# Patient Record
Sex: MALE | Race: WHITE | Employment: FULL TIME | URBAN - METROPOLITAN AREA
[De-identification: names, ages, dates, MRNs, and addresses within clinical notes are randomized per-mention and may not be internally consistent; named-entity substitution may affect disease eponyms.]

---

## 2019-09-10 ENCOUNTER — HOSPITAL ENCOUNTER (EMERGENCY)
Age: 64
Discharge: HOME | End: 2019-09-10
Attending: EMERGENCY MEDICINE
Payer: COMMERCIAL

## 2019-09-10 ENCOUNTER — APPOINTMENT (OUTPATIENT)
Dept: GENERAL RADIOLOGY | Age: 64
End: 2019-09-10
Payer: COMMERCIAL

## 2019-09-10 VITALS
HEART RATE: 103 BPM | RESPIRATION RATE: 16 BRPM | HEIGHT: 71 IN | OXYGEN SATURATION: 98 % | SYSTOLIC BLOOD PRESSURE: 150 MMHG | DIASTOLIC BLOOD PRESSURE: 76 MMHG | BODY MASS INDEX: 30.8 KG/M2 | TEMPERATURE: 98.1 F | WEIGHT: 220 LBS

## 2019-09-10 DIAGNOSIS — M54.50 RIGHT-SIDED LOW BACK PAIN WITHOUT SCIATICA, UNSPECIFIED CHRONICITY: Primary | ICD-10-CM

## 2019-09-10 PROCEDURE — 72100 X-RAY EXAM L-S SPINE 2/3 VWS: CPT

## 2019-09-10 PROCEDURE — 96372 THER/PROPH/DIAG INJ SC/IM: CPT

## 2019-09-10 PROCEDURE — 6360000002 HC RX W HCPCS: Performed by: NURSE PRACTITIONER

## 2019-09-10 PROCEDURE — 93005 ELECTROCARDIOGRAM TRACING: CPT

## 2019-09-10 PROCEDURE — 99283 EMERGENCY DEPT VISIT LOW MDM: CPT

## 2019-09-10 RX ORDER — IBUPROFEN 800 MG/1
800 TABLET ORAL EVERY 8 HOURS PRN
Qty: 15 TABLET | Refills: 0 | Status: SHIPPED | OUTPATIENT
Start: 2019-09-10

## 2019-09-10 RX ORDER — METHOCARBAMOL 500 MG/1
500 TABLET, FILM COATED ORAL 3 TIMES DAILY
Qty: 20 TABLET | Refills: 0 | Status: SHIPPED | OUTPATIENT
Start: 2019-09-10

## 2019-09-10 RX ORDER — KETOROLAC TROMETHAMINE 30 MG/ML
30 INJECTION, SOLUTION INTRAMUSCULAR; INTRAVENOUS ONCE
Status: COMPLETED | OUTPATIENT
Start: 2019-09-10 | End: 2019-09-10

## 2019-09-10 RX ORDER — DEXAMETHASONE SODIUM PHOSPHATE 10 MG/ML
10 INJECTION INTRAMUSCULAR; INTRAVENOUS ONCE
Status: COMPLETED | OUTPATIENT
Start: 2019-09-10 | End: 2019-09-10

## 2019-09-10 RX ADMIN — KETOROLAC TROMETHAMINE 30 MG: 30 INJECTION, SOLUTION INTRAMUSCULAR at 15:22

## 2019-09-10 RX ADMIN — DEXAMETHASONE SODIUM PHOSPHATE 10 MG: 10 INJECTION INTRAMUSCULAR; INTRAVENOUS at 15:22

## 2019-09-10 ASSESSMENT — ENCOUNTER SYMPTOMS
COLOR CHANGE: 0
ABDOMINAL PAIN: 0
BACK PAIN: 1

## 2019-09-10 ASSESSMENT — PAIN SCALES - GENERAL: PAINLEVEL_OUTOF10: 10

## 2019-09-10 NOTE — ED PROVIDER NOTES
urinating, dysuria, flank pain, frequency, hematuria and urgency. Musculoskeletal: Positive for back pain and myalgias. Negative for arthralgias, gait problem and neck pain. Skin: Negative for color change, rash and wound. Neurological: Negative for weakness and numbness. Except as noted above the remainder of the review of systems was reviewed and negative. PHYSICAL EXAM    (up to 7 for level 4, 8 or more for level 5)     ED Triage Vitals   BP Temp Temp src Pulse Resp SpO2 Height Weight   09/10/19 1435 09/10/19 1435 -- 09/10/19 1435 09/10/19 1435 09/10/19 1435 09/10/19 1437 09/10/19 1437   (!) 150/76 98.1 °F (36.7 °C)  121 16 98 % 5' 11\" (1.803 m) 220 lb (99.8 kg)     Physical Exam   Constitutional: He is oriented to person, place, and time. He appears well-developed and well-nourished. No distress. Eyes: Conjunctivae are normal.   Cardiovascular: Intact distal pulses. Pulmonary/Chest: Effort normal. No respiratory distress. Musculoskeletal:        Thoracic back: He exhibits no tenderness, no bony tenderness and no pain. Lumbar back: He exhibits tenderness, pain and spasm. He exhibits no bony tenderness, no swelling and no deformity. Back:    Neurological: He is alert and oriented to person, place, and time. He has normal strength. Coordination normal.   Skin: Skin is warm and dry. Capillary refill takes less than 2 seconds. No rash noted. He is not diaphoretic. Psychiatric: He has a normal mood and affect. His behavior is normal.   Vitals reviewed.       EMERGENCY DEPARTMENT COURSE and DIFFERENTIAL DIAGNOSIS/MDM:   Vitals:    Vitals:    09/10/19 1435 09/10/19 1437 09/10/19 1458   BP: (!) 150/76     Pulse: 121  103   Resp: 16     Temp: 98.1 °F (36.7 °C)     SpO2: 98%     Weight:  220 lb (99.8 kg)    Height:  5' 11\" (1.803 m)          MEDICATIONS GIVEN IN THE ED:  Medications   ketorolac (TORADOL) injection 30 mg (30 mg Intramuscular Given 9/10/19 1522)   dexamethasone (DECADRON)

## 2019-09-11 LAB
EKG ATRIAL RATE: 103 BPM
EKG P AXIS: 42 DEGREES
EKG P-R INTERVAL: 148 MS
EKG Q-T INTERVAL: 334 MS
EKG QRS DURATION: 84 MS
EKG QTC CALCULATION (BAZETT): 437 MS
EKG R AXIS: 30 DEGREES
EKG T AXIS: 37 DEGREES
EKG VENTRICULAR RATE: 103 BPM

## 2024-10-09 ENCOUNTER — ANESTHESIA EVENT (OUTPATIENT)
Dept: OPERATING ROOM | Age: 69
DRG: 915 | End: 2024-10-09
Payer: MEDICARE

## 2024-10-09 ENCOUNTER — ANESTHESIA EVENT (OUTPATIENT)
Dept: OPERATING ROOM | Age: 69
End: 2024-10-09
Payer: MEDICARE

## 2024-10-09 ENCOUNTER — APPOINTMENT (OUTPATIENT)
Dept: GENERAL RADIOLOGY | Age: 69
DRG: 915 | End: 2024-10-09
Payer: MEDICARE

## 2024-10-09 ENCOUNTER — ANESTHESIA (OUTPATIENT)
Dept: OPERATING ROOM | Age: 69
DRG: 915 | End: 2024-10-09
Payer: MEDICARE

## 2024-10-09 ENCOUNTER — HOSPITAL ENCOUNTER (INPATIENT)
Age: 69
LOS: 30 days | Discharge: NURSING, REHAB OR LONG-TERM CARE | DRG: 915 | End: 2024-11-08
Attending: EMERGENCY MEDICINE | Admitting: FAMILY MEDICINE
Payer: MEDICARE

## 2024-10-09 ENCOUNTER — ANESTHESIA (OUTPATIENT)
Dept: OPERATING ROOM | Age: 69
End: 2024-10-09
Payer: MEDICARE

## 2024-10-09 DIAGNOSIS — T78.3XXA ANGIOEDEMA, INITIAL ENCOUNTER: Primary | ICD-10-CM

## 2024-10-09 DIAGNOSIS — T78.3XXA ANGIOEDEMA OF TONGUE: ICD-10-CM

## 2024-10-09 DIAGNOSIS — R56.9 SEIZURE-LIKE ACTIVITY (HCC): ICD-10-CM

## 2024-10-09 LAB
ALBUMIN SERPL-MCNC: 3.5 G/DL (ref 3.5–5.2)
ALLEN TEST: POSITIVE
ALP SERPL-CCNC: 129 U/L (ref 40–129)
ALT SERPL-CCNC: 27 U/L (ref 5–41)
ANION GAP SERPL CALCULATED.3IONS-SCNC: 12 MMOL/L (ref 9–17)
AST SERPL-CCNC: 34 U/L
BASOPHILS # BLD: 0.04 K/UL (ref 0–0.2)
BASOPHILS NFR BLD: 1 % (ref 0–2)
BILIRUB SERPL-MCNC: 0.8 MG/DL (ref 0.3–1.2)
BUN SERPL-MCNC: 9 MG/DL (ref 8–23)
BUN/CREAT SERPL: 15 (ref 9–20)
CA-I BLD-SCNC: 1.15 MMOL/L (ref 1.13–1.33)
CALCIUM SERPL-MCNC: 8.1 MG/DL (ref 8.6–10.4)
CHLORIDE SERPL-SCNC: 99 MMOL/L (ref 98–107)
CO2 SERPL-SCNC: 22 MMOL/L (ref 20–31)
CREAT SERPL-MCNC: 0.6 MG/DL (ref 0.7–1.2)
EOSINOPHIL # BLD: 0.04 K/UL (ref 0–0.44)
EOSINOPHILS RELATIVE PERCENT: 1 % (ref 1–4)
ERYTHROCYTE [DISTWIDTH] IN BLOOD BY AUTOMATED COUNT: 12.1 % (ref 11.8–14.4)
FIO2: 50
GFR, ESTIMATED: >90 ML/MIN/1.73M2
GLUCOSE SERPL-MCNC: 101 MG/DL (ref 70–99)
HCT VFR BLD AUTO: 47.1 % (ref 40.7–50.3)
HGB BLD-MCNC: 16.1 G/DL (ref 13–17)
IMM GRANULOCYTES # BLD AUTO: 0.03 K/UL (ref 0–0.3)
IMM GRANULOCYTES NFR BLD: 0 %
LACTATE BLDV-SCNC: 1 MMOL/L (ref 0.5–2.2)
LYMPHOCYTES NFR BLD: 1.25 K/UL (ref 1.1–3.7)
LYMPHOCYTES RELATIVE PERCENT: 18 % (ref 24–43)
MAGNESIUM SERPL-MCNC: 1.7 MG/DL (ref 1.6–2.6)
MCH RBC QN AUTO: 33.3 PG (ref 25.2–33.5)
MCHC RBC AUTO-ENTMCNC: 34.2 G/DL (ref 28.4–34.8)
MCV RBC AUTO: 97.5 FL (ref 82.6–102.9)
MODE: ABNORMAL
MONOCYTES NFR BLD: 0.4 K/UL (ref 0.1–1.2)
MONOCYTES NFR BLD: 6 % (ref 3–12)
NEGATIVE BASE EXCESS, ART: 2.9 MMOL/L (ref 0–2)
NEUTROPHILS NFR BLD: 74 % (ref 36–65)
NEUTS SEG NFR BLD: 5.36 K/UL (ref 1.5–8.1)
NRBC BLD-RTO: 0 PER 100 WBC
O2 DELIVERY DEVICE: ABNORMAL
PHOSPHATE SERPL-MCNC: 3.6 MG/DL (ref 2.5–4.5)
PLATELET # BLD AUTO: 167 K/UL (ref 138–453)
PMV BLD AUTO: 9.3 FL (ref 8.1–13.5)
POC HCO3: 24.3 MMOL/L (ref 21–28)
POC O2 SATURATION: 96.1 % (ref 94–98)
POC PCO2: 49.8 MM HG (ref 35–48)
POC PH: 7.3 (ref 7.35–7.45)
POC PO2: 92.3 MM HG (ref 83–108)
POTASSIUM SERPL-SCNC: 4.4 MMOL/L (ref 3.7–5.3)
PROT SERPL-MCNC: 6.6 G/DL (ref 6.4–8.3)
RBC # BLD AUTO: 4.83 M/UL (ref 4.21–5.77)
SAMPLE SITE: ABNORMAL
SODIUM SERPL-SCNC: 133 MMOL/L (ref 135–144)
WBC OTHER # BLD: 7.1 K/UL (ref 3.5–11.3)

## 2024-10-09 PROCEDURE — 6360000002 HC RX W HCPCS: Performed by: INTERNAL MEDICINE

## 2024-10-09 PROCEDURE — 2580000003 HC RX 258: Performed by: INTERNAL MEDICINE

## 2024-10-09 PROCEDURE — 82803 BLOOD GASES ANY COMBINATION: CPT

## 2024-10-09 PROCEDURE — 6360000002 HC RX W HCPCS

## 2024-10-09 PROCEDURE — 6360000002 HC RX W HCPCS: Performed by: EMERGENCY MEDICINE

## 2024-10-09 PROCEDURE — 3700000000 HC ANESTHESIA ATTENDED CARE: Performed by: ANESTHESIOLOGY

## 2024-10-09 PROCEDURE — 3600000002 HC SURGERY LEVEL 2 BASE: Performed by: ANESTHESIOLOGY

## 2024-10-09 PROCEDURE — 94761 N-INVAS EAR/PLS OXIMETRY MLT: CPT

## 2024-10-09 PROCEDURE — 2700000000 HC OXYGEN THERAPY PER DAY

## 2024-10-09 PROCEDURE — 2000000000 HC ICU R&B

## 2024-10-09 PROCEDURE — 94002 VENT MGMT INPAT INIT DAY: CPT

## 2024-10-09 PROCEDURE — 5A1945Z RESPIRATORY VENTILATION, 24-96 CONSECUTIVE HOURS: ICD-10-PCS | Performed by: FAMILY MEDICINE

## 2024-10-09 PROCEDURE — 71045 X-RAY EXAM CHEST 1 VIEW: CPT

## 2024-10-09 PROCEDURE — 2580000003 HC RX 258: Performed by: EMERGENCY MEDICINE

## 2024-10-09 PROCEDURE — 3600000012 HC SURGERY LEVEL 2 ADDTL 15MIN: Performed by: ANESTHESIOLOGY

## 2024-10-09 PROCEDURE — 36600 WITHDRAWAL OF ARTERIAL BLOOD: CPT

## 2024-10-09 PROCEDURE — 2500000003 HC RX 250 WO HCPCS

## 2024-10-09 PROCEDURE — 0BH17EZ INSERTION OF ENDOTRACHEAL AIRWAY INTO TRACHEA, VIA NATURAL OR ARTIFICIAL OPENING: ICD-10-PCS | Performed by: ANESTHESIOLOGY

## 2024-10-09 PROCEDURE — 36415 COLL VENOUS BLD VENIPUNCTURE: CPT

## 2024-10-09 PROCEDURE — 2500000003 HC RX 250 WO HCPCS: Performed by: FAMILY MEDICINE

## 2024-10-09 PROCEDURE — 2580000003 HC RX 258: Performed by: FAMILY MEDICINE

## 2024-10-09 PROCEDURE — 83735 ASSAY OF MAGNESIUM: CPT

## 2024-10-09 PROCEDURE — 51798 US URINE CAPACITY MEASURE: CPT

## 2024-10-09 PROCEDURE — 2500000003 HC RX 250 WO HCPCS: Performed by: INTERNAL MEDICINE

## 2024-10-09 PROCEDURE — 96375 TX/PRO/DX INJ NEW DRUG ADDON: CPT

## 2024-10-09 PROCEDURE — 2500000003 HC RX 250 WO HCPCS: Performed by: EMERGENCY MEDICINE

## 2024-10-09 PROCEDURE — 82330 ASSAY OF CALCIUM: CPT

## 2024-10-09 PROCEDURE — 84100 ASSAY OF PHOSPHORUS: CPT

## 2024-10-09 PROCEDURE — 96374 THER/PROPH/DIAG INJ IV PUSH: CPT

## 2024-10-09 PROCEDURE — 99285 EMERGENCY DEPT VISIT HI MDM: CPT

## 2024-10-09 PROCEDURE — 96372 THER/PROPH/DIAG INJ SC/IM: CPT

## 2024-10-09 PROCEDURE — 80053 COMPREHEN METABOLIC PANEL: CPT

## 2024-10-09 PROCEDURE — 6360000002 HC RX W HCPCS: Performed by: FAMILY MEDICINE

## 2024-10-09 PROCEDURE — 83605 ASSAY OF LACTIC ACID: CPT

## 2024-10-09 PROCEDURE — 85025 COMPLETE CBC W/AUTO DIFF WBC: CPT

## 2024-10-09 PROCEDURE — 3700000001 HC ADD 15 MINUTES (ANESTHESIA): Performed by: ANESTHESIOLOGY

## 2024-10-09 RX ORDER — DIPHENHYDRAMINE HYDROCHLORIDE 50 MG/ML
50 INJECTION INTRAMUSCULAR; INTRAVENOUS ONCE
Status: COMPLETED | OUTPATIENT
Start: 2024-10-09 | End: 2024-10-09

## 2024-10-09 RX ORDER — POTASSIUM CHLORIDE 29.8 MG/ML
20 INJECTION INTRAVENOUS PRN
Status: DISCONTINUED | OUTPATIENT
Start: 2024-10-09 | End: 2024-10-21

## 2024-10-09 RX ORDER — POTASSIUM CHLORIDE 7.45 MG/ML
10 INJECTION INTRAVENOUS PRN
Status: DISCONTINUED | OUTPATIENT
Start: 2024-10-09 | End: 2024-10-21

## 2024-10-09 RX ORDER — AMLODIPINE BESYLATE AND ATORVASTATIN CALCIUM 10; 40 MG/1; MG/1
1 TABLET, FILM COATED ORAL DAILY
Status: ON HOLD | COMMUNITY
End: 2024-10-29 | Stop reason: HOSPADM

## 2024-10-09 RX ORDER — LIDOCAINE HYDROCHLORIDE 10 MG/ML
INJECTION, SOLUTION EPIDURAL; INFILTRATION; INTRACAUDAL; PERINEURAL
Status: DISCONTINUED | OUTPATIENT
Start: 2024-10-09 | End: 2024-10-09 | Stop reason: SDUPTHER

## 2024-10-09 RX ORDER — MAGNESIUM SULFATE IN WATER 40 MG/ML
2000 INJECTION, SOLUTION INTRAVENOUS PRN
Status: DISCONTINUED | OUTPATIENT
Start: 2024-10-09 | End: 2024-11-08 | Stop reason: HOSPADM

## 2024-10-09 RX ORDER — MIDAZOLAM HYDROCHLORIDE 1 MG/ML
5 INJECTION, SOLUTION INTRAMUSCULAR; INTRAVENOUS ONCE
Status: DISCONTINUED | OUTPATIENT
Start: 2024-10-09 | End: 2024-10-09

## 2024-10-09 RX ORDER — PROPOFOL 10 MG/ML
5-50 INJECTION, EMULSION INTRAVENOUS CONTINUOUS
Status: DISCONTINUED | OUTPATIENT
Start: 2024-10-09 | End: 2024-10-09

## 2024-10-09 RX ORDER — SODIUM CHLORIDE 0.9 % (FLUSH) 0.9 %
5-40 SYRINGE (ML) INJECTION PRN
Status: DISCONTINUED | OUTPATIENT
Start: 2024-10-09 | End: 2024-11-08 | Stop reason: HOSPADM

## 2024-10-09 RX ORDER — DEXTROSE MONOHYDRATE AND SODIUM CHLORIDE 5; .45 G/100ML; G/100ML
INJECTION, SOLUTION INTRAVENOUS CONTINUOUS
Status: DISCONTINUED | OUTPATIENT
Start: 2024-10-09 | End: 2024-10-11

## 2024-10-09 RX ORDER — BENAZEPRIL HYDROCHLORIDE 10 MG/1
10 TABLET ORAL DAILY
Status: ON HOLD | COMMUNITY
End: 2024-10-14 | Stop reason: SINTOL

## 2024-10-09 RX ORDER — PROPOFOL 10 MG/ML
INJECTION, EMULSION INTRAVENOUS
Status: DISCONTINUED | OUTPATIENT
Start: 2024-10-09 | End: 2024-10-09 | Stop reason: SDUPTHER

## 2024-10-09 RX ORDER — PROPOFOL 10 MG/ML
5-50 INJECTION, EMULSION INTRAVENOUS CONTINUOUS
Status: DISCONTINUED | OUTPATIENT
Start: 2024-10-09 | End: 2024-10-10

## 2024-10-09 RX ORDER — MIDAZOLAM HYDROCHLORIDE 5 MG/ML
6 INJECTION, SOLUTION INTRAMUSCULAR; INTRAVENOUS ONCE
Status: DISCONTINUED | OUTPATIENT
Start: 2024-10-09 | End: 2024-10-09

## 2024-10-09 RX ORDER — ALBUTEROL SULFATE 90 UG/1
2 INHALANT RESPIRATORY (INHALATION) EVERY 6 HOURS PRN
COMMUNITY

## 2024-10-09 RX ORDER — FENTANYL CITRATE 0.05 MG/ML
50 INJECTION, SOLUTION INTRAMUSCULAR; INTRAVENOUS ONCE
Status: COMPLETED | OUTPATIENT
Start: 2024-10-09 | End: 2024-10-09

## 2024-10-09 RX ORDER — MIDAZOLAM HYDROCHLORIDE 1 MG/ML
5 INJECTION, SOLUTION INTRAMUSCULAR; INTRAVENOUS ONCE
Status: COMPLETED | OUTPATIENT
Start: 2024-10-09 | End: 2024-10-09

## 2024-10-09 RX ORDER — SUCCINYLCHOLINE CHLORIDE 20 MG/ML
INJECTION INTRAMUSCULAR; INTRAVENOUS
Status: DISCONTINUED | OUTPATIENT
Start: 2024-10-09 | End: 2024-10-09 | Stop reason: SDUPTHER

## 2024-10-09 RX ORDER — SODIUM CHLORIDE 9 MG/ML
INJECTION, SOLUTION INTRAVENOUS PRN
Status: DISCONTINUED | OUTPATIENT
Start: 2024-10-09 | End: 2024-11-08 | Stop reason: HOSPADM

## 2024-10-09 RX ORDER — ENOXAPARIN SODIUM 100 MG/ML
40 INJECTION SUBCUTANEOUS DAILY
Status: DISCONTINUED | OUTPATIENT
Start: 2024-10-09 | End: 2024-11-04

## 2024-10-09 RX ORDER — ONDANSETRON 4 MG/1
4 TABLET, ORALLY DISINTEGRATING ORAL EVERY 8 HOURS PRN
Status: DISCONTINUED | OUTPATIENT
Start: 2024-10-09 | End: 2024-11-08 | Stop reason: HOSPADM

## 2024-10-09 RX ORDER — MAGNESIUM HYDROXIDE/ALUMINUM HYDROXICE/SIMETHICONE 120; 1200; 1200 MG/30ML; MG/30ML; MG/30ML
30 SUSPENSION ORAL EVERY 6 HOURS PRN
Status: DISCONTINUED | OUTPATIENT
Start: 2024-10-09 | End: 2024-11-08 | Stop reason: HOSPADM

## 2024-10-09 RX ORDER — HYDROCODONE BITARTRATE AND ACETAMINOPHEN 10; 325 MG/1; MG/1
1 TABLET ORAL EVERY 8 HOURS PRN
Status: ON HOLD | COMMUNITY
End: 2024-10-29 | Stop reason: HOSPADM

## 2024-10-09 RX ORDER — ACETAMINOPHEN 650 MG/1
650 SUPPOSITORY RECTAL EVERY 6 HOURS PRN
Status: DISCONTINUED | OUTPATIENT
Start: 2024-10-09 | End: 2024-11-08 | Stop reason: HOSPADM

## 2024-10-09 RX ORDER — ROCURONIUM BROMIDE 10 MG/ML
INJECTION, SOLUTION INTRAVENOUS
Status: DISCONTINUED | OUTPATIENT
Start: 2024-10-09 | End: 2024-10-09 | Stop reason: SDUPTHER

## 2024-10-09 RX ORDER — ACETAMINOPHEN 325 MG/1
650 TABLET ORAL EVERY 6 HOURS PRN
Status: DISCONTINUED | OUTPATIENT
Start: 2024-10-09 | End: 2024-11-08 | Stop reason: HOSPADM

## 2024-10-09 RX ORDER — ICATIBANT 30 MG/3ML
30 INJECTION, SOLUTION SUBCUTANEOUS ONCE
Status: COMPLETED | OUTPATIENT
Start: 2024-10-09 | End: 2024-10-09

## 2024-10-09 RX ORDER — DIPHENHYDRAMINE HYDROCHLORIDE 50 MG/ML
25 INJECTION INTRAMUSCULAR; INTRAVENOUS EVERY 6 HOURS
Status: DISCONTINUED | OUTPATIENT
Start: 2024-10-09 | End: 2024-10-15

## 2024-10-09 RX ORDER — EPINEPHRINE 1 MG/ML
0.3 INJECTION, SOLUTION INTRAMUSCULAR; SUBCUTANEOUS ONCE
Status: DISCONTINUED | OUTPATIENT
Start: 2024-10-09 | End: 2024-10-09

## 2024-10-09 RX ORDER — ONDANSETRON 2 MG/ML
4 INJECTION INTRAMUSCULAR; INTRAVENOUS EVERY 6 HOURS PRN
Status: DISCONTINUED | OUTPATIENT
Start: 2024-10-09 | End: 2024-11-08 | Stop reason: HOSPADM

## 2024-10-09 RX ORDER — FENTANYL CITRATE 50 UG/ML
50 INJECTION, SOLUTION INTRAMUSCULAR; INTRAVENOUS
Status: DISCONTINUED | OUTPATIENT
Start: 2024-10-09 | End: 2024-10-19

## 2024-10-09 RX ORDER — POLYETHYLENE GLYCOL 3350 17 G/17G
17 POWDER, FOR SOLUTION ORAL DAILY PRN
Status: DISCONTINUED | OUTPATIENT
Start: 2024-10-09 | End: 2024-11-08 | Stop reason: HOSPADM

## 2024-10-09 RX ORDER — 0.9 % SODIUM CHLORIDE 0.9 %
500 INTRAVENOUS SOLUTION INTRAVENOUS ONCE
Status: COMPLETED | OUTPATIENT
Start: 2024-10-09 | End: 2024-10-09

## 2024-10-09 RX ORDER — SODIUM CHLORIDE 0.9 % (FLUSH) 0.9 %
5-40 SYRINGE (ML) INJECTION EVERY 12 HOURS SCHEDULED
Status: DISCONTINUED | OUTPATIENT
Start: 2024-10-09 | End: 2024-11-08 | Stop reason: HOSPADM

## 2024-10-09 RX ADMIN — FAMOTIDINE 20 MG: 10 INJECTION, SOLUTION INTRAVENOUS at 10:12

## 2024-10-09 RX ADMIN — DIPHENHYDRAMINE HYDROCHLORIDE 25 MG: 50 INJECTION INTRAMUSCULAR; INTRAVENOUS at 14:02

## 2024-10-09 RX ADMIN — FENTANYL CITRATE 50 MCG: 50 INJECTION INTRAMUSCULAR; INTRAVENOUS at 11:09

## 2024-10-09 RX ADMIN — WATER 60 MG: 1 INJECTION INTRAMUSCULAR; INTRAVENOUS; SUBCUTANEOUS at 14:02

## 2024-10-09 RX ADMIN — Medication 125 MCG/HR: at 19:56

## 2024-10-09 RX ADMIN — LIDOCAINE HYDROCHLORIDE 80 MG: 10 INJECTION, SOLUTION EPIDURAL; INFILTRATION; INTRACAUDAL; PERINEURAL at 10:31

## 2024-10-09 RX ADMIN — SODIUM CHLORIDE, PRESERVATIVE FREE 20 MG: 5 INJECTION INTRAVENOUS at 19:36

## 2024-10-09 RX ADMIN — SODIUM CHLORIDE 500 ML: 9 INJECTION, SOLUTION INTRAVENOUS at 12:35

## 2024-10-09 RX ADMIN — MIDAZOLAM 5 MG: 1 INJECTION INTRAMUSCULAR; INTRAVENOUS at 11:31

## 2024-10-09 RX ADMIN — PROPOFOL 50 MCG/KG/MIN: 10 INJECTION, EMULSION INTRAVENOUS at 13:40

## 2024-10-09 RX ADMIN — ICATIBANT 30 MG: 10 INJECTION, SOLUTION SUBCUTANEOUS at 10:15

## 2024-10-09 RX ADMIN — ROCURONIUM BROMIDE 50 MG: 10 INJECTION, SOLUTION INTRAVENOUS at 10:42

## 2024-10-09 RX ADMIN — SODIUM CHLORIDE, PRESERVATIVE FREE 20 MG: 5 INJECTION INTRAVENOUS at 14:01

## 2024-10-09 RX ADMIN — PHENYLEPHRINE HYDROCHLORIDE 100 MCG: 10 INJECTION INTRAVENOUS at 10:46

## 2024-10-09 RX ADMIN — PROPOFOL 150 MG: 10 INJECTION, EMULSION INTRAVENOUS at 10:31

## 2024-10-09 RX ADMIN — DIPHENHYDRAMINE HYDROCHLORIDE 25 MG: 50 INJECTION INTRAMUSCULAR; INTRAVENOUS at 19:36

## 2024-10-09 RX ADMIN — DEXTROSE AND SODIUM CHLORIDE: 5; 450 INJECTION, SOLUTION INTRAVENOUS at 12:01

## 2024-10-09 RX ADMIN — PROPOFOL 50 MCG/KG/MIN: 10 INJECTION, EMULSION INTRAVENOUS at 23:09

## 2024-10-09 RX ADMIN — ENOXAPARIN SODIUM 40 MG: 100 INJECTION SUBCUTANEOUS at 14:02

## 2024-10-09 RX ADMIN — DIPHENHYDRAMINE HYDROCHLORIDE 50 MG: 50 INJECTION INTRAMUSCULAR; INTRAVENOUS at 10:11

## 2024-10-09 RX ADMIN — PROPOFOL 50 MCG/KG/MIN: 10 INJECTION, EMULSION INTRAVENOUS at 19:57

## 2024-10-09 RX ADMIN — METHYLPREDNISOLONE SODIUM SUCCINATE 125 MG: 125 INJECTION INTRAMUSCULAR; INTRAVENOUS at 10:10

## 2024-10-09 RX ADMIN — Medication 100 MCG/HR: at 11:18

## 2024-10-09 RX ADMIN — Medication 100 MG: at 10:31

## 2024-10-09 RX ADMIN — CISATRACURIUM BESYLATE 2 MCG/KG/MIN: 200 INJECTION, SOLUTION INTRAVENOUS at 11:42

## 2024-10-09 RX ADMIN — SODIUM CHLORIDE, PRESERVATIVE FREE 10 ML: 5 INJECTION INTRAVENOUS at 20:00

## 2024-10-09 RX ADMIN — PROPOFOL 50 MCG/KG/MIN: 10 INJECTION, EMULSION INTRAVENOUS at 10:40

## 2024-10-09 RX ADMIN — WATER 60 MG: 1 INJECTION INTRAMUSCULAR; INTRAVENOUS; SUBCUTANEOUS at 19:36

## 2024-10-09 RX ADMIN — PROPOFOL 50 MCG/KG/MIN: 10 INJECTION, EMULSION INTRAVENOUS at 16:33

## 2024-10-09 ASSESSMENT — PULMONARY FUNCTION TESTS
PIF_VALUE: 25
PIF_VALUE: 23
PIF_VALUE: 24
PIF_VALUE: 22
PIF_VALUE: 23
PIF_VALUE: 22
PIF_VALUE: 24
PIF_VALUE: 22
PIF_VALUE: 24
PIF_VALUE: 24
PIF_VALUE: 22
PIF_VALUE: 23
PIF_VALUE: 22
PIF_VALUE: 22
PIF_VALUE: 23
PIF_VALUE: 22
PIF_VALUE: 23
PIF_VALUE: 23
PIF_VALUE: 22
PIF_VALUE: 24
PIF_VALUE: 22
PIF_VALUE: 23
PIF_VALUE: 25
PIF_VALUE: 22
PIF_VALUE: 23
PIF_VALUE: 23

## 2024-10-09 NOTE — PROGRESS NOTES
Transitions of Care Pharmacy Service   Medication Review    The patient's list of current home medications has been reviewed.     Source(s) of information: Kranthi Montes PCP office visit records (7/17/24), OAs.    Based on information provided by the above source(s), I have updated the patient's home med list as described below.     I changed or updated the following medications on the patient's home medication list:  Removed Methocarbamol (Robaxin)     Added benazepril (LOTENSIN) 10 MG tablet, Take 1 tablet by mouth daily  albuterol sulfate HFA (VENTOLIN HFA) 108 (90 Base) MCG/ACT inhaler, Inhale 2 puffs into the lungs every 6 hours as needed for Wheezing or Shortness of Breath  amLODIPine-atorvastatatin (CADUET) 10-40 MG per tablet, Take 1 tablet by mouth daily  HYDROcodone-acetaminophen (NORCO)  MG per tablet, Take 1 tablet by mouth every 8 hours as needed for Pain. Max Daily Amount: 3 tablets       PROVIDER ACTION REQUESTED  Medications that need to be addressed by a physician/nurse practitioner: N/A.    Please feel free to call me with any questions about this encounter. Thank you.    Tessa Lao RPH   Transitions of Care Pharmacy Service  Phone:  [phone]    Electronically signed by Tessa Lao RPH on 10/9/2024 at 6:00 PM     Medications Prior to Admission:   benazepril (LOTENSIN) 10 MG tablet, Take 1 tablet by mouth daily  albuterol sulfate HFA (VENTOLIN HFA) 108 (90 Base) MCG/ACT inhaler, Inhale 2 puffs into the lungs every 6 hours as needed for Wheezing or Shortness of Breath  amLODIPine-atorvastatatin (CADUET) 10-40 MG per tablet, Take 1 tablet by mouth daily  HYDROcodone-acetaminophen (NORCO)  MG per tablet, Take 1 tablet by mouth every 8 hours as needed for Pain. Max Daily Amount: 3 tablets  ibuprofen (ADVIL;MOTRIN) 800 MG tablet, Take 1 tablet by mouth every 8 hours as needed for Pain or Fever

## 2024-10-09 NOTE — PROGRESS NOTES
End Of Shift Note  [facility]  Summary of shift: Patient resting in bed. He was admitted from ER and urgently went to surgery to be intubated. Patient presented with significant throat and tongue swelling after taking morning medications. He is an extremely difficult airway and OG was not able to be placed. Patient has an external catheter and has had low urine output. 225mls total with a bladder scan of 283. Dr. Gamboa aware and would like to monitor for another possible fluid bolus. Blood pressure became stable after sedation and fluid bolus, but David was ordered if map reaches below 65.     Vitals:    Vitals:    10/09/24 1700 10/09/24 1715 10/09/24 1730 10/09/24 1900   BP: 107/70 106/71 101/66 100/66   Pulse: 62 61 60 57   Resp: 20 20 20 20   Temp:       TempSrc:       SpO2: 95% 95% 95% 96%   Weight:       Height:   1.66 m (5' 5.35\")         I&O:   Intake/Output Summary (Last 24 hours) at 10/9/2024 1903  Last data filed at 10/9/2024 1754  Gross per 24 hour   Intake --   Output 225 ml   Net -225 ml       Resp Status: FIO2 50%    Ventilator Settings:  Vent Mode: AC/PRVC Resp Rate (Set): 20 bpm/Vt (Set, mL): 500 mL/ /FiO2 : 50 %    Critical Care IV infusions:   sodium chloride      dextrose 5 % and 0.45 % NaCl 75 mL/hr at 10/09/24 1201    cisatracurium besylate (NIMBEX) 200 mg in sodium chloride 0.9 % 100 mL infusion 2 mcg/kg/min (10/09/24 1142)    propofol 50 mcg/kg/min (10/09/24 1633)    fentaNYL 125 mcg/hr (10/09/24 1156)    phenylephrine (DAVID-SYNEPHRINE) 50 mg in sodium chloride 0.9 % 250 mL infusion          LDA:   Peripheral IV 10/09/24 Right Antecubital (Active)   Number of days: 0       Peripheral IV 10/09/24 Right Hand (Active)   Number of days: 0       Peripheral IV 10/09/24 Left Hand (Active)   Number of days: 0       Peripheral IV 10/09/24 Proximal;Right Cephalic (Active)   Number of days: 0       Peripheral IV 10/09/24 Right Forearm (Active)   Number of days: 0       External Urinary Catheter

## 2024-10-09 NOTE — H&P
Hospitalist - History & Physical      Patient: Noel Valle    Unit/Bed:[place] [address]  YOB: 1955  MRN: [id number]   Acct: [id number]   PCP: Ramakrishna Silver MD    Date of Service: Pt seen/examined on 10/09/24  and Admitted to Inpatient with expected LOS greater than two midnights due to medical therapy.     Chief Complaint: Tongue swelling 62-year-old male    Assessment and Plan:-    Acute respiratory insufficiency due to upper airway obstruction secondary to angioedema  Tongue swelling, upper airway swelling  Intubated in the OR under surgery supervision  Currently on the vent with critical care managing  Paralysis with Nimbex for the next 2 days due to fear of loss of airway which is life-threatening  On Solu-Medrol, Benadryl, Pepcid.  Sedation with propofol, fentanyl.    Hypotension  Developed after patient received sedation.  IV fluids ordered.  Pressor support to maintain a MAP of 65  Tobacco abuse disorder/history of possible COPD  As needed DuoNebs.  Will  on tobacco cessation when patient is alert      History Of Present Illness:    69-year-old male coming into the hospital for difficulty swallowing.  In the ER, patient found to have tongue swelling, trouble swallowing with acute distress, tachycardia, ill-appearing status.  Symptoms began earlier today at 6 AM.  He noticed severe swelling of his throat.  Patient denies any allergies, medications.  Patient is not on any ACE inhibitor.  Patient was able to talk but his words were muffled.  In the ER, case was discussed with the anesthesiologist to take the patient to the OR.  Patient was intubated under the supervision of surgery due to concerns for potential tracheostomy.  Patient was given rocuronium, propofol.  He continued to wake up and thrash around for which reason Versed 60 mg IV push was ordered followed by fentanyl 50 mg IV push.  He was then put on fentanyl drip.  His blood pressure did drop after this.  ICU ordered fluid

## 2024-10-09 NOTE — ED PROVIDER NOTES
EMERGENCY DEPARTMENT ENCOUNTER    Pt Name: Noel Valle  MRN: [id number]  Birthdate 1955  Date of evaluation: 10/9/24  CHIEF COMPLAINT       Chief Complaint   Patient presents with    Angioedema     HISTORY OF PRESENT ILLNESS   69-year-old male presenting to the ER with a swollen tongue.  Patient states symptoms started at 6 AM today.  Patient does admit to being on an ACE inhibitor.    The history is provided by the patient.   Allergic Reaction  Presenting symptoms: difficulty swallowing and swelling            REVIEW OF SYSTEMS     Review of Systems   Unable to perform ROS: Acuity of condition   HENT:  Positive for trouble swallowing.      PASTMEDICAL HISTORY   No past medical history on file.  Past Problem List  Patient Active Problem List   Diagnosis Code    Angioedema of tongue T78.3XXA     SURGICAL HISTORY     No past surgical history on file.  CURRENT MEDICATIONS       Current Discharge Medication List        CONTINUE these medications which have NOT CHANGED    Details   benazepril (LOTENSIN) 10 MG tablet Take 1 tablet by mouth daily      ibuprofen (ADVIL;MOTRIN) 800 MG tablet Take 1 tablet by mouth every 8 hours as needed for Pain or Fever  Qty: 15 tablet, Refills: 0      methocarbamol (ROBAXIN) 500 MG tablet Take 1 tablet by mouth 3 times daily   WARNING:  May cause drowsiness.  May impair ability to operate vehicles or machinery.  Do not use in combination with alcohol.  Qty: 20 tablet, Refills: 0           ALLERGIES     has No Known Allergies.  FAMILY HISTORY     has no family status information on file.      SOCIAL HISTORY       Social History     Tobacco Use    Smoking status: Every Day    Smokeless tobacco: Never     PHYSICAL EXAM     INITIAL VITALS: /68   Pulse (!) 112   Temp 97.7 °F (36.5 °C) (Temporal)   Resp 15   Wt 97.7 kg (215 lb 6.2 oz)   SpO2 98%   BMI 30.04 kg/m²    Physical Exam  Constitutional:       General: He is in acute distress.      Appearance: He is ill-appearing.  Answer:   2 mcg/kg/min     Order Specific Question:   Goal of Therapy:     Answer:   TOF of 1-4     Order Specific Question:   Indication of Therapy:     Answer:   Other     Order Specific Question:   Other Indication:     Answer:   difficult airway risk of death on accidental extubation     Order Specific Question:   Contact Provider if:     Answer:   Patient is receiving the maximum dose and is not achieving the goal of therapy    DISCONTD: midazolam (VERSED) injection 5 mg    DISCONTD: propofol infusion     Order Specific Question:   Titrate Infusion?     Answer:   Yes     Order Specific Question:   Initial Infusion Dose:     Answer:   20 mcg/kg/min     Order Specific Question:   Goal of Therapy:     Answer:   RASS of -4 to -5     Order Specific Question:   Contact Provider if:     Answer:   New onset HR less than 50 bpm     Order Specific Question:   Contact Provider if:     Answer:   New onset SBP less than 90 mmHg     Order Specific Question:   Contact Provider if:     Answer:   Patient is receiving maximum dose and is not achieving the goal of therapy     Order Specific Question:   Contact Provider if:     Answer:   Triglycerides greater than 500 mg/dL    fentaNYL (SUBLIMAZE) injection 50 mcg    midazolam (VERSED) injection 5 mg    cisatracurium besylate (NIMBEX) 200 mg in sodium chloride 0.9 % 100 mL infusion     Order Specific Question:   Titrate Infusion?     Answer:   Yes     Order Specific Question:   Initial Infusion Dose:     Answer:   2 mcg/kg/min     Order Specific Question:   Goal of Therapy:     Answer:   TOF of 1-4     Order Specific Question:   Indication of Therapy:     Answer:   Other     Order Specific Question:   Other Indication:     Answer:   difficult airway risk of death on accidental extubation     Order Specific Question:   Contact Provider if:     Answer:   Patient is receiving the maximum dose and is not achieving the goal of therapy    DISCONTD: fentaNYL 10 mcg/ml in 0.9%  ml

## 2024-10-09 NOTE — ED NOTES
Pt arrived ED with severe swollen throat and tongue. Starting 6am today, Pt denies being allergic to things. Denies taking Lisinopril, but something similar, ACE inhibiter. Pt words muffled but can make out small sentences. Pt tachy when hooked up to monitor. But alert and oriented.

## 2024-10-09 NOTE — ED NOTES
Attempted to call sister jossy left a message [phone]  Attempted to call wife ines left a message. [phone]

## 2024-10-09 NOTE — CONSULTS
Pulmonary Medicine and Critical Care Consult    Patient - Noel Valle   MRN -  [id number]   [facility] [id number]   - 1955      Date of Admission -  10/9/2024 10:05 AM  Date of evaluation -  10/9/2024  Room - [address] [place] [address]   Hospital Day - 0  Consulting - Reza, Mohsin Mohammad, MD Primary Care Physician - Ramakrishna Silver MD     Reason for Consult      Angioedema  Assessment/recommendations   Acute respiratory insufficiency due to upper airway instruction/angioedema with significant swelling upper airway/atelectasis with small effusion  Assist-control ventilation tidal volume 500 FiO2 50 rate of 20 PEEP of 8  Sedation with propofol RASS of -4  Sedation with fentanyl RASS -4  Paralysis with Nimbex for the next 2 days fear of loss of airway which is life-threatening  Discussed with anesthesia  Keep off ACE inhibitor for life  Status post icatibant  Will hold off FFP  Continue Solu-Medrol Benadryl and Pepcid    COPD/smoker   DuoNeb by nebulizer  Smoking cessation  PFT outpatient    Hypotension due to sedation   Fluid bolus/Marcel-Synephrine to MAP of 65    suspected sleep apnea/obesity  Outpatient sleep evaluation    discussed with sister; patient takes care of his wife who has neurological disorder not able to take care of herself and not able to make decisions.  Sister is next of kin.  Discussed with RN RT  Discussed with clinical pharmacist  Peptic ulcer disease prophylaxis  DVT prophylaxis      Problem List      Patient Active Problem List   Diagnosis    Angioedema of tongue       HPI     Noel Valle is 69 y.o.,  male, previous medical history hypertension COPD on inhalers obesity.  Patient presented to the emergency room after he developed throat swelling and tongue swelling that started around 6 AM in the morning today.  He has been on lisinopril.  He was found to have significant tongue swelling anesthesia consulted came to bedside to come to OR.  He was intubated with surgery supervision for  wheezing  Cardiovascular - Heart sounds are normal.  Regular rhythm normal rate without murmur, gallop or rub.  Abdomen - Soft, nontender, nondistended, no masses or organomegaly  Neurologic -intubated sedated  Skin - No bruising or bleeding  Extremities - No cyanosis, clubbing or edema    Labs  - Old records and notes have been reviewed in [facility]   CBC     Lab Results   Component Value Date/Time    WBC 7.1 10/09/2024 11:18 AM    RBC 4.83 10/09/2024 11:18 AM    HGB 16.1 10/09/2024 11:18 AM    HCT 47.1 10/09/2024 11:18 AM     10/09/2024 11:18 AM    MCV 97.5 10/09/2024 11:18 AM    MCH 33.3 10/09/2024 11:18 AM    MCHC 34.2 10/09/2024 11:18 AM    RDW 12.1 10/09/2024 11:18 AM    LYMPHOPCT 18 10/09/2024 11:18 AM    MONOPCT 6 10/09/2024 11:18 AM    EOSPCT 1 10/09/2024 11:18 AM    BASOPCT 1 10/09/2024 11:18 AM    MONOSABS 0.40 10/09/2024 11:18 AM    LYMPHSABS 1.25 10/09/2024 11:18 AM    EOSABS 0.04 10/09/2024 11:18 AM    BASOSABS 0.04 10/09/2024 11:18 AM     BMP   Lab Results   Component Value Date/Time     10/09/2024 11:18 AM    K 4.4 10/09/2024 11:18 AM    CL 99 10/09/2024 11:18 AM    CO2 22 10/09/2024 11:18 AM    BUN 9 10/09/2024 11:18 AM    CREATININE 0.6 10/09/2024 11:18 AM    GLUCOSE 101 10/09/2024 11:18 AM    CALCIUM 8.1 10/09/2024 11:18 AM    MG 1.7 10/09/2024 11:18 AM    PHOS 3.6 10/09/2024 11:18 AM     LFTS  Lab Results   Component Value Date/Time    ALKPHOS 129 10/09/2024 11:18 AM    ALT 27 10/09/2024 11:18 AM    AST 34 10/09/2024 11:18 AM    BILITOT 0.8 10/09/2024 11:18 AM         Radiology    CXR      Bibasilar infiltrates.  Atelectasis small left effusion     Endotracheal tube with the tip in the midtrachea.      (See actual reports for details)    \"Thank you for asking us to see this patient\"    Case discussed with nurse and patient/family.  Questions and concerns addressed.    Electronically signed by     Carlos Gamboa MD on 10/9/2024 at 12:52 PM   Cc35 min  This patient with critical

## 2024-10-09 NOTE — ADDENDUM NOTE
Addendum  created 10/09/24 1616 by Gayla Marcial APRN - NP    Attached Procedures edited, Procedure Navigator section edited

## 2024-10-09 NOTE — ANESTHESIA PROCEDURE NOTES
Airway  Date/Time: 10/9/2024 10:34 AM  Urgency: emergent    Difficult airway    General Information and Staff    Patient location during procedure: OR  Anesthesiologist: Cesar Garcia DO  Performed: anesthesiologist   Performed by: Cesar Garcia DO  Authorized by: Cesar Garcia DO      Consent for Airway (if performed for an anesthetic, see related documentation for consents)  Patient identity confirmed: per hospital policy  Consent: Verbal consent obtained.  Consent given by: patient      Indications and Patient Condition  Indications for airway management: airway protection  Spontaneous ventilation: present  Preoxygenated: yes  Patient position: reverse Trendelenburg  Mask difficulty assessment: not attempted    Final Airway Details  Final airway type: endotracheal airway      Successful airway: ETT  Cuffed: yes   Successful intubation technique: video laryngoscopy  Facilitating devices/methods: intubating stylet  Endotracheal tube insertion site: oral  Blade: Hernandez  Blade size: #3  ETT size (mm): 7.0  Cormack-Lehane Classification: grade I - full view of glottis  Placement verified by: chest auscultation   Measured from: lips  ETT to lips (cm): 24  Number of attempts at approach: 1    Additional Comments  Called to ER for airway management for angioedema. Decision to bring to OR to optimize conditions. Difficult airway equipment in the room. Preoxygenation 100% O2. RSI, lidocaine, propofol, succinylcholine. Glidescope reveals significant edema and swelling of the soft tissues around the airway. 7.0 ETT placed 1 attempt. +etCO2, +b/l breath sounds, VSS. Patient transported to ICU sedated.

## 2024-10-10 ENCOUNTER — APPOINTMENT (OUTPATIENT)
Dept: GENERAL RADIOLOGY | Age: 69
DRG: 915 | End: 2024-10-10
Payer: MEDICARE

## 2024-10-10 LAB
ALLEN TEST: POSITIVE
ANION GAP SERPL CALCULATED.3IONS-SCNC: 9 MMOL/L (ref 9–17)
BASOPHILS # BLD: 0 K/UL (ref 0–0.2)
BASOPHILS NFR BLD: 0 %
BUN SERPL-MCNC: 8 MG/DL (ref 8–23)
BUN/CREAT SERPL: 13 (ref 9–20)
CALCIUM SERPL-MCNC: 8.4 MG/DL (ref 8.6–10.4)
CHLORIDE SERPL-SCNC: 106 MMOL/L (ref 98–107)
CO2 SERPL-SCNC: 21 MMOL/L (ref 20–31)
CREAT SERPL-MCNC: 0.6 MG/DL (ref 0.7–1.2)
EOSINOPHIL # BLD: 0 K/UL (ref 0–0.4)
EOSINOPHILS RELATIVE PERCENT: 0 % (ref 1–4)
ERYTHROCYTE [DISTWIDTH] IN BLOOD BY AUTOMATED COUNT: 12 % (ref 11.8–14.4)
FIO2: 40
GFR, ESTIMATED: >90 ML/MIN/1.73M2
GLUCOSE SERPL-MCNC: 182 MG/DL (ref 70–99)
HCT VFR BLD AUTO: 41.9 % (ref 40.7–50.3)
HGB BLD-MCNC: 14.4 G/DL (ref 13–17)
IMM GRANULOCYTES # BLD AUTO: 0 K/UL (ref 0–0.3)
IMM GRANULOCYTES NFR BLD: 0 %
LYMPHOCYTES NFR BLD: 0.54 K/UL (ref 1–4.8)
LYMPHOCYTES RELATIVE PERCENT: 8 % (ref 24–44)
MCH RBC QN AUTO: 33.6 PG (ref 25.2–33.5)
MCHC RBC AUTO-ENTMCNC: 34.4 G/DL (ref 28.4–34.8)
MCV RBC AUTO: 97.7 FL (ref 82.6–102.9)
MODE: ABNORMAL
MONOCYTES NFR BLD: 0.07 K/UL (ref 0.2–0.8)
MONOCYTES NFR BLD: 1 % (ref 1–7)
NEGATIVE BASE EXCESS, ART: 2.4 MMOL/L (ref 0–2)
NEUTROPHILS NFR BLD: 91 % (ref 36–66)
NEUTS SEG NFR BLD: 6.09 K/UL (ref 1.8–7.7)
NRBC BLD-RTO: 0 PER 100 WBC
O2 DELIVERY DEVICE: ABNORMAL
PLATELET # BLD AUTO: 149 K/UL (ref 138–453)
PMV BLD AUTO: 9.4 FL (ref 8.1–13.5)
POC HCO3: 22.2 MMOL/L (ref 21–28)
POC O2 SATURATION: 95.4 % (ref 94–98)
POC PCO2: 37 MM HG (ref 35–48)
POC PH: 7.39 (ref 7.35–7.45)
POC PO2: 78.2 MM HG (ref 83–108)
POTASSIUM SERPL-SCNC: 4.1 MMOL/L (ref 3.7–5.3)
RBC # BLD AUTO: 4.29 M/UL (ref 4.21–5.77)
SAMPLE SITE: ABNORMAL
SODIUM SERPL-SCNC: 136 MMOL/L (ref 135–144)
WBC OTHER # BLD: 6.7 K/UL (ref 3.5–11.3)

## 2024-10-10 PROCEDURE — 2500000003 HC RX 250 WO HCPCS: Performed by: FAMILY MEDICINE

## 2024-10-10 PROCEDURE — 2580000003 HC RX 258: Performed by: INTERNAL MEDICINE

## 2024-10-10 PROCEDURE — 71045 X-RAY EXAM CHEST 1 VIEW: CPT

## 2024-10-10 PROCEDURE — 80048 BASIC METABOLIC PNL TOTAL CA: CPT

## 2024-10-10 PROCEDURE — 6360000002 HC RX W HCPCS: Performed by: FAMILY MEDICINE

## 2024-10-10 PROCEDURE — 6360000002 HC RX W HCPCS: Performed by: INTERNAL MEDICINE

## 2024-10-10 PROCEDURE — 36600 WITHDRAWAL OF ARTERIAL BLOOD: CPT

## 2024-10-10 PROCEDURE — 94003 VENT MGMT INPAT SUBQ DAY: CPT

## 2024-10-10 PROCEDURE — 2580000003 HC RX 258: Performed by: FAMILY MEDICINE

## 2024-10-10 PROCEDURE — 2700000000 HC OXYGEN THERAPY PER DAY

## 2024-10-10 PROCEDURE — 85025 COMPLETE CBC W/AUTO DIFF WBC: CPT

## 2024-10-10 PROCEDURE — 36415 COLL VENOUS BLD VENIPUNCTURE: CPT

## 2024-10-10 PROCEDURE — 2500000003 HC RX 250 WO HCPCS: Performed by: INTERNAL MEDICINE

## 2024-10-10 PROCEDURE — 82803 BLOOD GASES ANY COMBINATION: CPT

## 2024-10-10 PROCEDURE — 94761 N-INVAS EAR/PLS OXIMETRY MLT: CPT

## 2024-10-10 PROCEDURE — 2000000000 HC ICU R&B

## 2024-10-10 RX ORDER — PROPOFOL 10 MG/ML
5-50 INJECTION, EMULSION INTRAVENOUS CONTINUOUS
Status: DISCONTINUED | OUTPATIENT
Start: 2024-10-10 | End: 2024-10-14

## 2024-10-10 RX ADMIN — ONDANSETRON 4 MG: 2 INJECTION, SOLUTION INTRAMUSCULAR; INTRAVENOUS at 22:30

## 2024-10-10 RX ADMIN — WATER 60 MG: 1 INJECTION INTRAMUSCULAR; INTRAVENOUS; SUBCUTANEOUS at 19:48

## 2024-10-10 RX ADMIN — PROPOFOL 50 MCG/KG/MIN: 10 INJECTION, EMULSION INTRAVENOUS at 16:13

## 2024-10-10 RX ADMIN — DIPHENHYDRAMINE HYDROCHLORIDE 25 MG: 50 INJECTION INTRAMUSCULAR; INTRAVENOUS at 01:26

## 2024-10-10 RX ADMIN — FENTANYL CITRATE 50 MCG: 50 INJECTION INTRAMUSCULAR; INTRAVENOUS at 10:28

## 2024-10-10 RX ADMIN — ENOXAPARIN SODIUM 40 MG: 100 INJECTION SUBCUTANEOUS at 09:38

## 2024-10-10 RX ADMIN — PROPOFOL 50 MCG/KG/MIN: 10 INJECTION, EMULSION INTRAVENOUS at 13:08

## 2024-10-10 RX ADMIN — DIPHENHYDRAMINE HYDROCHLORIDE 25 MG: 50 INJECTION INTRAMUSCULAR; INTRAVENOUS at 19:42

## 2024-10-10 RX ADMIN — PROPOFOL 50 MCG/KG/MIN: 10 INJECTION, EMULSION INTRAVENOUS at 03:00

## 2024-10-10 RX ADMIN — DIPHENHYDRAMINE HYDROCHLORIDE 25 MG: 50 INJECTION INTRAMUSCULAR; INTRAVENOUS at 09:38

## 2024-10-10 RX ADMIN — CISATRACURIUM BESYLATE 2 MCG/KG/MIN: 200 INJECTION, SOLUTION INTRAVENOUS at 01:37

## 2024-10-10 RX ADMIN — WATER 60 MG: 1 INJECTION INTRAMUSCULAR; INTRAVENOUS; SUBCUTANEOUS at 05:10

## 2024-10-10 RX ADMIN — PROPOFOL 50 MCG/KG/MIN: 10 INJECTION, EMULSION INTRAVENOUS at 19:41

## 2024-10-10 RX ADMIN — WATER 60 MG: 1 INJECTION INTRAMUSCULAR; INTRAVENOUS; SUBCUTANEOUS at 13:25

## 2024-10-10 RX ADMIN — Medication 175 MCG/HR: at 21:57

## 2024-10-10 RX ADMIN — SODIUM CHLORIDE, PRESERVATIVE FREE 10 ML: 5 INJECTION INTRAVENOUS at 09:39

## 2024-10-10 RX ADMIN — PROPOFOL 50 MCG/KG/MIN: 10 INJECTION, EMULSION INTRAVENOUS at 22:48

## 2024-10-10 RX ADMIN — Medication 125 MCG/HR: at 06:28

## 2024-10-10 RX ADMIN — SODIUM CHLORIDE, PRESERVATIVE FREE 20 MG: 5 INJECTION INTRAVENOUS at 09:38

## 2024-10-10 RX ADMIN — PROPOFOL 50 MCG/KG/MIN: 10 INJECTION, EMULSION INTRAVENOUS at 10:22

## 2024-10-10 RX ADMIN — DIPHENHYDRAMINE HYDROCHLORIDE 25 MG: 50 INJECTION INTRAMUSCULAR; INTRAVENOUS at 13:26

## 2024-10-10 RX ADMIN — Medication 175 MCG/HR: at 16:12

## 2024-10-10 RX ADMIN — SODIUM CHLORIDE, PRESERVATIVE FREE 10 ML: 5 INJECTION INTRAVENOUS at 19:44

## 2024-10-10 RX ADMIN — DEXTROSE AND SODIUM CHLORIDE: 5; 450 INJECTION, SOLUTION INTRAVENOUS at 14:39

## 2024-10-10 RX ADMIN — SODIUM CHLORIDE, PRESERVATIVE FREE 20 MG: 5 INJECTION INTRAVENOUS at 19:47

## 2024-10-10 RX ADMIN — FENTANYL CITRATE 50 MCG: 50 INJECTION INTRAMUSCULAR; INTRAVENOUS at 22:29

## 2024-10-10 RX ADMIN — PROPOFOL 50 MCG/KG/MIN: 10 INJECTION, EMULSION INTRAVENOUS at 06:28

## 2024-10-10 ASSESSMENT — PULMONARY FUNCTION TESTS
PIF_VALUE: 22
PIF_VALUE: 26
PIF_VALUE: 24
PIF_VALUE: 22
PIF_VALUE: 24

## 2024-10-10 NOTE — PROGRESS NOTES
Pulmonary Critical Care Progress Note    Patient seen for the follow up of Angioedema of tongue     Subjective:    He is intubated sedated on propofol and fentanyl drip he is unresponsive he was on Nimbex drip just held.  He is a 40% FiO2 rate of 20 PEEP of 8 tidal volume 500 PEEP    Examination:    Vitals: /70   Pulse 70   Temp 96.8 °F (36 °C) (Axillary)   Resp 20   Ht 1.66 m (5' 5.35\")   Wt 97.7 kg (215 lb 6.2 oz)   SpO2 93%   BMI 35.46 kg/m²   SpO2  Av.8 %  Min: 93 %  Max: 96 %  General appearance: Intubated sedated significantly decreased tongue size and neck edema  Neck: No JVD  Lungs: Decreased breath sound no crackles or wheeze  Heart: regular rate and rhythm, S1, S2 normal, no gallop  Abdomen: Soft, non tender, + BS  Extremities: no cyanosis or clubbing. No significant edema    LABs:    CBC:   Recent Labs     10/09/24  1118 10/10/24  0334   WBC 7.1 6.7   HGB 16.1 14.4   HCT 47.1 41.9    149     BMP:   Recent Labs     10/09/24  1118 10/10/24  0334   * 136   K 4.4 4.1   CO2 22 21   BUN 9 8   CREATININE 0.6* 0.6*   LABGLOM >90 >90   GLUCOSE 101* 182*       LIVER PROFILE:  Recent Labs     10/09/24  1118   AST 34   ALT 27       Radiology:    Chest x-ray 10/10  Reviewed  No significant change in the appearance of the chest.     Impression/recommendations:    Acute respiratory insufficiency due to upper airway instruction/angioedema with significant swelling upper airway/atelectasis with small effusion  Assist-control ventilation tidal volume 500 FiO2 50 rate of 20 PEEP of 8  Sedation with propofol RASS of -2  Sedation with fentanyl RASS -2  Discontinue Nimbex  Keep off ACE inhibitor for life  Status post icatibant  Will hold off FFP  Continue Solu-Medrol Benadryl and Pepcid     COPD/smoker   DuoNeb by nebulizer  Smoking cessation  PFT outpatient     Hypotension due to sedation   Fluid bolus/Marcel-Synephrine to MAP of 65     suspected sleep apnea/obesity  Outpatient sleep evaluation

## 2024-10-10 NOTE — CARE COORDINATION
Case Management Assessment  Initial Evaluation    Date/Time of Evaluation: 10/10/2024 3:32 PM  Assessment Completed by: BENJAMÍN HAWKINS RN    If patient is discharged prior to next notation, then this note serves as note for discharge by case management.    Patient Name: Noel Valle                   YOB: 1955  Diagnosis: Angioedema, initial encounter [T78.3XXA]  Angioedema of tongue [T78.3XXA]                   Date / Time: 10/9/2024 10:05 AM    Patient Admission Status: Inpatient   Readmission Risk (Low < 19, Mod (19-27), High > 27): Readmission Risk Score: 14.1    Current PCP: Ramakrishna Silver MD  PCP verified by CM? Yes    Chart Reviewed: Yes      History Provided by: Child/Family  Patient Orientation: Unable to Assess (intubated)    Patient Cognition: Other (see comment) (intubated.)    Hospitalization in the last 30 days (Readmission):  No    If yes, Readmission Assessment in  Navigator will be completed.    Advance Directives:      Code Status: Full Code   Patient's Primary Decision Maker is: Legal Next of Kin      Discharge Planning:    Patient lives with: Spouse/Significant Other Type of Home: House  Primary Care Giver: Self  Patient Support Systems include: Spouse/Significant Other, Family Members   Current Financial resources: None  Current community resources: None  Current services prior to admission: Durable Medical Equipment            Current DME: Cane (cane as needed.)            Type of Home Care services:  PT, OT, Skilled Therapy    ADLS  Prior functional level: Independent in ADLs/IADLs  Current functional level: Assistance with the following:, Bathing, Dressing, Toileting, Feeding, Cooking, Housework, Shopping, Mobility    PT AM-PAC:   /24  OT AM-PAC:   /24    Family can provide assistance at DC: No  Would you like Case Management to discuss the discharge plan with any other family members/significant others, and if so, who? Yes (sister or wife.)  Plans to Return to Present

## 2024-10-10 NOTE — PLAN OF CARE
Problem: Discharge Planning  Goal: Discharge to home or other facility with appropriate resources  Outcome: Progressing  Flowsheets (Taken 10/9/2024 2000)  Discharge to home or other facility with appropriate resources: Identify barriers to discharge with patient and caregiver     Problem: Safety - Medical Restraint  Goal: Remains free of injury from restraints (Restraint for Interference with Medical Device)  Description: INTERVENTIONS:  1. Determine that other, less restrictive measures have been tried or would not be effective before applying the restraint  2. Evaluate the patient's condition at the time of restraint application  3. Inform patient/family regarding the reason for restraint  4. Q2H: Monitor safety, psychosocial status, comfort, nutrition and hydration  Outcome: Progressing  Flowsheets  Taken 10/9/2024 2000 by Lorna Luna RN  Remains free of injury from restraints (restraint for interference with medical device): Every 2 hours: Monitor safety, psychosocial status, comfort, nutrition and hydration  Taken 10/9/2024 1800 by Sadia Jung RN  Remains free of injury from restraints (restraint for interference with medical device):   Every 2 hours: Monitor safety, psychosocial status, comfort, nutrition and hydration   Determine that other, less restrictive measures have been tried or would not be effective before applying the restraint  Taken 10/9/2024 1600 by Saida Jung RN  Remains free of injury from restraints (restraint for interference with medical device):   Evaluate the patient's condition at the time of restraint application   Determine that other, less restrictive measures have been tried or would not be effective before applying the restraint  Taken 10/9/2024 1400 by Saida Jung RN  Remains free of injury from restraints (restraint for interference with medical device):   Determine that other, less restrictive measures have been tried or would not be effective before applying

## 2024-10-10 NOTE — PROGRESS NOTES
fentanyl drip.  His blood pressure did drop after this.  ICU ordered fluid boluses.  He does have a history of COPD, tobacco abuse.  He was transferred to the ICU thereafter.      Past Medical History:    No past medical history on file.    Past Surgical History:    No past surgical history on file.    Home Medications:   No current facility-administered medications on file prior to encounter.     Current Outpatient Medications on File Prior to Encounter   Medication Sig Dispense Refill    benazepril (LOTENSIN) 10 MG tablet Take 1 tablet by mouth daily      albuterol sulfate HFA (VENTOLIN HFA) 108 (90 Base) MCG/ACT inhaler Inhale 2 puffs into the lungs every 6 hours as needed for Wheezing or Shortness of Breath      amLODIPine-atorvastatatin (CADUET) 10-40 MG per tablet Take 1 tablet by mouth daily      HYDROcodone-acetaminophen (NORCO)  MG per tablet Take 1 tablet by mouth every 8 hours as needed for Pain. Max Daily Amount: 3 tablets      ibuprofen (ADVIL;MOTRIN) 800 MG tablet Take 1 tablet by mouth every 8 hours as needed for Pain or Fever 15 tablet 0       Allergies:    Patient has no known allergies.    Social History:    reports that he has been smoking. He has never used smokeless tobacco.    Family History:   No family history on file.    Diet:  Diet NPO    Review of systems:   Pertinent positives as noted in the HPI. All other systems reviewed and negative.    PHYSICAL EXAM:  /70   Pulse 57   Temp 96.9 °F (36.1 °C) (Temporal)   Resp 20   Ht 1.66 m (5' 5.35\")   Wt 97.7 kg (215 lb 6.2 oz)   SpO2 93%   BMI 35.46 kg/m²   General appearance: Intubated  HEENT: Normal cephalic, atraumatic, tongue swelling, ET tube in place  Neck: Supple, with full range of motion. No jugular venous distention. Trachea midline.  Respiratory:  Normal respiratory effort. Clear   Cardiovascular: Regular rate and rhythm with normal S1/S2 without murmurs, rubs or gallops.  Abdomen: Soft, non-tender, non-distended with  normal bowel sounds.  Musculoskeletal:  No clubbing, cyanosis or edema bilaterally.  Skin: Skin color, texture, turgor normal.  No rashes or lesions.  Neurologic:  intubated and sedated  Psychiatric: sedated  Capillary Refill: Brisk,< 3 seconds   Peripheral Pulses: +2 palpable, equal bilaterally     Labs:   Recent Labs     10/09/24  1118 10/10/24  0334   WBC 7.1 6.7   HGB 16.1 14.4   HCT 47.1 41.9    149     Recent Labs     10/09/24  1118 10/10/24  0334   * 136   K 4.4 4.1   CL 99 106   CO2 22 21   BUN 9 8   CREATININE 0.6* 0.6*   CALCIUM 8.1* 8.4*   PHOS 3.6  --      Recent Labs     10/09/24  1118   AST 34   ALT 27   BILITOT 0.8   ALKPHOS 129     No results for input(s): \"INR\" in the last 72 hours.  No results for input(s): \"CKTOTAL\", \"TROPONINI\" in the last 72 hours.    Urinalysis:    No results found for: \"NITRU\", \"WBCUA\", \"BACTERIA\", \"RBCUA\", \"BLOODU\", \"SPECGRAV\", \"GLUCOSEU\"    Radiology:   XR CHEST PORTABLE   Final Result   No significant change in the appearance of the chest.         XR CHEST PORTABLE   Final Result   Bibasilar infiltrates.      Endotracheal tube with the tip in the midtrachea.         XR CHEST PORTABLE    (Results Pending)     No results found.      EKG:     Electronically signed by Mohsin Mohammad Reza, MD on 10/10/2024 at 10:21 AM

## 2024-10-10 NOTE — PLAN OF CARE
Problem: Respiratory - Adult  Goal: Achieves optimal ventilation and oxygenation  Outcome: Progressing     Problem: Respiratory - Adult  Goal: Will be able to breathe spontaneously, without ventilator support  Description: Will be able to breathe spontaneously, without ventilator support  Outcome: Progressing

## 2024-10-10 NOTE — PROGRESS NOTES
End Of Shift Note  [place] ICU  Summary of shift: Patient resting in bed. Uneventful shift. He is an extremely difficult airway and OG was not able to be placed. Patient has an external catheter and has had urine output 1000 mls. Blood pressure has been stable and there has been no need to start david. He is maxed on propofol 50 mcg, fetanyl at 125 mcg, and nimbex at 2 mcg. Titrated to goal of rass -4/-5, currently patient is at RASS -5. He did become hypothermic 96.3 bare hugger placed for 1 hour and temp came back up to 98.4 and has been stable. Bare hugger turned off after 1 hr.     Vitals:    Vitals:    10/10/24 0400 10/10/24 0458 10/10/24 0500 10/10/24 0600   BP: 112/66  104/68 108/69   Pulse: 62 57 57 57   Resp: 20 20 20 20   Temp: 98.4 °F (36.9 °C)      TempSrc: Oral      SpO2: 94% 94%     Weight:       Height:            I&O:   Intake/Output Summary (Last 24 hours) at 10/10/2024 0609  Last data filed at 10/9/2024 2330  Gross per 24 hour   Intake --   Output 725 ml   Net -725 ml       Resp Status: intubated    Ventilator Settings:  Vent Mode: AC/PRVC Resp Rate (Set): 20 bpm/Vt (Set, mL): 500 mL/ /FiO2 : 40 %    Critical Care IV infusions:   sodium chloride      dextrose 5 % and 0.45 % NaCl 75 mL/hr at 10/09/24 1201    cisatracurium besylate (NIMBEX) 200 mg in sodium chloride 0.9 % 100 mL infusion 2 mcg/kg/min (10/10/24 0137)    propofol 50 mcg/kg/min (10/10/24 0300)    fentaNYL 125 mcg/hr (10/09/24 1956)    phenylephrine (DAVID-SYNEPHRINE) 50 mg in sodium chloride 0.9 % 250 mL infusion          LDA:   Peripheral IV 10/09/24 Right Antecubital (Active)   Number of days: 0       Peripheral IV 10/09/24 Right Hand (Active)   Number of days: 0       Peripheral IV 10/09/24 Left Hand (Active)   Number of days: 0       Peripheral IV 10/09/24 Proximal;Right Cephalic (Active)   Number of days: 0       Peripheral IV 10/09/24 Right Forearm (Active)   Number of days: 0       External Urinary Catheter (Active)   Number of

## 2024-10-11 ENCOUNTER — APPOINTMENT (OUTPATIENT)
Dept: GENERAL RADIOLOGY | Age: 69
DRG: 915 | End: 2024-10-11
Payer: MEDICARE

## 2024-10-11 LAB
ANION GAP SERPL CALCULATED.3IONS-SCNC: 11 MMOL/L (ref 9–17)
BASOPHILS # BLD: 0 K/UL (ref 0–0.2)
BASOPHILS NFR BLD: 0 %
BUN SERPL-MCNC: 16 MG/DL (ref 8–23)
BUN/CREAT SERPL: 15 (ref 9–20)
CALCIUM SERPL-MCNC: 8.6 MG/DL (ref 8.6–10.4)
CHLORIDE SERPL-SCNC: 105 MMOL/L (ref 98–107)
CO2 SERPL-SCNC: 21 MMOL/L (ref 20–31)
CREAT SERPL-MCNC: 1.1 MG/DL (ref 0.7–1.2)
EOSINOPHIL # BLD: 0 K/UL (ref 0–0.4)
EOSINOPHILS RELATIVE PERCENT: 0 % (ref 1–4)
ERYTHROCYTE [DISTWIDTH] IN BLOOD BY AUTOMATED COUNT: 12.1 % (ref 11.8–14.4)
GFR, ESTIMATED: 74 ML/MIN/1.73M2
GLUCOSE SERPL-MCNC: 155 MG/DL (ref 70–99)
HCT VFR BLD AUTO: 42.3 % (ref 40.7–50.3)
HGB BLD-MCNC: 14.2 G/DL (ref 13–17)
IMM GRANULOCYTES # BLD AUTO: 0.12 K/UL (ref 0–0.3)
IMM GRANULOCYTES NFR BLD: 1 %
LYMPHOCYTES NFR BLD: 0.47 K/UL (ref 1–4.8)
LYMPHOCYTES RELATIVE PERCENT: 4 % (ref 24–44)
MCH RBC QN AUTO: 33.3 PG (ref 25.2–33.5)
MCHC RBC AUTO-ENTMCNC: 33.6 G/DL (ref 28.4–34.8)
MCV RBC AUTO: 99.3 FL (ref 82.6–102.9)
MONOCYTES NFR BLD: 0.47 K/UL (ref 0.2–0.8)
MONOCYTES NFR BLD: 4 % (ref 1–7)
NEGATIVE BASE EXCESS, ART: 2.8 MMOL/L (ref 0–2)
NEUTROPHILS NFR BLD: 91 % (ref 36–66)
NEUTS SEG NFR BLD: 10.74 K/UL (ref 1.8–7.7)
NRBC BLD-RTO: 0 PER 100 WBC
PLATELET # BLD AUTO: 150 K/UL (ref 138–453)
PMV BLD AUTO: 9.7 FL (ref 8.1–13.5)
POC HCO3: 22.9 MMOL/L (ref 21–28)
POC O2 SATURATION: 96.8 % (ref 94–98)
POC PCO2: 41.9 MM HG (ref 35–48)
POC PH: 7.35 (ref 7.35–7.45)
POC PO2: 93.6 MM HG (ref 83–108)
POTASSIUM SERPL-SCNC: 4.3 MMOL/L (ref 3.7–5.3)
RBC # BLD AUTO: 4.26 M/UL (ref 4.21–5.77)
SODIUM SERPL-SCNC: 137 MMOL/L (ref 135–144)
TRIGL SERPL-MCNC: 222 MG/DL
WBC OTHER # BLD: 11.8 K/UL (ref 3.5–11.3)

## 2024-10-11 PROCEDURE — 2580000003 HC RX 258: Performed by: FAMILY MEDICINE

## 2024-10-11 PROCEDURE — 94761 N-INVAS EAR/PLS OXIMETRY MLT: CPT

## 2024-10-11 PROCEDURE — 6360000002 HC RX W HCPCS: Performed by: FAMILY MEDICINE

## 2024-10-11 PROCEDURE — 2580000003 HC RX 258: Performed by: INTERNAL MEDICINE

## 2024-10-11 PROCEDURE — 82803 BLOOD GASES ANY COMBINATION: CPT

## 2024-10-11 PROCEDURE — 2500000003 HC RX 250 WO HCPCS: Performed by: INTERNAL MEDICINE

## 2024-10-11 PROCEDURE — 85025 COMPLETE CBC W/AUTO DIFF WBC: CPT

## 2024-10-11 PROCEDURE — 71045 X-RAY EXAM CHEST 1 VIEW: CPT

## 2024-10-11 PROCEDURE — 94003 VENT MGMT INPAT SUBQ DAY: CPT

## 2024-10-11 PROCEDURE — 2700000000 HC OXYGEN THERAPY PER DAY

## 2024-10-11 PROCEDURE — 84478 ASSAY OF TRIGLYCERIDES: CPT

## 2024-10-11 PROCEDURE — 6360000002 HC RX W HCPCS: Performed by: INTERNAL MEDICINE

## 2024-10-11 PROCEDURE — 36415 COLL VENOUS BLD VENIPUNCTURE: CPT

## 2024-10-11 PROCEDURE — 36600 WITHDRAWAL OF ARTERIAL BLOOD: CPT

## 2024-10-11 PROCEDURE — 2000000000 HC ICU R&B

## 2024-10-11 PROCEDURE — 2500000003 HC RX 250 WO HCPCS: Performed by: FAMILY MEDICINE

## 2024-10-11 PROCEDURE — 80048 BASIC METABOLIC PNL TOTAL CA: CPT

## 2024-10-11 RX ORDER — FUROSEMIDE 10 MG/ML
40 INJECTION INTRAMUSCULAR; INTRAVENOUS ONCE
Status: COMPLETED | OUTPATIENT
Start: 2024-10-11 | End: 2024-10-11

## 2024-10-11 RX ADMIN — WATER 60 MG: 1 INJECTION INTRAMUSCULAR; INTRAVENOUS; SUBCUTANEOUS at 19:33

## 2024-10-11 RX ADMIN — FUROSEMIDE 40 MG: 10 INJECTION, SOLUTION INTRAMUSCULAR; INTRAVENOUS at 15:38

## 2024-10-11 RX ADMIN — PROPOFOL 40 MCG/KG/MIN: 10 INJECTION, EMULSION INTRAVENOUS at 11:50

## 2024-10-11 RX ADMIN — Medication 50 MCG/HR: at 15:41

## 2024-10-11 RX ADMIN — DIPHENHYDRAMINE HYDROCHLORIDE 25 MG: 50 INJECTION INTRAMUSCULAR; INTRAVENOUS at 19:16

## 2024-10-11 RX ADMIN — ENOXAPARIN SODIUM 40 MG: 100 INJECTION SUBCUTANEOUS at 08:22

## 2024-10-11 RX ADMIN — DEXTROSE AND SODIUM CHLORIDE: 5; 450 INJECTION, SOLUTION INTRAVENOUS at 03:17

## 2024-10-11 RX ADMIN — DIPHENHYDRAMINE HYDROCHLORIDE 25 MG: 50 INJECTION INTRAMUSCULAR; INTRAVENOUS at 08:22

## 2024-10-11 RX ADMIN — FENTANYL CITRATE 50 MCG: 50 INJECTION INTRAMUSCULAR; INTRAVENOUS at 02:48

## 2024-10-11 RX ADMIN — PROPOFOL 50 MCG/KG/MIN: 10 INJECTION, EMULSION INTRAVENOUS at 20:21

## 2024-10-11 RX ADMIN — PROPOFOL 40 MCG/KG/MIN: 10 INJECTION, EMULSION INTRAVENOUS at 08:22

## 2024-10-11 RX ADMIN — Medication 175 MCG/HR: at 04:59

## 2024-10-11 RX ADMIN — SODIUM CHLORIDE, PRESERVATIVE FREE 10 ML: 5 INJECTION INTRAVENOUS at 08:23

## 2024-10-11 RX ADMIN — PROPOFOL 45 MCG/KG/MIN: 10 INJECTION, EMULSION INTRAVENOUS at 04:01

## 2024-10-11 RX ADMIN — WATER 60 MG: 1 INJECTION INTRAMUSCULAR; INTRAVENOUS; SUBCUTANEOUS at 04:24

## 2024-10-11 RX ADMIN — SODIUM CHLORIDE, PRESERVATIVE FREE 10 ML: 5 INJECTION INTRAVENOUS at 19:16

## 2024-10-11 RX ADMIN — FENTANYL CITRATE 50 MCG: 50 INJECTION INTRAMUSCULAR; INTRAVENOUS at 16:15

## 2024-10-11 RX ADMIN — PROPOFOL 45 MCG/KG/MIN: 10 INJECTION, EMULSION INTRAVENOUS at 23:12

## 2024-10-11 RX ADMIN — WATER 60 MG: 1 INJECTION INTRAMUSCULAR; INTRAVENOUS; SUBCUTANEOUS at 12:43

## 2024-10-11 RX ADMIN — SODIUM CHLORIDE, PRESERVATIVE FREE 20 MG: 5 INJECTION INTRAVENOUS at 19:17

## 2024-10-11 RX ADMIN — DIPHENHYDRAMINE HYDROCHLORIDE 25 MG: 50 INJECTION INTRAMUSCULAR; INTRAVENOUS at 01:38

## 2024-10-11 RX ADMIN — PROPOFOL 40 MCG/KG/MIN: 10 INJECTION, EMULSION INTRAVENOUS at 01:42

## 2024-10-11 RX ADMIN — DIPHENHYDRAMINE HYDROCHLORIDE 25 MG: 50 INJECTION INTRAMUSCULAR; INTRAVENOUS at 12:43

## 2024-10-11 RX ADMIN — PROPOFOL 45 MCG/KG/MIN: 10 INJECTION, EMULSION INTRAVENOUS at 16:11

## 2024-10-11 RX ADMIN — FENTANYL CITRATE 50 MCG: 50 INJECTION INTRAMUSCULAR; INTRAVENOUS at 14:59

## 2024-10-11 RX ADMIN — SODIUM CHLORIDE, PRESERVATIVE FREE 20 MG: 5 INJECTION INTRAVENOUS at 08:22

## 2024-10-11 RX ADMIN — FENTANYL CITRATE 50 MCG: 50 INJECTION INTRAMUSCULAR; INTRAVENOUS at 04:23

## 2024-10-11 ASSESSMENT — PULMONARY FUNCTION TESTS
PIF_VALUE: 29
PIF_VALUE: 23
PIF_VALUE: 25
PIF_VALUE: 24
PIF_VALUE: 26
PIF_VALUE: 29
PIF_VALUE: 23
PIF_VALUE: 18

## 2024-10-11 NOTE — PLAN OF CARE
Problem: Safety - Medical Restraint  Goal: Remains free of injury from restraints (Restraint for Interference with Medical Device)  Description: INTERVENTIONS:  1. Determine that other, less restrictive measures have been tried or would not be effective before applying the restraint  2. Evaluate the patient's condition at the time of restraint application  3. Inform patient/family regarding the reason for restraint  4. Q2H: Monitor safety, psychosocial status, comfort, nutrition and hydration  10/10/2024 2137 by Dorina Weber RN  Outcome: Progressing     Problem: Respiratory - Adult  Goal: Achieves optimal ventilation and oxygenation  10/10/2024 2137 by Dorina Weber RN  Outcome: Progressing     Problem: Pain  Goal: Verbalizes/displays adequate comfort level or baseline comfort level  10/10/2024 2137 by Dorina Weber RN  Outcome: Progressing  10/10/2024 2049 by Amita Loco RN  Outcome: Progressing     Problem: Skin/Tissue Integrity  Goal: Absence of new skin breakdown  Description: 1.  Monitor for areas of redness and/or skin breakdown  2.  Assess vascular access sites hourly  3.  Every 4-6 hours minimum:  Change oxygen saturation probe site  4.  Every 4-6 hours:  If on nasal continuous positive airway pressure, respiratory therapy assess nares and determine need for appliance change or resting period.  10/10/2024 2137 by Dorina Weber RN  Outcome: Progressing     Problem: Safety - Adult  Goal: Free from fall injury  10/10/2024 2137 by Dorina Weber RN  Outcome: Progressing

## 2024-10-11 NOTE — PROGRESS NOTES
RN spoke to pt's sister (Alicia) twice today and gave update on pt's status and plan. All questions answered and she was satisfied with updates.    Pt's wife (Kell) called and asked for an update. RN answered questions and ensured she was understanding pt's current status and plan for tomorrow.

## 2024-10-11 NOTE — PROGRESS NOTES
Pulmonary Critical Care Progress Note    Patient seen for the follow up of Angioedema of tongue     Subjective:    He is intubated sedated on propofol and fentanyl drip.  He was weaned down from 45 with propofol and 175 fentanyl to 25 propofol and 25 fentanyl per hour now follows command.  Not tolerate CPAP trial for increased respiratory rate and tachycardia..  He is a 40% FiO2 rate of 20 PEEP of 8 tidal volume 500 PEEP he had a cuff leak.    Examination:    Vitals: /82   Pulse 80   Temp 97.7 °F (36.5 °C) (Oral)   Resp 10   Ht 1.66 m (5' 5.35\")   Wt 97.6 kg (215 lb 3.2 oz)   SpO2 95%   BMI 35.42 kg/m²   SpO2  Av %  Min: 93 %  Max: 99 %  General appearance: Intubated sedated significantly decreased tongue size and neck edema  Neck: No JVD  Lungs: Decreased breath sound no crackles or wheeze  Heart: regular rate and rhythm, S1, S2 normal, no gallop  Abdomen: Soft, non tender, + BS  Extremities: no cyanosis or clubbing. No significant edema    LABs:    CBC:   Recent Labs     10/10/24  0334 10/11/24  0602   WBC 6.7 11.8*   HGB 14.4 14.2   HCT 41.9 42.3    150     BMP:   Recent Labs     10/10/24  0334 10/11/24  0602    137   K 4.1 4.3   CO2 21 21   BUN 8 16   CREATININE 0.6* 1.1   LABGLOM >90 74   GLUCOSE 182* 155*       LIVER PROFILE:  Recent Labs     10/09/24  1118   AST 34   ALT 27       Radiology:    Chest x-ray 10/10  Reviewed  No significant change in the appearance of the chest.     Impression/recommendations:    Acute respiratory insufficiency due to upper airway instruction/angioedema with significant swelling upper airway/atelectasis with small effusion  Assist-control ventilation tidal volume 500 FiO2 50 rate of 20 PEEP of 8  Sedation with propofol RASS of -2  Sedation with fentanyl RASS -2  CPAP trial in a.m.  Keep off ACE inhibitor for life  Status post icatibant  Will hold off FFP  Continue Solu-Medrol Benadryl and Pepcid     COPD/smoker   DuoNeb by nebulizer  Smoking

## 2024-10-11 NOTE — PLAN OF CARE
Problem: Discharge Planning  Goal: Discharge to home or other facility with appropriate resources  Outcome: Progressing  Flowsheets (Taken 10/10/2024 0800)  Discharge to home or other facility with appropriate resources:   Identify barriers to discharge with patient and caregiver   Arrange for needed discharge resources and transportation as appropriate   Identify discharge learning needs (meds, wound care, etc)   Refer to discharge planning if patient needs post-hospital services based on physician order or complex needs related to functional status, cognitive ability or social support system     Problem: Safety - Medical Restraint  Goal: Remains free of injury from restraints (Restraint for Interference with Medical Device)  Description: INTERVENTIONS:  1. Determine that other, less restrictive measures have been tried or would not be effective before applying the restraint  2. Evaluate the patient's condition at the time of restraint application  3. Inform patient/family regarding the reason for restraint  4. Q2H: Monitor safety, psychosocial status, comfort, nutrition and hydration  Outcome: Progressing  Flowsheets  Taken 10/10/2024 2040  Remains free of injury from restraints (restraint for interference with medical device):   Determine that other, less restrictive measures have been tried or would not be effective before applying the restraint   Evaluate the patient's condition at the time of restraint application   Inform patient/family regarding the reason for restraint   Every 2 hours: Monitor safety, psychosocial status, comfort, nutrition and hydration  Taken 10/10/2024 1800  Remains free of injury from restraints (restraint for interference with medical device):   Determine that other, less restrictive measures have been tried or would not be effective before applying the restraint   Evaluate the patient's condition at the time of restraint application   Inform patient/family regarding the reason for

## 2024-10-11 NOTE — PROGRESS NOTES
End Of Shift Note  [place] [facility]  Summary of shift: Pt remains intubated and sedated with Propofol gtt @ 50mdg/kg/min and Fentanyl gtt @ 175mcg/hr; Nimbex gtt turned off @ 1030. RASS goal now -2 to -3. Pt following simple commands and nodding appropriately. Plan for tomorrow is to check for cuff leak, CPAP trial and extubate if appropriate per Dr. Gamboa. Total output from external catheter was 600mL clear, onelia urine and no BM. Swelling is significantly better than when presented on admit.     Vitals:    Vitals:    10/10/24 1815 10/10/24 1830 10/10/24 1910 10/10/24 2000   BP:  (!) 111/96     Pulse: 55 75 53    Resp: 20 16 20    Temp:    96.9 °F (36.1 °C)   TempSrc:    Temporal   SpO2: 96% 97% 96%    Weight:       Height:            I&O:   Intake/Output Summary (Last 24 hours) at 10/10/2024 2058  Last data filed at 10/10/2024 2024  Gross per 24 hour   Intake 3847.85 ml   Output 1600 ml   Net 2247.85 ml       Resp Status: mechanical vent    Ventilator Settings:  Vent Mode: AC/PRVC Resp Rate (Set): 20 bpm/Vt (Set, mL): 500 mL/ /FiO2 : 40 %    Critical Care IV infusions:   propofol 50 mcg/kg/min (10/10/24 2024)    fentaNYL 175 mcg/hr (10/10/24 2024)    sodium chloride      dextrose 5 % and 0.45 % NaCl 75 mL/hr at 10/10/24 2024    phenylephrine (DAVID-SYNEPHRINE) 50 mg in sodium chloride 0.9 % 250 mL infusion          LDA:   Peripheral IV 10/09/24 Right Antecubital (Active)   Number of days: 1       Peripheral IV 10/09/24 Right Hand (Active)   Number of days: 1       Peripheral IV 10/09/24 Proximal;Right Cephalic (Active)   Number of days: 1       Peripheral IV 10/09/24 Right Forearm (Active)   Number of days: 1       External Urinary Catheter (Active)   Number of days: 1       ETT  (Active)   Number of days: 1

## 2024-10-11 NOTE — PLAN OF CARE
Problem: Discharge Planning  Goal: Discharge to home or other facility with appropriate resources  10/11/2024 0925 by Angeline Brooks RN  Outcome: Progressing     Problem: Safety - Medical Restraint  Goal: Remains free of injury from restraints (Restraint for Interference with Medical Device)  Description: INTERVENTIONS:  1. Determine that other, less restrictive measures have been tried or would not be effective before applying the restraint  2. Evaluate the patient's condition at the time of restraint application  3. Inform patient/family regarding the reason for restraint  4. Q2H: Monitor safety, psychosocial status, comfort, nutrition and hydration  10/11/2024 0925 by Angeline Brooks RN  Outcome: Progressing     Problem: Respiratory - Adult  Goal: Achieves optimal ventilation and oxygenation  10/11/2024 0925 by Angeline Brooks RN  Outcome: Progressing     Problem: Pain  Goal: Verbalizes/displays adequate comfort level or baseline comfort level  10/11/2024 0925 by Angeline Brooks RN  Outcome: Progressing     Problem: Skin/Tissue Integrity  Goal: Absence of new skin breakdown  Description: 1.  Monitor for areas of redness and/or skin breakdown  2.  Assess vascular access sites hourly  3.  Every 4-6 hours minimum:  Change oxygen saturation probe site  4.  Every 4-6 hours:  If on nasal continuous positive airway pressure, respiratory therapy assess nares and determine need for appliance change or resting period.  10/11/2024 0925 by Angeline Brooks RN  Outcome: Progressing     Problem: Safety - Adult  Goal: Free from fall injury  10/11/2024 0925 by Angeline Brooks RN  Outcome: Progressing

## 2024-10-11 NOTE — PROGRESS NOTES
Hospitalist - Progress Note      Patient: Noel Valle    Unit/Bed:1114/1114-01  YOB: 1955  MRN: [id number]   Acct: [id number]   PCP: Ramakrishna Silver MD    Date of Service: Pt seen/examined on 10/11/24  and Admitted to Inpatient with expected LOS greater than two midnights due to medical therapy.     Chief Complaint: Tongue swelling 62-year-old male    Assessment and Plan:-    Acute Respiratory Insufficiency due to upper airway obstruction secondary to Angioedema  Tongue swelling, upper airway swelling  Intubated in the OR under surgery supervision  Currently on the vent with critical care managing  Paralysis with Nimbex for the next 24 hrs due to fear of loss of airway which is life-threatening  On Solu-Medrol, Benadryl, Pepcid.  Sedation with propofol, fentanyl.    Hypotension  Developed after patient received sedation.  IV fluids ordered.  Pressor support to maintain a MAP of 65    Tobacco abuse disorder/history of possible COPD  As needed DuoNebs.  Will  on tobacco cessation when patient is alert      Subjective:  Continues to be intubated. No acute concerns overnight.     History Of Present Illness:    69-year-old male coming into the hospital for difficulty swallowing.  In the ER, patient found to have tongue swelling, trouble swallowing with acute distress, tachycardia, ill-appearing status.  Symptoms began earlier today at 6 AM.  He noticed severe swelling of his throat.  Patient denies any allergies, medications.  Patient is not on any ACE inhibitor.  Patient was able to talk but his words were muffled.  In the ER, case was discussed with the anesthesiologist to take the patient to the OR.  Patient was intubated under the supervision of surgery due to concerns for potential tracheostomy.  Patient was given rocuronium, propofol.  He continued to wake up and thrash around for which reason Versed 60 mg IV push was ordered followed by fentanyl 50 mg IV push.  He was then put on

## 2024-10-12 ENCOUNTER — APPOINTMENT (OUTPATIENT)
Dept: GENERAL RADIOLOGY | Age: 69
DRG: 915 | End: 2024-10-12
Payer: MEDICARE

## 2024-10-12 LAB
ANION GAP SERPL CALCULATED.3IONS-SCNC: 13 MMOL/L (ref 9–17)
BASOPHILS # BLD: 0 K/UL (ref 0–0.2)
BASOPHILS NFR BLD: 0 % (ref 0–2)
BUN SERPL-MCNC: 35 MG/DL (ref 8–23)
BUN/CREAT SERPL: 21 (ref 9–20)
CALCIUM SERPL-MCNC: 8.1 MG/DL (ref 8.6–10.4)
CHLORIDE SERPL-SCNC: 106 MMOL/L (ref 98–107)
CO2 SERPL-SCNC: 18 MMOL/L (ref 20–31)
CREAT SERPL-MCNC: 1.7 MG/DL (ref 0.7–1.2)
EOSINOPHIL # BLD: 0 K/UL (ref 0–0.44)
EOSINOPHILS RELATIVE PERCENT: 0 % (ref 1–4)
ERYTHROCYTE [DISTWIDTH] IN BLOOD BY AUTOMATED COUNT: 12.6 % (ref 11.8–14.4)
GFR, ESTIMATED: 44 ML/MIN/1.73M2
GLUCOSE SERPL-MCNC: 129 MG/DL (ref 70–99)
HCT VFR BLD AUTO: 44.1 % (ref 40.7–50.3)
HGB BLD-MCNC: 14.1 G/DL (ref 13–17)
IMM GRANULOCYTES # BLD AUTO: 0.12 K/UL (ref 0–0.3)
IMM GRANULOCYTES NFR BLD: 1 %
LYMPHOCYTES NFR BLD: 0.36 K/UL (ref 1.1–3.7)
LYMPHOCYTES RELATIVE PERCENT: 3 % (ref 24–43)
MCH RBC QN AUTO: 33.9 PG (ref 25.2–33.5)
MCHC RBC AUTO-ENTMCNC: 32 G/DL (ref 28.4–34.8)
MCV RBC AUTO: 106 FL (ref 82.6–102.9)
MONOCYTES NFR BLD: 0.36 K/UL (ref 0.1–1.2)
MONOCYTES NFR BLD: 3 % (ref 3–12)
NEGATIVE BASE EXCESS, ART: 4 MMOL/L (ref 0–2)
NEUTROPHILS NFR BLD: 93 % (ref 36–65)
NEUTS SEG NFR BLD: 11.06 K/UL (ref 1.5–8.1)
NRBC BLD-RTO: 0 PER 100 WBC
PLATELET # BLD AUTO: 156 K/UL (ref 138–453)
PMV BLD AUTO: 10.4 FL (ref 8.1–13.5)
POC HCO3: 23.6 MMOL/L (ref 21–28)
POC O2 SATURATION: 97.3 % (ref 94–98)
POC PCO2: 51.5 MM HG (ref 35–48)
POC PH: 7.27 (ref 7.35–7.45)
POC PO2: 107.7 MM HG (ref 83–108)
POTASSIUM SERPL-SCNC: 4.7 MMOL/L (ref 3.7–5.3)
RBC # BLD AUTO: 4.16 M/UL (ref 4.21–5.77)
SODIUM SERPL-SCNC: 137 MMOL/L (ref 135–144)
WBC OTHER # BLD: 11.9 K/UL (ref 3.5–11.3)

## 2024-10-12 PROCEDURE — 94640 AIRWAY INHALATION TREATMENT: CPT

## 2024-10-12 PROCEDURE — 94761 N-INVAS EAR/PLS OXIMETRY MLT: CPT

## 2024-10-12 PROCEDURE — 6360000002 HC RX W HCPCS: Performed by: INTERNAL MEDICINE

## 2024-10-12 PROCEDURE — 6360000002 HC RX W HCPCS: Performed by: FAMILY MEDICINE

## 2024-10-12 PROCEDURE — 36415 COLL VENOUS BLD VENIPUNCTURE: CPT

## 2024-10-12 PROCEDURE — 2000000000 HC ICU R&B

## 2024-10-12 PROCEDURE — 94003 VENT MGMT INPAT SUBQ DAY: CPT

## 2024-10-12 PROCEDURE — 2500000003 HC RX 250 WO HCPCS: Performed by: FAMILY MEDICINE

## 2024-10-12 PROCEDURE — 6370000000 HC RX 637 (ALT 250 FOR IP): Performed by: INTERNAL MEDICINE

## 2024-10-12 PROCEDURE — 36600 WITHDRAWAL OF ARTERIAL BLOOD: CPT

## 2024-10-12 PROCEDURE — 82803 BLOOD GASES ANY COMBINATION: CPT

## 2024-10-12 PROCEDURE — 2580000003 HC RX 258: Performed by: FAMILY MEDICINE

## 2024-10-12 PROCEDURE — 85025 COMPLETE CBC W/AUTO DIFF WBC: CPT

## 2024-10-12 PROCEDURE — 2700000000 HC OXYGEN THERAPY PER DAY

## 2024-10-12 PROCEDURE — 2580000003 HC RX 258: Performed by: INTERNAL MEDICINE

## 2024-10-12 PROCEDURE — 71045 X-RAY EXAM CHEST 1 VIEW: CPT

## 2024-10-12 PROCEDURE — 2500000003 HC RX 250 WO HCPCS: Performed by: INTERNAL MEDICINE

## 2024-10-12 PROCEDURE — 80048 BASIC METABOLIC PNL TOTAL CA: CPT

## 2024-10-12 RX ORDER — IPRATROPIUM BROMIDE AND ALBUTEROL SULFATE 2.5; .5 MG/3ML; MG/3ML
1 SOLUTION RESPIRATORY (INHALATION)
Status: DISCONTINUED | OUTPATIENT
Start: 2024-10-12 | End: 2024-10-13

## 2024-10-12 RX ORDER — ALBUTEROL SULFATE 0.83 MG/ML
2.5 SOLUTION RESPIRATORY (INHALATION) EVERY 6 HOURS PRN
Status: DISCONTINUED | OUTPATIENT
Start: 2024-10-12 | End: 2024-11-08 | Stop reason: HOSPADM

## 2024-10-12 RX ADMIN — Medication 150 MCG/HR: at 18:30

## 2024-10-12 RX ADMIN — Medication 175 MCG/HR: at 02:32

## 2024-10-12 RX ADMIN — ALBUTEROL SULFATE 2.5 MG: 2.5 SOLUTION RESPIRATORY (INHALATION) at 08:26

## 2024-10-12 RX ADMIN — DIPHENHYDRAMINE HYDROCHLORIDE 25 MG: 50 INJECTION INTRAMUSCULAR; INTRAVENOUS at 05:59

## 2024-10-12 RX ADMIN — PROPOFOL 45 MCG/KG/MIN: 10 INJECTION, EMULSION INTRAVENOUS at 06:42

## 2024-10-12 RX ADMIN — ENOXAPARIN SODIUM 40 MG: 100 INJECTION SUBCUTANEOUS at 09:35

## 2024-10-12 RX ADMIN — SODIUM CHLORIDE, PRESERVATIVE FREE 10 ML: 5 INJECTION INTRAVENOUS at 09:36

## 2024-10-12 RX ADMIN — DIPHENHYDRAMINE HYDROCHLORIDE 25 MG: 50 INJECTION INTRAMUSCULAR; INTRAVENOUS at 19:46

## 2024-10-12 RX ADMIN — PROPOFOL 45 MCG/KG/MIN: 10 INJECTION, EMULSION INTRAVENOUS at 22:29

## 2024-10-12 RX ADMIN — FENTANYL CITRATE 50 MCG: 50 INJECTION INTRAMUSCULAR; INTRAVENOUS at 23:45

## 2024-10-12 RX ADMIN — IPRATROPIUM BROMIDE AND ALBUTEROL SULFATE 1 DOSE: 2.5; .5 SOLUTION RESPIRATORY (INHALATION) at 14:15

## 2024-10-12 RX ADMIN — FENTANYL CITRATE 50 MCG: 50 INJECTION INTRAMUSCULAR; INTRAVENOUS at 19:43

## 2024-10-12 RX ADMIN — SODIUM CHLORIDE, PRESERVATIVE FREE 20 MG: 5 INJECTION INTRAVENOUS at 09:35

## 2024-10-12 RX ADMIN — DIPHENHYDRAMINE HYDROCHLORIDE 25 MG: 50 INJECTION INTRAMUSCULAR; INTRAVENOUS at 13:49

## 2024-10-12 RX ADMIN — WATER 60 MG: 1 INJECTION INTRAMUSCULAR; INTRAVENOUS; SUBCUTANEOUS at 19:46

## 2024-10-12 RX ADMIN — IPRATROPIUM BROMIDE AND ALBUTEROL SULFATE 1 DOSE: 2.5; .5 SOLUTION RESPIRATORY (INHALATION) at 19:48

## 2024-10-12 RX ADMIN — WATER 60 MG: 1 INJECTION INTRAMUSCULAR; INTRAVENOUS; SUBCUTANEOUS at 05:59

## 2024-10-12 RX ADMIN — WATER 60 MG: 1 INJECTION INTRAMUSCULAR; INTRAVENOUS; SUBCUTANEOUS at 13:54

## 2024-10-12 RX ADMIN — IPRATROPIUM BROMIDE AND ALBUTEROL SULFATE 1 DOSE: 2.5; .5 SOLUTION RESPIRATORY (INHALATION) at 10:53

## 2024-10-12 RX ADMIN — PROPOFOL 45 MCG/KG/MIN: 10 INJECTION, EMULSION INTRAVENOUS at 03:01

## 2024-10-12 RX ADMIN — PROPOFOL 30 MCG/KG/MIN: 10 INJECTION, EMULSION INTRAVENOUS at 11:28

## 2024-10-12 RX ADMIN — Medication 175 MCG/HR: at 09:48

## 2024-10-12 RX ADMIN — PROPOFOL 30 MCG/KG/MIN: 10 INJECTION, EMULSION INTRAVENOUS at 18:02

## 2024-10-12 RX ADMIN — DIPHENHYDRAMINE HYDROCHLORIDE 25 MG: 50 INJECTION INTRAMUSCULAR; INTRAVENOUS at 02:02

## 2024-10-12 RX ADMIN — SODIUM CHLORIDE, PRESERVATIVE FREE 10 ML: 5 INJECTION INTRAVENOUS at 20:13

## 2024-10-12 ASSESSMENT — PULMONARY FUNCTION TESTS
PIF_VALUE: 27
PIF_VALUE: 30
PIF_VALUE: 26
PIF_VALUE: 28
PIF_VALUE: 24
PIF_VALUE: 27
PIF_VALUE: 27

## 2024-10-12 ASSESSMENT — PAIN SCALES - GENERAL
PAINLEVEL_OUTOF10: 8
PAINLEVEL_OUTOF10: 8

## 2024-10-12 NOTE — PLAN OF CARE
Problem: Gastrointestinal - Adult  Goal: Maintains adequate nutritional intake  Outcome: Not Progressing     Problem: Discharge Planning  Goal: Discharge to home or other facility with appropriate resources  Outcome: Progressing  Flowsheets (Taken 10/12/2024 0800)  Discharge to home or other facility with appropriate resources:   Identify barriers to discharge with patient and caregiver   Arrange for needed discharge resources and transportation as appropriate   Identify discharge learning needs (meds, wound care, etc)   Refer to discharge planning if patient needs post-hospital services based on physician order or complex needs related to functional status, cognitive ability or social support system     Problem: Safety - Medical Restraint  Goal: Remains free of injury from restraints (Restraint for Interference with Medical Device)  Description: INTERVENTIONS:  1. Determine that other, less restrictive measures have been tried or would not be effective before applying the restraint  2. Evaluate the patient's condition at the time of restraint application  3. Inform patient/family regarding the reason for restraint  4. Q2H: Monitor safety, psychosocial status, comfort, nutrition and hydration  Outcome: Progressing  Flowsheets  Taken 10/12/2024 1200 by Amita Loco RN  Remains free of injury from restraints (restraint for interference with medical device):   Determine that other, less restrictive measures have been tried or would not be effective before applying the restraint   Evaluate the patient's condition at the time of restraint application   Inform patient/family regarding the reason for restraint   Every 2 hours: Monitor safety, psychosocial status, comfort, nutrition and hydration  Taken 10/12/2024 1000 by Amita Loco RN  Remains free of injury from restraints (restraint for interference with medical device):   Determine that other, less restrictive measures have been tried or would not be effective

## 2024-10-12 NOTE — PROGRESS NOTES
End Of Shift Note  [place] ICU  Summary of shift: Uneventful shift. Pt remains on propofol and fentanyl. Pt able to follow commands and has remained vent compliant. Vital signs have been stable. No BM and urine output was 50, pt was bladder scanned twice, first showed 75 and second showed 50 in bladder, will pass along this information to day shift.     Vitals:    Vitals:    10/12/24 0343 10/12/24 0400 10/12/24 0500 10/12/24 0600   BP:  109/69 115/70 116/72   Pulse: 64 58 58 57   Resp: 14 18 18 18   Temp:  97.7 °F (36.5 °C)     TempSrc:  Axillary     SpO2: 94% 95% 92% 97%   Weight:       Height:            I&O:   Intake/Output Summary (Last 24 hours) at 10/12/2024 0657  Last data filed at 10/12/2024 0644  Gross per 24 hour   Intake 1612.4 ml   Output 350 ml   Net 1262.4 ml       Resp Status: Ventilator     Ventilator Settings:  Vent Mode: AC/PRVC Resp Rate (Set): (S) 18 bpm/Vt (Set, mL): 500 mL/ /FiO2 : 60 %    Critical Care IV infusions:   propofol 45 mcg/kg/min (10/12/24 0642)    fentaNYL 175 mcg/hr (10/12/24 0232)    sodium chloride      phenylephrine (DAVID-SYNEPHRINE) 50 mg in sodium chloride 0.9 % 250 mL infusion          LDA:   Peripheral IV 10/09/24 Right Antecubital (Active)   Number of days: 2       Peripheral IV 10/09/24 Right Hand (Active)   Number of days: 2       Peripheral IV 10/09/24 Proximal;Right Cephalic (Active)   Number of days: 2       Peripheral IV 10/09/24 Right Forearm (Active)   Number of days: 2       External Urinary Catheter (Active)   Number of days: 2       ETT  (Active)   Number of days: 3

## 2024-10-12 NOTE — PROGRESS NOTES
Hospitalist - Progress Note      Patient: Noel Valle    Unit/Bed:1114/1114-01  YOB: 1955  MRN: [id number]   Acct: [id number]   PCP: Ramakrishna Silver MD    Date of Service: Pt seen/examined on 10/12/24  and Admitted to Inpatient with expected LOS greater than two midnights due to medical therapy.     Chief Complaint: Tongue swelling 62-year-old male    Assessment and Plan:-    Acute Respiratory Insufficiency due to upper airway obstruction secondary to Angioedema  Tongue swelling, upper airway swelling  Intubated in the OR under surgery supervision  Currently on the vent with critical care managing  Paralysis with Nimbex for the next 24 hrs due to fear of loss of airway which is life-threatening  On Solu-Medrol, Benadryl, Pepcid.  Sedation with propofol, fentanyl.  Plan for Extubation and cpap trial today    Hypotension  Developed after patient received sedation.  IV fluids ordered.  Pressor support to maintain a MAP of 65    Tobacco abuse disorder/history of possible COPD  Sched Dave.  Will  on tobacco cessation when patient is alert      Subjective:  Continues to be intubated. No acute concerns overnight.   Tried to be placed on cpap but pt did not do well    History Of Present Illness:    69-year-old male coming into the hospital for difficulty swallowing.  In the ER, patient found to have tongue swelling, trouble swallowing with acute distress, tachycardia, ill-appearing status.  Symptoms began earlier today at 6 AM.  He noticed severe swelling of his throat.  Patient denies any allergies, medications.  Patient is not on any ACE inhibitor.  Patient was able to talk but his words were muffled.  In the ER, case was discussed with the anesthesiologist to take the patient to the OR.  Patient was intubated under the supervision of surgery due to concerns for potential tracheostomy.  Patient was given rocuronium, propofol.  He continued to wake up and thrash around for which reason  murmurs, rubs or gallops.  Abdomen: Soft, non-tender, non-distended with normal bowel sounds.  Musculoskeletal:  No clubbing, cyanosis or edema bilaterally.  Skin: Skin color, texture, turgor normal.  No rashes or lesions.  Neurologic:  intubated and sedated  Psychiatric: sedated  Capillary Refill: Brisk,< 3 seconds   Peripheral Pulses: +2 palpable, equal bilaterally     Labs:   Recent Labs     10/10/24  0334 10/11/24  0602 10/12/24  0256   WBC 6.7 11.8* 11.9*   HGB 14.4 14.2 14.1   HCT 41.9 42.3 44.1    150 156     Recent Labs     10/10/24  0334 10/11/24  0602 10/12/24  0256    137 137   K 4.1 4.3 4.7    105 106   CO2 21 21 18*   BUN 8 16 35*   CREATININE 0.6* 1.1 1.7*   CALCIUM 8.4* 8.6 8.1*     No results for input(s): \"AST\", \"ALT\", \"BILIDIR\", \"BILITOT\", \"ALKPHOS\" in the last 72 hours.    No results for input(s): \"INR\" in the last 72 hours.  No results for input(s): \"CKTOTAL\", \"TROPONINI\" in the last 72 hours.    Urinalysis:    No results found for: \"NITRU\", \"WBCUA\", \"BACTERIA\", \"RBCUA\", \"BLOODU\", \"SPECGRAV\", \"GLUCOSEU\"    Radiology:   XR CHEST PORTABLE   Final Result   1. The endotracheal tube is now approximately 5.9 cm above the jignesh.   2. Small lung volumes with no confluent airspace consolidation.         XR CHEST PORTABLE   Final Result   1. Low lung volumes.   2. No significant change in the appearance of the chest.         XR CHEST PORTABLE   Final Result   No significant change in the appearance of the chest.         XR CHEST PORTABLE   Final Result   Bibasilar infiltrates.      Endotracheal tube with the tip in the midtrachea.         XR CHEST PORTABLE    (Results Pending)     No results found.      EKG:     Electronically signed by Mohsin Mohammad Reza, MD on 10/12/2024 at 12:21 PM

## 2024-10-12 NOTE — PLAN OF CARE
Problem: Respiratory - Adult  Goal: Achieves optimal ventilation and oxygenation  10/11/2024 2002 by Zi Lara RCP  Outcome: Progressing     Problem: Respiratory - Adult  Goal: Will be able to breathe spontaneously, without ventilator support  Description: Will be able to breathe spontaneously, without ventilator support  Outcome: Progressing

## 2024-10-12 NOTE — PROGRESS NOTES
End Of Shift Note  [facility]  Summary of shift: Pt had uneventful shift. Remains sedated & intubated on fentanyl gtt @ 150 mcg/hr and propofol gtt @ 30mcg/kg/min. Attempted to CPAP x2 but not successful. Pt had no BM this shift and 400mL onelia colored urine from external catheter. Pt follows commands when he wakes. Plan to CPAP again in AM with hopes to extubate.     Vitals:    Vitals:    10/12/24 1730 10/12/24 1800 10/12/24 1830 10/12/24 1900   BP:  129/80  119/70   Pulse: 69 (!) 106 83 60   Resp: 18 18 19 18   Temp:       TempSrc:       SpO2: 95% 96% 95% 96%   Weight:       Height:            I&O:   Intake/Output Summary (Last 24 hours) at 10/12/2024 1918  Last data filed at 10/12/2024 1831  Gross per 24 hour   Intake 1060.84 ml   Output 450 ml   Net 610.84 ml       Resp Status: mechanical vent    Ventilator Settings:  Vent Mode: AC/PRVC Resp Rate (Set): 18 bpm/Vt (Set, mL): 500 mL/ /FiO2 : 40 %    Critical Care IV infusions:   propofol 30 mcg/kg/min (10/12/24 1831)    fentaNYL 150 mcg/hr (10/12/24 1831)    sodium chloride      phenylephrine (DAVID-SYNEPHRINE) 50 mg in sodium chloride 0.9 % 250 mL infusion          LDA:   Peripheral IV 10/09/24 Right Antecubital (Active)   Number of days: 3       Peripheral IV 10/09/24 Right Hand (Active)   Number of days: 3       Peripheral IV 10/09/24 Proximal;Right Cephalic (Active)   Number of days: 3       Peripheral IV 10/09/24 Right Forearm (Active)   Number of days: 3       External Urinary Catheter (Active)   Number of days: 3       ETT  (Active)   Number of days: 3

## 2024-10-12 NOTE — PROGRESS NOTES
PULMONARY PROGRESS NOTE:    REASON FOR VISIT:angioedema , resp failure  Interval History:    Unable to obtain    Events since last visit: Did not tolerate cpap trial today. No NG/OG tube. Will need to address nutrition tomorrow if unable to extubate.     PAST MEDICAL HISTORY:      Scheduled Meds:   ipratropium 0.5 mg-albuterol 2.5 mg  1 Dose Inhalation Q4H WA RT    [START ON 10/13/2024] famotidine (PEPCID) injection  20 mg IntraVENous Daily    sodium chloride flush  5-40 mL IntraVENous 2 times per day    enoxaparin  40 mg SubCUTAneous Daily    methylPREDNISolone  60 mg IntraVENous Q8H    diphenhydrAMINE  25 mg IntraVENous Q6H     Continuous Infusions:   propofol 30 mcg/kg/min (10/12/24 1128)    fentaNYL 175 mcg/hr (10/12/24 0948)    sodium chloride      phenylephrine (DAVID-SYNEPHRINE) 50 mg in sodium chloride 0.9 % 250 mL infusion       PRN Meds:albuterol, sodium chloride flush, sodium chloride, potassium chloride **OR** potassium chloride, magnesium sulfate, ondansetron **OR** ondansetron, polyethylene glycol, acetaminophen **OR** acetaminophen, sodium phosphate 15 mmol in sodium chloride 0.9 % 250 mL IVPB, aluminum & magnesium hydroxide-simethicone, fentanNYL        PHYSICAL EXAMINATION:  /66   Pulse 58   Temp 97.9 °F (36.6 °C) (Axillary)   Resp 18   Ht 1.66 m (5' 5.35\")   Wt 97.6 kg (215 lb 3.2 oz)   SpO2 93%   BMI 35.42 kg/m²     General :orally intubated, sedated , tongue edema   Neck - supple, no lymphadenopathy, JVD not raised  Heart - regular rhythm, S1 and S2 normal; no additional sounds heard  Lungs - Air Entry- fair bilaterally; breath sounds : vesicular;   rales/crackles - absent  Abdomen - soft, no tenderness  Upper Extremities  - no cyanosis, mottling; edema : absent  Lower Extremities: no cyanosis, mottling; edema : absent    Current Laboratory, Radiologic, Microbiologic, and Diagnostic studies reviewed  Data ReviewCBC:   Recent Labs     10/10/24  0334 10/11/24  0602 10/12/24  0256

## 2024-10-12 NOTE — PLAN OF CARE
Problem: Discharge Planning  Goal: Discharge to home or other facility with appropriate resources  10/11/2024 2048 by Guy Greene RN  Outcome: Progressing  Flowsheets (Taken 10/11/2024 2000)  Discharge to home or other facility with appropriate resources:   Identify barriers to discharge with patient and caregiver   Arrange for needed discharge resources and transportation as appropriate   Refer to discharge planning if patient needs post-hospital services based on physician order or complex needs related to functional status, cognitive ability or social support system  10/11/2024 0925 by Angeline Brooks RN  Outcome: Progressing     Problem: Safety - Medical Restraint  Goal: Remains free of injury from restraints (Restraint for Interference with Medical Device)  Description: INTERVENTIONS:  1. Determine that other, less restrictive measures have been tried or would not be effective before applying the restraint  2. Evaluate the patient's condition at the time of restraint application  3. Inform patient/family regarding the reason for restraint  4. Q2H: Monitor safety, psychosocial status, comfort, nutrition and hydration  10/11/2024 2048 by Guy Greene RN  Outcome: Progressing  Flowsheets  Taken 10/11/2024 2000 by Guy Greene RN  Remains free of injury from restraints (restraint for interference with medical device):   Determine that other, less restrictive measures have been tried or would not be effective before applying the restraint   Evaluate the patient's condition at the time of restraint application   Inform patient/family regarding the reason for restraint   Every 2 hours: Monitor safety, psychosocial status, comfort, nutrition and hydration  Taken 10/11/2024 1506 by Angeline Brooks RN  Remains free of injury from restraints (restraint for interference with medical device): Every 2 hours: Monitor safety, psychosocial status, comfort, nutrition and hydration  10/11/2024 0925 by Angeline Brooks  Progressing     Problem: Skin/Tissue Integrity  Goal: Absence of new skin breakdown  Description: 1.  Monitor for areas of redness and/or skin breakdown  2.  Assess vascular access sites hourly  3.  Every 4-6 hours minimum:  Change oxygen saturation probe site  4.  Every 4-6 hours:  If on nasal continuous positive airway pressure, respiratory therapy assess nares and determine need for appliance change or resting period.  10/11/2024 2048 by Guy Greene RN  Outcome: Progressing  10/11/2024 0925 by Angeline Brooks RN  Outcome: Progressing  10/11/2024 0925 by Angeline Brooks RN  Outcome: Progressing     Problem: Safety - Adult  Goal: Free from fall injury  10/11/2024 2048 by Guy Greene RN  Outcome: Progressing  10/11/2024 0925 by Angeline Brooks RN  Outcome: Progressing  10/11/2024 0925 by Angeline Brooks, RN  Outcome: Progressing

## 2024-10-12 NOTE — PLAN OF CARE
Problem: Respiratory - Adult  Goal: Achieves optimal ventilation and oxygenation  10/12/2024 1922 by Kell Humphreys RCP  Outcome: Progressing     Problem: Respiratory - Adult  Goal: Will be able to breathe spontaneously, without ventilator support  Description: Will be able to breathe spontaneously, without ventilator support  10/12/2024 1922 by Kell Humphreys RCP  Outcome: Progressing

## 2024-10-13 ENCOUNTER — APPOINTMENT (OUTPATIENT)
Dept: GENERAL RADIOLOGY | Age: 69
DRG: 915 | End: 2024-10-13
Payer: MEDICARE

## 2024-10-13 LAB
ALLEN TEST: POSITIVE
ALLEN TEST: POSITIVE
ANION GAP SERPL CALCULATED.3IONS-SCNC: 12 MMOL/L (ref 9–17)
BASOPHILS # BLD: 0 K/UL (ref 0–0.2)
BASOPHILS NFR BLD: 0 % (ref 0–2)
BUN SERPL-MCNC: 53 MG/DL (ref 8–23)
BUN/CREAT SERPL: 31 (ref 9–20)
CALCIUM SERPL-MCNC: 8.1 MG/DL (ref 8.6–10.4)
CHLORIDE SERPL-SCNC: 107 MMOL/L (ref 98–107)
CO2 SERPL-SCNC: 19 MMOL/L (ref 20–31)
CREAT SERPL-MCNC: 1.7 MG/DL (ref 0.7–1.2)
EOSINOPHIL # BLD: 0 K/UL (ref 0–0.44)
EOSINOPHILS RELATIVE PERCENT: 0 % (ref 1–4)
ERYTHROCYTE [DISTWIDTH] IN BLOOD BY AUTOMATED COUNT: 12.2 % (ref 11.8–14.4)
FIO2: 40
FIO2: 40
GFR, ESTIMATED: 44 ML/MIN/1.73M2
GLUCOSE SERPL-MCNC: 131 MG/DL (ref 70–99)
HCT VFR BLD AUTO: 41.9 % (ref 40.7–50.3)
HGB BLD-MCNC: 13.9 G/DL (ref 13–17)
IMM GRANULOCYTES # BLD AUTO: 0.1 K/UL (ref 0–0.3)
IMM GRANULOCYTES NFR BLD: 1 %
LYMPHOCYTES NFR BLD: 0.42 K/UL (ref 1.1–3.7)
LYMPHOCYTES RELATIVE PERCENT: 4 % (ref 24–43)
MCH RBC QN AUTO: 33.7 PG (ref 25.2–33.5)
MCHC RBC AUTO-ENTMCNC: 33.2 G/DL (ref 28.4–34.8)
MCV RBC AUTO: 101.5 FL (ref 82.6–102.9)
MODE: ABNORMAL
MODE: ABNORMAL
MONOCYTES NFR BLD: 0.31 K/UL (ref 0.1–1.2)
MONOCYTES NFR BLD: 3 % (ref 3–12)
NEGATIVE BASE EXCESS, ART: 2.1 MMOL/L (ref 0–2)
NEGATIVE BASE EXCESS, ART: 2.6 MMOL/L (ref 0–2)
NEUTROPHILS NFR BLD: 92 % (ref 36–65)
NEUTS SEG NFR BLD: 9.57 K/UL (ref 1.5–8.1)
NRBC BLD-RTO: 0 PER 100 WBC
O2 DELIVERY DEVICE: ABNORMAL
O2 DELIVERY DEVICE: ABNORMAL
PLATELET # BLD AUTO: 122 K/UL (ref 138–453)
PMV BLD AUTO: 9.8 FL (ref 8.1–13.5)
POC HCO3: 22.6 MMOL/L (ref 21–28)
POC HCO3: 23.8 MMOL/L (ref 21–28)
POC O2 SATURATION: 96.9 % (ref 94–98)
POC O2 SATURATION: 96.9 % (ref 94–98)
POC PCO2: 39.8 MM HG (ref 35–48)
POC PCO2: 43.9 MM HG (ref 35–48)
POC PH: 7.34 (ref 7.35–7.45)
POC PH: 7.36 (ref 7.35–7.45)
POC PO2: 92.7 MM HG (ref 83–108)
POC PO2: 95.4 MM HG (ref 83–108)
POTASSIUM SERPL-SCNC: 4.2 MMOL/L (ref 3.7–5.3)
RBC # BLD AUTO: 4.13 M/UL (ref 4.21–5.77)
SAMPLE SITE: ABNORMAL
SAMPLE SITE: ABNORMAL
SODIUM SERPL-SCNC: 138 MMOL/L (ref 135–144)
WBC OTHER # BLD: 10.4 K/UL (ref 3.5–11.3)

## 2024-10-13 PROCEDURE — 94003 VENT MGMT INPAT SUBQ DAY: CPT

## 2024-10-13 PROCEDURE — 94640 AIRWAY INHALATION TREATMENT: CPT

## 2024-10-13 PROCEDURE — 2580000003 HC RX 258: Performed by: INTERNAL MEDICINE

## 2024-10-13 PROCEDURE — 6360000002 HC RX W HCPCS: Performed by: INTERNAL MEDICINE

## 2024-10-13 PROCEDURE — 2500000003 HC RX 250 WO HCPCS: Performed by: INTERNAL MEDICINE

## 2024-10-13 PROCEDURE — 36415 COLL VENOUS BLD VENIPUNCTURE: CPT

## 2024-10-13 PROCEDURE — 82803 BLOOD GASES ANY COMBINATION: CPT

## 2024-10-13 PROCEDURE — 2700000000 HC OXYGEN THERAPY PER DAY

## 2024-10-13 PROCEDURE — 80048 BASIC METABOLIC PNL TOTAL CA: CPT

## 2024-10-13 PROCEDURE — 2000000000 HC ICU R&B

## 2024-10-13 PROCEDURE — 2580000003 HC RX 258: Performed by: FAMILY MEDICINE

## 2024-10-13 PROCEDURE — 85025 COMPLETE CBC W/AUTO DIFF WBC: CPT

## 2024-10-13 PROCEDURE — 71045 X-RAY EXAM CHEST 1 VIEW: CPT

## 2024-10-13 PROCEDURE — 6360000002 HC RX W HCPCS: Performed by: FAMILY MEDICINE

## 2024-10-13 PROCEDURE — 36600 WITHDRAWAL OF ARTERIAL BLOOD: CPT

## 2024-10-13 PROCEDURE — 6370000000 HC RX 637 (ALT 250 FOR IP): Performed by: INTERNAL MEDICINE

## 2024-10-13 PROCEDURE — 2500000003 HC RX 250 WO HCPCS: Performed by: FAMILY MEDICINE

## 2024-10-13 PROCEDURE — 94761 N-INVAS EAR/PLS OXIMETRY MLT: CPT

## 2024-10-13 RX ORDER — LORAZEPAM 2 MG/ML
4 INJECTION INTRAMUSCULAR
Status: DISCONTINUED | OUTPATIENT
Start: 2024-10-13 | End: 2024-10-22

## 2024-10-13 RX ORDER — LEVALBUTEROL 1.25 MG/.5ML
1.25 SOLUTION, CONCENTRATE RESPIRATORY (INHALATION)
Status: DISCONTINUED | OUTPATIENT
Start: 2024-10-13 | End: 2024-10-18

## 2024-10-13 RX ORDER — METOPROLOL TARTRATE 1 MG/ML
5 INJECTION, SOLUTION INTRAVENOUS EVERY 6 HOURS PRN
Status: DISCONTINUED | OUTPATIENT
Start: 2024-10-13 | End: 2024-11-08 | Stop reason: HOSPADM

## 2024-10-13 RX ORDER — LORAZEPAM 1 MG/1
1 TABLET ORAL
Status: DISCONTINUED | OUTPATIENT
Start: 2024-10-13 | End: 2024-10-22

## 2024-10-13 RX ORDER — SODIUM CHLORIDE 9 MG/ML
INJECTION, SOLUTION INTRAVENOUS CONTINUOUS
Status: DISCONTINUED | OUTPATIENT
Start: 2024-10-13 | End: 2024-10-14

## 2024-10-13 RX ORDER — SODIUM CHLORIDE 0.9 % (FLUSH) 0.9 %
5-40 SYRINGE (ML) INJECTION EVERY 12 HOURS SCHEDULED
Status: DISCONTINUED | OUTPATIENT
Start: 2024-10-14 | End: 2024-11-08 | Stop reason: HOSPADM

## 2024-10-13 RX ORDER — SODIUM CHLORIDE 9 MG/ML
INJECTION, SOLUTION INTRAVENOUS PRN
Status: DISCONTINUED | OUTPATIENT
Start: 2024-10-13 | End: 2024-11-08 | Stop reason: HOSPADM

## 2024-10-13 RX ORDER — LORAZEPAM 2 MG/ML
1 INJECTION INTRAMUSCULAR
Status: DISCONTINUED | OUTPATIENT
Start: 2024-10-13 | End: 2024-10-22

## 2024-10-13 RX ORDER — LORAZEPAM 1 MG/1
3 TABLET ORAL
Status: DISCONTINUED | OUTPATIENT
Start: 2024-10-13 | End: 2024-10-22

## 2024-10-13 RX ORDER — LORAZEPAM 1 MG/1
4 TABLET ORAL
Status: DISCONTINUED | OUTPATIENT
Start: 2024-10-13 | End: 2024-10-22

## 2024-10-13 RX ORDER — LORAZEPAM 2 MG/ML
3 INJECTION INTRAMUSCULAR
Status: DISCONTINUED | OUTPATIENT
Start: 2024-10-13 | End: 2024-10-22

## 2024-10-13 RX ORDER — LORAZEPAM 1 MG/1
2 TABLET ORAL
Status: DISCONTINUED | OUTPATIENT
Start: 2024-10-13 | End: 2024-10-22

## 2024-10-13 RX ORDER — LORAZEPAM 2 MG/ML
2 INJECTION INTRAMUSCULAR
Status: DISCONTINUED | OUTPATIENT
Start: 2024-10-13 | End: 2024-10-22

## 2024-10-13 RX ORDER — LORAZEPAM 2 MG/ML
1 INJECTION INTRAMUSCULAR EVERY 6 HOURS PRN
Status: DISCONTINUED | OUTPATIENT
Start: 2024-10-13 | End: 2024-10-22

## 2024-10-13 RX ORDER — GAUZE BANDAGE 2" X 2"
100 BANDAGE TOPICAL DAILY
Status: DISCONTINUED | OUTPATIENT
Start: 2024-10-14 | End: 2024-10-14

## 2024-10-13 RX ORDER — SODIUM CHLORIDE 0.9 % (FLUSH) 0.9 %
5-40 SYRINGE (ML) INJECTION PRN
Status: DISCONTINUED | OUTPATIENT
Start: 2024-10-13 | End: 2024-11-08 | Stop reason: HOSPADM

## 2024-10-13 RX ORDER — NICOTINE 21 MG/24HR
1 PATCH, TRANSDERMAL 24 HOURS TRANSDERMAL DAILY
Status: DISCONTINUED | OUTPATIENT
Start: 2024-10-13 | End: 2024-10-19

## 2024-10-13 RX ADMIN — FAMOTIDINE 20 MG: 10 INJECTION, SOLUTION INTRAVENOUS at 10:19

## 2024-10-13 RX ADMIN — IPRATROPIUM BROMIDE AND ALBUTEROL SULFATE 1 DOSE: 2.5; .5 SOLUTION RESPIRATORY (INHALATION) at 14:58

## 2024-10-13 RX ADMIN — DIPHENHYDRAMINE HYDROCHLORIDE 25 MG: 50 INJECTION INTRAMUSCULAR; INTRAVENOUS at 20:52

## 2024-10-13 RX ADMIN — SODIUM CHLORIDE, PRESERVATIVE FREE 10 ML: 5 INJECTION INTRAVENOUS at 10:19

## 2024-10-13 RX ADMIN — SODIUM CHLORIDE, PRESERVATIVE FREE 10 ML: 5 INJECTION INTRAVENOUS at 20:55

## 2024-10-13 RX ADMIN — IPRATROPIUM BROMIDE 0.5 MG: 0.5 SOLUTION RESPIRATORY (INHALATION) at 17:51

## 2024-10-13 RX ADMIN — PROPOFOL 45 MCG/KG/MIN: 10 INJECTION, EMULSION INTRAVENOUS at 04:39

## 2024-10-13 RX ADMIN — WATER 60 MG: 1 INJECTION INTRAMUSCULAR; INTRAVENOUS; SUBCUTANEOUS at 05:24

## 2024-10-13 RX ADMIN — METOPROLOL TARTRATE 5 MG: 5 INJECTION INTRAVENOUS at 17:01

## 2024-10-13 RX ADMIN — DIPHENHYDRAMINE HYDROCHLORIDE 25 MG: 50 INJECTION INTRAMUSCULAR; INTRAVENOUS at 05:31

## 2024-10-13 RX ADMIN — DEXMEDETOMIDINE 0.2 MCG/KG/HR: 100 INJECTION, SOLUTION INTRAVENOUS at 08:51

## 2024-10-13 RX ADMIN — Medication 200 MCG/HR: at 01:58

## 2024-10-13 RX ADMIN — DEXMEDETOMIDINE 0.2 MCG/KG/HR: 100 INJECTION, SOLUTION INTRAVENOUS at 23:39

## 2024-10-13 RX ADMIN — LORAZEPAM 1 MG: 2 INJECTION INTRAMUSCULAR; INTRAVENOUS at 20:52

## 2024-10-13 RX ADMIN — LEVALBUTEROL 1.25 MG: 1.25 SOLUTION, CONCENTRATE RESPIRATORY (INHALATION) at 17:50

## 2024-10-13 RX ADMIN — IPRATROPIUM BROMIDE AND ALBUTEROL SULFATE 1 DOSE: 2.5; .5 SOLUTION RESPIRATORY (INHALATION) at 06:51

## 2024-10-13 RX ADMIN — SODIUM CHLORIDE: 9 INJECTION, SOLUTION INTRAVENOUS at 17:04

## 2024-10-13 RX ADMIN — WATER 60 MG: 1 INJECTION INTRAMUSCULAR; INTRAVENOUS; SUBCUTANEOUS at 20:52

## 2024-10-13 RX ADMIN — WATER 60 MG: 1 INJECTION INTRAMUSCULAR; INTRAVENOUS; SUBCUTANEOUS at 16:12

## 2024-10-13 RX ADMIN — PROPOFOL 50 MCG/KG/MIN: 10 INJECTION, EMULSION INTRAVENOUS at 01:19

## 2024-10-13 RX ADMIN — IPRATROPIUM BROMIDE AND ALBUTEROL SULFATE 1 DOSE: 2.5; .5 SOLUTION RESPIRATORY (INHALATION) at 10:55

## 2024-10-13 RX ADMIN — DIPHENHYDRAMINE HYDROCHLORIDE 25 MG: 50 INJECTION INTRAMUSCULAR; INTRAVENOUS at 01:21

## 2024-10-13 RX ADMIN — DIPHENHYDRAMINE HYDROCHLORIDE 25 MG: 50 INJECTION INTRAMUSCULAR; INTRAVENOUS at 16:11

## 2024-10-13 RX ADMIN — ENOXAPARIN SODIUM 40 MG: 100 INJECTION SUBCUTANEOUS at 10:18

## 2024-10-13 ASSESSMENT — LIFESTYLE VARIABLES
HOW MANY STANDARD DRINKS CONTAINING ALCOHOL DO YOU HAVE ON A TYPICAL DAY: 10 OR MORE
HOW OFTEN DO YOU HAVE A DRINK CONTAINING ALCOHOL: 4 OR MORE TIMES A WEEK

## 2024-10-13 ASSESSMENT — PULMONARY FUNCTION TESTS
PIF_VALUE: 16
PIF_VALUE: 24
PIF_VALUE: 23
PIF_VALUE: 17
PIF_VALUE: 27

## 2024-10-13 NOTE — PLAN OF CARE
Problem: Respiratory - Adult  Goal: Achieves optimal ventilation and oxygenation  10/13/2024 0655 by Ameena Strange RCP  Outcome: Progressing  Flowsheets  Taken 10/13/2024 0433 by Kell Humphreys RCP  Achieves optimal ventilation and oxygenation:   Assess for changes in respiratory status   Oxygen supplementation based on oxygen saturation or arterial blood gases   Assess the need for suctioning and aspirate as needed   Respiratory therapy support as indicated  Taken 10/13/2024 0021 by Kell Humphreys RCP  Achieves optimal ventilation and oxygenation:   Assess for changes in respiratory status   Oxygen supplementation based on oxygen saturation or arterial blood gases   Assess the need for suctioning and aspirate as needed   Respiratory therapy support as indicated     Problem: Respiratory - Adult  Goal: Will be able to breathe spontaneously, without ventilator support  Description: Will be able to breathe spontaneously, without ventilator support  10/13/2024 0655 by Ameena Strange RCP  Outcome: Progressing

## 2024-10-13 NOTE — PROGRESS NOTES
Hospitalist - Progress Note      Patient: Noel Valle    Unit/Bed:1114/1114-01  YOB: 1955  MRN: [id number]   Acct: [id number]   PCP: Ramakrishna Silver MD    Date of Service: Pt seen/examined on 10/13/24  and Admitted to Inpatient with expected LOS greater than two midnights due to medical therapy.     Chief Complaint: Tongue swelling 62-year-old male    Assessment and Plan:-    Acute Respiratory Insufficiency due to upper airway obstruction secondary to Angioedema  S/P extubation 10-13-24  Drowsy as he was extubated and sedative meds weaned off  On 3 L nasal canula  On Solu-Medrol, Benadryl, Pepcid.  Critical care following.       Hypotension  Developed after patient received sedation.  IV fluids ordered.  Pressor support to maintain a MAP of 65  improving    HELENE  Likely 2/2 poor po intake.   IV fluids 100cc/hr NS ordered  Recheck labs in the morning    Tobacco abuse disorder/history of possible COPD  Sched DuJanice.  Will  on tobacco cessation when patient is alert      Subjective:  Patient was extubated. Doing well at this time.   Drowsy.     History Of Present Illness:    69-year-old male coming into the hospital for difficulty swallowing.  In the ER, patient found to have tongue swelling, trouble swallowing with acute distress, tachycardia, ill-appearing status.  Symptoms began earlier today at 6 AM.  He noticed severe swelling of his throat.  Patient denies any allergies, medications.  Patient is not on any ACE inhibitor.  Patient was able to talk but his words were muffled.  In the ER, case was discussed with the anesthesiologist to take the patient to the OR.  Patient was intubated under the supervision of surgery due to concerns for potential tracheostomy.  Patient was given rocuronium, propofol.  He continued to wake up and thrash around for which reason Versed 60 mg IV push was ordered followed by fentanyl 50 mg IV push.  He was then put on fentanyl drip.  His blood pressure did  bilaterally.  Skin: Skin color, texture, turgor normal.  No rashes or lesions.  Neurologic:  grossly normal  Psychiatric: drowsy, alert  Capillary Refill: Brisk,< 3 seconds   Peripheral Pulses: +2 palpable, equal bilaterally     Labs:   Recent Labs     10/11/24  0602 10/12/24  0256 10/13/24  0324   WBC 11.8* 11.9* 10.4   HGB 14.2 14.1 13.9   HCT 42.3 44.1 41.9    156 122*     Recent Labs     10/11/24  0602 10/12/24  0256 10/13/24  0324    137 138   K 4.3 4.7 4.2    106 107   CO2 21 18* 19*   BUN 16 35* 53*   CREATININE 1.1 1.7* 1.7*   CALCIUM 8.6 8.1* 8.1*     No results for input(s): \"AST\", \"ALT\", \"BILIDIR\", \"BILITOT\", \"ALKPHOS\" in the last 72 hours.    No results for input(s): \"INR\" in the last 72 hours.  No results for input(s): \"CKTOTAL\", \"TROPONINI\" in the last 72 hours.    Urinalysis:    No results found for: \"NITRU\", \"WBCUA\", \"BACTERIA\", \"RBCUA\", \"BLOODU\", \"SPECGRAV\", \"GLUCOSEU\"    Radiology:   XR CHEST PORTABLE   Final Result   No evidence of acute cardiopulmonary process.         XR CHEST PORTABLE   Final Result   1. The endotracheal tube is now approximately 5.9 cm above the jignesh.   2. Small lung volumes with no confluent airspace consolidation.         XR CHEST PORTABLE   Final Result   1. Low lung volumes.   2. No significant change in the appearance of the chest.         XR CHEST PORTABLE   Final Result   No significant change in the appearance of the chest.         XR CHEST PORTABLE   Final Result   Bibasilar infiltrates.      Endotracheal tube with the tip in the midtrachea.         XR CHEST PORTABLE    (Results Pending)     No results found.      EKG:     Electronically signed by Mohsin Mohammad Reza, MD on 10/13/2024 at 12:00 PM

## 2024-10-13 NOTE — PROGRESS NOTES
End Of Shift Note  [facility]  Summary of shift: Pt currently on propofol and fentanyl for sedation. Pt needed PRN fentanyl twice due to agitation. Around 0000 pt had what the RN believed to be a seizure, pts eyes rolled back and his whole body was shaking. RN notified on call NP of behavior and was left on read. Pt was able to follow commands and vitals were stable. No bowel movement and urine output was 600.     Vitals:    Vitals:    10/13/24 0448 10/13/24 0500 10/13/24 0600 10/13/24 0651   BP:  113/61 125/66    Pulse: 80 57 60 71   Resp: 15 18 18 18   Temp:       TempSrc:       SpO2: 96% 92% 91% 93%   Weight:       Height:            I&O:   Intake/Output Summary (Last 24 hours) at 10/13/2024 0656  Last data filed at 10/13/2024 0644  Gross per 24 hour   Intake 1068.31 ml   Output 400 ml   Net 668.31 ml       Resp Status: Ventilator     Ventilator Settings:  Vent Mode: AC/PRVC Resp Rate (Set): 18 bpm/Vt (Set, mL): 500 mL/ /FiO2 : 40 %    Critical Care IV infusions:   propofol 30 mcg/kg/min (10/13/24 0615)    fentaNYL 125 mcg/hr (10/13/24 0615)    sodium chloride      phenylephrine (DAVID-SYNEPHRINE) 50 mg in sodium chloride 0.9 % 250 mL infusion          LDA:   Peripheral IV 10/09/24 Right Antecubital (Active)   Number of days: 3       Peripheral IV 10/09/24 Right Hand (Active)   Number of days: 3       Peripheral IV 10/09/24 Proximal;Right Cephalic (Active)   Number of days: 3       Peripheral IV 10/09/24 Right Forearm (Active)   Number of days: 3       External Urinary Catheter (Active)   Number of days: 3       ETT  (Active)   Number of days: 4

## 2024-10-13 NOTE — PROGRESS NOTES
End Of Shift Note  [facility]  Summary of shift: Pt remains on 4L nasal cannula after being extubated today at 1100. Pt A&Ox4 and able to verbalize needs. Total of 1,000mL brick dust colored, hazy urine from external catheter and 2 BMs on bedpan. Precedex gtt was ordered if needed for pt's anxiety or if bipap is needed. Dr. Jackson started 0.9%NaCl @ 100mL/hr and RN made him aware that pt did admit to drinking about 12pack/day. Pt passed bedside swallow test with water; Dr. Tineo said to increase diet at tolerated. Nicotine patch ordered and placed on pt. PT/OT orders obtained d/t pt having weakness.     Vitals:    Vitals:    10/13/24 1845 10/13/24 1900 10/13/24 1915 10/13/24 1930   BP:  (!) 178/102     Pulse: 96 (!) 110 (!) 102 98   Resp: 12 15 12 11   Temp:       TempSrc:       SpO2:       Weight:       Height:            I&O:   Intake/Output Summary (Last 24 hours) at 10/13/2024 1953  Last data filed at 10/13/2024 1942  Gross per 24 hour   Intake 866.6 ml   Output 1600 ml   Net -733.4 ml       Resp Status: 4L nasal cannula      Critical Care IV infusions:   dexmedeTOMIDine (PRECEDEX) 400 mcg in sodium chloride 0.9 % 100 mL infusion Stopped (10/13/24 1111)    sodium chloride 100 mL/hr at 10/13/24 1704    propofol Stopped (10/13/24 0935)    fentaNYL Stopped (10/13/24 0910)    sodium chloride      phenylephrine (DAVID-SYNEPHRINE) 50 mg in sodium chloride 0.9 % 250 mL infusion          LDA:   Peripheral IV 10/09/24 Right Antecubital (Active)   Number of days: 4       Peripheral IV 10/09/24 Right Hand (Active)   Number of days: 4       Peripheral IV 10/09/24 Proximal;Right Cephalic (Active)   Number of days: 4       Peripheral IV 10/09/24 Right Forearm (Active)   Number of days: 4       External Urinary Catheter (Active)   Number of days: 4

## 2024-10-13 NOTE — PLAN OF CARE
Problem: Discharge Planning  Goal: Discharge to home or other facility with appropriate resources  10/12/2024 2109 by Guy Greene RN  Outcome: Progressing  Flowsheets (Taken 10/12/2024 2000)  Discharge to home or other facility with appropriate resources:   Identify barriers to discharge with patient and caregiver   Arrange for needed discharge resources and transportation as appropriate   Refer to discharge planning if patient needs post-hospital services based on physician order or complex needs related to functional status, cognitive ability or social support system  10/12/2024 1238 by Amita Loco RN  Outcome: Progressing  Flowsheets (Taken 10/12/2024 0800)  Discharge to home or other facility with appropriate resources:   Identify barriers to discharge with patient and caregiver   Arrange for needed discharge resources and transportation as appropriate   Identify discharge learning needs (meds, wound care, etc)   Refer to discharge planning if patient needs post-hospital services based on physician order or complex needs related to functional status, cognitive ability or social support system     Problem: Safety - Medical Restraint  Goal: Remains free of injury from restraints (Restraint for Interference with Medical Device)  Description: INTERVENTIONS:  1. Determine that other, less restrictive measures have been tried or would not be effective before applying the restraint  2. Evaluate the patient's condition at the time of restraint application  3. Inform patient/family regarding the reason for restraint  4. Q2H: Monitor safety, psychosocial status, comfort, nutrition and hydration  10/12/2024 2109 by Guy Greene, RN  Outcome: Progressing  Flowsheets  Taken 10/12/2024 2000 by Guy Greene, RN  Remains free of injury from restraints (restraint for interference with medical device):   Determine that other, less restrictive measures have been tried or would not be effective before applying the  breakdown  2.  Assess vascular access sites hourly  3.  Every 4-6 hours minimum:  Change oxygen saturation probe site  4.  Every 4-6 hours:  If on nasal continuous positive airway pressure, respiratory therapy assess nares and determine need for appliance change or resting period.  Outcome: Progressing     Problem: Safety - Adult  Goal: Free from fall injury  10/12/2024 2109 by Guy Greene, RN  Outcome: Progressing  10/12/2024 1238 by Amita Loco RN  Outcome: Progressing     Problem: Neurosensory - Adult  Goal: Achieves stable or improved neurological status  Outcome: Progressing  Flowsheets (Taken 10/12/2024 2000)  Achieves stable or improved neurological status:   Assess for and report changes in neurological status   Initiate measures to prevent increased intracranial pressure   Monitor temperature, glucose, and sodium. Initiate appropriate interventions as ordered   Maintain blood pressure and fluid volume within ordered parameters to optimize cerebral perfusion and minimize risk of hemorrhage     Problem: Cardiovascular - Adult  Goal: Maintains optimal cardiac output and hemodynamic stability  10/12/2024 2109 by Guy Greene RN  Outcome: Progressing  Flowsheets (Taken 10/12/2024 2000)  Maintains optimal cardiac output and hemodynamic stability:   Monitor blood pressure and heart rate   Monitor urine output and notify Licensed Independent Practitioner for values outside of normal range   Assess for signs of decreased cardiac output   Administer fluid and/or volume expanders as ordered   Administer vasoactive medications as ordered  10/12/2024 1238 by Amita Loco RN  Outcome: Progressing     Problem: Skin/Tissue Integrity - Adult  Goal: Skin integrity remains intact  10/12/2024 2109 by Guy Greene RN  Outcome: Progressing  Flowsheets (Taken 10/12/2024 2000)  Skin Integrity Remains Intact:   Monitor for areas of redness and/or skin breakdown   Assess vascular access sites hourly  10/12/2024 1238 by

## 2024-10-13 NOTE — PLAN OF CARE
Problem: Respiratory - Adult  Goal: Will be able to breathe spontaneously, without ventilator support  Description: Will be able to breathe spontaneously, without ventilator support  10/13/2024 1119 by Ameena Strange RCP  Outcome: Completed

## 2024-10-13 NOTE — PLAN OF CARE
Problem: Discharge Planning  Goal: Discharge to home or other facility with appropriate resources  Outcome: Progressing  Flowsheets (Taken 10/13/2024 0800)  Discharge to home or other facility with appropriate resources:   Identify barriers to discharge with patient and caregiver   Arrange for needed discharge resources and transportation as appropriate   Identify discharge learning needs (meds, wound care, etc)   Refer to discharge planning if patient needs post-hospital services based on physician order or complex needs related to functional status, cognitive ability or social support system     Problem: Respiratory - Adult  Goal: Achieves optimal ventilation and oxygenation  10/13/2024 1944 by Amita Loco RN  Outcome: Progressing  Flowsheets  Taken 10/13/2024 1200  Achieves optimal ventilation and oxygenation:   Assess for changes in respiratory status   Assess for changes in mentation and behavior   Position to facilitate oxygenation and minimize respiratory effort   Oxygen supplementation based on oxygen saturation or arterial blood gases   Initiate smoking cessation protocol as indicated   Encourage broncho-pulmonary hygiene including cough, deep breathe, incentive spirometry   Assess the need for suctioning and aspirate as needed   Assess and instruct to report shortness of breath or any respiratory difficulty   Respiratory therapy support as indicated  Patients respiratory status stable at this time. No signs or symptoms of distress noted. Respirations non-labored and regular. 02 via nasal cannula @ 5L nasal cannula in place as needed to maintain sp02>90% or per md order. Continuous SpO2 monitoring in place.     Problem: Pain  Goal: Verbalizes/displays adequate comfort level or baseline comfort level  Outcome: Progressing     Problem: Skin/Tissue Integrity  Goal: Absence of new skin breakdown  Description: 1.  Monitor for areas of redness and/or skin breakdown  2.  Assess vascular access sites hourly  3.

## 2024-10-13 NOTE — PROGRESS NOTES
PULMONARY PROGRESS NOTE:    REASON FOR VISIT:angioedema , resp failure   Interval History:    Breathing fair  Denies chest pain  Tolerating ice chips    Events since last visit: extubated earlier today under my direction.     PAST MEDICAL HISTORY:      Scheduled Meds:   ipratropium 0.5 mg-albuterol 2.5 mg  1 Dose Inhalation Q4H WA RT    famotidine (PEPCID) injection  20 mg IntraVENous Daily    sodium chloride flush  5-40 mL IntraVENous 2 times per day    enoxaparin  40 mg SubCUTAneous Daily    methylPREDNISolone  60 mg IntraVENous Q8H    diphenhydrAMINE  25 mg IntraVENous Q6H     Continuous Infusions:   dexmedeTOMIDine (PRECEDEX) 400 mcg in sodium chloride 0.9 % 100 mL infusion 0.3 mcg/kg/hr (10/13/24 0932)    propofol 25 mcg/kg/min (10/13/24 0932)    fentaNYL Stopped (10/13/24 0910)    sodium chloride      phenylephrine (DAVID-SYNEPHRINE) 50 mg in sodium chloride 0.9 % 250 mL infusion       PRN Meds:albuterol, sodium chloride flush, sodium chloride, potassium chloride **OR** potassium chloride, magnesium sulfate, ondansetron **OR** ondansetron, polyethylene glycol, acetaminophen **OR** acetaminophen, sodium phosphate 15 mmol in sodium chloride 0.9 % 250 mL IVPB, aluminum & magnesium hydroxide-simethicone, fentanNYL        PHYSICAL EXAMINATION:  BP (!) 145/85   Pulse 100   Temp 98.4 °F (36.9 °C) (Axillary)   Resp 17   Ht 1.66 m (5' 5.35\")   Wt 97.6 kg (215 lb 3.2 oz)   SpO2 96%   BMI 35.42 kg/m²     Precedex   General : Awake, alert, O2 3L; tongue size normal   Neck - supple, no lymphadenopathy, JVD not raised  Heart - regular rhythm, S1 and S2 normal; no additional sounds heard  Lungs - Air Entry- fair bilaterally; breath sounds : vesicular;   rales/crackles - absent  Abdomen - soft, no tenderness  Upper Extremities  - no cyanosis, mottling; edema : absent  Lower Extremities: no cyanosis, mottling; edema : absent    Current Laboratory, Radiologic, Microbiologic, and Diagnostic studies reviewed  Data  ReviewCBC:   Recent Labs     10/11/24  0602 10/12/24  0256 10/13/24  0324   WBC 11.8* 11.9* 10.4   RBC 4.26 4.16* 4.13*   HGB 14.2 14.1 13.9   HCT 42.3 44.1 41.9    156 122*     BMP:   Recent Labs     10/11/24  0602 10/12/24  0256 10/13/24  0324   GLUCOSE 155* 129* 131*    137 138   K 4.3 4.7 4.2   BUN 16 35* 53*   CREATININE 1.1 1.7* 1.7*   CALCIUM 8.6 8.1* 8.1*     ABGs:     ASSESSMENT / PLAN:    Acute respiratory insufficiency due to upper airway instruction/angioedema with significant swelling upper airway/atelectasis with small effusion  Assist-control ventilation tidal volume 500 FiO2 50 rate of 20 PEEP of 8  Sedation with propofol RASS of -2  Sedation with fentanyl RASS -2  CPAP trial in a.m.  Keep off ACE inhibitor for life  Status post icatibant  Continue Solu-Medrol Benadryl and Pepcid  EXTUBATED 10/13/24     COPD/smoker   DuoNeb by nebulizer  Smoking cessation  PFT outpatient     Status post hypotension due to sedation/suspect fluid overload  Monitor blood pressure off Marcel-Synephrine  Lasix 40 IV x 1//discontinue IV fluids     suspected sleep apnea/obesity  Outpatient sleep evaluation     Peptic ulcer disease prophylaxis  DVT prophylaxis      This is a late note on patient seen by me earlier today.  Electronically signed by Evelio Tineo MD on 10/13/24 at 5:51 PM

## 2024-10-13 NOTE — PROGRESS NOTES
Order obtained for extubation.  SpO2 of 96%on 40% FiO2.   Patient extubated and placed on 5 liters/min via nasal cannula.   Post extubation SpO2 is 94% with HR  102 bpm and RR 14 breaths/min.    Patient had strong cough that was productive of tan sputum.  Extubation Well tolerated by patient..   Breath Sounds: slight diminished      Ameena Strange RCP   11:14 AM

## 2024-10-14 ENCOUNTER — APPOINTMENT (OUTPATIENT)
Dept: GENERAL RADIOLOGY | Age: 69
DRG: 915 | End: 2024-10-14
Payer: MEDICARE

## 2024-10-14 LAB
ALLEN TEST: POSITIVE
ANION GAP SERPL CALCULATED.3IONS-SCNC: 10 MMOL/L (ref 9–17)
BASOPHILS # BLD: 0 K/UL (ref 0–0.2)
BASOPHILS NFR BLD: 0 % (ref 0–2)
BNP SERPL-MCNC: 1037 PG/ML
BUN SERPL-MCNC: 37 MG/DL (ref 8–23)
BUN/CREAT SERPL: 41 (ref 9–20)
CALCIUM SERPL-MCNC: 8.6 MG/DL (ref 8.6–10.4)
CHLORIDE SERPL-SCNC: 111 MMOL/L (ref 98–107)
CO2 BLD CALC-SCNC: 27 MMOL/L (ref 22–30)
CO2 SERPL-SCNC: 23 MMOL/L (ref 20–31)
CREAT SERPL-MCNC: 0.9 MG/DL (ref 0.7–1.2)
EOSINOPHIL # BLD: 0 K/UL (ref 0–0.44)
EOSINOPHILS RELATIVE PERCENT: 0 % (ref 1–4)
ERYTHROCYTE [DISTWIDTH] IN BLOOD BY AUTOMATED COUNT: 12.4 % (ref 11.8–14.4)
FIO2: 30
GFR, ESTIMATED: >90 ML/MIN/1.73M2
GLUCOSE SERPL-MCNC: 154 MG/DL (ref 70–99)
HCT VFR BLD AUTO: 42 % (ref 40.7–50.3)
HGB BLD-MCNC: 13.8 G/DL (ref 13–17)
IMM GRANULOCYTES # BLD AUTO: 0.11 K/UL (ref 0–0.3)
IMM GRANULOCYTES NFR BLD: 1 %
LYMPHOCYTES NFR BLD: 0.34 K/UL (ref 1.1–3.7)
LYMPHOCYTES RELATIVE PERCENT: 3 % (ref 24–43)
MCH RBC QN AUTO: 33.2 PG (ref 25.2–33.5)
MCHC RBC AUTO-ENTMCNC: 32.9 G/DL (ref 28.4–34.8)
MCV RBC AUTO: 101 FL (ref 82.6–102.9)
MODE: ABNORMAL
MONOCYTES NFR BLD: 0.56 K/UL (ref 0.1–1.2)
MONOCYTES NFR BLD: 5 % (ref 3–12)
NEUTROPHILS NFR BLD: 91 % (ref 36–65)
NEUTS SEG NFR BLD: 10.19 K/UL (ref 1.5–8.1)
NRBC BLD-RTO: 0 PER 100 WBC
O2 DELIVERY DEVICE: ABNORMAL
PLATELET # BLD AUTO: 122 K/UL (ref 138–453)
PMV BLD AUTO: 10.2 FL (ref 8.1–13.5)
POC HCO3: 26.6 MMOL/L (ref 21–28)
POC HCO3: 28.1 MMOL/L (ref 21–28)
POC O2 SATURATION: 95.6 % (ref 94–98)
POC O2 SATURATION: 95.8 % (ref 94–98)
POC PCO2: 42 MM HG (ref 35–48)
POC PCO2: 43.2 MM HG (ref 35–48)
POC PH: 7.41 (ref 7.35–7.45)
POC PH: 7.42 (ref 7.35–7.45)
POC PO2: 78.4 MM HG (ref 83–108)
POC PO2: 79.8 MM HG (ref 83–108)
POSITIVE BASE EXCESS, ART: 1.7 MMOL/L (ref 0–3)
POSITIVE BASE EXCESS, ART: 3.1 MMOL/L (ref 0–3)
POTASSIUM SERPL-SCNC: 4.1 MMOL/L (ref 3.7–5.3)
RBC # BLD AUTO: 4.16 M/UL (ref 4.21–5.77)
SAMPLE SITE: ABNORMAL
SODIUM SERPL-SCNC: 144 MMOL/L (ref 135–144)
WBC OTHER # BLD: 11.2 K/UL (ref 3.5–11.3)

## 2024-10-14 PROCEDURE — 2580000003 HC RX 258: Performed by: INTERNAL MEDICINE

## 2024-10-14 PROCEDURE — 36600 WITHDRAWAL OF ARTERIAL BLOOD: CPT

## 2024-10-14 PROCEDURE — 36415 COLL VENOUS BLD VENIPUNCTURE: CPT

## 2024-10-14 PROCEDURE — 6360000002 HC RX W HCPCS: Performed by: INTERNAL MEDICINE

## 2024-10-14 PROCEDURE — 2500000003 HC RX 250 WO HCPCS: Performed by: INTERNAL MEDICINE

## 2024-10-14 PROCEDURE — 5A09457 ASSISTANCE WITH RESPIRATORY VENTILATION, 24-96 CONSECUTIVE HOURS, CONTINUOUS POSITIVE AIRWAY PRESSURE: ICD-10-PCS | Performed by: FAMILY MEDICINE

## 2024-10-14 PROCEDURE — 6360000002 HC RX W HCPCS: Performed by: FAMILY MEDICINE

## 2024-10-14 PROCEDURE — 2000000000 HC ICU R&B

## 2024-10-14 PROCEDURE — 2580000003 HC RX 258: Performed by: FAMILY MEDICINE

## 2024-10-14 PROCEDURE — 74018 RADEX ABDOMEN 1 VIEW: CPT

## 2024-10-14 PROCEDURE — 71045 X-RAY EXAM CHEST 1 VIEW: CPT

## 2024-10-14 PROCEDURE — 85025 COMPLETE CBC W/AUTO DIFF WBC: CPT

## 2024-10-14 PROCEDURE — 6360000002 HC RX W HCPCS: Performed by: NURSE PRACTITIONER

## 2024-10-14 PROCEDURE — 94660 CPAP INITIATION&MGMT: CPT

## 2024-10-14 PROCEDURE — 2580000003 HC RX 258: Performed by: NURSE PRACTITIONER

## 2024-10-14 PROCEDURE — 94761 N-INVAS EAR/PLS OXIMETRY MLT: CPT

## 2024-10-14 PROCEDURE — 82803 BLOOD GASES ANY COMBINATION: CPT

## 2024-10-14 PROCEDURE — 82374 ASSAY BLOOD CARBON DIOXIDE: CPT

## 2024-10-14 PROCEDURE — 0DH67UZ INSERTION OF FEEDING DEVICE INTO STOMACH, VIA NATURAL OR ARTIFICIAL OPENING: ICD-10-PCS | Performed by: RADIOLOGY

## 2024-10-14 PROCEDURE — 83880 ASSAY OF NATRIURETIC PEPTIDE: CPT

## 2024-10-14 PROCEDURE — 6370000000 HC RX 637 (ALT 250 FOR IP): Performed by: INTERNAL MEDICINE

## 2024-10-14 PROCEDURE — 80048 BASIC METABOLIC PNL TOTAL CA: CPT

## 2024-10-14 PROCEDURE — 6370000000 HC RX 637 (ALT 250 FOR IP): Performed by: HOSPITALIST

## 2024-10-14 PROCEDURE — 2500000003 HC RX 250 WO HCPCS: Performed by: FAMILY MEDICINE

## 2024-10-14 PROCEDURE — 2700000000 HC OXYGEN THERAPY PER DAY

## 2024-10-14 PROCEDURE — 94640 AIRWAY INHALATION TREATMENT: CPT

## 2024-10-14 PROCEDURE — 37799 UNLISTED PX VASCULAR SURGERY: CPT

## 2024-10-14 RX ORDER — LISINOPRIL 10 MG/1
10 TABLET ORAL DAILY
Status: DISCONTINUED | OUTPATIENT
Start: 2024-10-14 | End: 2024-10-14

## 2024-10-14 RX ORDER — CHLORDIAZEPOXIDE HYDROCHLORIDE 25 MG/1
25 CAPSULE, GELATIN COATED ORAL 4 TIMES DAILY
Status: COMPLETED | OUTPATIENT
Start: 2024-10-14 | End: 2024-10-16

## 2024-10-14 RX ORDER — OLANZAPINE 2.5 MG/1
2.5 TABLET, FILM COATED ORAL NIGHTLY
Status: DISCONTINUED | OUTPATIENT
Start: 2024-10-14 | End: 2024-10-15

## 2024-10-14 RX ORDER — CHLORDIAZEPOXIDE HYDROCHLORIDE 25 MG/1
25 CAPSULE, GELATIN COATED ORAL 3 TIMES DAILY
Status: DISCONTINUED | OUTPATIENT
Start: 2024-10-14 | End: 2024-10-14

## 2024-10-14 RX ORDER — GAUZE BANDAGE 2" X 2"
100 BANDAGE TOPICAL DAILY
Status: DISCONTINUED | OUTPATIENT
Start: 2024-10-14 | End: 2024-10-21

## 2024-10-14 RX ORDER — ATORVASTATIN CALCIUM 40 MG/1
40 TABLET, FILM COATED ORAL DAILY
Status: DISCONTINUED | OUTPATIENT
Start: 2024-10-14 | End: 2024-11-08 | Stop reason: HOSPADM

## 2024-10-14 RX ORDER — MULTIVIT-MIN/FERROUS GLUCONATE 9 MG/15 ML
15 LIQUID (ML) ORAL DAILY
Status: DISCONTINUED | OUTPATIENT
Start: 2024-10-15 | End: 2024-10-21

## 2024-10-14 RX ORDER — FOLIC ACID 1 MG/1
1 TABLET ORAL DAILY
Status: DISCONTINUED | OUTPATIENT
Start: 2024-10-15 | End: 2024-10-21

## 2024-10-14 RX ORDER — AMLODIPINE BESYLATE AND ATORVASTATIN CALCIUM 10; 40 MG/1; MG/1
1 TABLET, FILM COATED ORAL DAILY
Status: DISCONTINUED | OUTPATIENT
Start: 2024-10-14 | End: 2024-10-14 | Stop reason: CLARIF

## 2024-10-14 RX ORDER — CHLORDIAZEPOXIDE HYDROCHLORIDE 25 MG/1
25 CAPSULE, GELATIN COATED ORAL 2 TIMES DAILY
Status: DISCONTINUED | OUTPATIENT
Start: 2024-10-18 | End: 2024-10-17

## 2024-10-14 RX ORDER — CHLORDIAZEPOXIDE HYDROCHLORIDE 25 MG/1
25 CAPSULE, GELATIN COATED ORAL DAILY
Status: DISCONTINUED | OUTPATIENT
Start: 2024-10-19 | End: 2024-10-17

## 2024-10-14 RX ORDER — CHLORDIAZEPOXIDE HYDROCHLORIDE 25 MG/1
25 CAPSULE, GELATIN COATED ORAL 3 TIMES DAILY
Status: DISCONTINUED | OUTPATIENT
Start: 2024-10-17 | End: 2024-10-17

## 2024-10-14 RX ORDER — HYDRALAZINE HYDROCHLORIDE 20 MG/ML
10 INJECTION INTRAMUSCULAR; INTRAVENOUS EVERY 6 HOURS PRN
Status: DISCONTINUED | OUTPATIENT
Start: 2024-10-14 | End: 2024-10-17

## 2024-10-14 RX ORDER — AMLODIPINE BESYLATE 10 MG/1
10 TABLET ORAL DAILY
Status: DISCONTINUED | OUTPATIENT
Start: 2024-10-14 | End: 2024-11-08 | Stop reason: HOSPADM

## 2024-10-14 RX ADMIN — FAMOTIDINE 20 MG: 10 INJECTION, SOLUTION INTRAVENOUS at 07:59

## 2024-10-14 RX ADMIN — SODIUM CHLORIDE, PRESERVATIVE FREE 10 ML: 5 INJECTION INTRAVENOUS at 21:00

## 2024-10-14 RX ADMIN — LEVALBUTEROL 1.25 MG: 1.25 SOLUTION, CONCENTRATE RESPIRATORY (INHALATION) at 11:42

## 2024-10-14 RX ADMIN — CHLORDIAZEPOXIDE HYDROCHLORIDE 25 MG: 25 CAPSULE ORAL at 21:48

## 2024-10-14 RX ADMIN — LORAZEPAM 2 MG: 2 INJECTION INTRAMUSCULAR; INTRAVENOUS at 09:18

## 2024-10-14 RX ADMIN — LEVALBUTEROL 1.25 MG: 1.25 SOLUTION, CONCENTRATE RESPIRATORY (INHALATION) at 06:01

## 2024-10-14 RX ADMIN — DEXMEDETOMIDINE 0.6 MCG/KG/HR: 100 INJECTION, SOLUTION INTRAVENOUS at 06:48

## 2024-10-14 RX ADMIN — SODIUM CHLORIDE, PRESERVATIVE FREE 10 ML: 5 INJECTION INTRAVENOUS at 08:01

## 2024-10-14 RX ADMIN — LORAZEPAM 4 MG: 2 INJECTION INTRAMUSCULAR; INTRAVENOUS at 20:49

## 2024-10-14 RX ADMIN — CHLORDIAZEPOXIDE HYDROCHLORIDE 25 MG: 25 CAPSULE ORAL at 12:33

## 2024-10-14 RX ADMIN — LEVALBUTEROL 1.25 MG: 1.25 SOLUTION, CONCENTRATE RESPIRATORY (INHALATION) at 15:53

## 2024-10-14 RX ADMIN — LORAZEPAM 2 MG: 2 INJECTION INTRAMUSCULAR; INTRAVENOUS at 10:27

## 2024-10-14 RX ADMIN — DEXMEDETOMIDINE 1 MCG/KG/HR: 100 INJECTION, SOLUTION INTRAVENOUS at 17:31

## 2024-10-14 RX ADMIN — LEVALBUTEROL 1.25 MG: 1.25 SOLUTION, CONCENTRATE RESPIRATORY (INHALATION) at 18:11

## 2024-10-14 RX ADMIN — Medication 100 MG: at 17:00

## 2024-10-14 RX ADMIN — LORAZEPAM 2 MG: 2 INJECTION INTRAMUSCULAR; INTRAVENOUS at 07:52

## 2024-10-14 RX ADMIN — WATER 40 MG: 1 INJECTION INTRAMUSCULAR; INTRAVENOUS; SUBCUTANEOUS at 21:49

## 2024-10-14 RX ADMIN — IPRATROPIUM BROMIDE 0.5 MG: 0.5 SOLUTION RESPIRATORY (INHALATION) at 06:01

## 2024-10-14 RX ADMIN — DIPHENHYDRAMINE HYDROCHLORIDE 25 MG: 50 INJECTION INTRAMUSCULAR; INTRAVENOUS at 13:32

## 2024-10-14 RX ADMIN — SODIUM CHLORIDE, PRESERVATIVE FREE 10 ML: 5 INJECTION INTRAVENOUS at 08:00

## 2024-10-14 RX ADMIN — IPRATROPIUM BROMIDE 0.5 MG: 0.5 SOLUTION RESPIRATORY (INHALATION) at 11:42

## 2024-10-14 RX ADMIN — ENOXAPARIN SODIUM 40 MG: 100 INJECTION SUBCUTANEOUS at 07:57

## 2024-10-14 RX ADMIN — DEXMEDETOMIDINE 1 MCG/KG/HR: 100 INJECTION, SOLUTION INTRAVENOUS at 22:07

## 2024-10-14 RX ADMIN — CHLORDIAZEPOXIDE HYDROCHLORIDE 25 MG: 25 CAPSULE ORAL at 17:00

## 2024-10-14 RX ADMIN — SODIUM CHLORIDE, PRESERVATIVE FREE 10 ML: 5 INJECTION INTRAVENOUS at 20:47

## 2024-10-14 RX ADMIN — OLANZAPINE 2.5 MG: 2.5 TABLET, FILM COATED ORAL at 21:48

## 2024-10-14 RX ADMIN — DIPHENHYDRAMINE HYDROCHLORIDE 25 MG: 50 INJECTION INTRAMUSCULAR; INTRAVENOUS at 20:47

## 2024-10-14 RX ADMIN — DIPHENHYDRAMINE HYDROCHLORIDE 25 MG: 50 INJECTION INTRAMUSCULAR; INTRAVENOUS at 05:32

## 2024-10-14 RX ADMIN — DEXMEDETOMIDINE 0.8 MCG/KG/HR: 100 INJECTION, SOLUTION INTRAVENOUS at 12:54

## 2024-10-14 RX ADMIN — IPRATROPIUM BROMIDE 0.5 MG: 0.5 SOLUTION RESPIRATORY (INHALATION) at 15:53

## 2024-10-14 RX ADMIN — SODIUM CHLORIDE: 9 INJECTION, SOLUTION INTRAVENOUS at 04:02

## 2024-10-14 RX ADMIN — SODIUM CHLORIDE, PRESERVATIVE FREE 20 MG: 5 INJECTION INTRAVENOUS at 21:49

## 2024-10-14 RX ADMIN — LORAZEPAM 1 MG: 2 INJECTION INTRAMUSCULAR; INTRAVENOUS at 05:11

## 2024-10-14 RX ADMIN — LORAZEPAM 4 MG: 2 INJECTION INTRAMUSCULAR; INTRAVENOUS at 22:02

## 2024-10-14 RX ADMIN — AMLODIPINE BESYLATE 10 MG: 10 TABLET ORAL at 17:00

## 2024-10-14 RX ADMIN — IPRATROPIUM BROMIDE 0.5 MG: 0.5 SOLUTION RESPIRATORY (INHALATION) at 18:11

## 2024-10-14 RX ADMIN — WATER 40 MG: 1 INJECTION INTRAMUSCULAR; INTRAVENOUS; SUBCUTANEOUS at 13:35

## 2024-10-14 RX ADMIN — WATER 60 MG: 1 INJECTION INTRAMUSCULAR; INTRAVENOUS; SUBCUTANEOUS at 05:32

## 2024-10-14 RX ADMIN — ATORVASTATIN CALCIUM 40 MG: 40 TABLET, FILM COATED ORAL at 17:00

## 2024-10-14 RX ADMIN — LORAZEPAM 2 MG: 2 INJECTION INTRAMUSCULAR; INTRAVENOUS at 14:19

## 2024-10-14 RX ADMIN — LORAZEPAM 4 MG: 2 INJECTION INTRAMUSCULAR; INTRAVENOUS at 23:15

## 2024-10-14 RX ADMIN — LORAZEPAM 3 MG: 2 INJECTION INTRAMUSCULAR; INTRAVENOUS at 00:08

## 2024-10-14 RX ADMIN — LORAZEPAM 2 MG: 2 INJECTION INTRAMUSCULAR; INTRAVENOUS at 17:21

## 2024-10-14 ASSESSMENT — PAIN SCALES - GENERAL
PAINLEVEL_OUTOF10: 0

## 2024-10-14 NOTE — FLOWSHEET NOTE
10/13/24 1558   Treatment Team Notification   Reason for Communication Abnormal vitals;Evaluate  (HR 120s-170s, SBP 160s, pt anxious)   Name of Team Member Notified Dr. Jackson   Treatment Team Role Attending Provider   Method of Communication Secure Message   Response See orders  (5mg Lopressor IV q6hr prn and 1mg Ativan IV q6hr prn ordered)   Notification Time 1558       RN notified Dr. Jackson about pt's HR remaining sinus tach ranging from 120s-170s, SBPs 160s and pt seeming anxious. MD placed an order for 5mg Lopressor IV q6 hr prn and 1mg Ativan q6 hr prn. When RN was in pt's room giving the Lopressor and starting his maintenance fluids, RN acknowledged knowing pt is a smoker and asked if he also drank. RN explained to pt that it was a \"no judgement\" question and how it was to only give him proper treatment if any issues were to arise. Pt stated he does drink about 12pack/day; RN made MD aware. RN educated pt to let staff know if he begins to feel shaky, anxious, sweaty, etc, so that we may help him if so. Pt acknowledged teaching.

## 2024-10-14 NOTE — PROGRESS NOTES
Pt showing visible signs of withdrawal, sweating, anxious, and visual hallucinations. RN messaged on call NP, CIWA scale was ordered and pt received PRN ativan per scale.

## 2024-10-14 NOTE — PROGRESS NOTES
Pulmonary Critical Care Progress Note    Patient seen for the follow up of Angioedema of tongue     Subjective:    He was extubated yesterday.  He now requires oxygen at 2 L.  Developed significant agitation requiring Precedex now at 0.8.  He had significant agitation admitted yesterday that he had been drinking 24 beers daily.  He received Ativan IV now he is lethargic.  He was on BiPAP tolerated.    Examination:    Vitals: BP (!) 163/81   Pulse 78   Temp 97.6 °F (36.4 °C) (Axillary)   Resp 18   Ht 1.66 m (5' 5.35\")   Wt 95.6 kg (210 lb 12.2 oz)   SpO2 95%   BMI 34.69 kg/m²   SpO2  Av.2 %  Min: 86 %  Max: 99 %  General appearance: Lethargic/sleepy  Neck: No JVD  Lungs: Decreased breath sound no crackles or wheeze  Heart: regular rate and rhythm, S1, S2 normal, no gallop  Abdomen: Soft, non tender, + BS  Extremities: no cyanosis or clubbing. No significant edema    LABs:    CBC:   Recent Labs     10/13/24  0324 10/14/24  0337   WBC 10.4 11.2   HGB 13.9 13.8   HCT 41.9 42.0   * 122*     BMP:   Recent Labs     10/13/24  0324 10/14/24  0337    144   K 4.2 4.1   CO2 19* 23   BUN 53* 37*   CREATININE 1.7* 0.9   LABGLOM 44* >90   GLUCOSE 131* 154*      Latest Reference Range & Units 10/14/24 06:00 10/14/24 12:31   POC TCO2 22 - 30 mmol/L  27   POC HCO3 21.0 - 28.0 mmol/L 28.1 (H) 26.6   POC O2 SAT 94.0 - 98.0 % 95.6 95.8   POC pCO2 35.0 - 48.0 mm Hg 43.2 42.0   POC pH 7.350 - 7.450  7.421 7.410   POC PO2 83.0 - 108.0 mm Hg 78.4 (L) 79.8 (L)   (H): Data is abnormally high  (L): Data is abnormally low    LIVER PROFILE:  No results for input(s): \"AST\", \"ALT\", \"LABALBU\" in the last 72 hours.      Radiology:  Chest x-ray 10/13  Reviewed  No evidence of acute cardiopulmonary process.          Impression/recommendations:    Acute respiratory insufficiency due to upper airway instruction/angioedema with significant swelling upper airway/atelectasis with small effusion  Oxygen by nasal cannula  BiPAP

## 2024-10-14 NOTE — PROGRESS NOTES
Hospitalist - Progress Note      Patient: Noel Valle    Unit/Bed:1114/1114-01  YOB: 1955  MRN: [id number]   Acct: [id number]   PCP: Ramakrishna Silver MD    Date of Service: Pt seen/examined on 10/14/24  and Admitted to Inpatient with expected LOS greater than two midnights due to medical therapy.     Chief Complaint: Tongue swelling 62-year-old male    Assessment and Plan:-    Acute Respiratory Insufficiency due to upper airway obstruction secondary to Angioedema  S/P extubation 10-13-24  Drowsy as he was extubated and sedative meds weaned off  On 3 L nasal canula  On Solu-Medrol, Benadryl, Pepcid.  Critical care following.       Hypotension-now hypertensive  Developed after patient received sedation.  IV fluids ordered.  Pressor support to maintain a MAP of 65, currently off  improving  Critical care following    Hypertension  Resume home antihypertensives    HELENE  Likely 2/2 poor po intake.   IV fluids 100cc/hr NS ordered  Recheck labs in the morning    Tobacco abuse disorder/history of possible COPD  Sched Dave.  Will  on tobacco cessation when patient is alert  Alcohol withdrawal  N.p.o.  Precedex  CIWA  On Zyprexa  Started on Librium      Subjective:  Patient was sleepy.  Did not talk to me much.  Currently on CIWA protocol  Afebrile  Blood pressure elevated      History Of Present Illness:    69-year-old male coming into the hospital for difficulty swallowing.  In the ER, patient found to have tongue swelling, trouble swallowing with acute distress, tachycardia, ill-appearing status.  Symptoms began earlier today at 6 AM.  He noticed severe swelling of his throat.  Patient denies any allergies, medications.  Patient is not on any ACE inhibitor.  Patient was able to talk but his words were muffled.  In the ER, case was discussed with the anesthesiologist to take the patient to the OR.  Patient was intubated under the supervision of surgery due to concerns for potential

## 2024-10-14 NOTE — PLAN OF CARE
Problem: Respiratory - Adult  Goal: Achieves optimal ventilation and oxygenation  10/14/2024 1912 by Kell Humphreys RCP  Outcome: Progressing     Problem: Respiratory - Adult  Goal: Able to breathe comfortably  Outcome: Progressing

## 2024-10-14 NOTE — PROGRESS NOTES
End Of Shift Note  [facility]  Summary of shift: Pt currently on precedex and bipap. Read previous notes for summary of night. Pt received 3 doses of PRN ativan for alcohol withdrawal symptoms. Pt currently A/O x 0 and lethargic. No bowel movement and urine output was 1350.     Vitals:    Vitals:    10/14/24 0332 10/14/24 0400 10/14/24 0600 10/14/24 0601   BP:  (!) 160/86 (!) 169/81    Pulse: 76 60 55 53   Resp: 16 16 21 21   Temp:  98.4 °F (36.9 °C)     TempSrc:  Oral     SpO2: 93% 92% 95% 94%   Weight:       Height:            I&O:   Intake/Output Summary (Last 24 hours) at 10/14/2024 0650  Last data filed at 10/14/2024 0635  Gross per 24 hour   Intake 1458.68 ml   Output 2950 ml   Net -1491.32 ml       Resp Status: Bipap/ 4 L NC     Ventilator Settings:  Vent Mode: CPAP/PS Resp Rate (Set): 18 bpm/Vt (Set, mL): 500 mL/ /FiO2 : 30 %    Critical Care IV infusions:   dexmedeTOMIDine (PRECEDEX) 400 mcg in sodium chloride 0.9 % 100 mL infusion 0.6 mcg/kg/hr (10/14/24 0648)    sodium chloride 100 mL/hr at 10/14/24 0635    sodium chloride      propofol Stopped (10/13/24 0935)    fentaNYL Stopped (10/13/24 0910)    sodium chloride      phenylephrine (DAVID-SYNEPHRINE) 50 mg in sodium chloride 0.9 % 250 mL infusion          LDA:   Peripheral IV 10/09/24 Right Hand (Active)   Number of days: 4       Peripheral IV 10/09/24 Proximal;Right Cephalic (Active)   Number of days: 4       Peripheral IV 10/09/24 Right Forearm (Active)   Number of days: 4       External Urinary Catheter (Active)   Number of days: 4

## 2024-10-14 NOTE — PROGRESS NOTES
Occupational Therapy  [facility]  Occupational Therapy Not Seen Note    Patient not available for Occupational Therapy due to:    [] Testing:    [] Hemodialysis    [] Cancelled by RN:    []Refusal by Patient:    [] Surgery:     [] Intubation:     [] Pain Medication:    [] Sedation:     [] Spine Precautions :    [] Medical Instability:    [x] Other: cx per RN, not appropriate d/t being on bipap, precedex, ativan.     Larisa Vasquez, OTR/L

## 2024-10-14 NOTE — PROGRESS NOTES
0500: Pt ripped off bipap, when RN and staff went in to talk with pt he became aggressive with staff and threatening. Critical care doctor was called and orders were placed for non-violent restraints. Pt was placed in restraints, given PRN ativan and precedex was titrated.     0600: Pt became lethargic and was breathing with diaphragm. Pt at this point was agreeable to bipap. Restraints were taken off and pt was placed on bipap. An ABG was done results were: ph: 7.421, co2: 43.2, and hco3: 28.1.

## 2024-10-14 NOTE — PROGRESS NOTES
End Of Shift Note  [facility]  Summary of shift: Patient had CIWA score in effect today. See MAR. NICOL placed for Medication management and nutrition assistance. Blood pressure medication restarted to effectively manage high blood pressures. BiPAP has been off patient since 11am and his blood were last monitored at noon and WNL. Patient is sleeping but can be aroused. He is still very anxious at times. CIWA score is reflective. Sister and Wife updated on status.   Vitals:    Vitals:    10/14/24 1600 10/14/24 1634 10/14/24 1700 10/14/24 1800   BP: (!) 175/89  (!) 169/78 (!) 179/86   Pulse: 69  (!) 47 54   Resp: 17  16 15   Temp: 97.6 °F (36.4 °C) 97.6 °F (36.4 °C) 97.6 °F (36.4 °C)    TempSrc: Axillary Axillary     SpO2: 94%  95% 94%   Weight:       Height:            I&O:   Intake/Output Summary (Last 24 hours) at 10/14/2024 1931  Last data filed at 10/14/2024 1709  Gross per 24 hour   Intake 2700.69 ml   Output 2000 ml   Net 700.69 ml       Resp Status: 2L NC- Intermittent BiPAP    Ventilator Settings:  Vent Mode: CPAP/PS Resp Rate (Set): 18 bpm/Vt (Set, mL): 500 mL/ /FiO2 : 30 %    Critical Care IV infusions:   dexmedeTOMIDine (PRECEDEX) 1,000 mcg in sodium chloride 0.9 % 250 mL infusion      dexmedeTOMIDine (PRECEDEX) 400 mcg in sodium chloride 0.9 % 100 mL infusion 1 mcg/kg/hr (10/14/24 1731)    sodium chloride      sodium chloride          LDA:   Peripheral IV 10/09/24 Right Hand (Active)   Number of days: 5       Peripheral IV 10/09/24 Proximal;Right Cephalic (Active)   Number of days: 5       Peripheral IV 10/09/24 Right Forearm (Active)   Number of days: 5       NG/OG/NJ/NE Tube Nasogastric 16 fr Left nostril (Active)   Number of days: 0       External Urinary Catheter (Active)   Number of days: 5

## 2024-10-14 NOTE — PROGRESS NOTES
Pt was extubated at 1100 and placed on 5L nasal cannula with extubation order obtained form critical care. Pt had strong, productive cough directly after extubation. Pt able to talk after clearing his throat and being suctioned. Pt was tearful and very appreciative when able to talk. RN explained situation since being admitted and pt acknowledged.     RN called and updated pt's sister (Alexia) that pt is now extubated and remains stable. She expressed her gratitude and stated she would be up later today.

## 2024-10-14 NOTE — PLAN OF CARE
Problem: Respiratory - Adult  Goal: Achieves optimal ventilation and oxygenation  Outcome: Progressing  Flowsheets  Taken 10/14/2024 0601 by Kell Humphreys RCP  Achieves optimal ventilation and oxygenation:   Assess for changes in respiratory status   Oxygen supplementation based on oxygen saturation or arterial blood gases   Assess the need for suctioning and aspirate as needed   Respiratory therapy support as indicated  Taken 10/14/2024 0332 by Kell Humphreys RCP  Achieves optimal ventilation and oxygenation:   Assess for changes in respiratory status   Oxygen supplementation based on oxygen saturation or arterial blood gases   Respiratory therapy support as indicated  Taken 10/14/2024 0101 by Kell Humphreys RCP  Achieves optimal ventilation and oxygenation:   Assess for changes in respiratory status   Oxygen supplementation based on oxygen saturation or arterial blood gases   Respiratory therapy support as indicated     Problem: Neurosensory - Adult  Goal: Achieves stable or improved neurological status  Outcome: Progressing     Problem: Cardiovascular - Adult  Goal: Maintains optimal cardiac output and hemodynamic stability  Outcome: Progressing     Problem: Skin/Tissue Integrity - Adult  Goal: Skin integrity remains intact  Outcome: Progressing   Problem: Safety - Medical Restraint  Goal: Remains free of injury from restraints (Restraint for Interference with Medical Device)  Description: INTERVENTIONS:  1. Determine that other, less restrictive measures have been tried or would not be effective before applying the restraint  2. Evaluate the patient's condition at the time of restraint application  3. Inform patient/family regarding the reason for restraint  4. Q2H: Monitor safety, psychosocial status, comfort, nutrition and hydration  Outcome: Progressing  Flowsheets  Taken 10/14/2024 1200 by Michelle Acosta RN  Remains free of injury from restraints (restraint for interference with

## 2024-10-14 NOTE — PROGRESS NOTES
Comprehensive Nutrition Assessment    Type and Reason for Visit:  Consult    Nutrition Recommendations/Plan:   NPO diet  Start trickle feeds- Glucerna 1.5 Camilo at 15 mL/hr (360 mL total volume)  Monitor tube feeding rate, tolerance, advancement and labs     Malnutrition Assessment:  Malnutrition Status:  At risk for malnutrition (Comment) (10/14/24 1527)        Nutrition Assessment:    Patient admitted for angioedema of the tongue and required intubation (10/9). Patient was extubated yesterday (10/13). Patient is on Precedex on max for agitation and attempts to remove BiPap. Patient told RN after extubation that he consumes 24 beers per day. Currently, patient is off Bipap and an NG tube has been placed for Librium. Dr. Gamboa approved starting trickle feeds since patient has not had nutrition this admission. Patient is on Solumedrol steroids which can cause increase in glucose labs. Will start Glucerna 1.5 Camilo at 15 mL/hr (360 mL total volume). Monitor BiPap status and appropriatiness to increase tube feeding to goal rate.    Nutrition Related Findings:    Edema: +1 BLE. Hypoactive bowel sounds. NG tube. Alcohol abuse Wound Type: None       Current Nutrition Intake & Therapies:    Average Meal Intake: NPO  Average Supplements Intake: NPO  Diet NPO  ADULT TUBE FEEDING; Nasogastric; Diabetic; Continuous; 15; No; 30; Q 4 hours  Current Tube Feeding (TF) Orders:  Feeding Route: Nasogastric  Formula: Diabetic  Schedule: Continuous  Feeding Regimen: Glucerna 1.5 Camilo at 15 mL/hr (360 mL total volume). Trickle feeds  Additives/Modulars: None  Water Flushes: 30 mL every 4 hours (180 mL free water)  Current TF & Flush Orders Provides: 540 kcal, 30 gm of protein and 453 mL free water  Goal TF & Flush Orders Provides: 47 mL/hr (1128 mL) provides 1692 kcal, 93 gm of protein, 856 mL free water    Anthropometric Measures:  Height: 166 cm (5' 5.35\")  Ideal Body Weight (IBW): 138 lbs (63 kg)       Current Body Weight: 95.6 kg (210

## 2024-10-14 NOTE — PROGRESS NOTES
Physical Therapy  DATE: 10/14/2024    NAME: Noel Valle  MRN: [id number]   : 1955    Patient not seen this date for Physical Therapy due to:      [x] Cancel by RN or physician due to: Patient on Bipap requiring max dose of precedex and on ativan due to aggression.      [] Hemodialysis    [] Critical Lab Value Level     [] Blood transfusion in progress    [] Acute or unstable cardiovascular status   _MAP < 55 or more than >115  _HR < 40 or > 130    [] Acute or unstable pulmonary status   -FiO2 > 60%   _RR < 5 or >40    _O2 sats < 85%    [] Strict Bedrest    [] Off Unit for surgery or procedure    [] Off Unit for testing       [] Pending imaging to R/O fracture    [] Refusal by Patient      [] Other:      [] PT being discontinued at this time. Patient independent. No further needs.     [] PT being discontinued at this time as the patient has been transferred to hospice care. No further needs.      Lianna Nunez, PT

## 2024-10-15 LAB
ANION GAP SERPL CALCULATED.3IONS-SCNC: 10 MMOL/L (ref 9–17)
BASOPHILS # BLD: 0 K/UL (ref 0–0.2)
BASOPHILS NFR BLD: 0 % (ref 0–2)
BNP SERPL-MCNC: 3190 PG/ML
BUN SERPL-MCNC: 31 MG/DL (ref 8–23)
BUN/CREAT SERPL: 44 (ref 9–20)
CALCIUM SERPL-MCNC: 9 MG/DL (ref 8.6–10.4)
CHLORIDE SERPL-SCNC: 113 MMOL/L (ref 98–107)
CO2 SERPL-SCNC: 26 MMOL/L (ref 20–31)
CREAT SERPL-MCNC: 0.7 MG/DL (ref 0.7–1.2)
EOSINOPHIL # BLD: 0 K/UL (ref 0–0.44)
EOSINOPHILS RELATIVE PERCENT: 0 % (ref 1–4)
ERYTHROCYTE [DISTWIDTH] IN BLOOD BY AUTOMATED COUNT: 12.1 % (ref 11.8–14.4)
GFR, ESTIMATED: >90 ML/MIN/1.73M2
GLUCOSE SERPL-MCNC: 159 MG/DL (ref 70–99)
HCT VFR BLD AUTO: 43.2 % (ref 40.7–50.3)
HGB BLD-MCNC: 14.7 G/DL (ref 13–17)
IMM GRANULOCYTES # BLD AUTO: 0.11 K/UL (ref 0–0.3)
IMM GRANULOCYTES NFR BLD: 1 %
LYMPHOCYTES NFR BLD: 0.53 K/UL (ref 1.1–3.7)
LYMPHOCYTES RELATIVE PERCENT: 5 % (ref 24–43)
MCH RBC QN AUTO: 33.4 PG (ref 25.2–33.5)
MCHC RBC AUTO-ENTMCNC: 34 G/DL (ref 28.4–34.8)
MCV RBC AUTO: 98.2 FL (ref 82.6–102.9)
MONOCYTES NFR BLD: 0.95 K/UL (ref 0.1–1.2)
MONOCYTES NFR BLD: 9 % (ref 3–12)
NEUTROPHILS NFR BLD: 85 % (ref 36–65)
NEUTS SEG NFR BLD: 9.01 K/UL (ref 1.5–8.1)
NRBC BLD-RTO: 0 PER 100 WBC
PLATELET # BLD AUTO: 132 K/UL (ref 138–453)
PMV BLD AUTO: 10.7 FL (ref 8.1–13.5)
POTASSIUM SERPL-SCNC: 4 MMOL/L (ref 3.7–5.3)
RBC # BLD AUTO: 4.4 M/UL (ref 4.21–5.77)
SODIUM SERPL-SCNC: 149 MMOL/L (ref 135–144)
WBC OTHER # BLD: 10.6 K/UL (ref 3.5–11.3)

## 2024-10-15 PROCEDURE — 2500000003 HC RX 250 WO HCPCS: Performed by: INTERNAL MEDICINE

## 2024-10-15 PROCEDURE — 6370000000 HC RX 637 (ALT 250 FOR IP): Performed by: INTERNAL MEDICINE

## 2024-10-15 PROCEDURE — 36415 COLL VENOUS BLD VENIPUNCTURE: CPT

## 2024-10-15 PROCEDURE — 6370000000 HC RX 637 (ALT 250 FOR IP): Performed by: HOSPITALIST

## 2024-10-15 PROCEDURE — 6360000002 HC RX W HCPCS: Performed by: NURSE PRACTITIONER

## 2024-10-15 PROCEDURE — 2580000003 HC RX 258: Performed by: NURSE PRACTITIONER

## 2024-10-15 PROCEDURE — 2700000000 HC OXYGEN THERAPY PER DAY

## 2024-10-15 PROCEDURE — 6360000002 HC RX W HCPCS: Performed by: INTERNAL MEDICINE

## 2024-10-15 PROCEDURE — 94660 CPAP INITIATION&MGMT: CPT

## 2024-10-15 PROCEDURE — 83880 ASSAY OF NATRIURETIC PEPTIDE: CPT

## 2024-10-15 PROCEDURE — 2000000000 HC ICU R&B

## 2024-10-15 PROCEDURE — 6360000002 HC RX W HCPCS: Performed by: FAMILY MEDICINE

## 2024-10-15 PROCEDURE — 2580000003 HC RX 258: Performed by: INTERNAL MEDICINE

## 2024-10-15 PROCEDURE — 2580000003 HC RX 258: Performed by: FAMILY MEDICINE

## 2024-10-15 PROCEDURE — 80048 BASIC METABOLIC PNL TOTAL CA: CPT

## 2024-10-15 PROCEDURE — 85025 COMPLETE CBC W/AUTO DIFF WBC: CPT

## 2024-10-15 PROCEDURE — 94640 AIRWAY INHALATION TREATMENT: CPT

## 2024-10-15 PROCEDURE — 94761 N-INVAS EAR/PLS OXIMETRY MLT: CPT

## 2024-10-15 RX ORDER — OLANZAPINE 5 MG/1
5 TABLET ORAL NIGHTLY
Status: DISCONTINUED | OUTPATIENT
Start: 2024-10-15 | End: 2024-10-17

## 2024-10-15 RX ORDER — DEXTROSE MONOHYDRATE 50 MG/ML
INJECTION, SOLUTION INTRAVENOUS CONTINUOUS
Status: DISCONTINUED | OUTPATIENT
Start: 2024-10-15 | End: 2024-10-16

## 2024-10-15 RX ORDER — CLONIDINE HYDROCHLORIDE 0.2 MG/1
0.2 TABLET ORAL 2 TIMES DAILY
Status: DISCONTINUED | OUTPATIENT
Start: 2024-10-15 | End: 2024-10-21

## 2024-10-15 RX ORDER — LABETALOL HYDROCHLORIDE 5 MG/ML
10 INJECTION, SOLUTION INTRAVENOUS ONCE
Status: DISCONTINUED | OUTPATIENT
Start: 2024-10-15 | End: 2024-11-08 | Stop reason: HOSPADM

## 2024-10-15 RX ORDER — HYDRALAZINE HYDROCHLORIDE 20 MG/ML
5 INJECTION INTRAMUSCULAR; INTRAVENOUS ONCE
Status: COMPLETED | OUTPATIENT
Start: 2024-10-15 | End: 2024-10-15

## 2024-10-15 RX ORDER — HYDRALAZINE HYDROCHLORIDE 20 MG/ML
10 INJECTION INTRAMUSCULAR; INTRAVENOUS ONCE
Status: COMPLETED | OUTPATIENT
Start: 2024-10-15 | End: 2024-10-15

## 2024-10-15 RX ORDER — CARVEDILOL 3.12 MG/1
3.12 TABLET ORAL 2 TIMES DAILY WITH MEALS
Status: DISCONTINUED | OUTPATIENT
Start: 2024-10-15 | End: 2024-10-17

## 2024-10-15 RX ADMIN — ENOXAPARIN SODIUM 40 MG: 100 INJECTION SUBCUTANEOUS at 08:59

## 2024-10-15 RX ADMIN — IPRATROPIUM BROMIDE 0.5 MG: 0.5 SOLUTION RESPIRATORY (INHALATION) at 06:15

## 2024-10-15 RX ADMIN — CLONIDINE HYDROCHLORIDE 0.2 MG: 0.2 TABLET ORAL at 14:54

## 2024-10-15 RX ADMIN — OLANZAPINE 5 MG: 5 TABLET, FILM COATED ORAL at 20:17

## 2024-10-15 RX ADMIN — HYDRALAZINE HYDROCHLORIDE 10 MG: 20 INJECTION INTRAMUSCULAR; INTRAVENOUS at 00:06

## 2024-10-15 RX ADMIN — SODIUM CHLORIDE, PRESERVATIVE FREE 10 ML: 5 INJECTION INTRAVENOUS at 09:00

## 2024-10-15 RX ADMIN — LEVALBUTEROL 1.25 MG: 1.25 SOLUTION, CONCENTRATE RESPIRATORY (INHALATION) at 06:15

## 2024-10-15 RX ADMIN — SODIUM CHLORIDE, PRESERVATIVE FREE 10 ML: 5 INJECTION INTRAVENOUS at 09:01

## 2024-10-15 RX ADMIN — SODIUM CHLORIDE, PRESERVATIVE FREE 20 MG: 5 INJECTION INTRAVENOUS at 20:18

## 2024-10-15 RX ADMIN — CLONIDINE HYDROCHLORIDE 0.2 MG: 0.2 TABLET ORAL at 20:17

## 2024-10-15 RX ADMIN — LORAZEPAM 2 MG: 2 INJECTION INTRAMUSCULAR; INTRAVENOUS at 02:37

## 2024-10-15 RX ADMIN — CHLORDIAZEPOXIDE HYDROCHLORIDE 25 MG: 25 CAPSULE ORAL at 08:59

## 2024-10-15 RX ADMIN — IPRATROPIUM BROMIDE 0.5 MG: 0.5 SOLUTION RESPIRATORY (INHALATION) at 19:28

## 2024-10-15 RX ADMIN — WATER 40 MG: 1 INJECTION INTRAMUSCULAR; INTRAVENOUS; SUBCUTANEOUS at 23:10

## 2024-10-15 RX ADMIN — CHLORDIAZEPOXIDE HYDROCHLORIDE 25 MG: 25 CAPSULE ORAL at 14:53

## 2024-10-15 RX ADMIN — SODIUM CHLORIDE, PRESERVATIVE FREE 10 ML: 5 INJECTION INTRAVENOUS at 20:18

## 2024-10-15 RX ADMIN — HYDRALAZINE HYDROCHLORIDE 10 MG: 20 INJECTION INTRAMUSCULAR; INTRAVENOUS at 23:09

## 2024-10-15 RX ADMIN — DEXTROSE MONOHYDRATE: 50 INJECTION, SOLUTION INTRAVENOUS at 17:51

## 2024-10-15 RX ADMIN — WATER 40 MG: 1 INJECTION INTRAMUSCULAR; INTRAVENOUS; SUBCUTANEOUS at 14:54

## 2024-10-15 RX ADMIN — IPRATROPIUM BROMIDE 0.5 MG: 0.5 SOLUTION RESPIRATORY (INHALATION) at 10:00

## 2024-10-15 RX ADMIN — FOLIC ACID 1 MG: 1 TABLET ORAL at 08:59

## 2024-10-15 RX ADMIN — DEXMEDETOMIDINE 0.8 MCG/KG/HR: 100 INJECTION, SOLUTION INTRAVENOUS at 18:32

## 2024-10-15 RX ADMIN — LEVALBUTEROL 1.25 MG: 1.25 SOLUTION, CONCENTRATE RESPIRATORY (INHALATION) at 15:11

## 2024-10-15 RX ADMIN — SODIUM CHLORIDE, PRESERVATIVE FREE 20 MG: 5 INJECTION INTRAVENOUS at 08:59

## 2024-10-15 RX ADMIN — DIPHENHYDRAMINE HYDROCHLORIDE 25 MG: 50 INJECTION INTRAMUSCULAR; INTRAVENOUS at 06:33

## 2024-10-15 RX ADMIN — Medication 15 ML: at 08:59

## 2024-10-15 RX ADMIN — AMLODIPINE BESYLATE 10 MG: 10 TABLET ORAL at 08:59

## 2024-10-15 RX ADMIN — ATORVASTATIN CALCIUM 40 MG: 40 TABLET, FILM COATED ORAL at 08:59

## 2024-10-15 RX ADMIN — CARVEDILOL 3.12 MG: 3.12 TABLET, FILM COATED ORAL at 17:53

## 2024-10-15 RX ADMIN — HYDRALAZINE HYDROCHLORIDE 10 MG: 20 INJECTION INTRAMUSCULAR; INTRAVENOUS at 05:34

## 2024-10-15 RX ADMIN — DIPHENHYDRAMINE HYDROCHLORIDE 25 MG: 50 INJECTION INTRAMUSCULAR; INTRAVENOUS at 00:08

## 2024-10-15 RX ADMIN — CHLORDIAZEPOXIDE HYDROCHLORIDE 25 MG: 25 CAPSULE ORAL at 20:17

## 2024-10-15 RX ADMIN — HYDRALAZINE HYDROCHLORIDE 5 MG: 20 INJECTION INTRAMUSCULAR; INTRAVENOUS at 02:00

## 2024-10-15 RX ADMIN — IPRATROPIUM BROMIDE 0.5 MG: 0.5 SOLUTION RESPIRATORY (INHALATION) at 15:11

## 2024-10-15 RX ADMIN — CHLORDIAZEPOXIDE HYDROCHLORIDE 25 MG: 25 CAPSULE ORAL at 17:53

## 2024-10-15 RX ADMIN — LORAZEPAM 4 MG: 2 INJECTION INTRAMUSCULAR; INTRAVENOUS at 05:31

## 2024-10-15 RX ADMIN — WATER 40 MG: 1 INJECTION INTRAMUSCULAR; INTRAVENOUS; SUBCUTANEOUS at 05:36

## 2024-10-15 RX ADMIN — LEVALBUTEROL 1.25 MG: 1.25 SOLUTION, CONCENTRATE RESPIRATORY (INHALATION) at 19:28

## 2024-10-15 RX ADMIN — HYDRALAZINE HYDROCHLORIDE 10 MG: 20 INJECTION INTRAMUSCULAR; INTRAVENOUS at 15:05

## 2024-10-15 RX ADMIN — DEXMEDETOMIDINE 1.4 MCG/KG/HR: 100 INJECTION, SOLUTION INTRAVENOUS at 07:40

## 2024-10-15 RX ADMIN — Medication 100 MG: at 08:59

## 2024-10-15 RX ADMIN — LEVALBUTEROL 1.25 MG: 1.25 SOLUTION, CONCENTRATE RESPIRATORY (INHALATION) at 10:00

## 2024-10-15 ASSESSMENT — PAIN SCALES - GENERAL
PAINLEVEL_OUTOF10: 0
PAINLEVEL_OUTOF10: 0

## 2024-10-15 NOTE — PROGRESS NOTES
Pulmonary Critical Care Progress Note    Patient seen for the follow up of Angioedema of tongue     Subjective:    He is on a 4 L.  He was on BiPAP tolerated.  He has significant lethargy on Precedex now at 1.3.  He previously had significant agitation and stated that he had been drinking 24 beers daily.  He has been NPO.  He is on NG feeds.  BP increased    Examination:    Vitals: BP (!) 160/68   Pulse 72   Temp 97.4 °F (36.3 °C) (Axillary)   Resp 21   Ht 1.66 m (5' 5.35\")   Wt 95.6 kg (210 lb 12.2 oz)   SpO2 95%   BMI 34.70 kg/m²   SpO2  Av %  Min: 91 %  Max: 96 %  General appearance: Lethargic/sleepy  Neck: No JVD  Lungs: Decreased breath sound no crackles or wheeze  Heart: regular rate and rhythm, S1, S2 normal, no gallop  Abdomen: Soft, non tender, + BS  Extremities: no cyanosis or clubbing. No significant edema    LABs:    CBC:   Recent Labs     10/14/24  0337 10/15/24  0433   WBC 11.2 10.6   HGB 13.8 14.7   HCT 42.0 43.2   * 132*     BMP:   Recent Labs     10/14/24  0337 10/15/24  0433    149*   K 4.1 4.0   CO2 23 26   BUN 37* 31*   CREATININE 0.9 0.7   LABGLOM >90 >90   GLUCOSE 154* 159*      Latest Reference Range & Units 10/14/24 06:00 10/14/24 12:31   POC TCO2 22 - 30 mmol/L  27   POC HCO3 21.0 - 28.0 mmol/L 28.1 (H) 26.6   POC O2 SAT 94.0 - 98.0 % 95.6 95.8   POC pCO2 35.0 - 48.0 mm Hg 43.2 42.0   POC pH 7.350 - 7.450  7.421 7.410   POC PO2 83.0 - 108.0 mm Hg 78.4 (L) 79.8 (L)   (H): Data is abnormally high  (L): Data is abnormally low    LIVER PROFILE:  No results for input(s): \"AST\", \"ALT\", \"LABALBU\" in the last 72 hours.      Radiology:  Chest x-ray 10/13  Reviewed  No evidence of acute cardiopulmonary process.          Impression/recommendations:    Acute respiratory insufficiency due to upper airway instruction/angioedema with significant swelling upper airway/atelectasis with small effusion  Oxygen by nasal cannula  BiPAP support if awake  Keep off ACE inhibitor for

## 2024-10-15 NOTE — PLAN OF CARE
Problem: Safety - Medical Restraint  Goal: Remains free of injury from restraints (Restraint for Interference with Medical Device)  Description: INTERVENTIONS:  1. Determine that other, less restrictive measures have been tried or would not be effective before applying the restraint  2. Evaluate the patient's condition at the time of restraint application  3. Inform patient/family regarding the reason for restraint  4. Q2H: Monitor safety, psychosocial status, comfort, nutrition and hydration  Recent Flowsheet Documentation  Taken 10/14/2024 2200 by Belkis Dawson RN  Remains free of injury from restraints (restraint for interference with medical device):   Determine that other, less restrictive measures have been tried or would not be effective before applying the restraint   Evaluate the patient's condition at the time of restraint application   Inform patient/family regarding the reason for restraint   Every 2 hours: Monitor safety, psychosocial status, comfort, nutrition and hydration  Taken 10/14/2024 2000 by Belkis Dawson RN  Remains free of injury from restraints (restraint for interference with medical device):   Determine that other, less restrictive measures have been tried or would not be effective before applying the restraint   Evaluate the patient's condition at the time of restraint application   Inform patient/family regarding the reason for restraint   Every 2 hours: Monitor safety, psychosocial status, comfort, nutrition and hydration  Taken 10/14/2024 1800 by Michelle Acosta RN  Remains free of injury from restraints (restraint for interference with medical device): Determine that other, less restrictive measures have been tried or would not be effective before applying the restraint  Taken 10/14/2024 1600 by Michelle Acosta RN  Remains free of injury from restraints (restraint for interference with medical device): Determine that other, less restrictive measures have been

## 2024-10-15 NOTE — PROGRESS NOTES
End Of Shift Note  [facility]  Summary of shift: Patient received multiple doses of Ativan per CIWA orders. At 2226 contacted On call NP Saundra Lump d/t patient /72 HR 47, on precedex and asleep, given orders for Hydralazine 10mg PRN. At 0107 contacted Saundra Lump d/t patient /75, given orders for hydralazine 5mg. 0442 contacted Saundra Lump d/t patient /75 HR 51, and unable to give PRN hydralazine; given one time dose of hydralazine 10mg. 0544 Contacted Saundra Lump d/t patient Na 149 previously 144. Patient had trickle TF with minimal residuals. U/O 1000ml.     Vitals:    Vitals:    10/15/24 0600 10/15/24 0615 10/15/24 0700 10/15/24 0730   BP: 129/81  (!) 162/72    Pulse: 66 67 50    Resp: 15 20 28    Temp:    98.4 °F (36.9 °C)   TempSrc:    Axillary   SpO2: 93% 94% 95%    Weight:       Height:            I&O:   Intake/Output Summary (Last 24 hours) at 10/15/2024 0745  Last data filed at 10/15/2024 0742  Gross per 24 hour   Intake 2133.96 ml   Output 1650 ml   Net 483.96 ml       Resp Status: Patient was placed on Bipap at 2205-currently still on bipap; previously on 2LNc     Ventilator Settings:  Vent Mode: CPAP/PS Resp Rate (Set): 18 bpm/Vt (Set, mL): 500 mL/ /FiO2 : 30 %    Critical Care IV infusions:   dexmedeTOMIDine (PRECEDEX) 1,000 mcg in sodium chloride 0.9 % 250 mL infusion 1.4 mcg/kg/hr (10/15/24 0740)    sodium chloride      sodium chloride          LDA:   Peripheral IV 10/09/24 Right Hand (Active)   Number of days: 5       Peripheral IV 10/09/24 Proximal;Right Cephalic (Active)   Number of days: 5       Peripheral IV 10/09/24 Right Forearm (Active)   Number of days: 5       NG/OG/NJ/NE Tube Nasogastric 16 fr Left nostril (Active)   Number of days: 0       External Urinary Catheter (Active)   Number of days: 5

## 2024-10-15 NOTE — PLAN OF CARE
Problem: Discharge Planning  Goal: Discharge to home or other facility with appropriate resources  10/15/2024 1313 by Amita Loco RN  Outcome: Progressing    Problem: Respiratory - Adult  Goal: Achieves optimal ventilation and oxygenation  10/15/2024 1313 by Amita Loco RN  Outcome: Progressing  Flowsheets (Taken 10/15/2024 0800)  Achieves optimal ventilation and oxygenation:   Assess for changes in respiratory status   Assess for changes in mentation and behavior   Position to facilitate oxygenation and minimize respiratory effort   Oxygen supplementation based on oxygen saturation or arterial blood gases   Initiate smoking cessation protocol as indicated   Encourage broncho-pulmonary hygiene including cough, deep breathe, incentive spirometry   Assess the need for suctioning and aspirate as needed   Assess and instruct to report shortness of breath or any respiratory difficulty   Respiratory therapy support as indicated    Patients respiratory status stable at this time. No signs or symptoms of distress noted. Respirations non-labored and regular. 02 via 4L nasal cannula or bipap in place as needed to maintain sp02>90% or per md order. Continuous SpO2 monitoring in place.     Problem: Pain  Goal: Verbalizes/displays adequate comfort level or baseline comfort level  10/15/2024 1313 by Amita Loco RN  Outcome: Progressing     Problem: Safety - Adult  Goal: Free from fall injury  10/15/2024 1313 by Amita Loco RN  Outcome: Progressing   Pt remains free from falls and in fall precautions at this time. Bed is in lowest position with all wheels locked and 3/4 side rails up. Call light, bedside table, and personal belongings within reach of pt. Nurse educated on proper use of call light. Pt acknowledged teaching. Nurse will continue to assess and monitor pt with hourly rounds and offer toileting.     Problem: Neurosensory - Adult  Goal: Achieves stable or improved neurological status  10/15/2024 1313 by Claudy  Amita CUEVAS RN  Outcome: Progressing  Flowsheets (Taken 10/15/2024 0800)  Achieves stable or improved neurological status:   Assess for and report changes in neurological status   Maintain blood pressure and fluid volume within ordered parameters to optimize cerebral perfusion and minimize risk of hemorrhage   Monitor temperature, glucose, and sodium. Initiate appropriate interventions as ordered     Problem: Cardiovascular - Adult  Goal: Maintains optimal cardiac output and hemodynamic stability  10/15/2024 1313 by Amita Loco RN  Outcome: Progressing  Flowsheets (Taken 10/15/2024 0800)  Maintains optimal cardiac output and hemodynamic stability:   Monitor blood pressure and heart rate   Monitor urine output and notify Licensed Independent Practitioner for values outside of normal range   Assess for signs of decreased cardiac output   Administer fluid and/or volume expanders as ordered   Administer vasoactive medications as ordered     Problem: Skin/Tissue Integrity - Adult  Goal: Skin integrity remains intact  10/15/2024 1313 by Amita Loco RN  Outcome: Progressing  Flowsheets (Taken 10/15/2024 0845)  Skin Integrity Remains Intact:   Monitor for areas of redness and/or skin breakdown   Assess vascular access sites hourly   Every 4-6 hours minimum: Change oxygen saturation probe site   Every 4-6 hours: If on nasal continuous positive airway pressure, respiratory therapy assesses nares and determine need for appliance change or resting period     Problem: Musculoskeletal - Adult  Goal: Return mobility to safest level of function  10/15/2024 1313 by Amita Loco RN  Outcome: Progressing     Problem: Gastrointestinal - Adult  Goal: Maintains adequate nutritional intake  10/15/2024 1313 by Amita Loco RN  Outcome: Progressing   Tube feed running at 15mL/hr; the goal rate. Minimal residuals with q4hr checks and 30mL flushes given.    Problem: Genitourinary - Adult  Goal: Absence of urinary retention  10/15/2024

## 2024-10-15 NOTE — PLAN OF CARE
Problem: Discharge Planning  Goal: Discharge to home or other facility with appropriate resources  Outcome: Progressing     Problem: Safety - Medical Restraint  Goal: Remains free of injury from restraints (Restraint for Interference with Medical Device)  Description: INTERVENTIONS:  1. Determine that other, less restrictive measures have been tried or would not be effective before applying the restraint  2. Evaluate the patient's condition at the time of restraint application  3. Inform patient/family regarding the reason for restraint  4. Q2H: Monitor safety, psychosocial status, comfort, nutrition and hydration  Recent Flowsheet Documentation  Taken 10/14/2024 2200 by Belkis Dawson RN  Remains free of injury from restraints (restraint for interference with medical device):   Determine that other, less restrictive measures have been tried or would not be effective before applying the restraint   Evaluate the patient's condition at the time of restraint application   Inform patient/family regarding the reason for restraint   Every 2 hours: Monitor safety, psychosocial status, comfort, nutrition and hydration  Taken 10/14/2024 2000 by Belkis Dawson RN  Remains free of injury from restraints (restraint for interference with medical device):   Determine that other, less restrictive measures have been tried or would not be effective before applying the restraint   Evaluate the patient's condition at the time of restraint application   Inform patient/family regarding the reason for restraint   Every 2 hours: Monitor safety, psychosocial status, comfort, nutrition and hydration  Taken 10/14/2024 1800 by Michelle Acosta RN  Remains free of injury from restraints (restraint for interference with medical device): Determine that other, less restrictive measures have been tried or would not be effective before applying the restraint  Taken 10/14/2024 1600 by Michelle Acosta RN  Remains free of injury

## 2024-10-15 NOTE — PLAN OF CARE
Problem: Discharge Planning  Goal: Discharge to home or other facility with appropriate resources  10/15/2024 0742 by Saida Jung RN  Outcome: Progressing  10/14/2024 2242 by Belkis Dawson RN  Outcome: Progressing     Problem: Safety - Medical Restraint  Goal: Remains free of injury from restraints (Restraint for Interference with Medical Device)  Description: INTERVENTIONS:  1. Determine that other, less restrictive measures have been tried or would not be effective before applying the restraint  2. Evaluate the patient's condition at the time of restraint application  3. Inform patient/family regarding the reason for restraint  4. Q2H: Monitor safety, psychosocial status, comfort, nutrition and hydration  Recent Flowsheet Documentation  Taken 10/14/2024 2200 by Belkis Dawson RN  Remains free of injury from restraints (restraint for interference with medical device):   Determine that other, less restrictive measures have been tried or would not be effective before applying the restraint   Evaluate the patient's condition at the time of restraint application   Inform patient/family regarding the reason for restraint   Every 2 hours: Monitor safety, psychosocial status, comfort, nutrition and hydration  Taken 10/14/2024 2000 by Belkis Dawson RN  Remains free of injury from restraints (restraint for interference with medical device):   Determine that other, less restrictive measures have been tried or would not be effective before applying the restraint   Evaluate the patient's condition at the time of restraint application   Inform patient/family regarding the reason for restraint   Every 2 hours: Monitor safety, psychosocial status, comfort, nutrition and hydration  Taken 10/14/2024 1800 by Michelle Acosta RN  Remains free of injury from restraints (restraint for interference with medical device): Determine that other, less restrictive measures have been tried or would not be effective  and initiate Spiritual Care, Psychosocial Clinical Specialist consults as needed  10/15/2024 0742 by Saida Jung, RN  Outcome: Progressing  10/14/2024 2242 by Belkis Dawson RN  Outcome: Progressing     Problem: Nutrition Deficit:  Goal: Optimize nutritional status  10/15/2024 0742 by Saida Jung, RN  Outcome: Progressing  10/14/2024 2242 by Belkis Dawson, RN  Outcome: Progressing     Problem: ABCDS Injury Assessment  Goal: Absence of physical injury  Outcome: Progressing      Yes

## 2024-10-15 NOTE — PLAN OF CARE
Problem: Respiratory - Adult  Goal: Achieves optimal ventilation and oxygenation  10/15/2024 0758 by Leslie Chaudhary RCP  Outcome: Progressing  10/15/2024 0742 by Saida Jung, RN  Outcome: Progressing  Flowsheets  Taken 10/15/2024 0615 by Kell Humphreys RCP  Achieves optimal ventilation and oxygenation:   Assess for changes in respiratory status   Oxygen supplementation based on oxygen saturation or arterial blood gases   Respiratory therapy support as indicated  Taken 10/15/2024 0241 by Kell Humphreys RCP  Achieves optimal ventilation and oxygenation:   Assess for changes in respiratory status   Oxygen supplementation based on oxygen saturation or arterial blood gases   Assess the need for suctioning and aspirate as needed   Respiratory therapy support as indicated  10/14/2024 2242 by Belkis Dawson, RN  Outcome: Progressing  Flowsheets (Taken 10/14/2024 2208 by Kell Humphreys RCP)  Achieves optimal ventilation and oxygenation:   Assess for changes in respiratory status   Oxygen supplementation based on oxygen saturation or arterial blood gases   Respiratory therapy support as indicated  10/14/2024 1912 by Kell Humphreys RCP  Outcome: Progressing  10/14/2024 1911 by Kell Humphreys RCP  Outcome: Progressing  Goal: Able to breathe comfortably  10/15/2024 0758 by Leslie Chaudhary RCP  Outcome: Progressing  10/14/2024 1912 by Kell Humphreys RCP  Outcome: Progressing

## 2024-10-15 NOTE — PROGRESS NOTES
Hospitalist - Progress Note      Patient: Noel Valle    Unit/Bed:1114/1114-01  YOB: 1955  MRN: [id number]   Acct: [id number]   PCP: Ramakrishna Silver MD    Date of Service: Pt seen/examined on 10/15/24  and Admitted to Inpatient with expected LOS greater than two midnights due to medical therapy.     Chief Complaint: Tongue swelling 62-year-old male    Assessment and Plan:-    Acute Respiratory Insufficiency due to upper airway obstruction secondary to Angioedema  S/P extubation 10-13-24  Drowsy as he was extubated and sedative meds weaned off  On 3 L nasal canula  On Solu-Medrol, Benadryl, Pepcid.  Critical care following.       Hypotension-now hypertensive  Developed after patient received sedation.  IV fluids ordered.  Pressor support to maintain a MAP of 65, currently off  improving  Critical care following    Hypertension  Resume home antihypertensives/Norvasc  Added Coreg    HELENE  Likely 2/2 poor po intake.   IV fluids 100cc/hr NS ordered  Recheck labs in the morning    Tobacco abuse disorder/history of possible COPD  Sched Mohinderbs.  Will  on tobacco cessation when patient is alert  Alcohol withdrawal  N.p.o.  Precedex  CIWA  On Zyprexa  Started on Librium      Discussed with sister present at the bedside      Subjective:  Patient was sleepy.  Did not talk to me much.  Currently on CIWA protocol  Afebrile  Blood pressure elevated      History Of Present Illness:    69-year-old male coming into the hospital for difficulty swallowing.  In the ER, patient found to have tongue swelling, trouble swallowing with acute distress, tachycardia, ill-appearing status.  Symptoms began earlier today at 6 AM.  He noticed severe swelling of his throat.  Patient denies any allergies, medications.  Patient is not on any ACE inhibitor.  Patient was able to talk but his words were muffled.  In the ER, case was discussed with the anesthesiologist to take the patient to the OR.  Patient was intubated  change in the appearance of the chest.         XR CHEST PORTABLE   Final Result   No significant change in the appearance of the chest.         XR CHEST PORTABLE   Final Result   Bibasilar infiltrates.      Endotracheal tube with the tip in the midtrachea.         XR CHEST PORTABLE    (Results Pending)     No results found.      EKG:     Electronically signed by Viktor Hernandez MD on 10/15/2024 at 1:29 PM

## 2024-10-15 NOTE — PLAN OF CARE
Problem: Respiratory - Adult  Goal: Achieves optimal ventilation and oxygenation  10/15/2024 1931 by Zi Lara RCP  Outcome: Progressing     Problem: Respiratory - Adult  Goal: Able to breathe comfortably  10/15/2024 1931 by Zi Lara RCP  Outcome: Progressing

## 2024-10-15 NOTE — PROGRESS NOTES
End Of Shift Note  [facility]  Summary of shift: Pt remains on Precedex gtt; currently @ 0.8mcg/kg/hr. D5% started per Dr. Gamboa @ 100mL/hr. Multiple meds restarted or adjusted. PRN hydralazine 10mg given x1 this shift. Pt alert at times but still having hallucinations and confused at situation. Tube feeds still running at goal rate of 15mL/hr with little to no residuals. No BM this shift and 950mL brickdust colored urine from external catheter.     Vitals:    Vitals:    10/15/24 1730 10/15/24 1753 10/15/24 1800 10/15/24 1830   BP:  (!) 143/76 (!) 151/69    Pulse: 72 81 76 83   Resp: 22  22 25   Temp:       TempSrc:       SpO2: 94%  94% 95%   Weight:       Height:            I&O:   Intake/Output Summary (Last 24 hours) at 10/15/2024 1923  Last data filed at 10/15/2024 1821  Gross per 24 hour   Intake 1421.88 ml   Output 1950 ml   Net -528.12 ml       Resp Status: 4L nasal cannula/bipap FiO2 30%        Critical Care IV infusions:   dextrose 100 mL/hr at 10/15/24 1821    dexmedeTOMIDine (PRECEDEX) 1,000 mcg in sodium chloride 0.9 % 250 mL infusion 0.8 mcg/kg/hr (10/15/24 1832)    sodium chloride      sodium chloride          LDA:   Peripheral IV 10/09/24 Right Hand (Active)   Number of days: 6       Peripheral IV 10/09/24 Proximal;Right Cephalic (Active)   Number of days: 6       Peripheral IV 10/09/24 Right Forearm (Active)   Number of days: 6       NG/OG/NJ/NE Tube Nasogastric 16 fr Left nostril (Active)   Number of days: 1       External Urinary Catheter (Active)   Number of days: 6

## 2024-10-16 ENCOUNTER — APPOINTMENT (OUTPATIENT)
Dept: GENERAL RADIOLOGY | Age: 69
DRG: 915 | End: 2024-10-16
Payer: MEDICARE

## 2024-10-16 LAB
ANION GAP SERPL CALCULATED.3IONS-SCNC: 9 MMOL/L (ref 9–17)
BUN SERPL-MCNC: 31 MG/DL (ref 8–23)
BUN/CREAT SERPL: 44 (ref 9–20)
CALCIUM SERPL-MCNC: 8.7 MG/DL (ref 8.6–10.4)
CHLORIDE SERPL-SCNC: 110 MMOL/L (ref 98–107)
CO2 SERPL-SCNC: 27 MMOL/L (ref 20–31)
CREAT SERPL-MCNC: 0.7 MG/DL (ref 0.7–1.2)
GFR, ESTIMATED: >90 ML/MIN/1.73M2
GLUCOSE BLD-MCNC: 132 MG/DL (ref 75–110)
GLUCOSE BLD-MCNC: 155 MG/DL (ref 75–110)
GLUCOSE BLD-MCNC: 97 MG/DL (ref 75–110)
GLUCOSE SERPL-MCNC: 152 MG/DL (ref 70–99)
POTASSIUM SERPL-SCNC: 4.1 MMOL/L (ref 3.7–5.3)
SODIUM SERPL-SCNC: 146 MMOL/L (ref 135–144)

## 2024-10-16 PROCEDURE — 97535 SELF CARE MNGMENT TRAINING: CPT

## 2024-10-16 PROCEDURE — 97167 OT EVAL HIGH COMPLEX 60 MIN: CPT

## 2024-10-16 PROCEDURE — 6370000000 HC RX 637 (ALT 250 FOR IP): Performed by: INTERNAL MEDICINE

## 2024-10-16 PROCEDURE — 2580000003 HC RX 258: Performed by: NURSE PRACTITIONER

## 2024-10-16 PROCEDURE — 36415 COLL VENOUS BLD VENIPUNCTURE: CPT

## 2024-10-16 PROCEDURE — 2000000000 HC ICU R&B

## 2024-10-16 PROCEDURE — 94660 CPAP INITIATION&MGMT: CPT

## 2024-10-16 PROCEDURE — 2500000003 HC RX 250 WO HCPCS: Performed by: INTERNAL MEDICINE

## 2024-10-16 PROCEDURE — 6370000000 HC RX 637 (ALT 250 FOR IP): Performed by: HOSPITALIST

## 2024-10-16 PROCEDURE — 6360000002 HC RX W HCPCS: Performed by: FAMILY MEDICINE

## 2024-10-16 PROCEDURE — 6360000002 HC RX W HCPCS: Performed by: INTERNAL MEDICINE

## 2024-10-16 PROCEDURE — 97110 THERAPEUTIC EXERCISES: CPT

## 2024-10-16 PROCEDURE — 97530 THERAPEUTIC ACTIVITIES: CPT

## 2024-10-16 PROCEDURE — 80048 BASIC METABOLIC PNL TOTAL CA: CPT

## 2024-10-16 PROCEDURE — 82947 ASSAY GLUCOSE BLOOD QUANT: CPT

## 2024-10-16 PROCEDURE — 2580000003 HC RX 258: Performed by: INTERNAL MEDICINE

## 2024-10-16 PROCEDURE — 94761 N-INVAS EAR/PLS OXIMETRY MLT: CPT

## 2024-10-16 PROCEDURE — 97163 PT EVAL HIGH COMPLEX 45 MIN: CPT

## 2024-10-16 PROCEDURE — 6360000002 HC RX W HCPCS: Performed by: NURSE PRACTITIONER

## 2024-10-16 PROCEDURE — 71045 X-RAY EXAM CHEST 1 VIEW: CPT

## 2024-10-16 PROCEDURE — 2700000000 HC OXYGEN THERAPY PER DAY

## 2024-10-16 PROCEDURE — 94640 AIRWAY INHALATION TREATMENT: CPT

## 2024-10-16 PROCEDURE — 2580000003 HC RX 258: Performed by: FAMILY MEDICINE

## 2024-10-16 RX ADMIN — DEXMEDETOMIDINE 1.2 MCG/KG/HR: 100 INJECTION, SOLUTION INTRAVENOUS at 04:47

## 2024-10-16 RX ADMIN — SODIUM CHLORIDE, PRESERVATIVE FREE 20 MG: 5 INJECTION INTRAVENOUS at 20:15

## 2024-10-16 RX ADMIN — CHLORDIAZEPOXIDE HYDROCHLORIDE 25 MG: 25 CAPSULE ORAL at 20:15

## 2024-10-16 RX ADMIN — DEXMEDETOMIDINE 0.5 MCG/KG/HR: 100 INJECTION, SOLUTION INTRAVENOUS at 13:29

## 2024-10-16 RX ADMIN — Medication 100 MG: at 08:55

## 2024-10-16 RX ADMIN — LEVALBUTEROL 1.25 MG: 1.25 SOLUTION, CONCENTRATE RESPIRATORY (INHALATION) at 19:32

## 2024-10-16 RX ADMIN — SODIUM CHLORIDE, PRESERVATIVE FREE 10 ML: 5 INJECTION INTRAVENOUS at 20:14

## 2024-10-16 RX ADMIN — CHLORDIAZEPOXIDE HYDROCHLORIDE 25 MG: 25 CAPSULE ORAL at 08:55

## 2024-10-16 RX ADMIN — FOLIC ACID 1 MG: 1 TABLET ORAL at 08:55

## 2024-10-16 RX ADMIN — IPRATROPIUM BROMIDE 0.5 MG: 0.5 SOLUTION RESPIRATORY (INHALATION) at 06:24

## 2024-10-16 RX ADMIN — SODIUM CHLORIDE, PRESERVATIVE FREE 10 ML: 5 INJECTION INTRAVENOUS at 08:58

## 2024-10-16 RX ADMIN — LEVALBUTEROL 1.25 MG: 1.25 SOLUTION, CONCENTRATE RESPIRATORY (INHALATION) at 06:24

## 2024-10-16 RX ADMIN — SODIUM CHLORIDE, PRESERVATIVE FREE 20 MG: 5 INJECTION INTRAVENOUS at 08:56

## 2024-10-16 RX ADMIN — WATER 40 MG: 1 INJECTION INTRAMUSCULAR; INTRAVENOUS; SUBCUTANEOUS at 05:48

## 2024-10-16 RX ADMIN — Medication 15 ML: at 08:55

## 2024-10-16 RX ADMIN — ENOXAPARIN SODIUM 40 MG: 100 INJECTION SUBCUTANEOUS at 08:56

## 2024-10-16 RX ADMIN — METHYLPREDNISOLONE SODIUM SUCCINATE 40 MG: 40 INJECTION INTRAMUSCULAR; INTRAVENOUS at 17:13

## 2024-10-16 RX ADMIN — CHLORDIAZEPOXIDE HYDROCHLORIDE 25 MG: 25 CAPSULE ORAL at 17:13

## 2024-10-16 RX ADMIN — LEVALBUTEROL 1.25 MG: 1.25 SOLUTION, CONCENTRATE RESPIRATORY (INHALATION) at 11:09

## 2024-10-16 RX ADMIN — IPRATROPIUM BROMIDE 0.5 MG: 0.5 SOLUTION RESPIRATORY (INHALATION) at 19:32

## 2024-10-16 RX ADMIN — LEVALBUTEROL 1.25 MG: 1.25 SOLUTION, CONCENTRATE RESPIRATORY (INHALATION) at 15:10

## 2024-10-16 RX ADMIN — OLANZAPINE 5 MG: 5 TABLET, FILM COATED ORAL at 20:15

## 2024-10-16 RX ADMIN — IPRATROPIUM BROMIDE 0.5 MG: 0.5 SOLUTION RESPIRATORY (INHALATION) at 15:10

## 2024-10-16 RX ADMIN — CLONIDINE HYDROCHLORIDE 0.2 MG: 0.2 TABLET ORAL at 08:55

## 2024-10-16 RX ADMIN — HYDRALAZINE HYDROCHLORIDE 10 MG: 20 INJECTION INTRAMUSCULAR; INTRAVENOUS at 05:48

## 2024-10-16 RX ADMIN — CARVEDILOL 3.12 MG: 3.12 TABLET, FILM COATED ORAL at 17:13

## 2024-10-16 RX ADMIN — LORAZEPAM 3 MG: 2 INJECTION INTRAMUSCULAR; INTRAVENOUS at 23:19

## 2024-10-16 RX ADMIN — AMLODIPINE BESYLATE 10 MG: 10 TABLET ORAL at 08:55

## 2024-10-16 RX ADMIN — DEXTROSE MONOHYDRATE: 50 INJECTION, SOLUTION INTRAVENOUS at 05:53

## 2024-10-16 RX ADMIN — ATORVASTATIN CALCIUM 40 MG: 40 TABLET, FILM COATED ORAL at 08:55

## 2024-10-16 RX ADMIN — CHLORDIAZEPOXIDE HYDROCHLORIDE 25 MG: 25 CAPSULE ORAL at 13:29

## 2024-10-16 RX ADMIN — IPRATROPIUM BROMIDE 0.5 MG: 0.5 SOLUTION RESPIRATORY (INHALATION) at 11:09

## 2024-10-16 RX ADMIN — CARVEDILOL 3.12 MG: 3.12 TABLET, FILM COATED ORAL at 08:56

## 2024-10-16 ASSESSMENT — PAIN SCALES - GENERAL
PAINLEVEL_OUTOF10: 0

## 2024-10-16 NOTE — PLAN OF CARE
Problem: Discharge Planning  Goal: Discharge to home or other facility with appropriate resources  10/16/2024 1922 by Belkis Dawson RN  Outcome: Progressing  10/16/2024 0737 by Belkis Dawson RN  Outcome: Progressing  10/16/2024 0735 by Belkis Dawson RN  Outcome: Progressing     Problem: Respiratory - Adult  Goal: Achieves optimal ventilation and oxygenation  10/16/2024 1922 by Belkis Dawson RN  Outcome: Progressing  10/16/2024 1113 by Ameena Strange RCP  Outcome: Progressing  10/16/2024 0737 by Belkis Dawson RN  Outcome: Progressing  10/16/2024 0735 by Belkis Dawson RN  Outcome: Progressing  Goal: Able to breathe comfortably  10/16/2024 1113 by Ameena Strange RCP  Outcome: Progressing     Problem: Neurosensory - Adult  Goal: Achieves stable or improved neurological status  10/16/2024 1922 by Belkis Dawson RN  Outcome: Progressing  10/16/2024 1353 by Mini Malone RN  Outcome: Progressing  10/16/2024 0737 by Belkis Dawson RN  Outcome: Progressing  10/16/2024 0735 by Belkis Dawson RN  Outcome: Progressing     Problem: Cardiovascular - Adult  Goal: Maintains optimal cardiac output and hemodynamic stability  10/16/2024 1922 by Belkis Dawson RN  Outcome: Progressing  10/16/2024 1353 by Mini Malone RN  Outcome: Progressing  10/16/2024 0737 by Belkis Dawson RN  Outcome: Progressing  10/16/2024 0735 by Belkis Dawson RN  Outcome: Progressing     Problem: Skin/Tissue Integrity - Adult  Goal: Skin integrity remains intact  10/16/2024 1922 by Belkis Dawson RN  Outcome: Progressing  10/16/2024 1353 by Mini Malone RN  Outcome: Progressing  10/16/2024 0737 by Belkis Dawson RN  Outcome: Progressing  10/16/2024 0735 by Belkis Dawson RN  Outcome: Progressing     Problem: Musculoskeletal - Adult  Goal: Return mobility to safest level of function  10/16/2024 1922 by Belkis Dawson, RN  Outcome: Progressing  10/16/2024  1353 by Mini Malone RN  Outcome: Progressing  10/16/2024 0737 by Belkis Dawson RN  Outcome: Progressing  10/16/2024 0735 by Belkis Dawson RN  Outcome: Progressing     Problem: Gastrointestinal - Adult  Goal: Maintains adequate nutritional intake  10/16/2024 1922 by Belkis Dawson RN  Outcome: Progressing  10/16/2024 0737 by Belkis Dawson RN  Outcome: Progressing  10/16/2024 0735 by Belkis Dawson RN  Outcome: Progressing     Problem: Genitourinary - Adult  Goal: Absence of urinary retention  10/16/2024 1922 by Belkis Dawson RN  Outcome: Progressing  10/16/2024 1353 by Mini Malone RN  Outcome: Progressing  10/16/2024 0737 by Belkis Dawson RN  Outcome: Progressing  10/16/2024 0735 by Belkis Dawson RN  Outcome: Progressing     Problem: Pain  Goal: Verbalizes/displays adequate comfort level or baseline comfort level  10/16/2024 1922 by Belkis Dawson RN  Outcome: Progressing  10/16/2024 1353 by Mini Malone RN  Outcome: Progressing  Flowsheets (Taken 10/16/2024 1353)  Verbalizes/displays adequate comfort level or baseline comfort level:   Encourage patient to monitor pain and request assistance   Assess pain using appropriate pain scale  10/16/2024 0737 by Belkis Dawson RN  Outcome: Progressing  10/16/2024 0735 by Belkis Dawson RN  Outcome: Progressing     Problem: Skin/Tissue Integrity  Goal: Absence of new skin breakdown  Description: 1.  Monitor for areas of redness and/or skin breakdown  2.  Assess vascular access sites hourly  3.  Every 4-6 hours minimum:  Change oxygen saturation probe site  4.  Every 4-6 hours:  If on nasal continuous positive airway pressure, respiratory therapy assess nares and determine need for appliance change or resting period.  10/16/2024 1922 by Belkis Dawson RN  Outcome: Progressing  10/16/2024 1353 by Mini Malone RN  Outcome: Progressing  10/16/2024 0737 by Belkis Dawson RN  Outcome:

## 2024-10-16 NOTE — PROGRESS NOTES
Occupational Therapy  Facility/Department: [facility] ICU  Occupational Therapy Initial Assessment    Name: Noel Valle  : 1955  MRN: [id number]  Date of Service: 10/16/2024    HPI per chart: 69-year-old male coming into the hospital for difficulty swallowing.  In the ER, patient found to have tongue swelling, trouble swallowing with acute distress, tachycardia, ill-appearing status.  Symptoms began earlier today at 6 AM.  He noticed severe swelling of his throat.  Patient denies any allergies, medications.  Patient is not on any ACE inhibitor.  Patient was able to talk but his words were muffled.  In the ER, case was discussed with the anesthesiologist to take the patient to the OR.  Patient was intubated under the supervision of surgery due to concerns for potential tracheostomy.  Patient was given rocuronium, propofol.  He continued to wake up and thrash around for which reason Versed 60 mg IV push was ordered followed by fentanyl 50 mg IV push.  He was then put on fentanyl drip.  His blood pressure did drop after this.  ICU ordered fluid boluses.  He does have a history of COPD, tobacco abuse.  He was transferred to the ICU thereafter.  Patient Diagnosis(es): The encounter diagnosis was Angioedema, initial encounter.  Past Medical History:  has no past medical history on file.  Past Surgical History:  has no past surgical history on file.    Assessment  Performance deficits / Impairments: Decreased functional mobility ;Decreased ADL status;Decreased ROM;Decreased strength;Decreased safe awareness;Decreased cognition;Decreased endurance;Decreased fine motor control;Decreased high-level IADLs;Decreased coordination;Decreased posture  Assessment: Pt presents this session as very lethargic and unable to maintain eyes being open for the majority of session.  Pt tolerated light UB ROM and bed mobility as well as attempts at cognitive reorientation/sensory stimulation.  Pt demonstrating deficits with cognition, ROM,

## 2024-10-16 NOTE — PLAN OF CARE
Problem: Respiratory - Adult  Goal: Achieves optimal ventilation and oxygenation  10/16/2024 1113 by Ameena Strange RCP  Outcome: Progressing     Problem: Respiratory - Adult  Goal: Able to breathe comfortably  Outcome: Progressing

## 2024-10-16 NOTE — PROGRESS NOTES
Hospitalist - Progress Note      Patient: Noel Valle    Unit/Bed:1114/1114-01  YOB: 1955  MRN: [id number]   Acct: [id number]   PCP: Ramakrishna Silver MD    Date of Service: Pt seen/examined on 10/16/24  and Admitted to Inpatient with expected LOS greater than two midnights due to medical therapy.     Chief Complaint: Tongue swelling 62-year-old male    Assessment and Plan:-    Acute Respiratory Insufficiency due to upper airway obstruction secondary to Angioedema  S/P extubation 10-13-24  Drowsy as he was extubated and sedative meds weaned off  On 3 L nasal canula  On Solu-Medrol, Benadryl, Pepcid.  Critical care following.       Hypotension-now hypertensive  Developed after patient received sedation.  IV fluids ordered.  Pressor support to maintain a MAP of 65, currently off  improving  Critical care following    Hypertension  Resume home antihypertensives/Norvasc  Added Coreg but needed to be held due to bradycardia    HELENE  Likely 2/2 poor po intake.   IV fluids 100cc/hr NS ordered  Recheck labs in the morning    Tobacco abuse disorder/history of possible COPD  Sched Dave.  Will  on tobacco cessation when patient is alert  Alcohol withdrawal  N.p.o.  Precedex  CIWA  On Zyprexa  Started on Librium      Discussed with sister present at the bedside      Subjective:  Patient was sleepy.  Did not talk to me much.  Currently on CIWA protocol  Afebrile  Heart rate was slow/in 40s but asymptomatic.  He is still on Precedex and lethargic  Tolerating tube feeds.        History Of Present Illness:    69-year-old male coming into the hospital for difficulty swallowing.  In the ER, patient found to have tongue swelling, trouble swallowing with acute distress, tachycardia, ill-appearing status.  Symptoms began earlier today at 6 AM.  He noticed severe swelling of his throat.  Patient denies any allergies, medications.  Patient is not on any ACE inhibitor.  Patient was able to talk but his words

## 2024-10-16 NOTE — PROGRESS NOTES
Physical Therapy  Facility/Department: [facility]  Physical Therapy Initial Assessment    Name: Noel Valle  : 1955  MRN: [id number]  Date of Service: 10/16/2024    Discharge Recommendations:  Pt currently functioning below baseline.  Recommend daily inpatient skilled therapy at time of discharge to maximize long term outcomes and prevent re-admission. Please refer to AM-PAC score for current level of function.     HPI per chart: 69-year-old male coming into the hospital for difficulty swallowing.  In the ER, patient found to have tongue swelling, trouble swallowing with acute distress, tachycardia, ill-appearing status.  Symptoms began earlier today at 6 AM.  He noticed severe swelling of his throat.  Patient denies any allergies, medications.  Patient is not on any ACE inhibitor.  Patient was able to talk but his words were muffled.  In the ER, case was discussed with the anesthesiologist to take the patient to the OR.  Patient was intubated under the supervision of surgery due to concerns for potential tracheostomy.  Patient was given rocuronium, propofol.  He continued to wake up and thrash around for which reason Versed 60 mg IV push was ordered followed by fentanyl 50 mg IV push.  He was then put on fentanyl drip.  His blood pressure did drop after this.  ICU ordered fluid boluses.  He does have a history of COPD, tobacco abuse.  He was transferred to the ICU thereafter.       PT Equipment Recommendations  Other: Pt currently functioning below baseline.  Recommend daily inpatient skilled therapy at time of discharge to maximize long term outcomes and prevent re-admission. Please refer to AM-PAC score for current level of function.      Patient Diagnosis(es): The encounter diagnosis was Angioedema, initial encounter.  Past Medical History:  has no past medical history on file.  Past Surgical History:  has no past surgical history on file.    Assessment  Body Structures, Functions, Activity Limitations

## 2024-10-16 NOTE — PROGRESS NOTES
Comprehensive Nutrition Assessment    Type and Reason for Visit:  Reassess    Nutrition Recommendations/Plan:   NPO diet  Modify tube feeding to Glucerna 1.5 Camilo  bolus 275 mL 4x/day (1100 mL total volume). Water flush 60 mL before and after each bolus.  Monitor tube feeding tolerance, GI function and labs     Malnutrition Assessment:  Malnutrition Status:  At risk for malnutrition (Comment) (10/14/24 7849)        Nutrition Assessment:    Patient still on BiPap and on trickle tube feedings at 15 mL/hr when off Bipap. RN reports patient has liquid stools and is not moving much. Patient is on D5 at 100 mL/hr. Recommend modifing tube feeding to bolus. Start Glucerna 1.5 Camilo bolus 275 mL 4x/day (1100 mL total volume). Monitor tube feeding tolerance, GI function and labs.    Nutrition Related Findings:    Edema: +1 BLE. Hypoactive bowel sounds. NG tube feedings. Hx: alcohol abuse Wound Type: None       Current Nutrition Intake & Therapies:    Average Meal Intake: NPO  Average Supplements Intake: NPO  Diet NPO  ADULT TUBE FEEDING; Nasogastric; Diabetic; Continuous; 15; No; 30; Q 4 hours  Current Tube Feeding (TF) Orders:  Feeding Route: Nasogastric  Formula: Diabetic  Schedule: Bolus  Feeding Regimen: Glucerna 1.5 Camilo bolus 275 mL 4x/day (1100 mL total volume)  Additives/Modulars: None  Water Flushes: 60 mL before and after each bolus (480 mL free water)  Current TF & Flush Orders Provides: 1650 kcal, 91 gm of protein and 1315 mL of free water  Goal TF & Flush Orders Provides: 1650 kcal, 91 gm of protein and 1315 mL of free water    Anthropometric Measures:  Height: 166 cm (5' 5.35\")  Ideal Body Weight (IBW): 138 lbs (63 kg)       Current Body Weight: 95.6 kg (210 lb 12.2 oz), 152.7 % IBW. Weight Source: Bed Scale  Current BMI (kg/m2): 34.7  Usual Body Weight: 97.5 kg (215 lb)  % Weight Change (Calculated): -2                    BMI Categories: Obese Class 1 (BMI 30.0-34.9)    Estimated Daily Nutrient Needs:  Energy

## 2024-10-16 NOTE — PROGRESS NOTES
End Of Shift Note  [place] [facility]    Summary of shift: Uneventful shift. Precedex has been weaned down to 0.2. Patient is alert and oriented. He has been having diarrhea and is now able to call out for the bedpan.     Vitals:    Vitals:    10/16/24 1532 10/16/24 1600 10/16/24 1700 10/16/24 1800   BP:  120/61 (!) 144/69 (!) 142/75   Pulse:  61 69 84   Resp:  20 29 18   Temp: 97.7 °F (36.5 °C)      TempSrc: Axillary      SpO2:  94% 95% 96%   Weight:       Height:            I&O:   Intake/Output Summary (Last 24 hours) at 10/16/2024 1826  Last data filed at 10/16/2024 1800  Gross per 24 hour   Intake 2522.82 ml   Output 1800 ml   Net 722.82 ml       Resp Status: 4 L NC        Critical Care IV infusions:   dexmedeTOMIDine (PRECEDEX) 1,000 mcg in sodium chloride 0.9 % 250 mL infusion 0.2 mcg/kg/hr (10/16/24 1658)    sodium chloride      sodium chloride          LDA:   Peripheral IV 10/09/24 Proximal;Right Cephalic (Active)   Number of days: 7       Peripheral IV 10/09/24 Right Forearm (Active)   Number of days: 7       NG/OG/NJ/NE Tube Nasogastric 16 fr Left nostril (Active)   Number of days: 2       External Urinary Catheter (Active)   Number of days: 7

## 2024-10-16 NOTE — PLAN OF CARE
Problem: Pain  Goal: Verbalizes/displays adequate comfort level or baseline comfort level  10/16/2024 1353 by Mini Malone RN  Outcome: Progressing  Flowsheets (Taken 10/16/2024 1353)  Verbalizes/displays adequate comfort level or baseline comfort level:   Encourage patient to monitor pain and request assistance   Assess pain using appropriate pain scale     Problem: Skin/Tissue Integrity  Goal: Absence of new skin breakdown  Description: 1.  Monitor for areas of redness and/or skin breakdown  2.  Assess vascular access sites hourly  3.  Every 4-6 hours minimum:  Change oxygen saturation probe site  4.  Every 4-6 hours:  If on nasal continuous positive airway pressure, respiratory therapy assess nares and determine need for appliance change or resting period.  10/16/2024 1353 by Mini Malone RN  Outcome: Progressing     Problem: Safety - Adult  Goal: Free from fall injury  10/16/2024 1353 by Mini Malone RN  Outcome: Progressing     Problem: Neurosensory - Adult  Goal: Achieves stable or improved neurological status  10/16/2024 1353 by Mini Malone RN  Outcome: Progressing     Problem: Cardiovascular - Adult  Goal: Maintains optimal cardiac output and hemodynamic stability  10/16/2024 1353 by Mini Malone RN  Outcome: Progressing     Problem: Skin/Tissue Integrity - Adult  Goal: Skin integrity remains intact  10/16/2024 1353 by Mini Malone RN  Outcome: Progressing     Problem: Musculoskeletal - Adult  Goal: Return mobility to safest level of function  10/16/2024 1353 by Mini Malone RN  Outcome: Progressing     Problem: Genitourinary - Adult  Goal: Absence of urinary retention  10/16/2024 1353 by Mini Malone RN  Outcome: Progressing

## 2024-10-16 NOTE — PLAN OF CARE
Problem: Respiratory - Adult  Goal: Achieves optimal ventilation and oxygenation  10/16/2024 1934 by Zi Lara RCP  Outcome: Progressing     Problem: Respiratory - Adult  Goal: Able to breathe comfortably  10/16/2024 1934 by Zi Lara RCP  Outcome: Progressing

## 2024-10-16 NOTE — PROGRESS NOTES
End Of Shift Note  [place] ICU  Summary of shift: patient had uneventful night. Placed on bipap at 2335; still on bipap. Patient had multiple liquid BM. Precedex gtt and D5W at 100ml/hr still running.     Vitals:    Vitals:    10/16/24 0000 10/16/24 0331 10/16/24 0548 10/16/24 0626   BP: (!) 147/75  (!) 163/103    Pulse: 84 (!) 45  72   Resp: 18 21  22   Temp: 98.5 °F (36.9 °C)      TempSrc: Axillary      SpO2: 96% 97%  95%   Weight:       Height:            I&O:   Intake/Output Summary (Last 24 hours) at 10/16/2024 0739  Last data filed at 10/16/2024 0531  Gross per 24 hour   Intake 837.8 ml   Output 1900 ml   Net -1062.2 ml       Resp Status: patient was on 4L then at 2335 placed on Bipap and currently on Bipap    Ventilator Settings:  Vent Mode: CPAP/PS Resp Rate (Set): 18 bpm/Vt (Set, mL): 500 mL/ /FiO2 : 30 %    Critical Care IV infusions:   dextrose 100 mL/hr at 10/16/24 0553    dexmedeTOMIDine (PRECEDEX) 1,000 mcg in sodium chloride 0.9 % 250 mL infusion 1.2 mcg/kg/hr (10/16/24 0447)    sodium chloride      sodium chloride          LDA:   Peripheral IV 10/09/24 Right Hand (Active)   Number of days: 6       Peripheral IV 10/09/24 Proximal;Right Cephalic (Active)   Number of days: 6       Peripheral IV 10/09/24 Right Forearm (Active)   Number of days: 6       NG/OG/NJ/NE Tube Nasogastric 16 fr Left nostril (Active)   Number of days: 1       External Urinary Catheter (Active)   Number of days: 6

## 2024-10-16 NOTE — PLAN OF CARE
Problem: Discharge Planning  Goal: Discharge to home or other facility with appropriate resources  Outcome: Progressing     Problem: Respiratory - Adult  Goal: Achieves optimal ventilation and oxygenation  10/16/2024 0735 by Belkis Dawson RN  Outcome: Progressing  10/15/2024 1931 by Zi Lara RCP  Outcome: Progressing  Goal: Able to breathe comfortably  10/15/2024 1931 by Zi Lara RCP  Outcome: Progressing     Problem: Neurosensory - Adult  Goal: Achieves stable or improved neurological status  Outcome: Progressing     Problem: Cardiovascular - Adult  Goal: Maintains optimal cardiac output and hemodynamic stability  Outcome: Progressing     Problem: Skin/Tissue Integrity - Adult  Goal: Skin integrity remains intact  Outcome: Progressing     Problem: Musculoskeletal - Adult  Goal: Return mobility to safest level of function  Outcome: Progressing     Problem: Gastrointestinal - Adult  Goal: Maintains adequate nutritional intake  Outcome: Progressing     Problem: Genitourinary - Adult  Goal: Absence of urinary retention  Outcome: Progressing     Problem: Pain  Goal: Verbalizes/displays adequate comfort level or baseline comfort level  Outcome: Progressing     Problem: Skin/Tissue Integrity  Goal: Absence of new skin breakdown  Description: 1.  Monitor for areas of redness and/or skin breakdown  2.  Assess vascular access sites hourly  3.  Every 4-6 hours minimum:  Change oxygen saturation probe site  4.  Every 4-6 hours:  If on nasal continuous positive airway pressure, respiratory therapy assess nares and determine need for appliance change or resting period.  Outcome: Progressing     Problem: Safety - Adult  Goal: Free from fall injury  Outcome: Progressing     Problem: Coping  Goal: Pt/Family able to verbalize concerns and demonstrate effective coping strategies  Description: INTERVENTIONS:  1. Assist patient/family to identify coping skills, available support systems and cultural and  spiritual values  2. Provide emotional support, including active listening and acknowledgement of concerns of patient and caregivers  3. Reduce environmental stimuli, as able  4. Instruct patient/family in relaxation techniques, as appropriate  5. Assess for spiritual pain/suffering and initiate Spiritual Care, Psychosocial Clinical Specialist consults as needed  Outcome: Progressing     Problem: Nutrition Deficit:  Goal: Optimize nutritional status  Outcome: Progressing     Problem: ABCDS Injury Assessment  Goal: Absence of physical injury  Outcome: Progressing

## 2024-10-16 NOTE — PROGRESS NOTES
Pulmonary Critical Care Progress Note    Patient seen for the follow up of Angioedema of tongue     Subjective:    He is on a 4 L.  He was on BiPAP tolerated overnight.  He is on low-dose tube feeds at 15 mL an hour.  Still on Precedex at 0.7 lethargic oriented to self..  He is not ambulating.  He previously had significant agitation and stated that he had been drinking 24 beers daily.  He has been NPO.  He is on NG feeds.  BP increased    Examination:    Vitals: BP (!) 156/94   Pulse (!) 43   Temp 97.7 °F (36.5 °C) (Axillary)   Resp 17   Ht 1.66 m (5' 5.35\")   Wt 95.6 kg (210 lb 12.2 oz)   SpO2 97%   BMI 34.70 kg/m²   SpO2  Av.7 %  Min: 91 %  Max: 98 %  General appearance: Lethargic/sleepy  Neck: No JVD  Lungs: Decreased breath sound no crackles or wheeze  Heart: regular rate and rhythm, S1, S2 normal, no gallop  Abdomen: Soft, non tender, + BS  Extremities: no cyanosis or clubbing. No significant edema    LABs:    CBC:   Recent Labs     10/14/24  0337 10/15/24  0433   WBC 11.2 10.6   HGB 13.8 14.7   HCT 42.0 43.2   * 132*     BMP:   Recent Labs     10/15/24  0433 10/16/24  0413   * 146*   K 4.0 4.1   CO2 26 27   BUN 31* 31*   CREATININE 0.7 0.7   LABGLOM >90 >90   GLUCOSE 159* 152*      Latest Reference Range & Units 10/14/24 06:00 10/14/24 12:31   POC TCO2 22 - 30 mmol/L  27   POC HCO3 21.0 - 28.0 mmol/L 28.1 (H) 26.6   POC O2 SAT 94.0 - 98.0 % 95.6 95.8   POC pCO2 35.0 - 48.0 mm Hg 43.2 42.0   POC pH 7.350 - 7.450  7.421 7.410   POC PO2 83.0 - 108.0 mm Hg 78.4 (L) 79.8 (L)   (H): Data is abnormally high  (L): Data is abnormally low      Radiology:  Chest x-ray 10/16  Reviewed  Small left effusion with atelectasis.            Impression/recommendations:    Acute respiratory insufficiency due to upper airway instruction/angioedema with significant swelling upper airway/atelectasis with small effusion  Oxygen by nasal cannula  BiPAP support if awake  Keep off ACE inhibitor for life  Status

## 2024-10-17 ENCOUNTER — APPOINTMENT (OUTPATIENT)
Dept: GENERAL RADIOLOGY | Age: 69
DRG: 915 | End: 2024-10-17
Payer: MEDICARE

## 2024-10-17 LAB
ANION GAP SERPL CALCULATED.3IONS-SCNC: 9 MMOL/L (ref 9–17)
BUN SERPL-MCNC: 29 MG/DL (ref 8–23)
BUN/CREAT SERPL: 48 (ref 9–20)
CALCIUM SERPL-MCNC: 8.7 MG/DL (ref 8.6–10.4)
CHLORIDE SERPL-SCNC: 107 MMOL/L (ref 98–107)
CO2 SERPL-SCNC: 29 MMOL/L (ref 20–31)
CREAT SERPL-MCNC: 0.6 MG/DL (ref 0.7–1.2)
ERYTHROCYTE [DISTWIDTH] IN BLOOD BY AUTOMATED COUNT: 12.2 % (ref 11.8–14.4)
GFR, ESTIMATED: >90 ML/MIN/1.73M2
GLUCOSE BLD-MCNC: 112 MG/DL (ref 75–110)
GLUCOSE BLD-MCNC: 112 MG/DL (ref 75–110)
GLUCOSE BLD-MCNC: 124 MG/DL (ref 75–110)
GLUCOSE BLD-MCNC: 144 MG/DL (ref 75–110)
GLUCOSE SERPL-MCNC: 106 MG/DL (ref 70–99)
HCT VFR BLD AUTO: 45.5 % (ref 40.7–50.3)
HGB BLD-MCNC: 14.9 G/DL (ref 13–17)
MCH RBC QN AUTO: 32.8 PG (ref 25.2–33.5)
MCHC RBC AUTO-ENTMCNC: 32.7 G/DL (ref 28.4–34.8)
MCV RBC AUTO: 100.2 FL (ref 82.6–102.9)
NRBC BLD-RTO: 0 PER 100 WBC
PLATELET # BLD AUTO: 125 K/UL (ref 138–453)
PMV BLD AUTO: 10.9 FL (ref 8.1–13.5)
POTASSIUM SERPL-SCNC: 4.2 MMOL/L (ref 3.7–5.3)
RBC # BLD AUTO: 4.54 M/UL (ref 4.21–5.77)
SODIUM SERPL-SCNC: 145 MMOL/L (ref 135–144)
WBC OTHER # BLD: 10.3 K/UL (ref 3.5–11.3)

## 2024-10-17 PROCEDURE — 94660 CPAP INITIATION&MGMT: CPT

## 2024-10-17 PROCEDURE — 6370000000 HC RX 637 (ALT 250 FOR IP): Performed by: INTERNAL MEDICINE

## 2024-10-17 PROCEDURE — 6360000002 HC RX W HCPCS: Performed by: NURSE PRACTITIONER

## 2024-10-17 PROCEDURE — 2580000003 HC RX 258: Performed by: INTERNAL MEDICINE

## 2024-10-17 PROCEDURE — 6360000002 HC RX W HCPCS: Performed by: FAMILY MEDICINE

## 2024-10-17 PROCEDURE — 2500000003 HC RX 250 WO HCPCS: Performed by: INTERNAL MEDICINE

## 2024-10-17 PROCEDURE — 2580000003 HC RX 258: Performed by: FAMILY MEDICINE

## 2024-10-17 PROCEDURE — 2700000000 HC OXYGEN THERAPY PER DAY

## 2024-10-17 PROCEDURE — 94761 N-INVAS EAR/PLS OXIMETRY MLT: CPT

## 2024-10-17 PROCEDURE — 82947 ASSAY GLUCOSE BLOOD QUANT: CPT

## 2024-10-17 PROCEDURE — 2000000000 HC ICU R&B

## 2024-10-17 PROCEDURE — 80048 BASIC METABOLIC PNL TOTAL CA: CPT

## 2024-10-17 PROCEDURE — 6360000002 HC RX W HCPCS: Performed by: INTERNAL MEDICINE

## 2024-10-17 PROCEDURE — 2580000003 HC RX 258: Performed by: NURSE PRACTITIONER

## 2024-10-17 PROCEDURE — 36415 COLL VENOUS BLD VENIPUNCTURE: CPT

## 2024-10-17 PROCEDURE — 71045 X-RAY EXAM CHEST 1 VIEW: CPT

## 2024-10-17 PROCEDURE — 85027 COMPLETE CBC AUTOMATED: CPT

## 2024-10-17 PROCEDURE — 94640 AIRWAY INHALATION TREATMENT: CPT

## 2024-10-17 RX ORDER — OLANZAPINE 5 MG/1
10 TABLET ORAL NIGHTLY
Status: DISCONTINUED | OUTPATIENT
Start: 2024-10-17 | End: 2024-10-19

## 2024-10-17 RX ORDER — OLANZAPINE 5 MG/1
10 TABLET ORAL NIGHTLY
Status: CANCELLED | OUTPATIENT
Start: 2024-10-17

## 2024-10-17 RX ORDER — HYDRALAZINE HYDROCHLORIDE 20 MG/ML
10 INJECTION INTRAMUSCULAR; INTRAVENOUS EVERY 6 HOURS PRN
Status: DISCONTINUED | OUTPATIENT
Start: 2024-10-17 | End: 2024-11-08 | Stop reason: HOSPADM

## 2024-10-17 RX ORDER — ZIPRASIDONE MESYLATE 20 MG/ML
20 INJECTION, POWDER, LYOPHILIZED, FOR SOLUTION INTRAMUSCULAR NIGHTLY
Status: CANCELLED | OUTPATIENT
Start: 2024-10-17

## 2024-10-17 RX ORDER — LORAZEPAM 2 MG/ML
2 INJECTION INTRAMUSCULAR ONCE
Status: COMPLETED | OUTPATIENT
Start: 2024-10-17 | End: 2024-10-17

## 2024-10-17 RX ORDER — CHLORDIAZEPOXIDE HYDROCHLORIDE 25 MG/1
25 CAPSULE, GELATIN COATED ORAL 4 TIMES DAILY
Status: DISCONTINUED | OUTPATIENT
Start: 2024-10-17 | End: 2024-10-20

## 2024-10-17 RX ADMIN — DEXMEDETOMIDINE 0.8 MCG/KG/HR: 100 INJECTION, SOLUTION INTRAVENOUS at 21:37

## 2024-10-17 RX ADMIN — DEXMEDETOMIDINE 1 MCG/KG/HR: 100 INJECTION, SOLUTION INTRAVENOUS at 06:27

## 2024-10-17 RX ADMIN — LORAZEPAM 1 MG: 2 INJECTION INTRAMUSCULAR; INTRAVENOUS at 11:05

## 2024-10-17 RX ADMIN — LORAZEPAM 2 MG: 2 INJECTION INTRAMUSCULAR; INTRAVENOUS at 13:49

## 2024-10-17 RX ADMIN — LEVALBUTEROL 1.25 MG: 1.25 SOLUTION, CONCENTRATE RESPIRATORY (INHALATION) at 11:36

## 2024-10-17 RX ADMIN — ENOXAPARIN SODIUM 40 MG: 100 INJECTION SUBCUTANEOUS at 08:42

## 2024-10-17 RX ADMIN — SODIUM CHLORIDE, PRESERVATIVE FREE 10 ML: 5 INJECTION INTRAVENOUS at 08:51

## 2024-10-17 RX ADMIN — SODIUM CHLORIDE, PRESERVATIVE FREE 20 MG: 5 INJECTION INTRAVENOUS at 08:41

## 2024-10-17 RX ADMIN — LORAZEPAM 4 MG: 2 INJECTION INTRAMUSCULAR; INTRAVENOUS at 00:20

## 2024-10-17 RX ADMIN — ZIPRASIDONE MESYLATE 20 MG: 20 INJECTION, POWDER, LYOPHILIZED, FOR SOLUTION INTRAMUSCULAR at 21:32

## 2024-10-17 RX ADMIN — IPRATROPIUM BROMIDE 0.5 MG: 0.5 SOLUTION RESPIRATORY (INHALATION) at 07:36

## 2024-10-17 RX ADMIN — IPRATROPIUM BROMIDE 0.5 MG: 0.5 SOLUTION RESPIRATORY (INHALATION) at 11:36

## 2024-10-17 RX ADMIN — SODIUM CHLORIDE, PRESERVATIVE FREE 20 MG: 5 INJECTION INTRAVENOUS at 19:53

## 2024-10-17 RX ADMIN — WATER 20 MG: 1 INJECTION INTRAMUSCULAR; INTRAVENOUS; SUBCUTANEOUS at 17:12

## 2024-10-17 RX ADMIN — LORAZEPAM 2 MG: 2 INJECTION INTRAMUSCULAR; INTRAVENOUS at 15:21

## 2024-10-17 RX ADMIN — SODIUM CHLORIDE, PRESERVATIVE FREE 10 ML: 5 INJECTION INTRAVENOUS at 21:20

## 2024-10-17 RX ADMIN — LEVALBUTEROL 1.25 MG: 1.25 SOLUTION, CONCENTRATE RESPIRATORY (INHALATION) at 07:36

## 2024-10-17 RX ADMIN — SODIUM CHLORIDE, PRESERVATIVE FREE 10 ML: 5 INJECTION INTRAVENOUS at 21:22

## 2024-10-17 RX ADMIN — HYDRALAZINE HYDROCHLORIDE 10 MG: 20 INJECTION INTRAMUSCULAR; INTRAVENOUS at 23:09

## 2024-10-17 RX ADMIN — METHYLPREDNISOLONE SODIUM SUCCINATE 40 MG: 40 INJECTION INTRAMUSCULAR; INTRAVENOUS at 08:40

## 2024-10-17 ASSESSMENT — PAIN SCALES - GENERAL: PAINLEVEL_OUTOF10: 0

## 2024-10-17 NOTE — PROGRESS NOTES
Attempt made to re-insert NG. Pt educated, he is oriented to name and place. Pt agitated and combative. Attempt unsuccessful. Will attempt when pt less agitated

## 2024-10-17 NOTE — PLAN OF CARE
Problem: Respiratory - Adult  Goal: Achieves optimal ventilation and oxygenation  10/17/2024 0737 by Ameena Strange RCP  Outcome: Progressing     Problem: Respiratory - Adult  Goal: Able to breathe comfortably  10/17/2024 0737 by Ameena Strange RCP  Outcome: Progressing

## 2024-10-17 NOTE — PROGRESS NOTES
Hospitalist - Progress Note      Patient: Noel Valle    Unit/Bed:1114/1114-01  YOB: 1955  MRN: [id number]   Acct: [id number]   PCP: Ramakrishna Silver MD    Date of Service: Pt seen/examined on 10/17/24  and Admitted to Inpatient with expected LOS greater than two midnights due to medical therapy.     Chief Complaint: Tongue swelling 62-year-old male    Assessment and Plan:-    Acute Respiratory Insufficiency due to upper airway obstruction secondary to Angioedema  S/P extubation 10-13-24  Drowsy as he was extubated and sedative meds weaned off  On 3 L nasal canula  On Solu-Medrol, Benadryl, Pepcid.  Critical care following.       Hypotension-now hypertensive  Developed after patient received sedation.  IV fluids ordered.  Pressor support to maintain a MAP of 65, currently off  improving  Critical care following    Hypertension  Resume home antihypertensives/Norvasc  Added Coreg but needed to be held due to bradycardia    HELENE  Likely 2/2 poor po intake.   IV fluids 100cc/hr NS ordered  Recheck labs in the morning    Tobacco abuse disorder/history of possible COPD  Sched Dave.  Will  on tobacco cessation when patient is alert  Alcohol withdrawal  N.p.o.  Precedex  CIWA  On Zyprexa  Started on Librium      Discussed with sister present at the bedside      Subjective:  Patient was sleepy.  Did not talk to me much.  Currently on Precedex  Afebrile  Patient is awake but confused.  Tolerating tube feeds.        History Of Present Illness:    69-year-old male coming into the hospital for difficulty swallowing.  In the ER, patient found to have tongue swelling, trouble swallowing with acute distress, tachycardia, ill-appearing status.  Symptoms began earlier today at 6 AM.  He noticed severe swelling of his throat.  Patient denies any allergies, medications.  Patient is not on any ACE inhibitor.  Patient was able to talk but his words were muffled.  In the ER, case was discussed with the

## 2024-10-17 NOTE — PROGRESS NOTES
Spiritual Health History and Assessment/Progress Note  [facility]    (P) Spiritual/Emotional Needs, Initial Encounter,  ,  ,      Name: Noel Valle MRN: [id number]    Age: 69 y.o.     Sex: male   Language: English   Spiritism: New Religionist   Angioedema of tongue     Date: 10/17/2024            Total Time Calculated: (P) 7 min              Spiritual Assessment began in [facility] ICU        Referral/Consult From: (P) Rounding   Encounter Overview/Reason: (P) Spiritual/Emotional Needs, Initial Encounter  Service Provided For: (P) Patient  Patient indicated a desire for prayer.  Kailee, Belief, Meaning:   Patient has beliefs or practices that help with coping during difficult times  Family/Friends No family/friends present      Importance and Influence:  Patient has no beliefs influential to healthcare decision-making identified during this visit  Family/Friends No family/friends present    Community:  Patient Other: to be determined  Family/Friends No family/friends present    Assessment and Plan of Care:     Patient Interventions include: Facilitated expression of thoughts and feelings  Family/Friends Interventions include: No family/friends present    Patient Plan of Care: Spiritual Care available upon further referral  Family/Friends Plan of Care: Spiritual Care available upon further referral    Electronically signed by Chaplain Nighat on 10/17/2024 at 11:18 AM

## 2024-10-17 NOTE — PROGRESS NOTES
End Of Shift Note  [place] [facility]  Summary of shift: At start shift patient was alert and oriented, throughout the shift patient became agitated and aggressive, patient was having hallucinations, attempting to get out of bed. CIWA was done, ativan given x2 per CIWA scale. Precedex was titrated per orders. Patient pulled out NG tube, TF was off at time. Atria Darrick was notified, given orders to place NG when patient becomes less aggressive.     Vitals:    Vitals:    10/17/24 0400 10/17/24 0500 10/17/24 0600 10/17/24 0739   BP: (!) 144/78 (!) 147/77 (!) 149/75    Pulse: (!) 43 (!) 42 (!) 42 (!) 40   Resp: 20 20 20 24   Temp: 98.4 °F (36.9 °C)      TempSrc: Axillary      SpO2: 100% 100% 100% 100%   Weight:       Height:            I&O:   Intake/Output Summary (Last 24 hours) at 10/17/2024 0758  Last data filed at 10/17/2024 0700  Gross per 24 hour   Intake 1271.67 ml   Output 1650 ml   Net -378.33 ml       Resp Status: 4LNC patient placed on Bipap at 0100    Ventilator Settings:  Vent Mode: CPAP/PS Resp Rate (Set): 18 bpm/Vt (Set, mL): 500 mL/ /FiO2 : 30 %    Critical Care IV infusions:   dexmedeTOMIDine (PRECEDEX) 1,000 mcg in sodium chloride 0.9 % 250 mL infusion 1 mcg/kg/hr (10/17/24 0700)    sodium chloride      sodium chloride          LDA:   Peripheral IV 10/09/24 Proximal;Right Cephalic (Active)   Number of days: 7       Peripheral IV 10/09/24 Right Forearm (Active)   Number of days: 7       External Urinary Catheter (Active)   Number of days: 7

## 2024-10-17 NOTE — PROGRESS NOTES
Per orders from crit care precedex titrated up as HR tolerates and one time dose ativan given to attempt NG re-insertion. Multiple attempts made to insert NG by multiple staff members. Pt continues to be agitated and fight insertion, NG insertion unsuccessful at this time. Critical care notified

## 2024-10-17 NOTE — PROGRESS NOTES
Pulmonary Critical Care Progress Note    Patient seen for the follow up of Angioedema of tongue     Subjective:    He got agitated yesterday on decreasing Precedex pulled his NG out.  He is still is on a 4 L.  He was on BiPAP tolerated overnight.  He is still on Precedex oriented to self confused..  He is not ambulating.  He previously had significant agitation and stated that he had been drinking 24 beers daily.  He has been NPO.  He had decreased heart rate on Precedex  Examination:    Vitals: BP (!) 150/86   Pulse 77   Temp 97.4 °F (36.3 °C) (Axillary)   Resp 26   Ht 1.66 m (5' 5.35\")   Wt 95.6 kg (210 lb 12.2 oz)   SpO2 96%   BMI 34.70 kg/m²   SpO2  Av.8 %  Min: 91 %  Max: 100 %  General appearance: Lethargic/sleepy  Neck: No JVD  Lungs: Decreased breath sound no crackles or wheeze  Heart: regular rate and rhythm, S1, S2 normal, no gallop  Abdomen: Soft, non tender, + BS  Extremities: no cyanosis or clubbing. No significant edema    LABs:    CBC:   Recent Labs     10/15/24  0433 10/17/24  0643   WBC 10.6 10.3   HGB 14.7 14.9   HCT 43.2 45.5   * 125*     BMP:   Recent Labs     10/16/24  0413 10/17/24  0643   * 145*   K 4.1 4.2   CO2 27 29   BUN 31* 29*   CREATININE 0.7 0.6*   LABGLOM >90 >90   GLUCOSE 152* 106*      Latest Reference Range & Units 10/14/24 06:00 10/14/24 12:31   POC TCO2 22 - 30 mmol/L  27   POC HCO3 21.0 - 28.0 mmol/L 28.1 (H) 26.6   POC O2 SAT 94.0 - 98.0 % 95.6 95.8   POC pCO2 35.0 - 48.0 mm Hg 43.2 42.0   POC pH 7.350 - 7.450  7.421 7.410   POC PO2 83.0 - 108.0 mm Hg 78.4 (L) 79.8 (L)   (H): Data is abnormally high  (L): Data is abnormally low      Radiology:  Chest x-ray 10/17 reviewed  1. Probable mild atelectatic changes at the base of the left lung.  2. Small left pleural effusion versus pleural thickening.  3. No other acute cardiopulmonary process.      Chest x-ray 10/16  Reviewed  Small left effusion with atelectasis.             Impression/recommendations:    Acute respiratory insufficiency due to upper airway instruction/angioedema with significant swelling upper airway/atelectasis with small effusion  Oxygen by nasal cannula  BiPAP support if awake  Keep off ACE inhibitor for life  Status post icatibant  Make Solu-Medrol 40 IV every 12 hours  Repeat chest x-ray     COPD/smoker   DuoNeb by nebulizer  Smoking cessation  PFT outpatient     alcohol withdrawal/metabolic encephalopathy  Reinsert NG/Ativan IV once per NG insertion  Precedex drip titrate to RASS of 0  CIWA for back up   Zyprexa increased to 10 mg  Continue Librium keep at fixed dose 25 4 times daily  Thiamine folate multivitamin    Bradycardia/hypertension/status post hypotension due to sedation/suspect fluid overload  Monitor blood pressure   Norvasc 10 daily/clonidine 0.2 twice daily  Hold Coreg  Hydralazine 10 IV every 4 hours for blood pressure above 170 systolic       Hypernatremia   Discontinue D5W at 100 mL an hour/monitor sodium    suspected sleep apnea/obesity  Outpatient sleep evaluation     I previously discussed with sister; patient takes care of his wife who has neurological disorder not able to take care of herself and not able to make decisions.  Sister is next of kin.  Discussed with RN RT  Discussed with clinical pharmacist  Peptic ulcer disease prophylaxis  DVT prophylaxis      Carlos Gamboa MD, MD, [facility]  Pulmonary Critical Care and Sleep Medicine,  [facility]  Cell: [phone]  Office: [phone]

## 2024-10-17 NOTE — PLAN OF CARE
Problem: Respiratory - Adult  Goal: Achieves optimal ventilation and oxygenation  10/17/2024 1308 by Rosie Ronquillo RN  Outcome: Progressing  Flowsheets (Taken 10/17/2024 0800)  Achieves optimal ventilation and oxygenation:   Assess for changes in respiratory status   Assess for changes in mentation and behavior   Position to facilitate oxygenation and minimize respiratory effort   Oxygen supplementation based on oxygen saturation or arterial blood gases     Problem: Neurosensory - Adult  Goal: Achieves stable or improved neurological status  Outcome: Progressing  Flowsheets (Taken 10/17/2024 0800)  Achieves stable or improved neurological status:   Assess for and report changes in neurological status   Initiate measures to prevent increased intracranial pressure   Maintain blood pressure and fluid volume within ordered parameters to optimize cerebral perfusion and minimize risk of hemorrhage     Problem: Cardiovascular - Adult  Goal: Maintains optimal cardiac output and hemodynamic stability  Outcome: Progressing  Flowsheets (Taken 10/17/2024 0800)  Maintains optimal cardiac output and hemodynamic stability:   Monitor blood pressure and heart rate   Monitor urine output and notify Licensed Independent Practitioner for values outside of normal range   Assess for signs of decreased cardiac output   Administer vasoactive medications as ordered     Problem: Skin/Tissue Integrity - Adult  Goal: Skin integrity remains intact  Outcome: Progressing  Flowsheets (Taken 10/17/2024 0800)  Skin Integrity Remains Intact: Monitor for areas of redness and/or skin breakdown     Problem: Musculoskeletal - Adult  Goal: Return mobility to safest level of function  Outcome: Progressing  Flowsheets (Taken 10/17/2024 0800)  Return Mobility to Safest Level of Function:   Assess patient stability and activity tolerance for standing, transferring and ambulating with or without assistive devices   Assist with transfers and ambulation

## 2024-10-17 NOTE — PROGRESS NOTES
End Of Shift Note  [facility]  Summary of shift: Patient continues to be agitated and restless. He is oriented to name, place and year, will not follow commands and not easily redirectable. Bradycardia with HR sustaining 37, precedex titrated down, pt became more agitated and aggressive with precedex decreased. See previous notes, unsuccessful with NG insertion attempts even with ativan and precedex increased. Geodon ordered per crit care, first dose this evening. Will attempt NG again for pt to get medications. Received prn ativan per CIWA. Had two small bowel movements.     Vitals:    Vitals:    10/17/24 1500 10/17/24 1600 10/17/24 1659 10/17/24 1700   BP: (!) 149/86 (!) 161/107  (!) 148/79   Pulse: 70 59  65   Resp: 22 22  25   Temp:   98 °F (36.7 °C)    TempSrc:   Axillary    SpO2: 98% 99%  99%   Weight:       Height:            I&O:   Intake/Output Summary (Last 24 hours) at 10/17/2024 1742  Last data filed at 10/17/2024 1741  Gross per 24 hour   Intake 614.96 ml   Output 2650 ml   Net -2035.04 ml       Resp Status: 2L nasal canula, lung sounds diminished/clear    Ventilator Settings:  Vent Mode: CPAP/PS Resp Rate (Set): 18 bpm/Vt (Set, mL): 500 mL/ /FiO2 : 30 %    Critical Care IV infusions:   dexmedeTOMIDine (PRECEDEX) 1,000 mcg in sodium chloride 0.9 % 250 mL infusion 0.8 mcg/kg/hr (10/17/24 1509)    sodium chloride      sodium chloride          LDA:   Peripheral IV 10/09/24 Proximal;Right Cephalic (Active)   Number of days: 8       Peripheral IV 10/09/24 Right Forearm (Active)   Number of days: 8       External Urinary Catheter (Active)   Number of days: 8

## 2024-10-18 LAB
ANION GAP SERPL CALCULATED.3IONS-SCNC: 8 MMOL/L (ref 9–17)
BUN SERPL-MCNC: 27 MG/DL (ref 8–23)
BUN/CREAT SERPL: 45 (ref 9–20)
CALCIUM SERPL-MCNC: 8.7 MG/DL (ref 8.6–10.4)
CHLORIDE SERPL-SCNC: 107 MMOL/L (ref 98–107)
CO2 SERPL-SCNC: 31 MMOL/L (ref 20–31)
CREAT SERPL-MCNC: 0.6 MG/DL (ref 0.7–1.2)
ERYTHROCYTE [DISTWIDTH] IN BLOOD BY AUTOMATED COUNT: 11.8 % (ref 11.8–14.4)
GFR, ESTIMATED: >90 ML/MIN/1.73M2
GLUCOSE BLD-MCNC: 107 MG/DL (ref 75–110)
GLUCOSE BLD-MCNC: 109 MG/DL (ref 75–110)
GLUCOSE BLD-MCNC: 91 MG/DL (ref 75–110)
GLUCOSE BLD-MCNC: 93 MG/DL (ref 75–110)
GLUCOSE SERPL-MCNC: 102 MG/DL (ref 70–99)
HCT VFR BLD AUTO: 48.3 % (ref 40.7–50.3)
HGB BLD-MCNC: 15.8 G/DL (ref 13–17)
MCH RBC QN AUTO: 33.1 PG (ref 25.2–33.5)
MCHC RBC AUTO-ENTMCNC: 32.7 G/DL (ref 28.4–34.8)
MCV RBC AUTO: 101 FL (ref 82.6–102.9)
NRBC BLD-RTO: 0 PER 100 WBC
PLATELET # BLD AUTO: 120 K/UL (ref 138–453)
PMV BLD AUTO: 10.6 FL (ref 8.1–13.5)
POTASSIUM SERPL-SCNC: 4.4 MMOL/L (ref 3.7–5.3)
RBC # BLD AUTO: 4.78 M/UL (ref 4.21–5.77)
SODIUM SERPL-SCNC: 146 MMOL/L (ref 135–144)
WBC OTHER # BLD: 9.8 K/UL (ref 3.5–11.3)

## 2024-10-18 PROCEDURE — 6370000000 HC RX 637 (ALT 250 FOR IP): Performed by: INTERNAL MEDICINE

## 2024-10-18 PROCEDURE — 80048 BASIC METABOLIC PNL TOTAL CA: CPT

## 2024-10-18 PROCEDURE — 36415 COLL VENOUS BLD VENIPUNCTURE: CPT

## 2024-10-18 PROCEDURE — 97530 THERAPEUTIC ACTIVITIES: CPT

## 2024-10-18 PROCEDURE — 94640 AIRWAY INHALATION TREATMENT: CPT

## 2024-10-18 PROCEDURE — 6360000002 HC RX W HCPCS: Performed by: INTERNAL MEDICINE

## 2024-10-18 PROCEDURE — 2500000003 HC RX 250 WO HCPCS: Performed by: INTERNAL MEDICINE

## 2024-10-18 PROCEDURE — 2700000000 HC OXYGEN THERAPY PER DAY

## 2024-10-18 PROCEDURE — 94660 CPAP INITIATION&MGMT: CPT

## 2024-10-18 PROCEDURE — 2000000000 HC ICU R&B

## 2024-10-18 PROCEDURE — 6360000002 HC RX W HCPCS: Performed by: FAMILY MEDICINE

## 2024-10-18 PROCEDURE — 97110 THERAPEUTIC EXERCISES: CPT

## 2024-10-18 PROCEDURE — 2580000003 HC RX 258: Performed by: FAMILY MEDICINE

## 2024-10-18 PROCEDURE — 82947 ASSAY GLUCOSE BLOOD QUANT: CPT

## 2024-10-18 PROCEDURE — 85027 COMPLETE CBC AUTOMATED: CPT

## 2024-10-18 PROCEDURE — 2580000003 HC RX 258: Performed by: INTERNAL MEDICINE

## 2024-10-18 PROCEDURE — 2580000003 HC RX 258: Performed by: NURSE PRACTITIONER

## 2024-10-18 PROCEDURE — 94761 N-INVAS EAR/PLS OXIMETRY MLT: CPT

## 2024-10-18 RX ORDER — IPRATROPIUM BROMIDE AND ALBUTEROL SULFATE 2.5; .5 MG/3ML; MG/3ML
1 SOLUTION RESPIRATORY (INHALATION)
Status: DISCONTINUED | OUTPATIENT
Start: 2024-10-18 | End: 2024-10-19

## 2024-10-18 RX ADMIN — WATER 20 MG: 1 INJECTION INTRAMUSCULAR; INTRAVENOUS; SUBCUTANEOUS at 06:30

## 2024-10-18 RX ADMIN — CHLORDIAZEPOXIDE HYDROCHLORIDE 25 MG: 25 CAPSULE ORAL at 20:54

## 2024-10-18 RX ADMIN — IPRATROPIUM BROMIDE AND ALBUTEROL SULFATE 1 DOSE: .5; 2.5 SOLUTION RESPIRATORY (INHALATION) at 19:48

## 2024-10-18 RX ADMIN — VALPROATE SODIUM 250 MG: 100 INJECTION, SOLUTION INTRAVENOUS at 15:35

## 2024-10-18 RX ADMIN — ENOXAPARIN SODIUM 40 MG: 100 INJECTION SUBCUTANEOUS at 09:02

## 2024-10-18 RX ADMIN — ZIPRASIDONE MESYLATE 20 MG: 20 INJECTION, POWDER, LYOPHILIZED, FOR SOLUTION INTRAMUSCULAR at 09:04

## 2024-10-18 RX ADMIN — CLONIDINE HYDROCHLORIDE 0.2 MG: 0.2 TABLET ORAL at 20:55

## 2024-10-18 RX ADMIN — DEXMEDETOMIDINE 0.2 MCG/KG/HR: 100 INJECTION, SOLUTION INTRAVENOUS at 17:47

## 2024-10-18 RX ADMIN — SODIUM CHLORIDE, PRESERVATIVE FREE 10 ML: 5 INJECTION INTRAVENOUS at 09:17

## 2024-10-18 RX ADMIN — SODIUM CHLORIDE, PRESERVATIVE FREE 20 MG: 5 INJECTION INTRAVENOUS at 20:55

## 2024-10-18 RX ADMIN — WATER 20 MG: 1 INJECTION INTRAMUSCULAR; INTRAVENOUS; SUBCUTANEOUS at 17:43

## 2024-10-18 RX ADMIN — SODIUM CHLORIDE, PRESERVATIVE FREE 10 ML: 5 INJECTION INTRAVENOUS at 09:04

## 2024-10-18 RX ADMIN — LEVALBUTEROL 1.25 MG: 1.25 SOLUTION, CONCENTRATE RESPIRATORY (INHALATION) at 08:08

## 2024-10-18 RX ADMIN — SODIUM CHLORIDE, PRESERVATIVE FREE 10 ML: 5 INJECTION INTRAVENOUS at 20:56

## 2024-10-18 RX ADMIN — SODIUM CHLORIDE, PRESERVATIVE FREE 20 MG: 5 INJECTION INTRAVENOUS at 09:02

## 2024-10-18 RX ADMIN — IPRATROPIUM BROMIDE 0.5 MG: 0.5 SOLUTION RESPIRATORY (INHALATION) at 08:08

## 2024-10-18 RX ADMIN — SODIUM CHLORIDE, PRESERVATIVE FREE 10 ML: 5 INJECTION INTRAVENOUS at 20:55

## 2024-10-18 NOTE — DISCHARGE INSTR - COC
Continuity of Care Form    Patient Name: Noel Valle   :  1955  MRN:  [id number]    Admit date:  10/9/2024  Discharge date:  2024    Code Status Order: Full Code   Advance Directives:   Advance Care Flowsheet Documentation             Admitting Physician:  Mohsin Mohammad Reza, MD  PCP: Ramakrishna Silver MD    Discharging Nurse: Hillary William RN  Discharging Hospital Unit/Room#: 1114/1114-01  Discharging Unit Phone Number: [phone]    Emergency Contact:   Extended Emergency Contact Information  Primary Emergency Contact: Alicia Brar  Home Phone: [phone]  Mobile Phone: [phone]  Relation: Brother/Sister  Secondary Emergency Contact: Kell Valle  Home Phone: [phone]  Relation: Spouse   needed? No    Past Surgical History:  No past surgical history on file.    Immunization History:   Immunization History   Administered Date(s) Administered    COVID-19, PFIZER PURPLE top, DILUTE for use, (age 12 y+), 30mcg/0.3mL 2021, 2021, 2022       Active Problems:  Patient Active Problem List   Diagnosis Code    Angioedema of tongue T78.3XXA       Isolation/Infection:   Isolation            No Isolation          Patient Infection Status       None to display            Nurse Assessment:  Last Vital Signs: /84   Pulse 65   Temp 98.1 °F (36.7 °C) (Temporal)   Resp 23   Ht 1.66 m (5' 5.35\")   Wt 95.6 kg (210 lb 12.2 oz)   SpO2 96%   BMI 34.70 kg/m²     Last documented pain score (0-10 scale): Pain Level: 0  Last Weight:   Wt Readings from Last 1 Encounters:   10/14/24 95.6 kg (210 lb 12.2 oz)     Mental Status:  oriented, alert, coherent, logical, thought processes intact, able to concentrate and follow conversation, and intermittent confusion but reorients easily.    IV Access:  - None    Nursing Mobility/ADLs:  Walking   Dependent Deanna steady  Transfer  Dependent  Bathing  Dependent  Dressing  Dependent  Toileting  Dependent  Feeding  Independent  Med  Admin  Assisted  Med Delivery   whole    Wound Care Documentation and Therapy:        Elimination:  Continence:   Bowel: Yes  Bladder: Yes  Urinary Catheter: Removal Date 11-7-2024 2pm. Patient has urinated twice 150ml and 200ml on 11/8/2024. Some post void residual noted so Urology consult was placed.    Colostomy/Ileostomy/Ileal Conduit: No       Date of Last BM: 11-8-2024    Intake/Output Summary (Last 24 hours) at 10/18/2024 1643  Last data filed at 10/18/2024 1641  Gross per 24 hour   Intake 469.46 ml   Output 2000 ml   Net -1530.54 ml     I/O last 3 completed shifts:  In: 666.3 [I.V.:636.3; NG/GT:30]  Out: 2800 [Urine:2800]    Safety Concerns:     At Risk for Falls and History of Seizures    Impairments/Disabilities:      Foot drop of right foot, DVT in right common femoral, femoral and popliteal veins    Nutrition Therapy:  Current Nutrition Therapy:   - Oral Diet:  Dysphagia - Soft and Bite Sized  - Oral Nutrition Supplement:  High protein/High Calorie with Breakfast and Dinner, and then Frozen nutrition supplement with dinner.    Routes of Feeding: Oral  Liquids: Thin Liquids  Daily Fluid Restriction: no  Last Modified Barium Swallow with Video (Video Swallowing Test): not done    Treatments at the Time of Hospital Discharge:   Respiratory Treatments:   Oxygen Therapy:  is on oxygen at 1-2 L/min per nasal cannula.  Ventilator:    - No ventilator support    Rehab Therapies: Physical Therapy, Occupational Therapy, and Orthotics/Prosthetics  Weight Bearing Status/Restrictions: No weight bearing restrictions  Other Medical Equipment (for information only, NOT a DME order):  wheelchair, walker, bath bench, hospital bed, and heel boots for foot drop  Other Treatments: foot drop boots, Wound care for bilateral heels, stage 2 pressure on right heel and stage 1 on left heel using Triad cream and mepilex.  Compression socks during the day and off at night.     Patient's personal belongings (please select all that are

## 2024-10-18 NOTE — PROGRESS NOTES
End Of Shift Note  [facility] ICU  Summary of shift: Patient more alert, oriented x3, following commands and appropriate today. Precedex titrated down per rass goal. Received scheduled geodon and romina. Worked with therapy, followed commands and did range of motion. Appropriate for nursing swallow eval, passed and order from crit care for meds with applesauce but no diet at this time. Called out appropriately for Bms, good urine output. BP stable, room air. Agreeable to wear bipap tonight.     Vitals:    Vitals:    10/18/24 1600 10/18/24 1700 10/18/24 1716 10/18/24 1800   BP: 136/84 (!) 170/89 136/86 (!) 143/87   Pulse: 65 64 71 72   Resp: 23 21 24 25   Temp: 98.1 °F (36.7 °C)      TempSrc: Temporal      SpO2: 96% 96%  95%   Weight:       Height:            I&O:   Intake/Output Summary (Last 24 hours) at 10/18/2024 1928  Last data filed at 10/18/2024 1839  Gross per 24 hour   Intake 419.22 ml   Output 2000 ml   Net -1580.78 ml       Resp Status: Room air, wheezing in right lung. Bipap at night     Ventilator Settings:  Vent Mode: CPAP/PS Resp Rate (Set): 18 bpm/Vt (Set, mL): 500 mL/ /FiO2 : 30 %    Critical Care IV infusions:   dexmedeTOMIDine (PRECEDEX) 1,000 mcg in sodium chloride 0.9 % 250 mL infusion 0.2 mcg/kg/hr (10/18/24 1835)    sodium chloride      sodium chloride          LDA:   Peripheral IV 10/18/24 Left Forearm (Active)   Number of days: 0       External Urinary Catheter (Active)   Number of days: 9

## 2024-10-18 NOTE — PROGRESS NOTES
Comprehensive Nutrition Assessment    Type and Reason for Visit:  Reassess    Nutrition Recommendations/Plan:   Recommend restarting feeds when NG placed vs swallow eval  RD to continue to monitor/follow.      Malnutrition Assessment:  Malnutrition Status:  At risk for malnutrition (Comment) (10/14/24 0267)        Nutrition Assessment:    Patient continues in ICU, he was extubated 10/13. Getting 2L NC during the dy and bipap at night. He has had 7 NG attempts without success, unable to take oral meds. RN suggested possibility of swallowing eval but MD wants to hold off. Patient is sedated. Pt agitated and wants to go home. H ewas on trickle feed, Glucerna 1.5 and boluses, but is not on feeds at this time. Labs, meds, PMH reviewed    Nutrition Related Findings:    Na 146, , LBM 10/18, hypoactive bs; +1 LE edema Wound Type: None       Current Nutrition Intake & Therapies:    Average Meal Intake: NPO  Average Supplements Intake: NPO  Current Tube Feeding (TF) Orders:  Feeding Route: Nasogastric  Formula: Diabetic  Schedule: Bolus  Feeding Regimen: Glucerna 1.5 Camilo bolus 275 mL 4x/day (1100 mL total volume)  Additives/Modulars: None  Water Flushes: 60 mL before and after each bolus (480 mL free water)  Current TF & Flush Orders Provides: 1650 kcal, 91 gm of protein and 1315 mL of free water  Goal TF & Flush Orders Provides: 1650 kcal, 91 gm of protein and 1315 mL of free water    Anthropometric Measures:  Height: 166 cm (5' 5.35\")  Ideal Body Weight (IBW): 138 lbs (63 kg)       Current Body Weight: 95.6 kg (210 lb 12.2 oz), 152.7 % IBW. Weight Source: Bed Scale  Current BMI (kg/m2): 34.7  Usual Body Weight: 97.5 kg (215 lb)  % Weight Change (Calculated): -2                    BMI Categories: Obese Class 1 (BMI 30.0-34.9)    Estimated Daily Nutrient Needs:  Energy Requirements Based On: Kcal/kg  Weight Used for Energy Requirements: Current  Energy (kcal/day): 9319-6028 kcal (15-18 kcal/kg)  Weight Used for Protein  Requirements: Ideal  Protein (g/day): 76-88 gm of protein (1.2-1.4 gm/kg)  Method Used for Fluid Requirements: 1 ml/kcal  Fluid (ml/day): 8162-4050 mL    Nutrition Diagnosis:   Inadequate oral intake related to cognitive or neurological impairment as evidenced by NPO or clear liquid status due to medical condition, nutrition support - enteral nutrition    Nutrition Interventions:   Food and/or Nutrient Delivery: Continue NPO, Modify Tube Feeding  Nutrition Education/Counseling: Education not indicated  Coordination of Nutrition Care: Continue to monitor while inpatient       Goals:     Goals: Meet at least 75% of estimated needs, Tolerate nutrition support at goal rate       Nutrition Monitoring and Evaluation:      Food/Nutrient Intake Outcomes: Enteral Nutrition Intake/Tolerance, Diet Advancement/Tolerance  Physical Signs/Symptoms Outcomes: Biochemical Data, Fluid Status or Edema, Skin, Weight, GI Status    Discharge Planning:    Too soon to determine     Rosey Arias RD  Contact: [phone]

## 2024-10-18 NOTE — PROGRESS NOTES
Pulmonary Critical Care Progress Note    Patient seen for the follow up of Angioedema of tongue     Subjective:    He  had NG attempted 5 times yesterday failed.  I was contacted by RN ordered Geodon IM.  He was weaned down oxygen now on room air.  He has been taking nebulized treatment with Xopenex..  He is still on Precedex oriented more alert..  He is not ambulating.  He previously had significant agitation and stated that he had been drinking 24 beers daily.  He has been NPO.  He had decreased heart rate on Precedex before  Examination:    Vitals: BP (!) 136/94   Pulse 76   Temp 97.7 °F (36.5 °C) (Axillary)   Resp 28   Ht 1.66 m (5' 5.35\")   Wt 95.6 kg (210 lb 12.2 oz)   SpO2 96%   BMI 34.70 kg/m²   SpO2  Av.4 %  Min: 95 %  Max: 100 %  General appearance: Lethargic/sleepy  Neck: No JVD  Lungs: Decreased breath sound no crackles or wheeze  Heart: regular rate and rhythm, S1, S2 normal, no gallop  Abdomen: Soft, non tender, + BS  Extremities: no cyanosis or clubbing. No significant edema    LABs:    CBC:   Recent Labs     10/17/24  0643 10/18/24  0747   WBC 10.3 9.8   HGB 14.9 15.8   HCT 45.5 48.3   * 120*     BMP:   Recent Labs     10/17/24  0643 10/18/24  0747   * 146*   K 4.2 4.4   CO2 29 31   BUN 29* 27*   CREATININE 0.6* 0.6*   LABGLOM >90 >90   GLUCOSE 106* 102*      Latest Reference Range & Units 10/14/24 06:00 10/14/24 12:31   POC TCO2 22 - 30 mmol/L  27   POC HCO3 21.0 - 28.0 mmol/L 28.1 (H) 26.6   POC O2 SAT 94.0 - 98.0 % 95.6 95.8   POC pCO2 35.0 - 48.0 mm Hg 43.2 42.0   POC pH 7.350 - 7.450  7.421 7.410   POC PO2 83.0 - 108.0 mm Hg 78.4 (L) 79.8 (L)   (H): Data is abnormally high  (L): Data is abnormally low      Radiology:  Chest x-ray 10/17 reviewed  1. Probable mild atelectatic changes at the base of the left lung.  2. Small left pleural effusion versus pleural thickening.  3. No other acute cardiopulmonary process.      Chest x-ray 10/16  Reviewed  Small left effusion with

## 2024-10-18 NOTE — PROGRESS NOTES
Hospitalist - Progress Note      Patient: Noel Valle    Unit/Bed:1114/1114-01  YOB: 1955  MRN: [id number]   Acct: [id number]   PCP: Ramakrishna Silver MD    Date of Service: Pt seen/examined on 10/18/24  and Admitted to Inpatient with expected LOS greater than two midnights due to medical therapy.     Chief Complaint: Tongue swelling 62-year-old male    Assessment and Plan:-    Acute Respiratory Insufficiency due to upper airway obstruction secondary to Angioedema  S/P extubation 10-13-24  Drowsy as he was extubated and sedative meds weaned off  On 3 L nasal canula  On Solu-Medrol, Benadryl, Pepcid.  Critical care following.       Hypotension-now hypertensive  Developed after patient received sedation.  IV fluids ordered.  Pressor support to maintain a MAP of 65, currently off  improving  Critical care following    Hypertension  Resume home antihypertensives/Norvasc  Added Coreg but needed to be held due to bradycardia    HELENE  Likely 2/2 poor po intake.   IV fluids 100cc/hr NS ordered  Recheck labs in the morning    Tobacco abuse disorder/history of possible COPD  Sched DuSchuylerbs.  Will  on tobacco cessation when patient is alert      Alcohol withdrawal  N.p.o.  Precedex  CIWA  On Zyprexa  Started on Librium    No family at bedside.       Subjective:  Patient sedated. Slowly improving.   Discussed with RN.        History Of Present Illness:    69-year-old male coming into the hospital for difficulty swallowing.  In the ER, patient found to have tongue swelling, trouble swallowing with acute distress, tachycardia, ill-appearing status.  Symptoms began earlier today at 6 AM.  He noticed severe swelling of his throat.  Patient denies any allergies, medications.  Patient is not on any ACE inhibitor.  Patient was able to talk but his words were muffled.  In the ER, case was discussed with the anesthesiologist to take the patient to the OR.  Patient was intubated under the supervision of surgery  Result   1. The endotracheal tube is now approximately 5.9 cm above the jignseh.   2. Small lung volumes with no confluent airspace consolidation.         XR CHEST PORTABLE   Final Result   1. Low lung volumes.   2. No significant change in the appearance of the chest.         XR CHEST PORTABLE   Final Result   No significant change in the appearance of the chest.         XR CHEST PORTABLE   Final Result   Bibasilar infiltrates.      Endotracheal tube with the tip in the midtrachea.           No results found.      EKG:     Electronically signed by Mohsin Mohammad Reza, MD on 10/18/2024 at 6:11 PM

## 2024-10-18 NOTE — PROGRESS NOTES
Spoke to patient sister Alexia, updated her on patient condition, answered all questions; no question at this time.

## 2024-10-18 NOTE — RT PROTOCOL NOTE
RT Inhaler-Nebulizer Bronchodilator Protocol Note    There is a bronchodilator order in the chart from a provider indicating to follow the RT Bronchodilator Protocol and there is an “Initiate RT Inhaler-Nebulizer Bronchodilator Protocol” order as well (see protocol at bottom of note).    CXR Findings:  No results found.    The findings from the last RT Protocol Assessment were as follows:   History Pulmonary Disease: None or smoker <15 pack years  Respiratory Pattern: Dyspnea on exertion or RR 21-25 bpm  Breath Sounds: Slightly diminished and/or crackles  Cough: Strong, spontaneous, non-productive  Indication for Bronchodilator Therapy: Decreased or absent breath sounds  Bronchodilator Assessment Score: 4    Aerosolized bronchodilator medication orders have been revised according to the RT Inhaler-Nebulizer Bronchodilator Protocol below.    Respiratory Therapist to perform RT Therapy Protocol Assessment initially then follow the protocol.  Repeat RT Therapy Protocol Assessment PRN for score 0-3 or on second treatment, BID, and PRN for scores above 3.    No Indications - adjust the frequency to every 6 hours PRN wheezing or bronchospasm, if no treatments needed after 48 hours then discontinue using Per Protocol order mode.     If indication present, adjust the RT bronchodilator orders based on the Bronchodilator Assessment Score as indicated below.  Use Inhaler orders unless patient has one or more of the following: on home nebulizer, not able to hold breath for 10 seconds, is not alert and oriented, cannot activate and use MDI correctly, or respiratory rate 25 breaths per minute or more, then use the equivalent nebulizer order(s) with same Frequency and PRN reasons based on the score.  If a patient is on this medication at home then do not decrease Frequency below that used at home.    0-3 - enter or revise RT bronchodilator order(s) to equivalent RT Bronchodilator order with Frequency of every 4 hours PRN for

## 2024-10-18 NOTE — PROGRESS NOTES
Therapy worked with patient. He is alert and oriented x3, followed commands well. Did range of motion in bed and tolerated well.

## 2024-10-18 NOTE — PROGRESS NOTES
Patient alert, oriented x3, following commands. Orders received for nursing swallow eval from crit care. Eval complete and pt passed. Critical care notified - medications crushed with applesauce, no advanced diet at this time.

## 2024-10-18 NOTE — CARE COORDINATION
Discharge planning    Chart reviewed. Patient from home with spouse who he cares for. Has cane if needed.     Patient extubated on 10/13 and currently on 2 L during day and bipap at night.     Therapy recommending snf and will need to follow up with plan with patient.     If home will need home 02 jonnaal

## 2024-10-18 NOTE — PLAN OF CARE
Problem: Respiratory - Adult  Goal: Achieves optimal ventilation and oxygenation  Outcome: Progressing  Flowsheets (Taken 10/18/2024 0729 by Rosie Ronquillo RN)  Achieves optimal ventilation and oxygenation:   Assess for changes in mentation and behavior   Assess for changes in respiratory status   Position to facilitate oxygenation and minimize respiratory effort   Oxygen supplementation based on oxygen saturation or arterial blood gases   Respiratory therapy support as indicated   Assess the need for suctioning and aspirate as needed  Goal: Able to breathe comfortably  Outcome: Progressing

## 2024-10-18 NOTE — PROGRESS NOTES
End Of Shift Note  [facility]  Summary of shift: Patient received first dose of Geodon IM. Attempted to place NG x2 by 2 different RN; unsuccessfully. Gtts: Precedex 0.6mcg/kg/hr. Patient received PRN hydralazine. Patient did become very agitated throughout the shift; attempting to get out of bed, Predecex titrated per orders.   Vitals:    Vitals:    10/18/24 0400 10/18/24 0500 10/18/24 0600 10/18/24 0700   BP: (!) 151/78 139/84 139/81 (!) 147/90   Pulse: (!) 43 62 (!) 44 58   Resp: 17 18 16 16   Temp: 97.3 °F (36.3 °C)      TempSrc: Axillary      SpO2: 100% 99% 100% 100%   Weight:       Height:            I&O:   Intake/Output Summary (Last 24 hours) at 10/18/2024 0725  Last data filed at 10/18/2024 0630  Gross per 24 hour   Intake 411.76 ml   Output 2000 ml   Net -1588.24 ml       Resp Status: 2LNC; Placed on Bipap at 2336, currently on bipap    Ventilator Settings:  Vent Mode: CPAP/PS Resp Rate (Set): 18 bpm/Vt (Set, mL): 500 mL/ /FiO2 : 30 %    Critical Care IV infusions:   dexmedeTOMIDine (PRECEDEX) 1,000 mcg in sodium chloride 0.9 % 250 mL infusion 0.6 mcg/kg/hr (10/18/24 0626)    sodium chloride      sodium chloride          LDA:   Peripheral IV 10/09/24 Proximal;Right Cephalic (Active)   Number of days: 8       Peripheral IV 10/09/24 Right Forearm (Active)   Number of days: 8       External Urinary Catheter (Active)   Number of days: 8

## 2024-10-18 NOTE — PROGRESS NOTES
Occupational Therapy  Facility/Department: [facility] ICU  Daily Treatment Note  NAME: Noel Valle  : 1955  MRN: [id number]    Date of Service: 10/18/2024    RN reports patient is medically stable for therapy treatment this date.    Chart reviewed prior to treatment and patient is agreeable for therapy.  All lines intact and patient positioned comfortably at end of treatment.  All patient needs addressed prior to ending therapy session.      Discharge Recommendations:  Patient would benefit from continued therapy after discharge  Pt currently functioning below baseline.  Recommend daily inpatient skilled therapy at time of discharge to maximize long term outcomes and prevent re-admission. Please refer to AM-PAC score for current level of function.  OT Equipment Recommendations  Equipment Needed:  (CTA)    Patient Diagnosis(es): The encounter diagnosis was Angioedema, initial encounter.     Assessment   Assessment: Pt more alert and oriented this date. Pt following commands during AAROM with cues needed for attention to task occasionally as pt demo fatigue. Pt tolerating PROM/AAROM. Pt needing mod A for grooming as pt demo generalized weakness and fatigue. Pt tearful throughout and reassured by writer. Pt receptive and pleasant reporting \"thank you\". Pt would benefit from continued OT to increase indep with ADLsfor d/c.  Activity Tolerance: Patient tolerated treatment well  Discharge Recommendations: Patient would benefit from continued therapy after discharge  Equipment Needed:  (CTA)     Plan  Occupational Therapy Plan  Times Per Week: 4-5x/weekly 1x/daily  Specific Instructions for Next Treatment: Reassess for supine>sit as tolerating if pt is alert and following commands.  Current Treatment Recommendations: Strengthening;ROM;Balance training;Functional mobility training;Endurance training;Cognitive reorientation;Safety education & training;Patient/Caregiver education & training;Equipment evaluation, education,

## 2024-10-18 NOTE — CARE COORDINATION
Social work: spoke to sister Alicia Alcazar [phone] cell and home [phone].. She is only well person in the family to visit. Wife was being cared for by patient. Now other family members, neighbors and friends are helping care for her. Pt has a recommendation to go to snf to regain strength at discharge. Will need precert once snf has accepted pt. Then will need jarvis and Rx at discharge.  [facility] is closest snf to family home that is in network. Faxed to [facility] to be reviewed. Await their ok to proceed with precert. Telma wilson

## 2024-10-19 LAB
ANION GAP SERPL CALCULATED.3IONS-SCNC: 12 MMOL/L (ref 9–17)
BUN SERPL-MCNC: 26 MG/DL (ref 8–23)
BUN/CREAT SERPL: 37 (ref 9–20)
CALCIUM SERPL-MCNC: 8.4 MG/DL (ref 8.6–10.4)
CHLORIDE SERPL-SCNC: 106 MMOL/L (ref 98–107)
CO2 SERPL-SCNC: 25 MMOL/L (ref 20–31)
CREAT SERPL-MCNC: 0.7 MG/DL (ref 0.7–1.2)
GFR, ESTIMATED: >90 ML/MIN/1.73M2
GLUCOSE SERPL-MCNC: 96 MG/DL (ref 70–99)
POTASSIUM SERPL-SCNC: 4.2 MMOL/L (ref 3.7–5.3)
SODIUM SERPL-SCNC: 143 MMOL/L (ref 135–144)

## 2024-10-19 PROCEDURE — 94761 N-INVAS EAR/PLS OXIMETRY MLT: CPT

## 2024-10-19 PROCEDURE — 6370000000 HC RX 637 (ALT 250 FOR IP): Performed by: NURSE PRACTITIONER

## 2024-10-19 PROCEDURE — 2500000003 HC RX 250 WO HCPCS: Performed by: INTERNAL MEDICINE

## 2024-10-19 PROCEDURE — 6370000000 HC RX 637 (ALT 250 FOR IP): Performed by: INTERNAL MEDICINE

## 2024-10-19 PROCEDURE — 6360000002 HC RX W HCPCS: Performed by: NURSE PRACTITIONER

## 2024-10-19 PROCEDURE — 80048 BASIC METABOLIC PNL TOTAL CA: CPT

## 2024-10-19 PROCEDURE — 6360000002 HC RX W HCPCS: Performed by: INTERNAL MEDICINE

## 2024-10-19 PROCEDURE — 36415 COLL VENOUS BLD VENIPUNCTURE: CPT

## 2024-10-19 PROCEDURE — 6360000002 HC RX W HCPCS: Performed by: FAMILY MEDICINE

## 2024-10-19 PROCEDURE — 6370000000 HC RX 637 (ALT 250 FOR IP): Performed by: HOSPITALIST

## 2024-10-19 PROCEDURE — 2580000003 HC RX 258: Performed by: INTERNAL MEDICINE

## 2024-10-19 PROCEDURE — 2580000003 HC RX 258: Performed by: NURSE PRACTITIONER

## 2024-10-19 PROCEDURE — 94640 AIRWAY INHALATION TREATMENT: CPT

## 2024-10-19 PROCEDURE — 2580000003 HC RX 258: Performed by: FAMILY MEDICINE

## 2024-10-19 PROCEDURE — 2000000000 HC ICU R&B

## 2024-10-19 RX ORDER — LOPERAMIDE HYDROCHLORIDE 2 MG/1
2 CAPSULE ORAL 4 TIMES DAILY PRN
Status: DISCONTINUED | OUTPATIENT
Start: 2024-10-19 | End: 2024-11-08 | Stop reason: HOSPADM

## 2024-10-19 RX ORDER — ACETAMINOPHEN 500 MG
1000 TABLET ORAL EVERY 8 HOURS SCHEDULED
Status: DISPENSED | OUTPATIENT
Start: 2024-10-19 | End: 2024-10-21

## 2024-10-19 RX ORDER — KETOROLAC TROMETHAMINE 15 MG/ML
15 INJECTION, SOLUTION INTRAMUSCULAR; INTRAVENOUS ONCE
Status: COMPLETED | OUTPATIENT
Start: 2024-10-19 | End: 2024-10-19

## 2024-10-19 RX ORDER — DEXTROSE MONOHYDRATE 50 MG/ML
INJECTION, SOLUTION INTRAVENOUS CONTINUOUS
Status: DISCONTINUED | OUTPATIENT
Start: 2024-10-19 | End: 2024-10-21

## 2024-10-19 RX ORDER — IPRATROPIUM BROMIDE AND ALBUTEROL SULFATE 2.5; .5 MG/3ML; MG/3ML
1 SOLUTION RESPIRATORY (INHALATION) EVERY 4 HOURS PRN
Status: DISCONTINUED | OUTPATIENT
Start: 2024-10-19 | End: 2024-11-08 | Stop reason: HOSPADM

## 2024-10-19 RX ADMIN — ACETAMINOPHEN 1000 MG: 500 TABLET ORAL at 20:41

## 2024-10-19 RX ADMIN — ZIPRASIDONE MESYLATE 20 MG: 20 INJECTION, POWDER, LYOPHILIZED, FOR SOLUTION INTRAMUSCULAR at 09:09

## 2024-10-19 RX ADMIN — DEXMEDETOMIDINE 0.2 MCG/KG/HR: 100 INJECTION, SOLUTION INTRAVENOUS at 23:02

## 2024-10-19 RX ADMIN — CHLORDIAZEPOXIDE HYDROCHLORIDE 25 MG: 25 CAPSULE ORAL at 09:07

## 2024-10-19 RX ADMIN — LOPERAMIDE HYDROCHLORIDE 2 MG: 2 CAPSULE ORAL at 02:30

## 2024-10-19 RX ADMIN — VALPROATE SODIUM 250 MG: 100 INJECTION, SOLUTION INTRAVENOUS at 09:35

## 2024-10-19 RX ADMIN — CLONIDINE HYDROCHLORIDE 0.2 MG: 0.2 TABLET ORAL at 20:41

## 2024-10-19 RX ADMIN — VALPROATE SODIUM 250 MG: 100 INJECTION, SOLUTION INTRAVENOUS at 16:05

## 2024-10-19 RX ADMIN — SODIUM CHLORIDE, PRESERVATIVE FREE 10 ML: 5 INJECTION INTRAVENOUS at 09:22

## 2024-10-19 RX ADMIN — ENOXAPARIN SODIUM 40 MG: 100 INJECTION SUBCUTANEOUS at 09:07

## 2024-10-19 RX ADMIN — SODIUM CHLORIDE, PRESERVATIVE FREE 10 ML: 5 INJECTION INTRAVENOUS at 20:42

## 2024-10-19 RX ADMIN — Medication 15 ML: at 09:08

## 2024-10-19 RX ADMIN — SODIUM CHLORIDE, PRESERVATIVE FREE 20 MG: 5 INJECTION INTRAVENOUS at 20:40

## 2024-10-19 RX ADMIN — KETOROLAC TROMETHAMINE 15 MG: 15 INJECTION, SOLUTION INTRAMUSCULAR; INTRAVENOUS at 20:40

## 2024-10-19 RX ADMIN — WATER 20 MG: 1 INJECTION INTRAMUSCULAR; INTRAVENOUS; SUBCUTANEOUS at 18:19

## 2024-10-19 RX ADMIN — CLONIDINE HYDROCHLORIDE 0.2 MG: 0.2 TABLET ORAL at 09:07

## 2024-10-19 RX ADMIN — SODIUM CHLORIDE, PRESERVATIVE FREE 20 MG: 5 INJECTION INTRAVENOUS at 09:08

## 2024-10-19 RX ADMIN — CHLORDIAZEPOXIDE HYDROCHLORIDE 25 MG: 25 CAPSULE ORAL at 20:42

## 2024-10-19 RX ADMIN — VALPROATE SODIUM 250 MG: 100 INJECTION, SOLUTION INTRAVENOUS at 00:27

## 2024-10-19 RX ADMIN — Medication 100 MG: at 09:07

## 2024-10-19 RX ADMIN — WATER 20 MG: 1 INJECTION INTRAMUSCULAR; INTRAVENOUS; SUBCUTANEOUS at 05:48

## 2024-10-19 RX ADMIN — CHLORDIAZEPOXIDE HYDROCHLORIDE 25 MG: 25 CAPSULE ORAL at 18:19

## 2024-10-19 RX ADMIN — FOLIC ACID 1 MG: 1 TABLET ORAL at 09:08

## 2024-10-19 RX ADMIN — IPRATROPIUM BROMIDE AND ALBUTEROL SULFATE 1 DOSE: .5; 2.5 SOLUTION RESPIRATORY (INHALATION) at 10:26

## 2024-10-19 RX ADMIN — DEXTROSE MONOHYDRATE: 50 INJECTION, SOLUTION INTRAVENOUS at 15:18

## 2024-10-19 RX ADMIN — AMLODIPINE BESYLATE 10 MG: 10 TABLET ORAL at 09:08

## 2024-10-19 RX ADMIN — ATORVASTATIN CALCIUM 40 MG: 40 TABLET, FILM COATED ORAL at 09:07

## 2024-10-19 ASSESSMENT — PAIN - FUNCTIONAL ASSESSMENT: PAIN_FUNCTIONAL_ASSESSMENT: ACTIVITIES ARE NOT PREVENTED

## 2024-10-19 ASSESSMENT — PAIN SCALES - GENERAL: PAINLEVEL_OUTOF10: 6

## 2024-10-19 ASSESSMENT — PAIN DESCRIPTION - LOCATION: LOCATION: GENERALIZED

## 2024-10-19 ASSESSMENT — PAIN DESCRIPTION - PAIN TYPE: TYPE: ACUTE PAIN

## 2024-10-19 ASSESSMENT — PAIN DESCRIPTION - FREQUENCY: FREQUENCY: INTERMITTENT

## 2024-10-19 ASSESSMENT — PAIN DESCRIPTION - ONSET: ONSET: GRADUAL

## 2024-10-19 ASSESSMENT — PAIN DESCRIPTION - DESCRIPTORS: DESCRIPTORS: ACHING

## 2024-10-19 NOTE — PROGRESS NOTES
Hospitalist - Progress Note      Patient: Noel Valle    Unit/Bed:1114/1114-01  YOB: 1955  MRN: [id number]   Acct: [id number]   PCP: Ramakrishna Silver MD    Date of Service: Pt seen/examined on 10/19/24  and Admitted to Inpatient with expected LOS greater than two midnights due to medical therapy.     Chief Complaint: Tongue swelling 62-year-old male    Assessment and Plan:-    Acute Respiratory Insufficiency due to upper airway obstruction secondary to Angioedema  S/P extubation 10-13-24  Drowsy as he was extubated and sedative meds weaned off  On 3 L nasal canula  On Solu-Medrol, Benadryl, Pepcid.  Critical care following.       Hypotension-now hypertensive  Developed after patient received sedation.  IV fluids ordered.  Pressor support to maintain a MAP of 65, currently off  improving  Critical care following    Hypertension  Resume home antihypertensives/Norvasc  Added Coreg but needed to be held due to bradycardia    HELENE  Likely 2/2 poor po intake.   IV fluids 100cc/hr NS ordered  Recheck labs in the morning    Tobacco abuse disorder/history of possible COPD  Sched Dave.  Will  on tobacco cessation when patient is alert      Alcohol withdrawal  N.p.o.  Precedex  CIWA  On Zyprexa   on Librium    No family at bedside.       Subjective:  Patient sedated. Slowly improving.   Discussed with RN.  Continues to have agitation at times        History Of Present Illness:    69-year-old male coming into the hospital for difficulty swallowing.  In the ER, patient found to have tongue swelling, trouble swallowing with acute distress, tachycardia, ill-appearing status.  Symptoms began earlier today at 6 AM.  He noticed severe swelling of his throat.  Patient denies any allergies, medications.  Patient is not on any ACE inhibitor.  Patient was able to talk but his words were muffled.  In the ER, case was discussed with the anesthesiologist to take the patient to the OR.  Patient was intubated

## 2024-10-19 NOTE — PROGRESS NOTES
Pt on room air with SaO2 97%. Pt awake speaking with RN. Pt denies any SOB. Will hold Bipap for tonight unless pt should need it.  Dr Gamboa's notes state \"Bipap support if awake\".  RN (Tahir) aware and will call RT if Bipap needed.

## 2024-10-19 NOTE — CARE COORDINATION
Social work; sent updates to [facility] as sister states this is closest to her. Will need jarvis and Rx at discharge. Telma wilson

## 2024-10-19 NOTE — PROGRESS NOTES
End Of Shift Note  [facility]  Summary of shift: pt had uneventful shift. He was lethargic after receiving geodon this morning, librium held as necessary. Remains NPO (ok applesauce & sips w meds), tachycardic in the low 100's, D5 gtt started at 100mL/hr. Pt continues to have hallucinations, remains off precedex gtt.     Vitals:    Vitals:    10/19/24 1559 10/19/24 1600 10/19/24 1700 10/19/24 1800   BP: 115/75 115/75 (!) 150/76 122/84   Pulse: 100 99 98 100   Resp: 28 21     Temp: 97.8 °F (36.6 °C) 97.8 °F (36.6 °C)     TempSrc: Oral Oral     SpO2:  94%     Weight:       Height:            I&O:   Intake/Output Summary (Last 24 hours) at 10/19/2024 1843  Last data filed at 10/19/2024 1842  Gross per 24 hour   Intake 692.56 ml   Output 550 ml   Net 142.56 ml       Resp Status: room air    Ventilator Settings:  Vent Mode: CPAP/PS Resp Rate (Set): 18 bpm/Vt (Set, mL): 500 mL/ /FiO2 : 30 %    Critical Care IV infusions:   dextrose 100 mL/hr at 10/19/24 1842    dexmedeTOMIDine (PRECEDEX) 1,000 mcg in sodium chloride 0.9 % 250 mL infusion Stopped (10/19/24 0539)    sodium chloride      sodium chloride          LDA:   Peripheral IV 10/19/24 Left Forearm (Active)   Number of days: 0       External Urinary Catheter (Active)   Number of days: 10

## 2024-10-19 NOTE — PROGRESS NOTES
Pt became increasingly confused stating that there were people and his room. He pulled out his IV and tele patches. RN reoriented pt and placed new IV. RN will continue to monitor.

## 2024-10-19 NOTE — PROGRESS NOTES
Pulmonary Critical Care Progress Note    Patient seen for the follow up of Angioedema of tongue     Subjective:    He  was on Precedex overnight weaned off at this point.  He is still lethargic but is able to answer some questions sleepy at this point.  He tolerated oral intake with applesauce meds but he is still sleepy to eat.  He previously had NG attempted 5 times yesterday failed.   He is on room air  has not been ambulating.    Examination:    Vitals: /73   Pulse (!) 102   Temp 98 °F (36.7 °C) (Oral)   Resp 27   Ht 1.66 m (5' 5.35\")   Wt 95.6 kg (210 lb 12.2 oz)   SpO2 95%   BMI 34.70 kg/m²   SpO2  Av.5 %  Min: 94 %  Max: 98 %  General appearance: Lethargic/sleepy  Neck: No JVD  Lungs: Decreased breath sound no crackles or wheeze  Heart: regular rate and rhythm, S1, S2 normal, no gallop  Abdomen: Soft, non tender, + BS  Extremities: no cyanosis or clubbing. No significant edema    LABs:    CBC:   Recent Labs     10/17/24  0643 10/18/24  0747   WBC 10.3 9.8   HGB 14.9 15.8   HCT 45.5 48.3   * 120*     BMP:   Recent Labs     10/17/24  0643 10/18/24  0747   * 146*   K 4.2 4.4   CO2 29 31   BUN 29* 27*   CREATININE 0.6* 0.6*   LABGLOM >90 >90   GLUCOSE 106* 102*      Latest Reference Range & Units 10/14/24 06:00 10/14/24 12:31   POC TCO2 22 - 30 mmol/L  27   POC HCO3 21.0 - 28.0 mmol/L 28.1 (H) 26.6   POC O2 SAT 94.0 - 98.0 % 95.6 95.8   POC pCO2 35.0 - 48.0 mm Hg 43.2 42.0   POC pH 7.350 - 7.450  7.421 7.410   POC PO2 83.0 - 108.0 mm Hg 78.4 (L) 79.8 (L)   (H): Data is abnormally high  (L): Data is abnormally low      Radiology:  Chest x-ray 10/17 reviewed  1. Probable mild atelectatic changes at the base of the left lung.  2. Small left pleural effusion versus pleural thickening.  3. No other acute cardiopulmonary process.      Chest x-ray 10/16  Reviewed  Small left effusion with atelectasis.            Impression/recommendations:    Acute respiratory insufficiency due to upper  airway instruction/angioedema with significant swelling upper airway/atelectasis with small effusion  Oxygen by nasal cannula  BiPAP support if awake  Keep off ACE inhibitor for life  Status post icatibant   Discontinue Solu-Medrol 20 IV every 12 hours   Prednisone 20 mg p.o. b.I.d.  Repeat chest x-ray     COPD/smoker   DuoNeb by nebulizer .  Smoking cessation /discontinue nicotine patch for tachycardia  PFT outpatient     alcohol withdrawal/metabolic encephalopathy   NPO except for meds with applesauce  Precedex drip titrate to RASS of 0  CIWA for back up  Geodon off Zyprexa  Depakote 250 IV 3 times daily  Restart Librium keep at fixed dose 25 4 times daily if able to tolerate oral  Thiamine folate multivitamin    Bradycardia/hypertension/status post hypotension due to sedation/suspect fluid overload  Monitor blood pressure   Norvasc 10 daily/clonidine 0.2 twice daily  Off Coreg  Hydralazine 10 IV every 4 hours for blood pressure above 170 systolic  Will consider clonidine patch       Hypernatremia     Restart D5W at 100 mL an hour/monitor sodium    suspected sleep apnea/obesity  Outpatient sleep evaluation     I previously discussed with sister; patient takes care of his wife who has neurological disorder not able to take care of herself and not able to make decisions.  Sister is next of kin.  Discussed with RN   Peptic ulcer disease prophylaxis  DVT prophylaxis      Carlos Gamboa MD, MD, [facility]  Pulmonary Critical Care and Sleep Medicine,  [facility]  Cell: [phone]  Office: [phone]

## 2024-10-19 NOTE — PLAN OF CARE
Problem: Discharge Planning  Goal: Discharge to home or other facility with appropriate resources  10/19/2024 1234 by Monica Amato RN  Outcome: Progressing     Problem: Safety - Adult  Goal: Free from fall injury  10/19/2024 1234 by Monica Amato RN  Outcome: Progressing     Problem: Neurosensory - Adult  Goal: Achieves stable or improved neurological status  10/19/2024 1234 by Monica Amato RN  Outcome: Progressing     Problem: Cardiovascular - Adult  Goal: Maintains optimal cardiac output and hemodynamic stability  10/19/2024 1234 by Monica Amato RN  Outcome: Progressing     Problem: Skin/Tissue Integrity - Adult  Goal: Skin integrity remains intact  10/19/2024 1234 by Monica Amato RN  Outcome: Progressing     Problem: Genitourinary - Adult  Goal: Absence of urinary retention  10/19/2024 1234 by Monica Amato RN  Outcome: Progressing

## 2024-10-19 NOTE — PROGRESS NOTES
End Of Shift Note  [place] ICU  Summary of shift: Pt still is actively hallucinating, but is calm and cooperative. Pt is still on precedex, A/O x 4 and vital signs have been stable. Pt has had multiple liquid bowel movements, RN notified on call NP, imodium was ordered and given. Urine output was 950.    Vitals:    Vitals:    10/19/24 0300 10/19/24 0400 10/19/24 0500 10/19/24 0600   BP: (!) 118/91 130/79 137/83 (!) 141/110   Pulse: 90 78 93 96   Resp: 24 19 24 (!) 31   Temp:  98.2 °F (36.8 °C)     TempSrc:  Axillary     SpO2: 95% 94% 95% 94%   Weight:       Height:            I&O:   Intake/Output Summary (Last 24 hours) at 10/19/2024 0627  Last data filed at 10/18/2024 1839  Gross per 24 hour   Intake 314.15 ml   Output 1000 ml   Net -685.85 ml       Resp Status: Room air     Ventilator Settings:  Vent Mode: CPAP/PS Resp Rate (Set): 18 bpm/Vt (Set, mL): 500 mL/ /FiO2 : 30 %    Critical Care IV infusions:   dexmedeTOMIDine (PRECEDEX) 1,000 mcg in sodium chloride 0.9 % 250 mL infusion 0.2 mcg/kg/hr (10/18/24 1835)    sodium chloride      sodium chloride          LDA:   Peripheral IV 10/19/24 Left Forearm (Active)   Number of days: 0       External Urinary Catheter (Active)   Number of days: 9

## 2024-10-19 NOTE — PLAN OF CARE
Problem: Discharge Planning  Goal: Discharge to home or other facility with appropriate resources  Outcome: Progressing  Flowsheets (Taken 10/18/2024 2000)  Discharge to home or other facility with appropriate resources:   Identify barriers to discharge with patient and caregiver   Arrange for needed discharge resources and transportation as appropriate   Refer to discharge planning if patient needs post-hospital services based on physician order or complex needs related to functional status, cognitive ability or social support system     Problem: Respiratory - Adult  Goal: Achieves optimal ventilation and oxygenation  Outcome: Progressing  Flowsheets (Taken 10/18/2024 2000)  Achieves optimal ventilation and oxygenation:   Assess for changes in respiratory status   Assess for changes in mentation and behavior   Position to facilitate oxygenation and minimize respiratory effort   Oxygen supplementation based on oxygen saturation or arterial blood gases   Initiate smoking cessation protocol as indicated   Encourage broncho-pulmonary hygiene including cough, deep breathe, incentive spirometry   Assess the need for suctioning and aspirate as needed   Assess and instruct to report shortness of breath or any respiratory difficulty   Respiratory therapy support as indicated     Problem: Pain  Goal: Verbalizes/displays adequate comfort level or baseline comfort level  Outcome: Progressing  Flowsheets  Taken 10/18/2024 1600 by Rosie Ronquillo RN  Verbalizes/displays adequate comfort level or baseline comfort level:   Encourage patient to monitor pain and request assistance   Assess pain using appropriate pain scale  Taken 10/18/2024 1400 by Rosie Ronquillo RN  Verbalizes/displays adequate comfort level or baseline comfort level:   Assess pain using appropriate pain scale   Encourage patient to monitor pain and request assistance     Problem: Skin/Tissue Integrity  Goal: Absence of new skin breakdown  Description: 1.

## 2024-10-20 ENCOUNTER — APPOINTMENT (OUTPATIENT)
Dept: GENERAL RADIOLOGY | Age: 69
DRG: 915 | End: 2024-10-20
Payer: MEDICARE

## 2024-10-20 LAB
ALBUMIN SERPL-MCNC: 3.3 G/DL (ref 3.5–5.2)
ALP SERPL-CCNC: 72 U/L (ref 40–129)
ALT SERPL-CCNC: 54 U/L (ref 5–41)
ANION GAP SERPL CALCULATED.3IONS-SCNC: 10 MMOL/L (ref 9–17)
AST SERPL-CCNC: 24 U/L
BASOPHILS # BLD: <0.03 K/UL (ref 0–0.2)
BASOPHILS NFR BLD: 0 % (ref 0–2)
BILIRUB DIRECT SERPL-MCNC: 0.2 MG/DL
BILIRUB INDIRECT SERPL-MCNC: 0.8 MG/DL (ref 0–1)
BILIRUB SERPL-MCNC: 1 MG/DL (ref 0.3–1.2)
BUN SERPL-MCNC: 25 MG/DL (ref 8–23)
BUN/CREAT SERPL: 36 (ref 9–20)
CALCIUM SERPL-MCNC: 8.3 MG/DL (ref 8.6–10.4)
CHLORIDE SERPL-SCNC: 103 MMOL/L (ref 98–107)
CO2 SERPL-SCNC: 25 MMOL/L (ref 20–31)
CREAT SERPL-MCNC: 0.7 MG/DL (ref 0.7–1.2)
EOSINOPHIL # BLD: <0.03 K/UL (ref 0–0.44)
EOSINOPHILS RELATIVE PERCENT: 0 % (ref 1–4)
ERYTHROCYTE [DISTWIDTH] IN BLOOD BY AUTOMATED COUNT: 11.5 % (ref 11.8–14.4)
GFR, ESTIMATED: >90 ML/MIN/1.73M2
GLUCOSE SERPL-MCNC: 126 MG/DL (ref 70–99)
HCT VFR BLD AUTO: 44.6 % (ref 40.7–50.3)
HGB BLD-MCNC: 15 G/DL (ref 13–17)
IMM GRANULOCYTES # BLD AUTO: 0.11 K/UL (ref 0–0.3)
IMM GRANULOCYTES NFR BLD: 1 %
LYMPHOCYTES NFR BLD: 1.63 K/UL (ref 1.1–3.7)
LYMPHOCYTES RELATIVE PERCENT: 15 % (ref 24–43)
MCH RBC QN AUTO: 33.1 PG (ref 25.2–33.5)
MCHC RBC AUTO-ENTMCNC: 33.6 G/DL (ref 28.4–34.8)
MCV RBC AUTO: 98.5 FL (ref 82.6–102.9)
MONOCYTES NFR BLD: 0.79 K/UL (ref 0.1–1.2)
MONOCYTES NFR BLD: 7 % (ref 3–12)
NEUTROPHILS NFR BLD: 77 % (ref 36–65)
NEUTS SEG NFR BLD: 8.57 K/UL (ref 1.5–8.1)
NRBC BLD-RTO: 0 PER 100 WBC
PLATELET # BLD AUTO: 153 K/UL (ref 138–453)
PMV BLD AUTO: 11 FL (ref 8.1–13.5)
POTASSIUM SERPL-SCNC: 4.1 MMOL/L (ref 3.7–5.3)
PROT SERPL-MCNC: 5.8 G/DL (ref 6.4–8.3)
RBC # BLD AUTO: 4.53 M/UL (ref 4.21–5.77)
SODIUM SERPL-SCNC: 138 MMOL/L (ref 135–144)
WBC OTHER # BLD: 11.1 K/UL (ref 3.5–11.3)

## 2024-10-20 PROCEDURE — 80076 HEPATIC FUNCTION PANEL: CPT

## 2024-10-20 PROCEDURE — 6370000000 HC RX 637 (ALT 250 FOR IP): Performed by: NURSE PRACTITIONER

## 2024-10-20 PROCEDURE — 6360000002 HC RX W HCPCS: Performed by: INTERNAL MEDICINE

## 2024-10-20 PROCEDURE — 2580000003 HC RX 258: Performed by: NURSE PRACTITIONER

## 2024-10-20 PROCEDURE — 6360000002 HC RX W HCPCS: Performed by: FAMILY MEDICINE

## 2024-10-20 PROCEDURE — 71045 X-RAY EXAM CHEST 1 VIEW: CPT

## 2024-10-20 PROCEDURE — 94761 N-INVAS EAR/PLS OXIMETRY MLT: CPT

## 2024-10-20 PROCEDURE — 36415 COLL VENOUS BLD VENIPUNCTURE: CPT

## 2024-10-20 PROCEDURE — 2000000000 HC ICU R&B

## 2024-10-20 PROCEDURE — 2500000003 HC RX 250 WO HCPCS: Performed by: INTERNAL MEDICINE

## 2024-10-20 PROCEDURE — 97530 THERAPEUTIC ACTIVITIES: CPT

## 2024-10-20 PROCEDURE — 80048 BASIC METABOLIC PNL TOTAL CA: CPT

## 2024-10-20 PROCEDURE — 85025 COMPLETE CBC W/AUTO DIFF WBC: CPT

## 2024-10-20 PROCEDURE — 2580000003 HC RX 258: Performed by: INTERNAL MEDICINE

## 2024-10-20 PROCEDURE — 97535 SELF CARE MNGMENT TRAINING: CPT | Performed by: NURSE PRACTITIONER

## 2024-10-20 PROCEDURE — 6370000000 HC RX 637 (ALT 250 FOR IP): Performed by: HOSPITALIST

## 2024-10-20 PROCEDURE — 2580000003 HC RX 258: Performed by: FAMILY MEDICINE

## 2024-10-20 PROCEDURE — 6370000000 HC RX 637 (ALT 250 FOR IP): Performed by: INTERNAL MEDICINE

## 2024-10-20 RX ORDER — OLANZAPINE 5 MG/1
5 TABLET ORAL NIGHTLY
Status: DISCONTINUED | OUTPATIENT
Start: 2024-10-20 | End: 2024-10-24

## 2024-10-20 RX ORDER — PREDNISONE 20 MG/1
20 TABLET ORAL DAILY
Status: DISCONTINUED | OUTPATIENT
Start: 2024-10-21 | End: 2024-10-23

## 2024-10-20 RX ADMIN — OLANZAPINE 5 MG: 5 TABLET, FILM COATED ORAL at 20:01

## 2024-10-20 RX ADMIN — VALPROATE SODIUM 250 MG: 100 INJECTION, SOLUTION INTRAVENOUS at 08:20

## 2024-10-20 RX ADMIN — FOLIC ACID 1 MG: 1 TABLET ORAL at 08:15

## 2024-10-20 RX ADMIN — SODIUM CHLORIDE, PRESERVATIVE FREE 10 ML: 5 INJECTION INTRAVENOUS at 08:25

## 2024-10-20 RX ADMIN — SODIUM CHLORIDE, PRESERVATIVE FREE 10 ML: 5 INJECTION INTRAVENOUS at 20:03

## 2024-10-20 RX ADMIN — WATER 20 MG: 1 INJECTION INTRAMUSCULAR; INTRAVENOUS; SUBCUTANEOUS at 05:43

## 2024-10-20 RX ADMIN — SODIUM CHLORIDE, PRESERVATIVE FREE 10 ML: 5 INJECTION INTRAVENOUS at 08:26

## 2024-10-20 RX ADMIN — SODIUM CHLORIDE, PRESERVATIVE FREE 10 ML: 5 INJECTION INTRAVENOUS at 20:05

## 2024-10-20 RX ADMIN — ATORVASTATIN CALCIUM 40 MG: 40 TABLET, FILM COATED ORAL at 08:15

## 2024-10-20 RX ADMIN — SODIUM CHLORIDE, PRESERVATIVE FREE 20 MG: 5 INJECTION INTRAVENOUS at 08:15

## 2024-10-20 RX ADMIN — AMLODIPINE BESYLATE 10 MG: 10 TABLET ORAL at 08:24

## 2024-10-20 RX ADMIN — VALPROATE SODIUM 250 MG: 100 INJECTION, SOLUTION INTRAVENOUS at 16:05

## 2024-10-20 RX ADMIN — ENOXAPARIN SODIUM 40 MG: 100 INJECTION SUBCUTANEOUS at 08:15

## 2024-10-20 RX ADMIN — Medication 15 ML: at 08:15

## 2024-10-20 RX ADMIN — DEXTROSE MONOHYDRATE: 50 INJECTION, SOLUTION INTRAVENOUS at 10:37

## 2024-10-20 RX ADMIN — SODIUM CHLORIDE, PRESERVATIVE FREE 20 MG: 5 INJECTION INTRAVENOUS at 20:02

## 2024-10-20 RX ADMIN — VALPROATE SODIUM 250 MG: 100 INJECTION, SOLUTION INTRAVENOUS at 00:31

## 2024-10-20 RX ADMIN — ACETAMINOPHEN 1000 MG: 500 TABLET ORAL at 20:06

## 2024-10-20 RX ADMIN — DEXTROSE MONOHYDRATE: 50 INJECTION, SOLUTION INTRAVENOUS at 01:00

## 2024-10-20 RX ADMIN — Medication 100 MG: at 08:15

## 2024-10-20 RX ADMIN — CLONIDINE HYDROCHLORIDE 0.2 MG: 0.2 TABLET ORAL at 20:01

## 2024-10-20 ASSESSMENT — PAIN SCALES - GENERAL
PAINLEVEL_OUTOF10: 0

## 2024-10-20 NOTE — PROGRESS NOTES
Physical Therapy  Facility/Department: [facility] ICU  Rehabilitation Physical Therapy Treatment Note    NAME: Noel Valle  : 1955 (69 y.o.)  MRN: [id number]  CODE STATUS: Full Code    Date of Service: 10/20/24     Pt currently functioning below baseline.  Recommend daily inpatient skilled therapy at time of discharge to maximize long term outcomes and prevent re-admission. Please refer to AM-PAC score for current level of function.     Restrictions:  Restrictions/Precautions: NPO, General Precautions, Fall Risk, Seizure, Bed Alarm  Position Activity Restriction  Other position/activity restrictions: LUE IV, catheter;.     SUBJECTIVE  Subjective  Subjective: Patient up in bed upon therapists arrival.  States \"I am at the gas station smoking a cigerette\" when asked where he is.  Also states \"how did you get my wife up so early\" noone in room but PTA and LARSEN.     OBJECTIVE  Cognition  Overall Cognitive Status: Exceptions  Arousal/Alertness: Appears intact  Following Commands: Inconsistently follows commands;Follows one step commands with increased time;Follows one step commands with repetition  Attention Span: Attends with cues to redirect;Unable to maintain attention;Difficulty dividing attention;Difficulty attending to directions  Memory: Decreased recall of precautions;Decreased recall of recent events  Safety Judgement: Impaired  Problem Solving: Impaired  Insights: Impaired  Initiation: Requires cues for all  Sequencing: Requires cues for all  Cognition Comment: Pt activley hallucinating and delusional throughout tx. Becoming tearful at times. Consitent theme/perseveration of being in trouble or doing somehting wrong to get in trouble.  Orientation  Overall Orientation Status: Impaired  Orientation Level: Oriented to person;Disoriented to place;Disoriented to time;Disoriented to situation    Functional Mobility  Bed Mobility  Overall Assistance Level: Maximum Assistance;Dependent;Requires x 2  REASSESSED when approp.  Additional Goals?: Yes    PLAN OF CARE/SAFETY  Physical Therapy Plan  General Plan: 5-7 times per week  Specific Instructions for Next Treatment: Focus on pt. participating with AROM against gravity x 4 extremities; reassess when approp.  Current Treatment Recommendations: Strengthening;ROM;Patient/Caregiver education & training  Additional Comments: Needs full reassess when approp.  Safety Devices  Type of Devices: All fall risk precautions in place;All jb prominences offloaded;Patient at risk for falls;Bed alarm in place;Call light within reach;Nurse notified;Left in bed (Simultaneous filing. User may not have seen previous data.)  Restraints  Restraints Initially in Place: No (Upon arrival, restraints present but not applied.  Simultaneous filing. User may not have seen previous data.)    EDUCATION  Education  Education Given To: Patient  Education Provided Comments: attempted eduation on the importance of contrinued mobllity and movement to prevent secondary complications.        Therapy Time   Individual Concurrent Group Co-treatment   Time In 0916         Time Out 0940         Minutes 24           Co-treatment with OT warranted secondary to decreased safety and independence requiring 2 skilled therapy professionals to address individual discipline's goals. PT addressing  positional change bed mobility tasks .     Dina Duffy, TYSON, 10/20/24 at 10:03 AM

## 2024-10-20 NOTE — PROGRESS NOTES
Occupational Therapy  Facility/Department: [facility] ICU  Daily Treatment Note  NAME: Noel Valle  : 1955  MRN: [id number]    Date of Service: 10/20/2024    Discharge Recommendations:  Patient would benefit from continued therapy after discharge   Pt currently functioning below baseline.  Recommend daily inpatient skilled therapy at time of discharge to maximize long term outcomes and prevent re-admission. Please refer to AM-PAC score for current level of function.    Patient Diagnosis(es): The encounter diagnosis was Angioedema, initial encounter.     Assessment   Assessment: Pt able to minimally follow directions this date, charlene to minnimally assist with bed mobility. Continues to require the assist of two staff. Limited by cognition/mental status. Tremors noted at times in BUE. Pt would benefit from continued skilled OT services to address deficits in areas of functional balance, functional reach, ADL completion, safety awareness, ADL transfers, bed mobility, UE strength, and functional mobility, all limiting safe return home to [facility] and decrease caregiver burden.     Activity Tolerance: Patient tolerated treatment well  Discharge Recommendations: Patient would benefit from continued therapy after discharge     Plan  Occupational Therapy Plan  Times Per Week: 4-5x/weekly 1x/daily  Current Treatment Recommendations: Strengthening;ROM;Balance training;Functional mobility training;Endurance training;Cognitive reorientation;Safety education & training;Patient/Caregiver education & training;Equipment evaluation, education, & procurement;Positioning;Self-Care / ADL    Restrictions  Restrictions/Precautions  Restrictions/Precautions: NPO;General Precautions;Fall Risk;Seizure;Bed Alarm  Required Braces or Orthoses?: No  Position Activity Restriction  Other position/activity restrictions: LUE IV, catheter;.    Subjective  Subjective  Subjective: Pt activiley hallucinating and delusional.  Orientation  Overall  address individual discipline's goals. OT addressing functional reaching, ability to sequence and follow directions, bed mobility tech, and functional UE strength.       ZUNILDA Manzanares

## 2024-10-20 NOTE — PLAN OF CARE
Problem: Pain  Goal: Verbalizes/displays adequate comfort level or baseline comfort level  Outcome: Progressing  Flowsheets (Taken 10/20/2024 1617)  Verbalizes/displays adequate comfort level or baseline comfort level:   Encourage patient to monitor pain and request assistance   Assess pain using appropriate pain scale     Problem: Skin/Tissue Integrity  Goal: Absence of new skin breakdown  Description: 1.  Monitor for areas of redness and/or skin breakdown  2.  Assess vascular access sites hourly  3.  Every 4-6 hours minimum:  Change oxygen saturation probe site  4.  Every 4-6 hours:  If on nasal continuous positive airway pressure, respiratory therapy assess nares and determine need for appliance change or resting period.  Outcome: Progressing     Problem: Safety - Adult  Goal: Free from fall injury  Outcome: Progressing     Problem: Skin/Tissue Integrity - Adult  Goal: Skin integrity remains intact  Outcome: Progressing     Problem: Coping  Goal: Pt/Family able to verbalize concerns and demonstrate effective coping strategies  Description: INTERVENTIONS:  1. Assist patient/family to identify coping skills, available support systems and cultural and spiritual values  2. Provide emotional support, including active listening and acknowledgement of concerns of patient and caregivers  3. Reduce environmental stimuli, as able  4. Instruct patient/family in relaxation techniques, as appropriate  5. Assess for spiritual pain/suffering and initiate Spiritual Care, Psychosocial Clinical Specialist consults as needed  10/20/2024 1619 by Mini Malone RN  Outcome: Progressing  10/20/2024 1617 by Mini Malone, RN  Outcome: Progressing     Problem: ABCDS Injury Assessment  Goal: Absence of physical injury  Outcome: Progressing     Problem: Confusion  Goal: Confusion, delirium, dementia, or psychosis is improved or at baseline  Description: INTERVENTIONS:  1. Assess for possible contributors to thought

## 2024-10-20 NOTE — PLAN OF CARE
Problem: Discharge Planning  Goal: Discharge to home or other facility with appropriate resources  10/19/2024 2152 by Guy Greene RN  Outcome: Progressing  10/19/2024 1234 by Monica Amato RN  Outcome: Progressing     Problem: Respiratory - Adult  Goal: Achieves optimal ventilation and oxygenation  Outcome: Progressing  Flowsheets (Taken 10/19/2024 2000)  Achieves optimal ventilation and oxygenation:   Assess for changes in respiratory status   Assess for changes in mentation and behavior   Oxygen supplementation based on oxygen saturation or arterial blood gases   Position to facilitate oxygenation and minimize respiratory effort   Initiate smoking cessation protocol as indicated   Assess the need for suctioning and aspirate as needed   Encourage broncho-pulmonary hygiene including cough, deep breathe, incentive spirometry   Respiratory therapy support as indicated   Assess and instruct to report shortness of breath or any respiratory difficulty     Problem: Pain  Goal: Verbalizes/displays adequate comfort level or baseline comfort level  Outcome: Progressing     Problem: Skin/Tissue Integrity  Goal: Absence of new skin breakdown  Description: 1.  Monitor for areas of redness and/or skin breakdown  2.  Assess vascular access sites hourly  3.  Every 4-6 hours minimum:  Change oxygen saturation probe site  4.  Every 4-6 hours:  If on nasal continuous positive airway pressure, respiratory therapy assess nares and determine need for appliance change or resting period.  Outcome: Progressing     Problem: Safety - Adult  Goal: Free from fall injury  10/19/2024 2152 by Guy Greene RN  Outcome: Progressing  10/19/2024 1234 by Monica Amato RN  Outcome: Progressing     Problem: Neurosensory - Adult  Goal: Achieves stable or improved neurological status  10/19/2024 2152 by Guy Greene RN  Outcome: Progressing  Flowsheets (Taken 10/19/2024 2000)  Achieves stable or improved neurological status:   Assess for and  metabolic abnormalities, dehydration, psychiatric diagnoses, and notify attending LIP  2. [place] high risk fall precautions, as indicated  3. Provide frequent short contacts to provide reality reorientation, refocusing and direction  4. Decrease environmental stimuli, including noise as appropriate  5. Monitor and intervene to maintain adequate nutrition, hydration, elimination, sleep and activity  6. If unable to ensure safety without constant attention obtain sitter and review sitter guidelines with assigned personnel  7. Initiate Psychosocial CNS and Spiritual Care consult, as indicated  Outcome: Progressing

## 2024-10-20 NOTE — PLAN OF CARE
Problem: Pain  Goal: Verbalizes/displays adequate comfort level or baseline comfort level  Outcome: Progressing  Flowsheets (Taken 10/20/2024 1617)  Verbalizes/displays adequate comfort level or baseline comfort level:   Encourage patient to monitor pain and request assistance   Assess pain using appropriate pain scale     Problem: Skin/Tissue Integrity  Goal: Absence of new skin breakdown  Description: 1.  Monitor for areas of redness and/or skin breakdown  2.  Assess vascular access sites hourly  3.  Every 4-6 hours minimum:  Change oxygen saturation probe site  4.  Every 4-6 hours:  If on nasal continuous positive airway pressure, respiratory therapy assess nares and determine need for appliance change or resting period.  Outcome: Progressing     Problem: Skin/Tissue Integrity - Adult  Goal: Skin integrity remains intact  Outcome: Progressing     Problem: Coping  Goal: Pt/Family able to verbalize concerns and demonstrate effective coping strategies  Description: INTERVENTIONS:  1. Assist patient/family to identify coping skills, available support systems and cultural and spiritual values  2. Provide emotional support, including active listening and acknowledgement of concerns of patient and caregivers  3. Reduce environmental stimuli, as able  4. Instruct patient/family in relaxation techniques, as appropriate  5. Assess for spiritual pain/suffering and initiate Spiritual Care, Psychosocial Clinical Specialist consults as needed  Outcome: Progressing     Problem: ABCDS Injury Assessment  Goal: Absence of physical injury  Outcome: Progressing     Problem: Confusion  Goal: Confusion, delirium, dementia, or psychosis is improved or at baseline  Description: INTERVENTIONS:  1. Assess for possible contributors to thought disturbance, including medications, impaired vision or hearing, underlying metabolic abnormalities, dehydration, psychiatric diagnoses, and notify attending LIP  2. [place] high risk fall

## 2024-10-20 NOTE — PROGRESS NOTES
Pt is becoming increasingly confused and stating that they are seeing things that are not in the room. Attempts to reorient pt have not worked, RN reached out to on call NP and orders were placed for non-violent restraints. RN will continue to monitor.

## 2024-10-20 NOTE — PLAN OF CARE
Problem: Discharge Planning  Goal: Discharge to home or other facility with appropriate resources  10/19/2024 2152 by Guy Greene RN  Outcome: Progressing  10/19/2024 1234 by Monica Amato RN  Outcome: Progressing     Problem: Respiratory - Adult  Goal: Achieves optimal ventilation and oxygenation  Outcome: Progressing  Flowsheets (Taken 10/19/2024 2000)  Achieves optimal ventilation and oxygenation:   Assess for changes in respiratory status   Assess for changes in mentation and behavior   Oxygen supplementation based on oxygen saturation or arterial blood gases   Position to facilitate oxygenation and minimize respiratory effort   Initiate smoking cessation protocol as indicated   Assess the need for suctioning and aspirate as needed   Encourage broncho-pulmonary hygiene including cough, deep breathe, incentive spirometry   Respiratory therapy support as indicated   Assess and instruct to report shortness of breath or any respiratory difficulty     Problem: Pain  Goal: Verbalizes/displays adequate comfort level or baseline comfort level  Outcome: Progressing     Problem: Skin/Tissue Integrity  Goal: Absence of new skin breakdown  Description: 1.  Monitor for areas of redness and/or skin breakdown  2.  Assess vascular access sites hourly  3.  Every 4-6 hours minimum:  Change oxygen saturation probe site  4.  Every 4-6 hours:  If on nasal continuous positive airway pressure, respiratory therapy assess nares and determine need for appliance change or resting period.  Outcome: Progressing     Problem: Safety - Adult  Goal: Free from fall injury  10/19/2024 2152 by Guy Greene RN  Outcome: Progressing  10/19/2024 1234 by Monica Amato RN  Outcome: Progressing     Problem: Neurosensory - Adult  Goal: Achieves stable or improved neurological status  10/19/2024 2152 by Guy Greene RN  Outcome: Progressing  Flowsheets (Taken 10/19/2024 2000)  Achieves stable or improved neurological status:   Assess for and

## 2024-10-20 NOTE — PROGRESS NOTES
End Of Shift Note  [facility]    Summary of shift: Precedex was turned off this morning. Geodon and Librium stopped this morning and Zyprexa to start. Patient was getting agitated because he wanted to go home. He was hallucinating as well. Since family visited he has been pleasant. He still is hallucinating but he knows that he is.     Vitals:    Vitals:    10/20/24 1400 10/20/24 1500 10/20/24 1537 10/20/24 1600   BP: (!) 141/82 (!) 152/90  (!) 154/69   Pulse: 88 94  94   Resp: 20 20 27   Temp:   97.7 °F (36.5 °C) 97.7 °F (36.5 °C)   TempSrc:   Oral Oral   SpO2: 91% 91%  91%   Weight:       Height:            I&O:   Intake/Output Summary (Last 24 hours) at 10/20/2024 1822  Last data filed at 10/20/2024 1821  Gross per 24 hour   Intake 3383.74 ml   Output 2050 ml   Net 1333.74 ml       Resp Status: Room air      Critical Care IV infusions:   dextrose Stopped (10/20/24 1812)    dexmedeTOMIDine (PRECEDEX) 1,000 mcg in sodium chloride 0.9 % 250 mL infusion Stopped (10/20/24 0816)    sodium chloride      sodium chloride          LDA:   Peripheral IV 10/19/24 Left Forearm (Active)   Number of days: 1       External Urinary Catheter (Active)   Number of days: 11

## 2024-10-20 NOTE — PROGRESS NOTES
Pt continues to be fine on room air tonight. No Bipap need at this point. RN (Tahir) aware and will call RT if pt should need to go on Bipap.

## 2024-10-20 NOTE — PROGRESS NOTES
End Of Shift Note  [facility]  Summary of shift: Pt currently in non-violent restraints and on precedex. Pt is A/O x 4 on/off, has been hallucinating all night with periods of agitation. At on point in the morning the pt stated that he wanted to shoot the RN in the face. Vital signs have been stable. No BM and urine output was 850.     Vitals:    Vitals:    10/20/24 0331 10/20/24 0400 10/20/24 0500 10/20/24 0600   BP:  122/71 127/65 125/71   Pulse: 52 56 56 55   Resp: 21 23 23 22   Temp:  97.8 °F (36.6 °C)     TempSrc:  Axillary     SpO2: 93% 93% 93% 93%   Weight:       Height:            I&O:   Intake/Output Summary (Last 24 hours) at 10/20/2024 0712  Last data filed at 10/20/2024 0634  Gross per 24 hour   Intake 1875.1 ml   Output 550 ml   Net 1325.1 ml       Resp Status: Room air     Ventilator Settings:  Vent Mode: CPAP/PS Resp Rate (Set): 18 bpm/Vt (Set, mL): 500 mL/ /FiO2 : 30 %    Critical Care IV infusions:   dextrose 100 mL/hr at 10/20/24 0634    dexmedeTOMIDine (PRECEDEX) 1,000 mcg in sodium chloride 0.9 % 250 mL infusion 0.4 mcg/kg/hr (10/19/24 9252)    sodium chloride      sodium chloride          LDA:   Peripheral IV 10/19/24 Left Forearm (Active)   Number of days: 1       External Urinary Catheter (Active)   Number of days: 10

## 2024-10-20 NOTE — PROGRESS NOTES
Hospitalist - Progress Note      Patient: Noel Valle    Unit/Bed:1114/1114-01  YOB: 1955  MRN: [id number]   Acct: [id number]   PCP: Ramakrishna Silver MD    Date of Service: Pt seen/examined on 10/20/24  and Admitted to Inpatient with expected LOS greater than two midnights due to medical therapy.     Chief Complaint: Tongue swelling 62-year-old male    Assessment and Plan:-    Acute Respiratory Insufficiency due to upper airway obstruction secondary to Angioedema  S/P extubation 10-13-24  Drowsy as he was extubated and sedative meds weaned off  On 3 L nasal canula  Solumedrol transitioned to Po prednisone  Critical care following.       Hypotension - resolved  Hypertension  Monitor BP   On norvasc and clonidine. Off coreg due to bradycardia   On prn Hydralazine.     HELENE  resolved    Tobacco abuse disorder/history of possible COPD  Sched Dave.  Will  on tobacco cessation when patient is alert      Alcohol withdrawal  On protocol  Overnight precedex restarted due to hallucinations and agitation, irritation  Continue to monitor at this time  On zyprexa, valproate and librium restarted  NPO except for meds with apple sauce.     Hypernatremia  Free water flushes. improved    No family at bedside.       Subjective:  Patient improving but overnight he was agitated and pulling at his Ivs.   Precedex restarted.         History Of Present Illness:    69-year-old male coming into the hospital for difficulty swallowing.  In the ER, patient found to have tongue swelling, trouble swallowing with acute distress, tachycardia, ill-appearing status.  Symptoms began earlier today at 6 AM.  He noticed severe swelling of his throat.  Patient denies any allergies, medications.  Patient is not on any ACE inhibitor.  Patient was able to talk but his words were muffled.  In the ER, case was discussed with the anesthesiologist to take the patient to the OR.  Patient was intubated under the supervision of

## 2024-10-20 NOTE — PROGRESS NOTES
Pulmonary Critical Care Progress Note    Patient seen for the follow up of Angioedema of tongue     Subjective:    He is bradycardic on Precedex decreased now 0.2/h..  He gets significant lethargic on Geodon given in the morning according to RN.  He was agitated overnight however.  I was contacted yesterday regarding lethargy advised to hold Librium if lethargic..  He tolerated oral intake with applesauce meds but he is still sleepy to eat.  He previously had NG attempted 5 times yesterday failed.   He is on room air  has not been ambulating.  He is hallucinating reported seeing dead people wants to go home and come back tomorrow    Examination:    Vitals: /72   Pulse 56   Temp 98 °F (36.7 °C) (Oral)   Resp 24   Ht 1.66 m (5' 5.35\")   Wt 90.2 kg (198 lb 13.7 oz)   SpO2 92%   BMI 32.74 kg/m²   SpO2  Av.6 %  Min: 92 %  Max: 97 %  General appearance: Lethargic/sleepy  Neck: No JVD  Lungs: Decreased breath sound no crackles or wheeze  Heart: regular rate and rhythm, S1, S2 normal, no gallop  Abdomen: Soft, non tender, + BS  Extremities: no cyanosis or clubbing. No significant edema    LABs:    CBC:   Recent Labs     10/18/24  0747 10/20/24  0533   WBC 9.8 11.1   HGB 15.8 15.0   HCT 48.3 44.6   * 153     BMP:   Recent Labs     10/19/24  1342 10/20/24  0533    138   K 4.2 4.1   CO2 25 25   BUN 26* 25*   CREATININE 0.7 0.7   LABGLOM >90 >90   GLUCOSE 96 126*      Latest Reference Range & Units 10/14/24 06:00 10/14/24 12:31   POC TCO2 22 - 30 mmol/L  27   POC HCO3 21.0 - 28.0 mmol/L 28.1 (H) 26.6   POC O2 SAT 94.0 - 98.0 % 95.6 95.8   POC pCO2 35.0 - 48.0 mm Hg 43.2 42.0   POC pH 7.350 - 7.450  7.421 7.410   POC PO2 83.0 - 108.0 mm Hg 78.4 (L) 79.8 (L)   (H): Data is abnormally high  (L): Data is abnormally low      Radiology:  Chest x-ray 10/17 reviewed  1. Probable mild atelectatic changes at the base of the left lung.  2. Small left pleural effusion versus pleural thickening.  3. No other

## 2024-10-21 LAB
AMMONIA PLAS-SCNC: 18 UMOL/L (ref 16–60)
ANION GAP SERPL CALCULATED.3IONS-SCNC: 9 MMOL/L (ref 9–17)
BASOPHILS # BLD: 0.04 K/UL (ref 0–0.2)
BASOPHILS NFR BLD: 0 % (ref 0–2)
BUN SERPL-MCNC: 19 MG/DL (ref 8–23)
BUN/CREAT SERPL: 24 (ref 9–20)
CALCIUM SERPL-MCNC: 8.6 MG/DL (ref 8.6–10.4)
CHLORIDE SERPL-SCNC: 105 MMOL/L (ref 98–107)
CO2 SERPL-SCNC: 28 MMOL/L (ref 20–31)
CREAT SERPL-MCNC: 0.8 MG/DL (ref 0.7–1.2)
EOSINOPHIL # BLD: 0.23 K/UL (ref 0–0.44)
EOSINOPHILS RELATIVE PERCENT: 2 % (ref 1–4)
ERYTHROCYTE [DISTWIDTH] IN BLOOD BY AUTOMATED COUNT: 11.5 % (ref 11.8–14.4)
GFR, ESTIMATED: >90 ML/MIN/1.73M2
GLUCOSE SERPL-MCNC: 84 MG/DL (ref 70–99)
HCT VFR BLD AUTO: 47.3 % (ref 40.7–50.3)
HGB BLD-MCNC: 16.3 G/DL (ref 13–17)
IMM GRANULOCYTES # BLD AUTO: 0.22 K/UL (ref 0–0.3)
IMM GRANULOCYTES NFR BLD: 2 %
LYMPHOCYTES NFR BLD: 3.97 K/UL (ref 1.1–3.7)
LYMPHOCYTES RELATIVE PERCENT: 27 % (ref 24–43)
MCH RBC QN AUTO: 34 PG (ref 25.2–33.5)
MCHC RBC AUTO-ENTMCNC: 34.5 G/DL (ref 28.4–34.8)
MCV RBC AUTO: 98.5 FL (ref 82.6–102.9)
MONOCYTES NFR BLD: 1.14 K/UL (ref 0.1–1.2)
MONOCYTES NFR BLD: 8 % (ref 3–12)
NEUTROPHILS NFR BLD: 61 % (ref 36–65)
NEUTS SEG NFR BLD: 9.39 K/UL (ref 1.5–8.1)
NRBC BLD-RTO: 0 PER 100 WBC
PLATELET # BLD AUTO: 167 K/UL (ref 138–453)
PMV BLD AUTO: 11 FL (ref 8.1–13.5)
POTASSIUM SERPL-SCNC: 3.5 MMOL/L (ref 3.7–5.3)
RBC # BLD AUTO: 4.8 M/UL (ref 4.21–5.77)
SODIUM SERPL-SCNC: 142 MMOL/L (ref 135–144)
WBC OTHER # BLD: 15 K/UL (ref 3.5–11.3)

## 2024-10-21 PROCEDURE — 6370000000 HC RX 637 (ALT 250 FOR IP): Performed by: INTERNAL MEDICINE

## 2024-10-21 PROCEDURE — 6370000000 HC RX 637 (ALT 250 FOR IP): Performed by: NURSE PRACTITIONER

## 2024-10-21 PROCEDURE — 2000000000 HC ICU R&B

## 2024-10-21 PROCEDURE — 2580000003 HC RX 258: Performed by: NURSE PRACTITIONER

## 2024-10-21 PROCEDURE — 6370000000 HC RX 637 (ALT 250 FOR IP): Performed by: FAMILY MEDICINE

## 2024-10-21 PROCEDURE — 2500000003 HC RX 250 WO HCPCS: Performed by: INTERNAL MEDICINE

## 2024-10-21 PROCEDURE — 80048 BASIC METABOLIC PNL TOTAL CA: CPT

## 2024-10-21 PROCEDURE — 97535 SELF CARE MNGMENT TRAINING: CPT

## 2024-10-21 PROCEDURE — 97530 THERAPEUTIC ACTIVITIES: CPT

## 2024-10-21 PROCEDURE — 85025 COMPLETE CBC W/AUTO DIFF WBC: CPT

## 2024-10-21 PROCEDURE — 97164 PT RE-EVAL EST PLAN CARE: CPT

## 2024-10-21 PROCEDURE — 36415 COLL VENOUS BLD VENIPUNCTURE: CPT

## 2024-10-21 PROCEDURE — 2580000003 HC RX 258: Performed by: INTERNAL MEDICINE

## 2024-10-21 PROCEDURE — 94761 N-INVAS EAR/PLS OXIMETRY MLT: CPT

## 2024-10-21 PROCEDURE — 6370000000 HC RX 637 (ALT 250 FOR IP): Performed by: HOSPITALIST

## 2024-10-21 PROCEDURE — 6360000002 HC RX W HCPCS: Performed by: FAMILY MEDICINE

## 2024-10-21 PROCEDURE — 82140 ASSAY OF AMMONIA: CPT

## 2024-10-21 PROCEDURE — 97168 OT RE-EVAL EST PLAN CARE: CPT

## 2024-10-21 RX ORDER — HYDROCODONE BITARTRATE AND ACETAMINOPHEN 5; 325 MG/1; MG/1
1 TABLET ORAL EVERY 6 HOURS PRN
Status: DISCONTINUED | OUTPATIENT
Start: 2024-10-21 | End: 2024-10-28

## 2024-10-21 RX ORDER — FOLIC ACID 1 MG/1
1 TABLET ORAL DAILY
Status: DISCONTINUED | OUTPATIENT
Start: 2024-10-22 | End: 2024-11-08 | Stop reason: HOSPADM

## 2024-10-21 RX ORDER — MULTIVIT-MIN/FERROUS GLUCONATE 9 MG/15 ML
15 LIQUID (ML) ORAL DAILY
Status: DISCONTINUED | OUTPATIENT
Start: 2024-10-22 | End: 2024-10-23

## 2024-10-21 RX ORDER — CLONIDINE HYDROCHLORIDE 0.2 MG/1
0.2 TABLET ORAL 2 TIMES DAILY
Status: DISCONTINUED | OUTPATIENT
Start: 2024-10-21 | End: 2024-10-31

## 2024-10-21 RX ORDER — VALPROIC ACID 250 MG/1
250 CAPSULE, LIQUID FILLED ORAL 3 TIMES DAILY
Status: DISCONTINUED | OUTPATIENT
Start: 2024-10-21 | End: 2024-11-08 | Stop reason: HOSPADM

## 2024-10-21 RX ORDER — GAUZE BANDAGE 2" X 2"
100 BANDAGE TOPICAL DAILY
Status: DISCONTINUED | OUTPATIENT
Start: 2024-10-22 | End: 2024-11-08 | Stop reason: HOSPADM

## 2024-10-21 RX ORDER — FAMOTIDINE 20 MG/1
20 TABLET, FILM COATED ORAL 2 TIMES DAILY
Status: DISCONTINUED | OUTPATIENT
Start: 2024-10-21 | End: 2024-11-08 | Stop reason: HOSPADM

## 2024-10-21 RX ORDER — POTASSIUM CHLORIDE 7.45 MG/ML
10 INJECTION INTRAVENOUS PRN
Status: ACTIVE | OUTPATIENT
Start: 2024-10-21 | End: 2024-10-26

## 2024-10-21 RX ORDER — POTASSIUM CHLORIDE 1500 MG/1
40 TABLET, EXTENDED RELEASE ORAL PRN
Status: ACTIVE | OUTPATIENT
Start: 2024-10-21 | End: 2024-10-26

## 2024-10-21 RX ADMIN — Medication 100 MG: at 08:36

## 2024-10-21 RX ADMIN — VALPROIC ACID 250 MG: 250 CAPSULE, LIQUID FILLED ORAL at 19:58

## 2024-10-21 RX ADMIN — DEXMEDETOMIDINE 0.2 MCG/KG/HR: 100 INJECTION, SOLUTION INTRAVENOUS at 23:19

## 2024-10-21 RX ADMIN — SODIUM CHLORIDE, PRESERVATIVE FREE 10 ML: 5 INJECTION INTRAVENOUS at 19:30

## 2024-10-21 RX ADMIN — FAMOTIDINE 20 MG: 20 TABLET, FILM COATED ORAL at 19:23

## 2024-10-21 RX ADMIN — OLANZAPINE 5 MG: 5 TABLET, FILM COATED ORAL at 19:22

## 2024-10-21 RX ADMIN — CLONIDINE HYDROCHLORIDE 0.2 MG: 0.2 TABLET ORAL at 19:23

## 2024-10-21 RX ADMIN — CLONIDINE HYDROCHLORIDE 0.2 MG: 0.2 TABLET ORAL at 08:36

## 2024-10-21 RX ADMIN — ENOXAPARIN SODIUM 40 MG: 100 INJECTION SUBCUTANEOUS at 08:36

## 2024-10-21 RX ADMIN — VALPROIC ACID 250 MG: 250 CAPSULE, LIQUID FILLED ORAL at 15:52

## 2024-10-21 RX ADMIN — VALPROATE SODIUM 250 MG: 100 INJECTION, SOLUTION INTRAVENOUS at 08:35

## 2024-10-21 RX ADMIN — SODIUM CHLORIDE, PRESERVATIVE FREE 20 MG: 5 INJECTION INTRAVENOUS at 08:36

## 2024-10-21 RX ADMIN — HYDROCODONE BITARTRATE AND ACETAMINOPHEN 1 TABLET: 5; 325 TABLET ORAL at 19:57

## 2024-10-21 RX ADMIN — VALPROATE SODIUM 250 MG: 100 INJECTION, SOLUTION INTRAVENOUS at 01:15

## 2024-10-21 RX ADMIN — AMLODIPINE BESYLATE 10 MG: 10 TABLET ORAL at 08:36

## 2024-10-21 RX ADMIN — PREDNISONE 20 MG: 20 TABLET ORAL at 08:36

## 2024-10-21 RX ADMIN — Medication 15 ML: at 08:36

## 2024-10-21 RX ADMIN — ATORVASTATIN CALCIUM 40 MG: 40 TABLET, FILM COATED ORAL at 08:36

## 2024-10-21 RX ADMIN — SODIUM CHLORIDE, PRESERVATIVE FREE 10 ML: 5 INJECTION INTRAVENOUS at 10:49

## 2024-10-21 RX ADMIN — ACETAMINOPHEN 1000 MG: 500 TABLET ORAL at 15:51

## 2024-10-21 RX ADMIN — FOLIC ACID 1 MG: 1 TABLET ORAL at 12:33

## 2024-10-21 RX ADMIN — POTASSIUM BICARBONATE 40 MEQ: 782 TABLET, EFFERVESCENT ORAL at 15:52

## 2024-10-21 ASSESSMENT — PAIN DESCRIPTION - ORIENTATION
ORIENTATION: LEFT;PROXIMAL
ORIENTATION: RIGHT
ORIENTATION: RIGHT

## 2024-10-21 ASSESSMENT — PAIN DESCRIPTION - LOCATION
LOCATION: FOOT;TOE (COMMENT WHICH ONE)
LOCATION: TOE (COMMENT WHICH ONE)
LOCATION: EAR

## 2024-10-21 ASSESSMENT — PAIN SCALES - GENERAL
PAINLEVEL_OUTOF10: 2
PAINLEVEL_OUTOF10: 8
PAINLEVEL_OUTOF10: 2
PAINLEVEL_OUTOF10: 0

## 2024-10-21 ASSESSMENT — PAIN DESCRIPTION - PAIN TYPE
TYPE: ACUTE PAIN

## 2024-10-21 ASSESSMENT — PAIN DESCRIPTION - DESCRIPTORS
DESCRIPTORS: THROBBING;PINS AND NEEDLES
DESCRIPTORS: ACHING;SORE
DESCRIPTORS: ACHING

## 2024-10-21 ASSESSMENT — PAIN - FUNCTIONAL ASSESSMENT
PAIN_FUNCTIONAL_ASSESSMENT: ACTIVITIES ARE NOT PREVENTED
PAIN_FUNCTIONAL_ASSESSMENT: ACTIVITIES ARE NOT PREVENTED

## 2024-10-21 ASSESSMENT — PAIN DESCRIPTION - ONSET: ONSET: GRADUAL

## 2024-10-21 ASSESSMENT — PAIN DESCRIPTION - FREQUENCY: FREQUENCY: INTERMITTENT

## 2024-10-21 NOTE — PROGRESS NOTES
Famotidine has been changed from IV to PO per [facility] approved policy.    Basic Criteria (please refer to hospital policy for details):  1. functioning GI tract  2. tolerating PO/NG routine medications  3. Antibiotics: Received at least 24 hours of IV, clinical stabilization, afebrile, normalizing WBC    Thank you.  ELEANOR STARR [facility] 10/21/2024 2:59 PM

## 2024-10-21 NOTE — PROGRESS NOTES
Comprehensive Nutrition Assessment    Type and Reason for Visit:  Reassess    Nutrition Recommendations/Plan:   Send Ensure BID  Encourage good po intakes  Monitor weight closely  RD to follow/monitor intake, labs, weight, POC     Malnutrition Assessment:  Malnutrition Status:  At risk for malnutrition (Comment) (10/14/24 8297)        Nutrition Assessment:    Patient continues in ICU, was extubated 10/13. Patient is on soft and bite sized diet. He is feeding himself, has adaptives on silverware. He ate some bites of pancakes and sausage this mroning. Multiple liquid stools. Labs, meds, PMH reviewed.    Nutrition Related Findings:    Weight is down since last week, will monitor. LBM 10/20. +2 LE edema. K+ 3.5. Patient in restraints for agitation. Wound Type: None       Current Nutrition Intake & Therapies:    Average Meal Intake: NPO  Average Supplements Intake: NPO  ADULT DIET; Dysphagia - Soft and Bite Sized    Anthropometric Measures:  Height: 166 cm (5' 5.35\")  Ideal Body Weight (IBW): 138 lbs (63 kg)       Current Body Weight: 89.8 kg (198 lb), 152.7 % IBW. Weight Source: Bed Scale  Current BMI (kg/m2): 32.6  Usual Body Weight: 97.5 kg (215 lb)  % Weight Change (Calculated): -2                    BMI Categories: Obese Class 1 (BMI 30.0-34.9)    Estimated Daily Nutrient Needs:  Energy Requirements Based On: Kcal/kg  Weight Used for Energy Requirements: Current  Energy (kcal/day): 9776-3162 kcal (15-18 kcal/kg)  Weight Used for Protein Requirements: Ideal  Protein (g/day): 76-88 gm of protein (1.2-1.4 gm/kg)  Method Used for Fluid Requirements: 1 ml/kcal  Fluid (ml/day): 1896-4728 mL    Nutrition Diagnosis:   Inadequate oral intake related to cognitive or neurological impairment as evidenced by NPO or clear liquid status due to medical condition, nutrition support - enteral nutrition    Nutrition Interventions:   Food and/or Nutrient Delivery: Start Oral Nutrition Supplement  Nutrition Education/Counseling:  Education not indicated  Coordination of Nutrition Care: Continue to monitor while inpatient       Goals:     Goals: Meet at least 75% of estimated needs, Tolerate nutrition support at goal rate       Nutrition Monitoring and Evaluation:      Food/Nutrient Intake Outcomes: Enteral Nutrition Intake/Tolerance, Diet Advancement/Tolerance  Physical Signs/Symptoms Outcomes: Biochemical Data, Fluid Status or Edema, Skin, Weight, GI Status    Discharge Planning:    Too soon to determine     Rosey Arias RD  Contact: [phone]

## 2024-10-21 NOTE — PLAN OF CARE
Problem: Discharge Planning  Goal: Discharge to home or other facility with appropriate resources  Outcome: Progressing  Flowsheets (Taken 10/21/2024 1735)  Discharge to home or other facility with appropriate resources: Identify discharge learning needs (meds, wound care, etc)     Problem: Respiratory - Adult  Goal: Achieves optimal ventilation and oxygenation  Outcome: Progressing  Flowsheets (Taken 10/21/2024 1735)  Achieves optimal ventilation and oxygenation:   Assess for changes in respiratory status   Position to facilitate oxygenation and minimize respiratory effort   Assess the need for suctioning and aspirate as needed   Respiratory therapy support as indicated   Assess for changes in mentation and behavior   Oxygen supplementation based on oxygen saturation or arterial blood gases   Assess and instruct to report shortness of breath or any respiratory difficulty     Problem: Neurosensory - Adult  Goal: Achieves stable or improved neurological status  Outcome: Progressing  Flowsheets (Taken 10/21/2024 1735)  Achieves stable or improved neurological status:   Assess for and report changes in neurological status   Monitor temperature, glucose, and sodium. Initiate appropriate interventions as ordered     Problem: Cardiovascular - Adult  Goal: Maintains optimal cardiac output and hemodynamic stability  Outcome: Progressing  Flowsheets (Taken 10/21/2024 1735)  Maintains optimal cardiac output and hemodynamic stability:   Monitor blood pressure and heart rate   Monitor urine output and notify Licensed Independent Practitioner for values outside of normal range   Assess for signs of decreased cardiac output     Problem: Skin/Tissue Integrity - Adult  Goal: Skin integrity remains intact  Outcome: Progressing  Flowsheets (Taken 10/21/2024 1735)  Skin Integrity Remains Intact: Monitor for areas of redness and/or skin breakdown     Problem: Musculoskeletal - Adult  Goal: Return mobility to safest level of

## 2024-10-21 NOTE — PLAN OF CARE
Problem: Discharge Planning  Goal: Discharge to home or other facility with appropriate resources  10/21/2024 1902 by Belkis Dawson RN  Outcome: Progressing  10/21/2024 1735 by Poornima Dorado RN  Outcome: Progressing  Flowsheets (Taken 10/21/2024 1735)  Discharge to home or other facility with appropriate resources: Identify discharge learning needs (meds, wound care, etc)     Problem: Respiratory - Adult  Goal: Achieves optimal ventilation and oxygenation  10/21/2024 1902 by Belkis Dawson RN  Outcome: Progressing  10/21/2024 1735 by Poornima Dorado RN  Outcome: Progressing  Flowsheets (Taken 10/21/2024 1735)  Achieves optimal ventilation and oxygenation:   Assess for changes in respiratory status   Position to facilitate oxygenation and minimize respiratory effort   Assess the need for suctioning and aspirate as needed   Respiratory therapy support as indicated   Assess for changes in mentation and behavior   Oxygen supplementation based on oxygen saturation or arterial blood gases   Assess and instruct to report shortness of breath or any respiratory difficulty     Problem: Neurosensory - Adult  Goal: Achieves stable or improved neurological status  10/21/2024 1902 by Belkis Dawson RN  Outcome: Progressing  10/21/2024 1735 by Poornima Dorado RN  Outcome: Progressing  Flowsheets (Taken 10/21/2024 1735)  Achieves stable or improved neurological status:   Assess for and report changes in neurological status   Monitor temperature, glucose, and sodium. Initiate appropriate interventions as ordered     Problem: Cardiovascular - Adult  Goal: Maintains optimal cardiac output and hemodynamic stability  10/21/2024 1902 by Belkis Dawson RN  Outcome: Progressing  10/21/2024 1735 by Poornima Dorado RN  Outcome: Progressing  Flowsheets (Taken 10/21/2024 1735)  Maintains optimal cardiac output and hemodynamic stability:   Monitor blood pressure and heart rate   Monitor urine output and notify Licensed  Independent Practitioner for values outside of normal range   Assess for signs of decreased cardiac output     Problem: Skin/Tissue Integrity - Adult  Goal: Skin integrity remains intact  10/21/2024 1902 by Belkis Dawson RN  Outcome: Progressing  10/21/2024 1735 by Poornima Dorado RN  Outcome: Progressing  Flowsheets (Taken 10/21/2024 1735)  Skin Integrity Remains Intact: Monitor for areas of redness and/or skin breakdown     Problem: Musculoskeletal - Adult  Goal: Return mobility to safest level of function  10/21/2024 1902 by Belkis Dawson RN  Outcome: Progressing  10/21/2024 1735 by Poornima Dorado RN  Outcome: Progressing  Flowsheets (Taken 10/21/2024 1735)  Return Mobility to Safest Level of Function: Assist with transfers and ambulation using safe patient handling equipment as needed     Problem: Gastrointestinal - Adult  Goal: Maintains adequate nutritional intake  10/21/2024 1902 by Belkis Dawson RN  Outcome: Progressing  10/21/2024 1735 by Poornima Dorado RN  Outcome: Progressing  Flowsheets (Taken 10/21/2024 1735)  Maintains adequate nutritional intake:   Monitor percentage of each meal consumed   Assist with meals as needed     Problem: Genitourinary - Adult  Goal: Absence of urinary retention  10/21/2024 1902 by Belkis Dawson RN  Outcome: Progressing  10/21/2024 1735 by Poornima Dorado RN  Outcome: Progressing  Flowsheets (Taken 10/21/2024 1735)  Absence of urinary retention: Monitor intake/output and perform bladder scan as needed     Problem: Pain  Goal: Verbalizes/displays adequate comfort level or baseline comfort level  10/21/2024 1902 by Belkis Dawson RN  Outcome: Progressing  10/21/2024 1735 by Poornima Dorado RN  Outcome: Progressing  Flowsheets (Taken 10/21/2024 1735)  Verbalizes/displays adequate comfort level or baseline comfort level:   Encourage patient to monitor pain and request assistance   Assess pain using appropriate pain scale   Administer analgesics based on type

## 2024-10-21 NOTE — CARE COORDINATION
Social work:  [place], they would like to follow overnight to see if they can meet patients needs.   SW spoke to wife and provided update, she is agreeable to [facility].

## 2024-10-21 NOTE — PROGRESS NOTES
End Of Shift Note  [place]  Summary of shift: pt improving mentation today. A& O x2, easily reoriented. Tolerating small amounts of diet/fluids.  Up to chair with rita lift and therapy. Discharge planning started. Replaced potassium, discontinued IV fluids.     Vitals:    Vitals:    10/21/24 1525 10/21/24 1600 10/21/24 1645 10/21/24 1700   BP:  91/76 107/71 (!) 112/93   Pulse:  70 65 75   Resp:  17 26 20   Temp: 98.2 °F (36.8 °C)      TempSrc: Oral      SpO2:  97% 97%    Weight:       Height:            I&O:   Intake/Output Summary (Last 24 hours) at 10/21/2024 1841  Last data filed at 10/21/2024 1829  Gross per 24 hour   Intake 821.14 ml   Output 800 ml   Net 21.14 ml       Resp Status: 0-2 L NC as needed    Ventilator Settings:  Vent Mode: CPAP/PS Resp Rate (Set): 18 bpm/Vt (Set, mL): 500 mL/ /FiO2 : 30 %    Critical Care IV infusions:   dexmedeTOMIDine (PRECEDEX) 1,000 mcg in sodium chloride 0.9 % 250 mL infusion Stopped (10/20/24 0816)    sodium chloride      sodium chloride          LDA:   Peripheral IV 10/19/24 Left Forearm (Active)   Number of days: 2       External Urinary Catheter (Active)   Number of days: 12       Fecal Management System 10/21/24 (Active)   Number of days: 0

## 2024-10-21 NOTE — PROGRESS NOTES
Pulmonary Critical Care Progress Note    Patient seen for the follow up of Angioedema of tongue     Subjective:    He become more alert yesterday.  I started diet he tolerated well he is off Precedex.  He is now room air he is more alert    Examination:    Vitals: /79   Pulse 76   Temp 97.9 °F (36.6 °C) (Oral)   Resp 24   Ht 1.66 m (5' 5.35\")   Wt 90.2 kg (198 lb 13.7 oz)   SpO2 94%   BMI 32.74 kg/m²   SpO2  Av.1 %  Min: 90 %  Max: 94 %  General appearance: Awake alert follows commands  Neck: No JVD  Lungs: Decreased breath sound no crackles or wheeze  Heart: regular rate and rhythm, S1, S2 normal, no gallop  Abdomen: Soft, non tender, + BS  Extremities: no cyanosis or clubbing. No significant edema    LABs:    CBC:   Recent Labs     10/20/24  0533 10/21/24  0626   WBC 11.1 15.0*   HGB 15.0 16.3   HCT 44.6 47.3    167     BMP:   Recent Labs     10/20/24  0533 10/21/24  0626    142   K 4.1 3.5*   CO2 25 28   BUN 25* 19   CREATININE 0.7 0.8   LABGLOM >90 >90   GLUCOSE 126* 84      Latest Reference Range & Units 10/14/24 06:00 10/14/24 12:31   POC TCO2 22 - 30 mmol/L  27   POC HCO3 21.0 - 28.0 mmol/L 28.1 (H) 26.6   POC O2 SAT 94.0 - 98.0 % 95.6 95.8   POC pCO2 35.0 - 48.0 mm Hg 43.2 42.0   POC pH 7.350 - 7.450  7.421 7.410   POC PO2 83.0 - 108.0 mm Hg 78.4 (L) 79.8 (L)   (H): Data is abnormally high  (L): Data is abnormally low      Radiology:  Chest x-ray 10/20  Poor inspiratory effort.       Chest x-ray 10/17 reviewed  1. Probable mild atelectatic changes at the base of the left lung.  2. Small left pleural effusion versus pleural thickening.  3. No other acute cardiopulmonary process.      Chest x-ray 10/16  Reviewed  Small left effusion with atelectasis.            Impression/recommendations:    Acute respiratory insufficiency due to upper airway instruction/angioedema with significant swelling upper airway/atelectasis with small effusion  Oxygen by nasal cannula  BiPAP support if

## 2024-10-21 NOTE — PROGRESS NOTES
Physical Therapy  Facility/Department: [facility]  Physical Therapy Re-Eval    Name: Noel Valle  : 1955  MRN: [id number]  Date of Service: 10/21/2024    YURI Noguera reports patient is medically stable for therapy treatment this date.    Chart reviewed prior to treatment and patient is agreeable for therapy.  All lines intact and patient positioned comfortably at end of treatment.  All patient needs addressed prior to ending therapy session.     Discharge Recommendations:  Patient would benefit from continued therapy after discharge   Continue to assess pt functional performance to determine safe and appropriate discharge recommendation.          Patient Diagnosis(es): The encounter diagnosis was Angioedema, initial encounter.  Past Medical History:  has no past medical history on file.  Past Surgical History:  has no past surgical history on file.    Assessment  Body Structures, Functions, Activity Limitations Requiring Skilled Therapeutic Intervention: Decreased functional mobility ;Decreased endurance;Decreased balance;Decreased strength;Decreased safe awareness;Decreased posture;Decreased ROM;Decreased cognition  Assessment: 68 y/o male referred to PT. Patient demo increased alertness; ability to follow commands, however continued confusion. Patient pleasant, cooperative and motivated throughout session. PT re-eval complete. R LE deficits compared to L LE observed.  Patient progressed mobility to EOB with 2 person assist. Unsafe to attempt transfer/gait due to generalized weakness. Patient is a high fall risk; functioning below baseline and would benefit from continued PT.  Therapy Prognosis: Good  Decision Making: High Complexity  Requires PT Follow-Up: Yes  Activity Tolerance  Activity Tolerance: Treatment limited secondary to decreased cognition;Patient limited by fatigue    Plan  Physical Therapy Plan  General Plan: 5-7 times per week  Specific Instructions for Next Treatment: Focus on pt.

## 2024-10-21 NOTE — CARE COORDINATION
DC Planning    CHART REVIEW    Pt is x 2 max assist now on RA. Off Precedex. More alert. LSW following for SNF [facility]-will need to check if pt has been accepted. .

## 2024-10-21 NOTE — PROGRESS NOTES
on file.  Past Surgical History:  has no past surgical history on file.    Assessment  Performance deficits / Impairments: Decreased functional mobility ;Decreased ADL status;Decreased ROM;Decreased strength;Decreased safe awareness;Decreased cognition;Decreased endurance;Decreased fine motor control;Decreased high-level IADLs;Decreased coordination;Decreased posture  Assessment: Pt reassessed this date for bed mobility and OOB activity. Pt needing max Ax2 for bed mobility and sitting balance ranging from Min A to max Ax1 for safety. Pt with noted L lateral lean needing cues to correct self. Pt vitals WFL this date with BP running soft. Pt assymptomatic throughout. Pt pleasantly confused throughout. Pt needing total A for transfer OOB with maxi rita to recliner in room. Pt needing total A for toileting and LB dressing and min A for grooming. Pt would benefit from continued OT to increase indep with ADLs/IADLs for d/c.  Prognosis: Fair  Decision Making: Medium Complexity  REQUIRES OT FOLLOW-UP: Yes  Activity Tolerance  Activity Tolerance: Patient Tolerated treatment well;Treatment limited secondary to decreased cognition     Plan  Occupational Therapy Plan  Times Per Week: 5x/wk, 1x/day  Specific Instructions for Next Treatment: Reassess for supine>sit as tolerating if pt is alert and following commands.  Current Treatment Recommendations: Strengthening, ROM, Functional mobility training, Endurance training, Safety education & training, Patient/Caregiver education & training, Equipment evaluation, education, & procurement, Positioning, Self-Care / ADL, Home management training, Co-Treatment    Restrictions  Restrictions/Precautions  Restrictions/Precautions: General Precautions, Fall Risk, Seizure, Bed Alarm  Required Braces or Orthoses?: No  Position Activity Restriction  Other position/activity restrictions: FMS, ex cath, 2 L NC O2, telemetry, continuous pulse ox, alarms, maxi rita.    Subjective  General  Chart  Impaired  Orientation Level: Oriented to person;Disoriented to place;Disoriented to time;Disoriented to situation    Education Given To: Patient;Family  Education Provided: Role of Therapy;Plan of Care;Orientation;Mobility Training;Transfer Training;Equipment;Energy Conservation  Education Provided Comments: OT role, POC, ECT, B UE/B LE, pressure relief, skin integrity, OOB activity, orientation, maxi rita.  Education Method: Verbal;Demonstration  Barriers to Learning: Cognition  Education Outcome: Continued education needed;Verbalized understanding;Demonstrated understanding     AM-PAC - ADL  AM-PAC Daily Activity - Inpatient   How much help is needed for putting on and taking off regular lower body clothing?: Total  How much help is needed for bathing (which includes washing, rinsing, drying)?: Total  How much help is needed for toileting (which includes using toilet, bedpan, or urinal)?: Total  How much help is needed for putting on and taking off regular upper body clothing?: A Lot  How much help is needed for taking care of personal grooming?: A Little  How much help for eating meals?: A Little  AM-[facility] Inpatient Daily Activity Raw Score: 11  AM-PAC Inpatient ADL T-Scale Score : 29.04  ADL Inpatient CMS 0-100% Score: 70.42  ADL Inpatient CMS G-Code Modifier : CL    Goals  Short Term Goals  Time Frame for Short Term Goals: By discharge, Pt will  Short Term Goal 1: Pt/caregiver demo & verb good understanding of education provided on ADL techniques, bed mobility and functional mobility techniques, EC/WS, and d/c recommendations.  Short Term Goal 2: Pt will demo bed mobility with mod Ax1 to increase indep with ADLs. (GOAL UPDATED 10/21/2024 Carolyn Mueller OTR/L.)  Short Term Goal 3: Pt will demo grooming/UB ADLs with SBA to increase indep with ADLs. (GOAL UPDATED 10/21/2024 Carolyn Mueller OTR/L.)  Short Term Goal 4: Pt will participate in BUE HEP with min A to increase strenght and endurance for ADLs. (GOAL UPDATED

## 2024-10-21 NOTE — PROGRESS NOTES
End Of Shift Note  [facility]  Summary of shift: uneventful shift. Precedex remains off, no ativan given and zyprea started. Intermittent bouts of confusion and hallucinations, pt is redirectable. FMS initiated d/t having multiple liquid stools consecutive day in a row. Plan is for indira at d/c.     Vitals:    Vitals:    10/21/24 0200 10/21/24 0300 10/21/24 0400 10/21/24 0428   BP: 109/88 120/67 113/64    Pulse: 80 93 74 82   Resp: 27 20 23 24   Temp:   97.8 °F (36.6 °C)    TempSrc:   Oral    SpO2: 94% 94% 92% 92%   Weight:       Height:            I&O:   Intake/Output Summary (Last 24 hours) at 10/21/2024 0617  Last data filed at 10/21/2024 0616  Gross per 24 hour   Intake 3060.02 ml   Output 2450 ml   Net 610.02 ml       Resp Status: room air     Ventilator Settings:  Vent Mode: CPAP/PS Resp Rate (Set): 18 bpm/Vt (Set, mL): 500 mL/ /FiO2 : 30 %    Critical Care IV infusions:   dextrose 5 mL/hr at 10/21/24 0616    dexmedeTOMIDine (PRECEDEX) 1,000 mcg in sodium chloride 0.9 % 250 mL infusion Stopped (10/20/24 0816)    sodium chloride      sodium chloride          LDA:   Peripheral IV 10/19/24 Left Forearm (Active)   Number of days: 1       External Urinary Catheter (Active)   Number of days: 11       Fecal Management System 10/21/24 (Active)   Number of days: 0

## 2024-10-22 ENCOUNTER — APPOINTMENT (OUTPATIENT)
Dept: MRI IMAGING | Age: 69
DRG: 915 | End: 2024-10-22
Payer: MEDICARE

## 2024-10-22 ENCOUNTER — APPOINTMENT (OUTPATIENT)
Dept: GENERAL RADIOLOGY | Age: 69
DRG: 915 | End: 2024-10-22
Payer: MEDICARE

## 2024-10-22 LAB
AMMONIA PLAS-SCNC: 27 UMOL/L (ref 16–60)
ANION GAP SERPL CALCULATED.3IONS-SCNC: 9 MMOL/L (ref 9–17)
BASOPHILS # BLD: <0.03 K/UL (ref 0–0.2)
BASOPHILS NFR BLD: 0 % (ref 0–2)
BUN SERPL-MCNC: 19 MG/DL (ref 8–23)
BUN/CREAT SERPL: 27 (ref 9–20)
CALCIUM SERPL-MCNC: 8.3 MG/DL (ref 8.6–10.4)
CHLORIDE SERPL-SCNC: 105 MMOL/L (ref 98–107)
CO2 SERPL-SCNC: 28 MMOL/L (ref 20–31)
CREAT SERPL-MCNC: 0.7 MG/DL (ref 0.7–1.2)
EOSINOPHIL # BLD: 0.27 K/UL (ref 0–0.44)
EOSINOPHILS RELATIVE PERCENT: 2 % (ref 1–4)
ERYTHROCYTE [DISTWIDTH] IN BLOOD BY AUTOMATED COUNT: 11.5 % (ref 11.8–14.4)
FOLATE SERPL-MCNC: 12.3 NG/ML (ref 4.8–24.2)
GFR, ESTIMATED: >90 ML/MIN/1.73M2
GLUCOSE BLD-MCNC: 113 MG/DL (ref 75–110)
GLUCOSE SERPL-MCNC: 92 MG/DL (ref 70–99)
HCT VFR BLD AUTO: 44.1 % (ref 40.7–50.3)
HGB BLD-MCNC: 14.5 G/DL (ref 13–17)
IMM GRANULOCYTES # BLD AUTO: 0.11 K/UL (ref 0–0.3)
IMM GRANULOCYTES NFR BLD: 1 %
LYMPHOCYTES NFR BLD: 2.85 K/UL (ref 1.1–3.7)
LYMPHOCYTES RELATIVE PERCENT: 23 % (ref 24–43)
MCH RBC QN AUTO: 33.1 PG (ref 25.2–33.5)
MCHC RBC AUTO-ENTMCNC: 32.9 G/DL (ref 28.4–34.8)
MCV RBC AUTO: 100.7 FL (ref 82.6–102.9)
MONOCYTES NFR BLD: 0.86 K/UL (ref 0.1–1.2)
MONOCYTES NFR BLD: 7 % (ref 3–12)
NEUTROPHILS NFR BLD: 67 % (ref 36–65)
NEUTS SEG NFR BLD: 8.41 K/UL (ref 1.5–8.1)
NRBC BLD-RTO: 0 PER 100 WBC
PLATELET # BLD AUTO: 145 K/UL (ref 138–453)
PMV BLD AUTO: 11.7 FL (ref 8.1–13.5)
POTASSIUM SERPL-SCNC: 3.6 MMOL/L (ref 3.7–5.3)
RBC # BLD AUTO: 4.38 M/UL (ref 4.21–5.77)
SODIUM SERPL-SCNC: 142 MMOL/L (ref 135–144)
T4 FREE SERPL-MCNC: 1.4 NG/DL (ref 0.9–1.7)
TSH SERPL DL<=0.05 MIU/L-ACNC: 0.01 UIU/ML (ref 0.3–5)
VIT B12 SERPL-MCNC: 1213 PG/ML (ref 232–1245)
WBC OTHER # BLD: 12.5 K/UL (ref 3.5–11.3)

## 2024-10-22 PROCEDURE — 6370000000 HC RX 637 (ALT 250 FOR IP): Performed by: HOSPITALIST

## 2024-10-22 PROCEDURE — 82607 VITAMIN B-12: CPT

## 2024-10-22 PROCEDURE — 2580000003 HC RX 258: Performed by: NURSE PRACTITIONER

## 2024-10-22 PROCEDURE — 6370000000 HC RX 637 (ALT 250 FOR IP): Performed by: FAMILY MEDICINE

## 2024-10-22 PROCEDURE — 82947 ASSAY GLUCOSE BLOOD QUANT: CPT

## 2024-10-22 PROCEDURE — 71045 X-RAY EXAM CHEST 1 VIEW: CPT

## 2024-10-22 PROCEDURE — 82746 ASSAY OF FOLIC ACID SERUM: CPT

## 2024-10-22 PROCEDURE — 84439 ASSAY OF FREE THYROXINE: CPT

## 2024-10-22 PROCEDURE — 82140 ASSAY OF AMMONIA: CPT

## 2024-10-22 PROCEDURE — 36415 COLL VENOUS BLD VENIPUNCTURE: CPT

## 2024-10-22 PROCEDURE — 84443 ASSAY THYROID STIM HORMONE: CPT

## 2024-10-22 PROCEDURE — 6370000000 HC RX 637 (ALT 250 FOR IP): Performed by: INTERNAL MEDICINE

## 2024-10-22 PROCEDURE — A9579 GAD-BASE MR CONTRAST NOS,1ML: HCPCS | Performed by: PSYCHIATRY & NEUROLOGY

## 2024-10-22 PROCEDURE — 94761 N-INVAS EAR/PLS OXIMETRY MLT: CPT

## 2024-10-22 PROCEDURE — 2000000000 HC ICU R&B

## 2024-10-22 PROCEDURE — 6360000004 HC RX CONTRAST MEDICATION: Performed by: PSYCHIATRY & NEUROLOGY

## 2024-10-22 PROCEDURE — 70553 MRI BRAIN STEM W/O & W/DYE: CPT

## 2024-10-22 PROCEDURE — 85025 COMPLETE CBC W/AUTO DIFF WBC: CPT

## 2024-10-22 PROCEDURE — 99222 1ST HOSP IP/OBS MODERATE 55: CPT | Performed by: PSYCHIATRY & NEUROLOGY

## 2024-10-22 PROCEDURE — 84425 ASSAY OF VITAMIN B-1: CPT

## 2024-10-22 PROCEDURE — 80048 BASIC METABOLIC PNL TOTAL CA: CPT

## 2024-10-22 PROCEDURE — 6360000002 HC RX W HCPCS: Performed by: FAMILY MEDICINE

## 2024-10-22 RX ORDER — LORAZEPAM 2 MG/ML
1 INJECTION INTRAMUSCULAR
Status: DISCONTINUED | OUTPATIENT
Start: 2024-10-22 | End: 2024-11-08 | Stop reason: HOSPADM

## 2024-10-22 RX ORDER — CLONAZEPAM 0.5 MG/1
1 TABLET ORAL EVERY 12 HOURS
Status: DISCONTINUED | OUTPATIENT
Start: 2024-10-22 | End: 2024-10-25

## 2024-10-22 RX ADMIN — CLONAZEPAM 1 MG: 0.5 TABLET ORAL at 19:20

## 2024-10-22 RX ADMIN — SODIUM CHLORIDE, PRESERVATIVE FREE 10 ML: 5 INJECTION INTRAVENOUS at 19:19

## 2024-10-22 RX ADMIN — VALPROIC ACID 250 MG: 250 CAPSULE, LIQUID FILLED ORAL at 09:11

## 2024-10-22 RX ADMIN — Medication 100 MG: at 09:11

## 2024-10-22 RX ADMIN — ENOXAPARIN SODIUM 40 MG: 100 INJECTION SUBCUTANEOUS at 09:11

## 2024-10-22 RX ADMIN — FAMOTIDINE 20 MG: 20 TABLET, FILM COATED ORAL at 09:11

## 2024-10-22 RX ADMIN — PREDNISONE 20 MG: 20 TABLET ORAL at 09:11

## 2024-10-22 RX ADMIN — OLANZAPINE 5 MG: 5 TABLET, FILM COATED ORAL at 19:19

## 2024-10-22 RX ADMIN — GADOTERIDOL 20 ML: 279.3 INJECTION, SOLUTION INTRAVENOUS at 13:16

## 2024-10-22 RX ADMIN — VALPROIC ACID 250 MG: 250 CAPSULE, LIQUID FILLED ORAL at 19:19

## 2024-10-22 RX ADMIN — Medication 15 ML: at 09:11

## 2024-10-22 RX ADMIN — ATORVASTATIN CALCIUM 40 MG: 40 TABLET, FILM COATED ORAL at 09:11

## 2024-10-22 RX ADMIN — FAMOTIDINE 20 MG: 20 TABLET, FILM COATED ORAL at 19:19
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
## 2024-10-22 NOTE — CONSULTS
[facility] Neurology   IN-PATIENT SERVICE      NEUROLOGY CONSULT  NOTE            Date:   10/22/2024  Patient name:  Noel Valle  Date of admission:  10/9/2024  YOB: 1955      Chief Complaint:     Chief Complaint   Patient presents with    Angioedema       Reason for Consult:      [facility]     History of Present Illness:     The patient is a 69 y.o. male who presents with Angioedema  . The patient was seen and examined and the chart was reviewed.     This is a 69 year old male with history of ETOH abuse who presented to the ED with tongue swelling. He was having trouble swallowing and in acute respiratory distress. Intubated for airway protection.   S/p extubation on 10/13/24. Also being treated for hypotension, bradycardia and HELENE. Patient became hypotensive after intubation with meds.     Patient has been agitated and agressive, combative with staff. Yelling and has had hallucinations. Started on Precedex and put in restraints.     Per MAR meds include Klonopin, Zyprexa, prednisone, VPA 250mg TID.   Past Medical History:     No past medical history on file.     Past Surgical History:     No past surgical history on file.     Medications Prior to Admission:     Prior to Admission medications    Medication Sig Start Date End Date Taking? Authorizing Provider   albuterol sulfate HFA (VENTOLIN HFA) 108 (90 Base) MCG/ACT inhaler Inhale 2 puffs into the lungs every 6 hours as needed for Wheezing or Shortness of Breath   Yes Provider, MD Roxy   amLODIPine-atorvastatatin (CADUET) 10-40 MG per tablet Take 1 tablet by mouth daily   Yes Provider, MD Roxy   HYDROcodone-acetaminophen (NORCO)  MG per tablet Take 1 tablet by mouth every 8 hours as needed for Pain. Max Daily Amount: 3 tablets   Yes Provider, MD Roxy   ibuprofen (ADVIL;MOTRIN) 800 MG tablet Take 1 tablet by mouth every 8 hours as needed for Pain or Fever 9/10/19   Camila Crystal, APRN - CNP        Allergies:     Benazepril

## 2024-10-22 NOTE — PLAN OF CARE
Problem: Discharge Planning  Goal: Discharge to home or other facility with appropriate resources  10/22/2024 0126 by Belkis Dawson, RN  Outcome: Progressing  Flowsheets (Taken 10/21/2024 1930)  Discharge to home or other facility with appropriate resources:   Identify barriers to discharge with patient and caregiver   Arrange for needed discharge resources and transportation as appropriate   Identify discharge learning needs (meds, wound care, etc)   Arrange for interpreters to assist at discharge as needed  10/21/2024 1902 by Belkis Dawson, RN  Outcome: Progressing  10/21/2024 1735 by Poornima Dorado RN  Outcome: Progressing  Flowsheets (Taken 10/21/2024 1735)  Discharge to home or other facility with appropriate resources: Identify discharge learning needs (meds, wound care, etc)     Problem: Safety - Medical Restraint  Goal: Remains free of injury from restraints (Restraint for Interference with Medical Device)  Description: INTERVENTIONS:  1. Determine that other, less restrictive measures have been tried or would not be effective before applying the restraint  2. Evaluate the patient's condition at the time of restraint application  3. Inform patient/family regarding the reason for restraint  4. Q2H: Monitor safety, psychosocial status, comfort, nutrition and hydration  Recent Flowsheet Documentation  Taken 10/21/2024 2327 by Belkis Dawson, RN  Remains free of injury from restraints (restraint for interference with medical device):   Determine that other, less restrictive measures have been tried or would not be effective before applying the restraint   Evaluate the patient's condition at the time of restraint application   Inform patient/family regarding the reason for restraint   Every 2 hours: Monitor safety, psychosocial status, comfort, nutrition and hydration     Problem: Safety - Medical Restraint  Goal: Remains free of injury from restraints (Restraint for Interference with Medical  Progressing  Flowsheets (Taken 10/21/2024 1904)  Free From Fall Injury:   Instruct family/caregiver on patient safety   Based on caregiver fall risk screen, instruct family/caregiver to ask for assistance with transferring infant if caregiver noted to have fall risk factors  10/21/2024 1902 by Belkis Dawson, RN  Outcome: Progressing  10/21/2024 1735 by Poornima Dorado RN  Outcome: Progressing  Note: Pt assessed as a fall risk this shift. Remains free from falls and accidental injury at this time.  bed is locked and in lowest position. Adequate lighting provided.  Pt encouraged to call before getting OOB for any need.  Bed alarm activated. Will continue to monitor needs during hourly rounding, and reinforce education on use of call light.      Problem: Coping  Goal: Pt/Family able to verbalize concerns and demonstrate effective coping strategies  Description: INTERVENTIONS:  1. Assist patient/family to identify coping skills, available support systems and cultural and spiritual values  2. Provide emotional support, including active listening and acknowledgement of concerns of patient and caregivers  3. Reduce environmental stimuli, as able  4. Instruct patient/family in relaxation techniques, as appropriate  5. Assess for spiritual pain/suffering and initiate Spiritual Care, Psychosocial Clinical Specialist consults as needed  10/22/2024 0126 by Belkis Dawson, RN  Outcome: Progressing  Flowsheets (Taken 10/21/2024 1930)  Patient/family able to verbalize anxieties, fears, and concerns, and demonstrate effective coping:   Assist patient/family to identify coping skills, available support systems and cultural and spiritual values   Provide emotional support, including active listening and acknowledgement of concerns of patient and caregivers   Reduce environmental stimuli, as able  10/21/2024 1902 by Belkis Dawson, RN  Outcome: Progressing     Problem: Nutrition Deficit:  Goal: Optimize nutritional

## 2024-10-22 NOTE — PLAN OF CARE
Problem: Safety - Medical Restraint  Goal: Remains free of injury from restraints (Restraint for Interference with Medical Device)  Description: INTERVENTIONS:  1. Determine that other, less restrictive measures have been tried or would not be effective before applying the restraint  2. Evaluate the patient's condition at the time of restraint application  3. Inform patient/family regarding the reason for restraint  4. Q2H: Monitor safety, psychosocial status, comfort, nutrition and hydration  Recent Flowsheet Documentation  Taken 10/22/2024 0000 by Belkis Dawson RN  Remains free of injury from restraints (restraint for interference with medical device):   Determine that other, less restrictive measures have been tried or would not be effective before applying the restraint   Evaluate the patient's condition at the time of restraint application   Inform patient/family regarding the reason for restraint   Every 2 hours: Monitor safety, psychosocial status, comfort, nutrition and hydration  Taken 10/21/2024 2327 by Belkis Dawson RN  Remains free of injury from restraints (restraint for interference with medical device):   Determine that other, less restrictive measures have been tried or would not be effective before applying the restraint   Evaluate the patient's condition at the time of restraint application   Inform patient/family regarding the reason for restraint   Every 2 hours: Monitor safety, psychosocial status, comfort, nutrition and hydration     Problem: Pain  Goal: Verbalizes/displays adequate comfort level or baseline comfort level  10/22/2024 0238 by Belkis Dawson RN  Outcome: Progressing  10/22/2024 0126 by Belkis Dawson RN  Outcome: Progressing  10/21/2024 1902 by Belkis Dawson RN  Outcome: Progressing  10/21/2024 1735 by Poornima Dorado RN  Outcome: Progressing  Flowsheets (Taken 10/21/2024 1735)  Verbalizes/displays adequate comfort level or baseline comfort level:

## 2024-10-22 NOTE — PROGRESS NOTES
End Of Shift Note  [facility]  Summary of shift: patient was alert and oriented at the start of shift. Patient was having intermittent hallucinations. At 2013 contacted Mitch Hollingsworth d/t patient having 8/10 pain and no pain meds, given orders for Norco Q6h PRN. Patient became more agitated, aggressive and combative towards staff. Contacted , given orders to restart precedex, placed in restraints. Patient refused to let writer check patient for any BM, FMS was removed overnight.     Vitals:    Vitals:    10/22/24 0300 10/22/24 0400 10/22/24 0500 10/22/24 0600   BP: (!) 96/58 (!) 96/59 106/62 (!) 100/58   Pulse: (!) 46 (!) 47 (!) 47 (!) 47   Resp: 22 23 21 21   Temp:  97.9 °F (36.6 °C)     TempSrc:  Axillary     SpO2: 91% 90% (!) 89% 94%   Weight:       Height:            I&O:   Intake/Output Summary (Last 24 hours) at 10/22/2024 0636  Last data filed at 10/22/2024 0626  Gross per 24 hour   Intake 879.25 ml   Output 650 ml   Net 229.25 ml       Resp Status:2LNC    Ventilator Settings:  Vent Mode: CPAP/PS Resp Rate (Set): 18 bpm/Vt (Set, mL): 500 mL/ /FiO2 : 30 %    Critical Care IV infusions:   dexmedeTOMIDine (PRECEDEX) 1,000 mcg in sodium chloride 0.9 % 250 mL infusion 0.2 mcg/kg/hr (10/22/24 0538)    sodium chloride      sodium chloride          LDA:   Peripheral IV 10/19/24 Left Forearm (Active)   Number of days: 3       External Urinary Catheter (Active)   Number of days: 12

## 2024-10-22 NOTE — PROGRESS NOTES
Hospitalist - Progress Note      Patient: Noel Valle    Unit/Bed:1114/1114-01  YOB: 1955  MRN: [id number]   Acct: [id number]   PCP: Ramakrishna Silver MD    Date of Service: Pt seen/examined on 10/21/24  and Admitted to Inpatient with expected LOS greater than two midnights due to medical therapy.     Chief Complaint: Tongue swelling 62-year-old male    Assessment and Plan:-    Acute Respiratory Insufficiency due to upper airway obstruction secondary to Angioedema  S/P extubation 10-13-24  Drowsy as he was extubated and sedative meds weaned off  On 3 L nasal canula  Solumedrol transitioned to Po prednisone  Critical care following.       Hypotension - resolved  Hypertension  Monitor BP   On norvasc and clonidine. Off coreg due to bradycardia   On prn Hydralazine.     HELENE  resolved    Tobacco abuse disorder/history of possible COPD  Sched DuJanice.  Will  on tobacco cessation when patient is alert      Alcohol withdrawal  On protocol  Overnight precedex restarted due to hallucinations and agitation, irritation  Continue to monitor at this time  On zyprexa, valproate and librium restarted  NPO except for meds with apple sauce.     Hypernatremia  Free water flushes. improved    No family at bedside.       Subjective:  Patient improving but overnight he was agitated and given Zyprexa.  Discussed with RN        History Of Present Illness:    69-year-old male coming into the hospital for difficulty swallowing.  In the ER, patient found to have tongue swelling, trouble swallowing with acute distress, tachycardia, ill-appearing status.  Symptoms began earlier today at 6 AM.  He noticed severe swelling of his throat.  Patient denies any allergies, medications.  Patient is not on any ACE inhibitor.  Patient was able to talk but his words were muffled.  In the ER, case was discussed with the anesthesiologist to take the patient to the OR.  Patient was intubated under the supervision of surgery due
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
## 2024-10-22 NOTE — PROGRESS NOTES
Physical Therapy  DATE: 10/22/2024    NAME: Noel Valle  MRN: [id number]   : 1955    Patient not seen this date for Physical Therapy due to:      [x] Cancel by YURI Yen secondary to patient not appropriate this morning. RN reports patient agitated, combative and refusing medications this date requiring to restart precedex and restraints.  Neurology also consulting due to AMS. Patient also has pending Brain MRI today. PT continue to follow.     [] Hemodialysis    [] Critical Lab Value Level     [] Blood transfusion in progress    [] Acute or unstable cardiovascular status   _MAP < 55 or more than >115  _HR < 40 or > 130    [] Acute or unstable pulmonary status   -FiO2 > 60%   _RR < 5 or >40    _O2 sats < 85%    [] Strict Bedrest    [] Off Unit for surgery or procedure    [] Off Unit for testing       [] Pending imaging to R/O fracture    [] Refusal by Patient      [] Other      [] PT being discontinued at this time. Patient independent. No further needs.     [] PT being discontinued at this time as the patient has been transferred to hospice care. No further needs.      Kell Culver, PT

## 2024-10-22 NOTE — PROGRESS NOTES
Occupational Therapy  DATE: 10/22/2024    NAME: Noel Valle  MRN: [id number]   : 1955    Patient not seen this date for Occupational Therapy due to:      [x] Cancel by YURI Yen secondary to patient not appropriate this morning. RN reports patient agitated, combative and refusing medications this date requiring to restart precedex and restraints.  Neurology also consulting due to AMS. Patient also has pending Brain MRI today. OT continue to follow.     [] Hemodialysis    [] Critical Lab Value Level     [] Blood transfusion in progress    [] Acute or unstable cardiovascular status   _MAP < 55 or more than >115  _HR < 40 or > 130    [] Acute or unstable pulmonary status   -FiO2 > 60%   _RR < 5 or >40    _O2 sats < 85%    [] Strict Bedrest    [] Off Unit for surgery or procedure    [] Off Unit for testing       [] Pending imaging to R/O fracture    [] Refusal by Patient      [] Other      [] OT being discontinued at this time. Patient independent. No further needs.     [] OT being discontinued at this time as the patient has been transferred to hospice care. No further needs.      Rhianna Casanova, OT

## 2024-10-22 NOTE — PLAN OF CARE
Problem: Discharge Planning  Goal: Discharge to home or other facility with appropriate resources  10/22/2024 1451 by Yovana Freitas  Outcome: Progressing  10/22/2024 0126 by Belkis Dawson RN  Outcome: Progressing  Flowsheets (Taken 10/21/2024 1930)  Discharge to home or other facility with appropriate resources:   Identify barriers to discharge with patient and caregiver   Arrange for needed discharge resources and transportation as appropriate   Identify discharge learning needs (meds, wound care, etc)   Arrange for interpreters to assist at discharge as needed     Problem: Safety - Medical Restraint  Goal: Remains free of injury from restraints (Restraint for Interference with Medical Device)  Description: INTERVENTIONS:  1. Determine that other, less restrictive measures have been tried or would not be effective before applying the restraint  2. Evaluate the patient's condition at the time of restraint application  3. Inform patient/family regarding the reason for restraint  4. Q2H: Monitor safety, psychosocial status, comfort, nutrition and hydration  Recent Flowsheet Documentation  Taken 10/22/2024 0600 by Belkis Dawson RN  Remains free of injury from restraints (restraint for interference with medical device):   Determine that other, less restrictive measures have been tried or would not be effective before applying the restraint   Evaluate the patient's condition at the time of restraint application   Inform patient/family regarding the reason for restraint   Every 2 hours: Monitor safety, psychosocial status, comfort, nutrition and hydration  Taken 10/22/2024 0400 by Belkis Dawson RN  Remains free of injury from restraints (restraint for interference with medical device):   Determine that other, less restrictive measures have been tried or would not be effective before applying the restraint   Evaluate the patient's condition at the time of restraint application   Every 2 hours: Monitor

## 2024-10-23 PROBLEM — R56.9 SEIZURE-LIKE ACTIVITY (HCC): Status: ACTIVE | Noted: 2024-10-23

## 2024-10-23 LAB
ANION GAP SERPL CALCULATED.3IONS-SCNC: 10 MMOL/L (ref 9–17)
BASOPHILS # BLD: <0.03 K/UL (ref 0–0.2)
BASOPHILS NFR BLD: 0 % (ref 0–2)
BUN SERPL-MCNC: 16 MG/DL (ref 8–23)
BUN/CREAT SERPL: 23 (ref 9–20)
CALCIUM SERPL-MCNC: 8.6 MG/DL (ref 8.6–10.4)
CHLORIDE SERPL-SCNC: 104 MMOL/L (ref 98–107)
CO2 SERPL-SCNC: 28 MMOL/L (ref 20–31)
CREAT SERPL-MCNC: 0.7 MG/DL (ref 0.7–1.2)
EOSINOPHIL # BLD: 0.25 K/UL (ref 0–0.44)
EOSINOPHILS RELATIVE PERCENT: 2 % (ref 1–4)
ERYTHROCYTE [DISTWIDTH] IN BLOOD BY AUTOMATED COUNT: 11.3 % (ref 11.8–14.4)
GFR, ESTIMATED: >90 ML/MIN/1.73M2
GLUCOSE SERPL-MCNC: 74 MG/DL (ref 70–99)
HCT VFR BLD AUTO: 44.3 % (ref 40.7–50.3)
HGB BLD-MCNC: 14.8 G/DL (ref 13–17)
IMM GRANULOCYTES # BLD AUTO: 0.11 K/UL (ref 0–0.3)
IMM GRANULOCYTES NFR BLD: 1 %
LYMPHOCYTES NFR BLD: 2.75 K/UL (ref 1.1–3.7)
LYMPHOCYTES RELATIVE PERCENT: 18 % (ref 24–43)
MCH RBC QN AUTO: 33.3 PG (ref 25.2–33.5)
MCHC RBC AUTO-ENTMCNC: 33.4 G/DL (ref 28.4–34.8)
MCV RBC AUTO: 99.6 FL (ref 82.6–102.9)
MONOCYTES NFR BLD: 0.91 K/UL (ref 0.1–1.2)
MONOCYTES NFR BLD: 6 % (ref 3–12)
NEUTROPHILS NFR BLD: 73 % (ref 36–65)
NEUTS SEG NFR BLD: 11.18 K/UL (ref 1.5–8.1)
NRBC BLD-RTO: 0 PER 100 WBC
PLATELET # BLD AUTO: 169 K/UL (ref 138–453)
PMV BLD AUTO: 11.1 FL (ref 8.1–13.5)
POTASSIUM SERPL-SCNC: 3.5 MMOL/L (ref 3.7–5.3)
RBC # BLD AUTO: 4.45 M/UL (ref 4.21–5.77)
SODIUM SERPL-SCNC: 142 MMOL/L (ref 135–144)
WBC OTHER # BLD: 15.2 K/UL (ref 3.5–11.3)

## 2024-10-23 PROCEDURE — 87086 URINE CULTURE/COLONY COUNT: CPT

## 2024-10-23 PROCEDURE — 97110 THERAPEUTIC EXERCISES: CPT

## 2024-10-23 PROCEDURE — 2500000003 HC RX 250 WO HCPCS: Performed by: INTERNAL MEDICINE

## 2024-10-23 PROCEDURE — 85025 COMPLETE CBC W/AUTO DIFF WBC: CPT

## 2024-10-23 PROCEDURE — 6370000000 HC RX 637 (ALT 250 FOR IP): Performed by: NURSE PRACTITIONER

## 2024-10-23 PROCEDURE — 6370000000 HC RX 637 (ALT 250 FOR IP): Performed by: FAMILY MEDICINE

## 2024-10-23 PROCEDURE — 6370000000 HC RX 637 (ALT 250 FOR IP): Performed by: HOSPITALIST

## 2024-10-23 PROCEDURE — 6370000000 HC RX 637 (ALT 250 FOR IP): Performed by: INTERNAL MEDICINE

## 2024-10-23 PROCEDURE — 95816 EEG AWAKE AND DROWSY: CPT | Performed by: PSYCHIATRY & NEUROLOGY

## 2024-10-23 PROCEDURE — 6360000002 HC RX W HCPCS: Performed by: FAMILY MEDICINE

## 2024-10-23 PROCEDURE — 2580000003 HC RX 258: Performed by: FAMILY MEDICINE

## 2024-10-23 PROCEDURE — 2580000003 HC RX 258: Performed by: NURSE PRACTITIONER

## 2024-10-23 PROCEDURE — 2580000003 HC RX 258: Performed by: INTERNAL MEDICINE

## 2024-10-23 PROCEDURE — 36415 COLL VENOUS BLD VENIPUNCTURE: CPT

## 2024-10-23 PROCEDURE — 87186 SC STD MICRODIL/AGAR DIL: CPT

## 2024-10-23 PROCEDURE — 80048 BASIC METABOLIC PNL TOTAL CA: CPT

## 2024-10-23 PROCEDURE — 97530 THERAPEUTIC ACTIVITIES: CPT

## 2024-10-23 PROCEDURE — 2500000003 HC RX 250 WO HCPCS: Performed by: NURSE PRACTITIONER

## 2024-10-23 PROCEDURE — 94761 N-INVAS EAR/PLS OXIMETRY MLT: CPT

## 2024-10-23 PROCEDURE — 97535 SELF CARE MNGMENT TRAINING: CPT

## 2024-10-23 PROCEDURE — 51798 US URINE CAPACITY MEASURE: CPT

## 2024-10-23 PROCEDURE — 51702 INSERT TEMP BLADDER CATH: CPT

## 2024-10-23 PROCEDURE — 6360000002 HC RX W HCPCS: Performed by: NURSE PRACTITIONER

## 2024-10-23 PROCEDURE — 99232 SBSQ HOSP IP/OBS MODERATE 35: CPT | Performed by: PSYCHIATRY & NEUROLOGY

## 2024-10-23 PROCEDURE — 2000000000 HC ICU R&B

## 2024-10-23 PROCEDURE — 87077 CULTURE AEROBIC IDENTIFY: CPT

## 2024-10-23 PROCEDURE — 95819 EEG AWAKE AND ASLEEP: CPT

## 2024-10-23 RX ORDER — MULTIVITAMIN WITH IRON
1 TABLET ORAL DAILY
Status: DISCONTINUED | OUTPATIENT
Start: 2024-10-24 | End: 2024-11-08 | Stop reason: HOSPADM

## 2024-10-23 RX ORDER — PREDNISONE 10 MG/1
10 TABLET ORAL DAILY
Status: DISCONTINUED | OUTPATIENT
Start: 2024-10-24 | End: 2024-10-25

## 2024-10-23 RX ORDER — KETOROLAC TROMETHAMINE 15 MG/ML
15 INJECTION, SOLUTION INTRAMUSCULAR; INTRAVENOUS ONCE
Status: COMPLETED | OUTPATIENT
Start: 2024-10-23 | End: 2024-10-23

## 2024-10-23 RX ADMIN — Medication 15 ML: at 07:55

## 2024-10-23 RX ADMIN — KETOROLAC TROMETHAMINE 15 MG: 15 INJECTION, SOLUTION INTRAMUSCULAR; INTRAVENOUS at 21:37

## 2024-10-23 RX ADMIN — VALPROIC ACID 250 MG: 250 CAPSULE, LIQUID FILLED ORAL at 07:54

## 2024-10-23 RX ADMIN — DEXMEDETOMIDINE 0.2 MCG/KG/HR: 100 INJECTION, SOLUTION INTRAVENOUS at 23:46

## 2024-10-23 RX ADMIN — CLONAZEPAM 1 MG: 0.5 TABLET ORAL at 07:55

## 2024-10-23 RX ADMIN — HYDROCODONE BITARTRATE AND ACETAMINOPHEN 1 TABLET: 5; 325 TABLET ORAL at 18:53

## 2024-10-23 RX ADMIN — ANTI-FUNGAL POWDER MICONAZOLE NITRATE TALC FREE: 1.42 POWDER TOPICAL at 07:55

## 2024-10-23 RX ADMIN — CLONIDINE HYDROCHLORIDE 0.2 MG: 0.2 TABLET ORAL at 07:54

## 2024-10-23 RX ADMIN — Medication 100 MG: at 07:54

## 2024-10-23 RX ADMIN — CLONAZEPAM 1 MG: 0.5 TABLET ORAL at 20:38

## 2024-10-23 RX ADMIN — HYDROCODONE BITARTRATE AND ACETAMINOPHEN 1 TABLET: 5; 325 TABLET ORAL at 04:17

## 2024-10-23 RX ADMIN — VALPROIC ACID 250 MG: 250 CAPSULE, LIQUID FILLED ORAL at 14:13

## 2024-10-23 RX ADMIN — PREDNISONE 20 MG: 20 TABLET ORAL at 07:54

## 2024-10-23 RX ADMIN — VALPROIC ACID 250 MG: 250 CAPSULE, LIQUID FILLED ORAL at 20:38

## 2024-10-23 RX ADMIN — FOLIC ACID 1 MG: 1 TABLET ORAL at 07:54

## 2024-10-23 RX ADMIN — AMLODIPINE BESYLATE 10 MG: 10 TABLET ORAL at 07:55

## 2024-10-23 RX ADMIN — SODIUM CHLORIDE, PRESERVATIVE FREE 10 ML: 5 INJECTION INTRAVENOUS at 20:45

## 2024-10-23 RX ADMIN — ENOXAPARIN SODIUM 40 MG: 100 INJECTION SUBCUTANEOUS at 07:55

## 2024-10-23 RX ADMIN — SODIUM CHLORIDE, PRESERVATIVE FREE 10 ML: 5 INJECTION INTRAVENOUS at 20:46

## 2024-10-23 RX ADMIN — ANTI-FUNGAL POWDER MICONAZOLE NITRATE TALC FREE: 1.42 POWDER TOPICAL at 00:57

## 2024-10-23 RX ADMIN — OLANZAPINE 5 MG: 5 TABLET, FILM COATED ORAL at 20:38

## 2024-10-23 RX ADMIN — SODIUM CHLORIDE, PRESERVATIVE FREE 10 ML: 5 INJECTION INTRAVENOUS at 10:44

## 2024-10-23 RX ADMIN — ANTI-FUNGAL POWDER MICONAZOLE NITRATE TALC FREE: 1.42 POWDER TOPICAL at 20:39

## 2024-10-23 RX ADMIN — CLONIDINE HYDROCHLORIDE 0.2 MG: 0.2 TABLET ORAL at 20:39

## 2024-10-23 RX ADMIN — HYDROCODONE BITARTRATE AND ACETAMINOPHEN 1 TABLET: 5; 325 TABLET ORAL at 11:34

## 2024-10-23 RX ADMIN — ATORVASTATIN CALCIUM 40 MG: 40 TABLET, FILM COATED ORAL at 07:54

## 2024-10-23 RX ADMIN — FAMOTIDINE 20 MG: 20 TABLET, FILM COATED ORAL at 07:54

## 2024-10-23 RX ADMIN — FAMOTIDINE 20 MG: 20 TABLET, FILM COATED ORAL at 20:39

## 2024-10-23 ASSESSMENT — PAIN DESCRIPTION - FREQUENCY
FREQUENCY: CONTINUOUS
FREQUENCY: CONTINUOUS

## 2024-10-23 ASSESSMENT — PAIN SCALES - GENERAL
PAINLEVEL_OUTOF10: 0
PAINLEVEL_OUTOF10: 4
PAINLEVEL_OUTOF10: 7
PAINLEVEL_OUTOF10: 0
PAINLEVEL_OUTOF10: 9
PAINLEVEL_OUTOF10: 0
PAINLEVEL_OUTOF10: 0
PAINLEVEL_OUTOF10: 9
PAINLEVEL_OUTOF10: 0

## 2024-10-23 ASSESSMENT — PAIN DESCRIPTION - DESCRIPTORS
DESCRIPTORS: PINS AND NEEDLES
DESCRIPTORS: PINS AND NEEDLES
DESCRIPTORS: CRAMPING
DESCRIPTORS: DULL;DISCOMFORT

## 2024-10-23 ASSESSMENT — PAIN DESCRIPTION - PAIN TYPE
TYPE: CHRONIC PAIN
TYPE: CHRONIC PAIN

## 2024-10-23 ASSESSMENT — PAIN DESCRIPTION - LOCATION
LOCATION: LEG
LOCATION: LEG;FOOT
LOCATION: LEG
LOCATION: LEG;FOOT

## 2024-10-23 ASSESSMENT — PAIN DESCRIPTION - ONSET
ONSET: SUDDEN
ONSET: SUDDEN

## 2024-10-23 ASSESSMENT — PAIN DESCRIPTION - ORIENTATION
ORIENTATION: RIGHT

## 2024-10-23 ASSESSMENT — PAIN - FUNCTIONAL ASSESSMENT
PAIN_FUNCTIONAL_ASSESSMENT: PREVENTS OR INTERFERES SOME ACTIVE ACTIVITIES AND ADLS
PAIN_FUNCTIONAL_ASSESSMENT: PREVENTS OR INTERFERES WITH MANY ACTIVE NOT PASSIVE ACTIVITIES
PAIN_FUNCTIONAL_ASSESSMENT: ACTIVITIES ARE NOT PREVENTED
PAIN_FUNCTIONAL_ASSESSMENT: PREVENTS OR INTERFERES WITH MANY ACTIVE NOT PASSIVE ACTIVITIES

## 2024-10-23 NOTE — PROGRESS NOTES
Hospitalist - Progress Note      Patient: Noel Valle    Unit/Bed:1114/1114-01  YOB: 1955  MRN: [id number]   Acct: [id number]   PCP: Ramakrishna Silver MD    Date of Service: Pt seen/examined on 10/22/24  and Admitted to Inpatient with expected LOS greater than two midnights due to medical therapy.     Chief Complaint: Tongue swelling 62-year-old male    Assessment and Plan:-    Acute Respiratory Insufficiency due to upper airway obstruction secondary to Angioedema  S/P extubation 10-13-24  Drowsy as he was extubated and sedative meds weaned off  On 3 L nasal canula  Solumedrol transitioned to Po prednisone  Critical care following.       Hypotension - resolved  Hypertension  Monitor BP   On norvasc and clonidine. Off coreg due to bradycardia   On prn Hydralazine.     HELENE  resolved    Tobacco abuse disorder/history of possible COPD  Sched DuSchuylerbs.  Will  on tobacco cessation when patient is alert      Alcohol withdrawal  On protocol  Overnight precedex restarted due to hallucinations and agitation, irritation  Continue to monitor at this time  On zyprexa, valproate and librium restarted  NPO except for meds with apple sauce.     Agitation  Haldol prn  Consult Neuro  D/w RN    Hypernatremia  Free water flushes. improved    No family at bedside.       Subjective:  Patient resting  However on questioning, got agitated  Presently in physical restrains for his safety  Discussed with RN        History Of Present Illness:    69-year-old male coming into the hospital for difficulty swallowing.  In the ER, patient found to have tongue swelling, trouble swallowing with acute distress, tachycardia, ill-appearing status.  Symptoms began earlier today at 6 AM.  He noticed severe swelling of his throat.  Patient denies any allergies, medications.  Patient is not on any ACE inhibitor.  Patient was able to talk but his words were muffled.  In the ER, case was discussed with the anesthesiologist to take  °F (36 °C) (Temporal)   Resp 23   Ht 1.66 m (5' 5.35\")   Wt 90.2 kg (198 lb 13.7 oz)   SpO2 94%   BMI 32.74 kg/m²   General appearance: alert   HEENT: Normal cephalic, atraumatic, EOMI, MM dry  Neck: Supple, with full range of motion. No jugular venous distention. Trachea midline.  Respiratory:  Normal respiratory effort. Clear   Cardiovascular: Regular rate and rhythm with normal S1/S2 without murmurs, rubs or gallops.  Abdomen: Soft, non-tender, non-distended with normal bowel sounds.  Musculoskeletal:  No clubbing, cyanosis or edema bilaterally.  Skin: Skin color, texture, turgor normal.  No rashes or lesions.  Neurologic:  grossly normal  Psychiatric: Resting  Capillary Refill: Brisk,< 3 seconds   Peripheral Pulses: +2 palpable, equal bilaterally     Labs:   Recent Labs     10/20/24  0533 10/21/24  0626 10/22/24  0929   WBC 11.1 15.0* 12.5*   HGB 15.0 16.3 14.5   HCT 44.6 47.3 44.1    167 145     Recent Labs     10/20/24  0533 10/21/24  0626 10/22/24  1121    142 142   K 4.1 3.5* 3.6*    105 105   CO2 25 28 28   BUN 25* 19 19   CREATININE 0.7 0.8 0.7   CALCIUM 8.3* 8.6 8.3*     Recent Labs     10/20/24  0533   AST 24   ALT 54*   BILIDIR 0.2   BILITOT 1.0   ALKPHOS 72       No results for input(s): \"INR\" in the last 72 hours.  No results for input(s): \"CKTOTAL\", \"TROPONINI\" in the last 72 hours.    Urinalysis:    No results found for: \"NITRU\", \"WBCUA\", \"BACTERIA\", \"RBCUA\", \"BLOODU\", \"SPECGRAV\", \"GLUCOSEU\"    Radiology:   XR CHEST (SINGLE VIEW FRONTAL)   Final Result   Mild left lower lobe atelectatic changes. No consolidative infiltrates.         MRI BRAIN W WO CONTRAST   Final Result   1. No acute infarct or other acute intracranial process.   2. Mild chronic microvascular ischemic changes.         XR CHEST (SINGLE VIEW FRONTAL)   Final Result   1. Poor inspiratory effort.         XR CHEST (SINGLE VIEW FRONTAL)   Final Result   1. Probable mild atelectatic changes at the base of the left

## 2024-10-23 NOTE — PROGRESS NOTES
Occupational Therapy  Facility/Department: [facility] ICU  Occupational Therapy Daily Treatment Note    Name: Noel Valle  : 1955  MRN: [id number]  Date of Service: 10/23/2024    Discharge Recommendations:  Patient would benefit from continued therapy after discharge Pt currently functioning below baseline.  Recommend daily inpatient skilled therapy at time of discharge to maximize long term outcomes and prevent re-admission. Please refer to AM-PAC score for current level of function.     Patient Diagnosis(es): The encounter diagnosis was Angioedema, initial encounter.  Past Medical History:  has no past medical history on file.  Past Surgical History:  has no past surgical history on file.     YURI Augustin reports patient is medically stable for therapy treatment this date.    Chart reviewed prior to treatment and patient is agreeable for therapy.  All lines intact and patient positioned comfortably at end of treatment.  All patient needs addressed prior to ending therapy session.      Assessment  Performance deficits / Impairments: Decreased functional mobility ;Decreased ADL status;Decreased ROM;Decreased strength;Decreased safe awareness;Decreased cognition;Decreased endurance;Decreased fine motor control;Decreased high-level IADLs;Decreased coordination;Decreased posture  Assessment: Pt continues to have decreased strength and activity tolerance at this time, needing 2 person assistance for bed mobility and sitting balance for safety. Not safe to trial standing this session but will continue to assess as able. Pt demo's good motivation this date to participate and puts good effort into session.  Pt would benefit from continued OT treatment to increase independence with ADLs, functional mobility, and ADL transfers for discharge.  Prognosis: Fair  Decision Making: Medium Complexity  REQUIRES OT FOLLOW-UP: Yes  Activity Tolerance  Activity Tolerance: Patient Tolerated treatment well;Treatment limited secondary to

## 2024-10-23 NOTE — PROGRESS NOTES
Patient had not urinated all day and he stated that he had no urge to urinate. Bladder scan revealed >900. Orders from Dr. Uribe to straight cath once. Straight cath 900 ml out

## 2024-10-23 NOTE — PLAN OF CARE
Problem: Discharge Planning  Goal: Discharge to home or other facility with appropriate resources  10/22/2024 2202 by Columba Blood RN  Outcome: Progressing  Flowsheets (Taken 10/22/2024 2000)  Discharge to home or other facility with appropriate resources:   Identify barriers to discharge with patient and caregiver   Arrange for needed discharge resources and transportation as appropriate   Identify discharge learning needs (meds, wound care, etc)   Refer to discharge planning if patient needs post-hospital services based on physician order or complex needs related to functional status, cognitive ability or social support system  10/22/2024 1451 by Yovana Freitas  Outcome: Progressing     Problem: Respiratory - Adult  Goal: Achieves optimal ventilation and oxygenation  10/22/2024 2202 by Columba Blood RN  Outcome: Progressing  10/22/2024 1451 by Yovana Freitas  Outcome: Progressing     Problem: Pain  Goal: Verbalizes/displays adequate comfort level or baseline comfort level  10/22/2024 2202 by Columba Blood RN  Outcome: Progressing  10/22/2024 1451 by Yovana Freitas  Outcome: Progressing     Problem: Skin/Tissue Integrity  Goal: Absence of new skin breakdown  Description: 1.  Monitor for areas of redness and/or skin breakdown  2.  Assess vascular access sites hourly  3.  Every 4-6 hours minimum:  Change oxygen saturation probe site  4.  Every 4-6 hours:  If on nasal continuous positive airway pressure, respiratory therapy assess nares and determine need for appliance change or resting period.  10/22/2024 2202 by Columba Blood RN  Outcome: Progressing  10/22/2024 1451 by Yovana Freitas  Outcome: Progressing     Problem: Safety - Adult  Goal: Free from fall injury  10/22/2024 2202 by Columba Blood RN  Outcome: Progressing  10/22/2024 1451 by Yovana Freitas  Outcome: Progressing     Problem: Neurosensory - Adult  Goal: Achieves stable or improved neurological status  10/22/2024 2202 by eJremi

## 2024-10-23 NOTE — PROGRESS NOTES
Pulmonary Critical Care Progress Note    Patient seen for the follow up of Angioedema of tongue     Subjective:    He is apologetic and more comfortable less agitated today.  He is off Precedex.  He is off oxygen.  Tolerates oral intake he worked with physical therapy  Examination:    Vitals: /65   Pulse 75   Temp 98.5 °F (36.9 °C) (Oral)   Resp 22   Ht 1.66 m (5' 5.35\")   Wt 91.2 kg (201 lb 1 oz)   SpO2 97%   BMI 33.10 kg/m²   SpO2  Av.9 %  Min: 90 %  Max: 97 %  General appearance: Awake alert follows commands  Neck: No JVD  Lungs: Decreased breath sound no crackles or wheeze  Heart: regular rate and rhythm, S1, S2 normal, no gallop  Abdomen: Soft, non tender, + BS  Extremities: no cyanosis or clubbing. No significant edema    LABs:    CBC:   Recent Labs     10/22/24  0929 10/23/24  0437   WBC 12.5* 15.2*   HGB 14.5 14.8   HCT 44.1 44.3    169     BMP:   Recent Labs     10/22/24  1121 10/23/24  0437    142   K 3.6* 3.5*   CO2 28 28   BUN 19 16   CREATININE 0.7 0.7   LABGLOM >90 >90   GLUCOSE 92 74      Latest Reference Range & Units 10/14/24 06:00 10/14/24 12:31   POC TCO2 22 - 30 mmol/L  27   POC HCO3 21.0 - 28.0 mmol/L 28.1 (H) 26.6   POC O2 SAT 94.0 - 98.0 % 95.6 95.8   POC pCO2 35.0 - 48.0 mm Hg 43.2 42.0   POC pH 7.350 - 7.450  7.421 7.410   POC PO2 83.0 - 108.0 mm Hg 78.4 (L) 79.8 (L)   (H): Data is abnormally high  (L): Data is abnormally low      Radiology:  Chest x-ray 10/20  Poor inspiratory effort.       Chest x-ray 10/17 reviewed  1. Probable mild atelectatic changes at the base of the left lung.  2. Small left pleural effusion versus pleural thickening.  3. No other acute cardiopulmonary process.      Chest x-ray 10/16  Reviewed  Small left effusion with atelectasis.            Impression/recommendations:    Acute respiratory insufficiency due to upper airway instruction/angioedema with significant swelling upper airway/atelectasis with small effusion  Oxygen by nasal

## 2024-10-23 NOTE — PLAN OF CARE
Problem: Pain  Goal: Verbalizes/displays adequate comfort level or baseline comfort level  Outcome: Progressing  Flowsheets (Taken 10/23/2024 6170)  Verbalizes/displays adequate comfort level or baseline comfort level:   Encourage patient to monitor pain and request assistance   Assess pain using appropriate pain scale   Administer analgesics based on type and severity of pain and evaluate response   Implement non-pharmacological measures as appropriate and evaluate response     Problem: Skin/Tissue Integrity  Goal: Absence of new skin breakdown  Description: 1.  Monitor for areas of redness and/or skin breakdown  2.  Assess vascular access sites hourly  3.  Every 4-6 hours minimum:  Change oxygen saturation probe site  4.  Every 4-6 hours:  If on nasal continuous positive airway pressure, respiratory therapy assess nares and determine need for appliance change or resting period.  Outcome: Progressing     Problem: Safety - Adult  Goal: Free from fall injury  Outcome: Progressing     Problem: Skin/Tissue Integrity - Adult  Goal: Skin integrity remains intact  Outcome: Progressing     Problem: Musculoskeletal - Adult  Goal: Return mobility to safest level of function  Outcome: Progressing

## 2024-10-23 NOTE — PROGRESS NOTES
Spiritual Health History and Assessment/Progress Note  [facility]    (P) Spiritual/Emotional Needs,  ,  ,      Name: Noel Valle MRN: [id number]    Age: 69 y.o.     Sex: male   Language: English   Confucianist: New Religionist   Angioedema of tongue     Date: 10/23/2024            Total Time Calculated: (P) 11 min              Spiritual Assessment continued in [facility] ICU        Referral/Consult From: (P) Rounding   Encounter Overview/Reason: (P) Spiritual/Emotional Needs  Service Provided For: (P) Patient and family together  Patient and sister value prayer.  Kailee, Belief, Meaning:   Patient has beliefs or practices that help with coping during difficult times  Family/Friends have beliefs or practices that help with coping during difficult times      Importance and Influence:  Patient has spiritual/personal beliefs that influence decisions regarding their health  Family/Friends have spiritual/personal beliefs that influence decisions regarding the patient's health    Community:  Patient feels well-supported. Support system includes: Other: sister and other family members  Family/Friends feel well-supported. Support system includes: Other: family members    Assessment and Plan of Care:     Patient Interventions include: Facilitated expression of thoughts and feelings and Affirmed coping skills/support systems  Family/Friends Interventions include: Facilitated expression of thoughts and feelings and Affirmed coping skills/support systems    Patient Plan of Care: Spiritual Care available upon further referral  Family/Friends Plan of Care: Spiritual Care available upon further referral    Electronically signed by Chaplain Nighat on 10/23/2024 at 11:12 AM

## 2024-10-23 NOTE — PROGRESS NOTES
14.5 14.8    145 169     BMP:    Recent Labs     10/21/24  0626 10/22/24  1121 10/23/24  0437    142 142   K 3.5* 3.6* 3.5*    105 104   CO2 28 28 28   BUN 19 19 16   CREATININE 0.8 0.7 0.7   GLUCOSE 84 92 74         Lab Results   Component Value Date    TRIG 222 (H) 10/11/2024    ALT 54 (H) 10/20/2024    AST 24 10/20/2024    TSH 0.01 (L) 10/22/2024    [id number] 1213 10/22/2024    MG 1.7 10/09/2024    PHOS 3.6 10/09/2024       No results found for: \"PHENYTOIN\", \"PHENOBARB\", \"VALPROATE\", \"CBMZ\"      Imaging/Diagnostics:      EEG:       No results found for this or any previous visit.    No results found for this or any previous visit.    No results found for this or any previous visit.    No results found for this or any previous visit.    No results found for this or any previous visit.    Results for orders placed during the hospital encounter of 10/09/24    MRI BRAIN W WO CONTRAST    Narrative  EXAMINATION:  MRI OF THE BRAIN WITHOUT AND WITH CONTRAST  10/22/2024 12:53 pm    TECHNIQUE:  Multiplanar multisequence MRI of the head/brain was performed without and  with the administration of intravenous contrast.    COMPARISON:  None.    HISTORY:  ORDERING SYSTEM PROVIDED HISTORY: ams      FINDINGS:  INTRACRANIAL STRUCTURES/VENTRICLES: No evidence of an acute infarct or other  acute parenchymal process. No evidence of acute intracranial hemorrhage.  There is no evidence of an intracranial mass or extraaxial fluid collection.  No significant mass effect or midline shift. Scattered patchy foci of  T2/FLAIR hyperintensity are present within the supratentorial white matter  which is a nonspecific finding but likely represents mild chronic  microvascular ischemia. There is mild generalized volume loss. Ventricular  enlargement concordant with the degree of parenchymal volume loss.  The  sellar/suprasellar regions appear unremarkable. The normal signal voids  within the major intracranial vessels appear

## 2024-10-23 NOTE — PROGRESS NOTES
End Of Shift Note  [facility]    Summary of shift: Patient has been pleasant all shift. At times he is slightly confused. He has been getting stronger, his right side is weaker than his left. He has been off of precedex since last night. He has had urine retention and required straight cath with 900 ml out.     Vitals:    Vitals:    10/23/24 1500 10/23/24 1600 10/23/24 1700 10/23/24 1800   BP: 117/62 113/71 126/73 116/63   Pulse: 73 80 90 85   Resp: 23 19 23 22   Temp:  98.5 °F (36.9 °C)     TempSrc:  Oral     SpO2: 97% 98% 98% 97%   Weight:       Height:            I&O:   Intake/Output Summary (Last 24 hours) at 10/23/2024 1822  Last data filed at 10/23/2024 1500  Gross per 24 hour   Intake 615.5 ml   Output 1550 ml   Net -934.5 ml       Resp Status: Room air    Critical Care IV infusions:   dexmedeTOMIDine (PRECEDEX) 1,000 mcg in sodium chloride 0.9 % 250 mL infusion Stopped (10/22/24 2150)    sodium chloride      sodium chloride          LDA:   Peripheral IV 10/19/24 Left Forearm (Active)   Number of days: 4       External Urinary Catheter (Active)   Number of days: 14

## 2024-10-23 NOTE — PROGRESS NOTES
Hospitalist - Progress Note      Patient: Noel Valle    Unit/Bed:1114/1114-01  YOB: 1955  MRN: [id number]   Acct: [id number]   PCP: Ramakrishna Silver MD    Date of Service: Pt seen/examined on 10/23/24  and Admitted to Inpatient with expected LOS greater than two midnights due to medical therapy.     Chief Complaint: Tongue swelling 62-year-old male    Assessment and Plan:-    Acute Respiratory Insufficiency due to upper airway obstruction secondary to Angioedema  S/P extubation 10-13-24  Drowsy as he was extubated and sedative meds weaned off  On 3 L nasal canula  Solumedrol transitioned to Po prednisone  Critical care following.       Hypotension - resolved  Hypertension  Monitor BP   On norvasc and clonidine. Off coreg due to bradycardia   On prn Hydralazine.     HELENE  resolved    Tobacco abuse disorder/history of possible COPD  Sched DuSchuylerbs.  Will  on tobacco cessation when patient is alert      Alcohol withdrawal  On protocol  Overnight precedex restarted due to hallucinations and agitation, irritation  Continue to monitor at this time  On zyprexa, valproate and librium restarted  NPO except for meds with apple sauce.-Diet advanced  CIWA protocol    Agitation secondary to alcohol withdrawal, metabolic disturbances, polypharmacy  Haldol prn  Consult Neuro  D/w RN  Prednisone may be contributing per neurology  Thiamine added per neuro  Okay to add Librium as needed per neuro  Neurochecks  EEG    Hypernatremia  Free water flushes. improved    No family at bedside.       Subjective:  Patient resting.  Much more pleasant  Bed turned to view outside room window  Discussed with RN        History Of Present Illness:    69-year-old male coming into the hospital for difficulty swallowing.  In the ER, patient found to have tongue swelling, trouble swallowing with acute distress, tachycardia, ill-appearing status.  Symptoms began earlier today at 6 AM.  He noticed severe swelling of his  Safety Tray; Safety Tray (Disposables No Utensils)    Review of systems:   Pertinent positives as noted in the HPI. All other systems reviewed and negative.    PHYSICAL EXAM:  /80   Pulse (!) 106   Temp 98.4 °F (36.9 °C) (Oral)   Resp 26   Ht 1.66 m (5' 5.35\")   Wt 91.2 kg (201 lb 1 oz)   SpO2 93%   BMI 33.10 kg/m²   General appearance: alert   HEENT: Normal cephalic, atraumatic, EOMI, MM dry  Neck: Supple, with full range of motion. No jugular venous distention. Trachea midline.  Respiratory:  Normal respiratory effort. Clear   Cardiovascular: Regular rate and rhythm with normal S1/S2 without murmurs, rubs or gallops.  Abdomen: Soft, non-tender, non-distended with normal bowel sounds.  Musculoskeletal:  No clubbing, cyanosis or edema bilaterally.  Skin: Skin color, texture, turgor normal.  No rashes or lesions.  Neurologic:  grossly normal  Psychiatric: Resting  Capillary Refill: Brisk,< 3 seconds   Peripheral Pulses: +2 palpable, equal bilaterally     Labs:   Recent Labs     10/21/24  0626 10/22/24  0929 10/23/24  0437   WBC 15.0* 12.5* 15.2*   HGB 16.3 14.5 14.8   HCT 47.3 44.1 44.3    145 169     Recent Labs     10/21/24  0626 10/22/24  1121 10/23/24  0437    142 142   K 3.5* 3.6* 3.5*    105 104   CO2 28 28 28   BUN 19 19 16   CREATININE 0.8 0.7 0.7   CALCIUM 8.6 8.3* 8.6     No results for input(s): \"AST\", \"ALT\", \"BILIDIR\", \"BILITOT\", \"ALKPHOS\" in the last 72 hours.      No results for input(s): \"INR\" in the last 72 hours.  No results for input(s): \"CKTOTAL\", \"TROPONINI\" in the last 72 hours.    Urinalysis:    No results found for: \"NITRU\", \"WBCUA\", \"BACTERIA\", \"RBCUA\", \"BLOODU\", \"SPECGRAV\", \"GLUCOSEU\"    Radiology:   XR CHEST (SINGLE VIEW FRONTAL)   Final Result   Mild left lower lobe atelectatic changes. No consolidative infiltrates.         MRI BRAIN W WO CONTRAST   Final Result   1. No acute infarct or other acute intracranial process.   2. Mild chronic microvascular

## 2024-10-23 NOTE — PROGRESS NOTES
End Of Shift Note  [facility]  Summary of shift: Patient presented as pleasantly confused and conversational. Precedex turned off at 10pm due to bradycardia and hypotension. Restraints discontinued. CHG bath and complete linen change. RASS score has been 0 since 11pm. Blood sugar 74 this morning, given apple juice. Patient expresses regret and apologizes for his actions and verbiage in the previous days.     Urine output: 650ml  BM x3 Loose, watery, brown.     Vitals:    Vitals:    10/23/24 0400 10/23/24 0417 10/23/24 0447 10/23/24 0500   BP:  136/66  115/60   Pulse:  97  92   Resp:  20 20 24   Temp:       TempSrc:       SpO2: 90%   96%   Weight:       Height:            I&O:   Intake/Output Summary (Last 24 hours) at 10/23/2024 0544  Last data filed at 10/23/2024 0500  Gross per 24 hour   Intake 726.06 ml   Output 1050 ml   Net -323.94 ml       Resp Status: 2LNC    Ventilator Settings:  Vent Mode: CPAP/PS Resp Rate (Set): 18 bpm/Vt (Set, mL): 500 mL/ /FiO2 : 30 %    Critical Care IV infusions:   dexmedeTOMIDine (PRECEDEX) 1,000 mcg in sodium chloride 0.9 % 250 mL infusion Stopped (10/22/24 2150)    sodium chloride      sodium chloride          LDA:   Peripheral IV 10/19/24 Left Forearm (Active)   Number of days: 3       External Urinary Catheter (Active)   Number of days: 13

## 2024-10-23 NOTE — PROGRESS NOTES
Physical Therapy  Facility/Department: [facility] ICU  Daily Treatment Note  NAME: Noel Valle  : 1955  MRN: [id number]    Date of Service: 10/23/2024    Discharge Recommendations:  Pt currently functioning below baseline.  Recommend daily inpatient skilled therapy at time of discharge to maximize long term outcomes and prevent re-admission. Please refer to AM-PAC score for current level of function.       Patient Diagnosis(es): The encounter diagnosis was Angioedema, initial encounter.    Assessment  Assessment: Pt. with negative MRI yesterday. Presents with Rt. side weakness compared to L, however very weak globallly. Noticing coordination deficits in Rt. hand. Static sitting balance requiring max assist at times to maintain. Not approp. to attempt standing at this time. Will continue to progress.    Plan  Physical Therapy Plan  General Plan: 5-7 times per week  Current Treatment Recommendations: Strengthening;ROM;Patient/Caregiver education & training;Functional mobility training;Therapeutic activities;Safety education & training;Positioning;Balance training    Restrictions  Restrictions/Precautions  Restrictions/Precautions: General Precautions, Fall Risk, Seizure, Bed Alarm  Required Braces or Orthoses?: No  Position Activity Restriction  Other position/activity restrictions: FMS, ex cath, telemetry, continuous pulse ox, alarms, ACE bandage on LUE     Subjective   Subjective  Subjective: \"I feel terrible that I was agitated and mean to the nurses.\"  Orientation  Overall Orientation Status: Impaired  Orientation Level: Oriented to person;Disoriented to place;Disoriented to situation;Oriented to time  Cognition  Overall Cognitive Status: Exceptions  Arousal/Alertness: Appears intact  Following Commands: Follows one step commands with increased time  Memory: Impaired  Safety Judgement: Impaired  Problem Solving: Impaired  Insights: Impaired  Initiation: Requires cues for all  Sequencing: Impaired  Cognition

## 2024-10-23 NOTE — PROCEDURES
PROCEDURE NOTE  Date: 10/23/2024   Name: Noel Valle  YOB: 1955    Procedures    EEG REPORT     CLINICAL NEUROPHYSIOLOGY LABORATORY  DEPARTMENT OF NEUROLOGY  [facility]       Patient: Noel Valle Age: 69 y.o.  MRN: [id number]      Referring Provider: No ref. provider found    History: This routine 30 minute scalp EEG was recorded with video- monitoring for a 69 y.o.. male  who presented with encephalopathy. This EEG was performed to evaluate for focal and epileptiform abnormalities.     Noel Valle   Current Facility-Administered Medications   Medication Dose Route Frequency Provider Last Rate Last Admin    [START ON 10/24/2024] multivitamin 1 tablet  1 tablet Oral Daily Theresa Uribe MD        [START ON 10/24/2024] predniSONE (DELTASONE) tablet 10 mg  10 mg Oral Daily Carlos Gamboa MD        LORazepam (ATIVAN) injection 1 mg  1 mg IntraVENous Q2H PRN Carlos Gamboa MD        clonazePAM (KLONOPIN) tablet 1 mg  1 mg Oral Q12H Carlos Gamboa MD   1 mg at 10/23/24 0755    ziprasidone (GEODON) 10 mg in sterile water 0.5 mL injection  10 mg IntraMUSCular Daily PRN Carlos Gamboa MD        miconazole (MICOTIN) 2 % powder   Topical BID Mitch Hollingsworth APRN - CNP   Given at 10/23/24 0755    potassium chloride (KLOR-CON M) extended release tablet 40 mEq  40 mEq Oral PRN Carlos Gamboa MD        Or    potassium bicarb-citric acid (EFFER-K) effervescent tablet 40 mEq  40 mEq Oral PRN Carlos Gamboa MD   40 mEq at 10/21/24 1552    Or    potassium chloride 10 mEq/100 mL IVPB (Peripheral Line)  10 mEq IntraVENous PRN Carlos Gamboa MD        valproic acid (DEPAKENE) capsule 250 mg  250 mg Oral TID Carlos Gamboa MD   250 mg at 10/23/24 1413    cloNIDine (CATAPRES) tablet 0.2 mg  0.2 mg Oral BID Theresa Uribe MD   0.2 mg at 10/23/24 0754    folic acid (FOLVITE) tablet 1 mg  1 mg Oral Daily Theresa Uribe MD   1 mg at 10/23/24 0754    thiamine mononitrate tablet 100 mg

## 2024-10-24 LAB
ANION GAP SERPL CALCULATED.3IONS-SCNC: 8 MMOL/L (ref 9–17)
BASOPHILS # BLD: 0.05 K/UL (ref 0–0.2)
BASOPHILS NFR BLD: 0 % (ref 0–2)
BUN SERPL-MCNC: 12 MG/DL (ref 8–23)
BUN/CREAT SERPL: 24 (ref 9–20)
CALCIUM SERPL-MCNC: 8.2 MG/DL (ref 8.6–10.4)
CHLORIDE SERPL-SCNC: 106 MMOL/L (ref 98–107)
CO2 SERPL-SCNC: 25 MMOL/L (ref 20–31)
CREAT SERPL-MCNC: 0.5 MG/DL (ref 0.7–1.2)
EOSINOPHIL # BLD: 0.24 K/UL (ref 0–0.44)
EOSINOPHILS RELATIVE PERCENT: 2 % (ref 1–4)
ERYTHROCYTE [DISTWIDTH] IN BLOOD BY AUTOMATED COUNT: 11.4 % (ref 11.8–14.4)
GFR, ESTIMATED: >90 ML/MIN/1.73M2
GLUCOSE SERPL-MCNC: 78 MG/DL (ref 70–99)
HCT VFR BLD AUTO: 43.4 % (ref 40.7–50.3)
HGB BLD-MCNC: 13.5 G/DL (ref 13–17)
IMM GRANULOCYTES # BLD AUTO: 0.08 K/UL (ref 0–0.3)
IMM GRANULOCYTES NFR BLD: 1 %
LYMPHOCYTES NFR BLD: 2.59 K/UL (ref 1.1–3.7)
LYMPHOCYTES RELATIVE PERCENT: 20 % (ref 24–43)
MCH RBC QN AUTO: 33.2 PG (ref 25.2–33.5)
MCHC RBC AUTO-ENTMCNC: 31.1 G/DL (ref 28.4–34.8)
MCV RBC AUTO: 106.6 FL (ref 82.6–102.9)
MONOCYTES NFR BLD: 0.83 K/UL (ref 0.1–1.2)
MONOCYTES NFR BLD: 6 % (ref 3–12)
NEUTROPHILS NFR BLD: 71 % (ref 36–65)
NEUTS SEG NFR BLD: 9.28 K/UL (ref 1.5–8.1)
NRBC BLD-RTO: 0 PER 100 WBC
PLATELET # BLD AUTO: 158 K/UL (ref 138–453)
PMV BLD AUTO: 11.1 FL (ref 8.1–13.5)
POTASSIUM SERPL-SCNC: 3.8 MMOL/L (ref 3.7–5.3)
RBC # BLD AUTO: 4.07 M/UL (ref 4.21–5.77)
RBC # BLD: ABNORMAL 10*6/UL
SODIUM SERPL-SCNC: 139 MMOL/L (ref 135–144)
WBC OTHER # BLD: 13.1 K/UL (ref 3.5–11.3)

## 2024-10-24 PROCEDURE — 6360000002 HC RX W HCPCS: Performed by: INTERNAL MEDICINE

## 2024-10-24 PROCEDURE — 36415 COLL VENOUS BLD VENIPUNCTURE: CPT

## 2024-10-24 PROCEDURE — 2000000000 HC ICU R&B

## 2024-10-24 PROCEDURE — 94761 N-INVAS EAR/PLS OXIMETRY MLT: CPT

## 2024-10-24 PROCEDURE — 6370000000 HC RX 637 (ALT 250 FOR IP): Performed by: NURSE PRACTITIONER

## 2024-10-24 PROCEDURE — 80048 BASIC METABOLIC PNL TOTAL CA: CPT

## 2024-10-24 PROCEDURE — 6370000000 HC RX 637 (ALT 250 FOR IP): Performed by: INTERNAL MEDICINE

## 2024-10-24 PROCEDURE — 2580000003 HC RX 258: Performed by: NURSE PRACTITIONER

## 2024-10-24 PROCEDURE — 2580000003 HC RX 258: Performed by: FAMILY MEDICINE

## 2024-10-24 PROCEDURE — 99232 SBSQ HOSP IP/OBS MODERATE 35: CPT | Performed by: PSYCHIATRY & NEUROLOGY

## 2024-10-24 PROCEDURE — 2700000000 HC OXYGEN THERAPY PER DAY

## 2024-10-24 PROCEDURE — 85025 COMPLETE CBC W/AUTO DIFF WBC: CPT

## 2024-10-24 PROCEDURE — 6370000000 HC RX 637 (ALT 250 FOR IP): Performed by: FAMILY MEDICINE

## 2024-10-24 PROCEDURE — 6370000000 HC RX 637 (ALT 250 FOR IP): Performed by: HOSPITALIST

## 2024-10-24 PROCEDURE — 2500000003 HC RX 250 WO HCPCS: Performed by: FAMILY MEDICINE

## 2024-10-24 PROCEDURE — 6360000002 HC RX W HCPCS: Performed by: FAMILY MEDICINE

## 2024-10-24 PROCEDURE — 2580000003 HC RX 258: Performed by: INTERNAL MEDICINE

## 2024-10-24 RX ORDER — NICOTINE 21 MG/24HR
1 PATCH, TRANSDERMAL 24 HOURS TRANSDERMAL DAILY
Status: DISCONTINUED | OUTPATIENT
Start: 2024-10-24 | End: 2024-11-02

## 2024-10-24 RX ORDER — OLANZAPINE 5 MG/1
10 TABLET ORAL NIGHTLY
Status: DISCONTINUED | OUTPATIENT
Start: 2024-10-24 | End: 2024-11-05

## 2024-10-24 RX ADMIN — VALPROIC ACID 250 MG: 250 CAPSULE, LIQUID FILLED ORAL at 20:50

## 2024-10-24 RX ADMIN — LORAZEPAM 1 MG: 2 INJECTION INTRAMUSCULAR; INTRAVENOUS at 17:01

## 2024-10-24 RX ADMIN — SODIUM CHLORIDE, PRESERVATIVE FREE 10 ML: 5 INJECTION INTRAVENOUS at 08:41

## 2024-10-24 RX ADMIN — FAMOTIDINE 20 MG: 20 TABLET, FILM COATED ORAL at 08:40

## 2024-10-24 RX ADMIN — HYDROCODONE BITARTRATE AND ACETAMINOPHEN 1 TABLET: 5; 325 TABLET ORAL at 22:40

## 2024-10-24 RX ADMIN — LORAZEPAM 1 MG: 2 INJECTION INTRAMUSCULAR; INTRAVENOUS at 01:29

## 2024-10-24 RX ADMIN — ANTI-FUNGAL POWDER MICONAZOLE NITRATE TALC FREE: 1.42 POWDER TOPICAL at 20:49

## 2024-10-24 RX ADMIN — LORAZEPAM 1 MG: 2 INJECTION INTRAMUSCULAR; INTRAVENOUS at 20:15

## 2024-10-24 RX ADMIN — CLONAZEPAM 1 MG: 0.5 TABLET ORAL at 20:48

## 2024-10-24 RX ADMIN — MULTIVITAMIN TABLET 1 TABLET: TABLET at 08:40

## 2024-10-24 RX ADMIN — ZIPRASIDONE MESYLATE 10 MG: 20 INJECTION, POWDER, LYOPHILIZED, FOR SOLUTION INTRAMUSCULAR at 22:02

## 2024-10-24 RX ADMIN — METOPROLOL TARTRATE 5 MG: 5 INJECTION INTRAVENOUS at 22:30

## 2024-10-24 RX ADMIN — CLONIDINE HYDROCHLORIDE 0.2 MG: 0.2 TABLET ORAL at 08:40

## 2024-10-24 RX ADMIN — OLANZAPINE 10 MG: 5 TABLET, FILM COATED ORAL at 22:36

## 2024-10-24 RX ADMIN — CLONAZEPAM 1 MG: 0.5 TABLET ORAL at 08:40

## 2024-10-24 RX ADMIN — Medication 100 MG: at 08:40

## 2024-10-24 RX ADMIN — AMLODIPINE BESYLATE 10 MG: 10 TABLET ORAL at 08:43

## 2024-10-24 RX ADMIN — LORAZEPAM 1 MG: 2 INJECTION INTRAMUSCULAR; INTRAVENOUS at 15:03

## 2024-10-24 RX ADMIN — ANTI-FUNGAL POWDER MICONAZOLE NITRATE TALC FREE: 1.42 POWDER TOPICAL at 08:41

## 2024-10-24 RX ADMIN — VALPROIC ACID 250 MG: 250 CAPSULE, LIQUID FILLED ORAL at 08:40

## 2024-10-24 RX ADMIN — VALPROIC ACID 250 MG: 250 CAPSULE, LIQUID FILLED ORAL at 13:17

## 2024-10-24 RX ADMIN — HYDROCODONE BITARTRATE AND ACETAMINOPHEN 1 TABLET: 5; 325 TABLET ORAL at 08:43

## 2024-10-24 RX ADMIN — FOLIC ACID 1 MG: 1 TABLET ORAL at 08:40

## 2024-10-24 RX ADMIN — ENOXAPARIN SODIUM 40 MG: 100 INJECTION SUBCUTANEOUS at 08:42

## 2024-10-24 ASSESSMENT — PAIN DESCRIPTION - FREQUENCY
FREQUENCY: INTERMITTENT

## 2024-10-24 ASSESSMENT — PAIN DESCRIPTION - PAIN TYPE
TYPE: ACUTE PAIN

## 2024-10-24 ASSESSMENT — PAIN DESCRIPTION - ORIENTATION
ORIENTATION: RIGHT;LEFT
ORIENTATION: MID

## 2024-10-24 ASSESSMENT — PAIN DESCRIPTION - DESCRIPTORS
DESCRIPTORS: ACHING

## 2024-10-24 ASSESSMENT — PAIN SCALES - GENERAL
PAINLEVEL_OUTOF10: 0
PAINLEVEL_OUTOF10: 3
PAINLEVEL_OUTOF10: 10
PAINLEVEL_OUTOF10: 0
PAINLEVEL_OUTOF10: 10
PAINLEVEL_OUTOF10: 0
PAINLEVEL_OUTOF10: 8

## 2024-10-24 ASSESSMENT — PAIN DESCRIPTION - LOCATION
LOCATION: SHOULDER;FOOT
LOCATION: BACK
LOCATION: SCROTUM

## 2024-10-24 ASSESSMENT — PAIN - FUNCTIONAL ASSESSMENT
PAIN_FUNCTIONAL_ASSESSMENT: ACTIVITIES ARE NOT PREVENTED

## 2024-10-24 ASSESSMENT — PAIN DESCRIPTION - ONSET
ONSET: SUDDEN
ONSET: SUDDEN
ONSET: ON-GOING

## 2024-10-24 NOTE — PROGRESS NOTES
End Of Shift Note  [place] ICU  Summary of shift: Pt had eventful day. Pt was weaned off precedex per critical care. Critcal care MD wants pt to start with PRN IV ativan, PRN IM geodon, and then PRN precedex gtt started if pt is very agitated and combative. Pt was only alert to self and is hallucinating. Pt very uncooperative, making verbal threats, sexual comments, and being physically combative at times throughout the day. Pt remains in restraints. PRN IV ativan given and writer attempted music to help calm pt as well as patients sister at the bedside. Pt was refusing medications, q2 turns, and to be touched. Pt did state he wanted to go outside to smoke multiple times and nicotine patch was ordered. Psych consult was made. Hill catheter output was bloody due to pt tugging on catheter but is now turning onelia throughout the shift. Pt had 600mL of urine output. Otherwise pt resting in bed expressing no further needs at this time.     Vitals:    Vitals:    10/24/24 1300 10/24/24 1400 10/24/24 1500 10/24/24 1600   BP: 110/69 (!) 102/54 122/87 124/64   Pulse: 57 62 84 74   Resp: 19 22 24 18   Temp:    98 °F (36.7 °C)   TempSrc:    Axillary   SpO2: 100% 98% 98% 94%   Weight:       Height:            I&O:   Intake/Output Summary (Last 24 hours) at 10/24/2024 1616  Last data filed at 10/24/2024 1119  Gross per 24 hour   Intake 56.47 ml   Output 1167 ml   Net -1110.53 ml       Resp Status: Pt on room air.     Ventilator Settings:  Vent Mode: CPAP/PS Resp Rate (Set): 18 bpm/Vt (Set, mL): 500 mL/ /FiO2 : 30 %    Critical Care IV infusions:   dexmedeTOMIDine (PRECEDEX) 1,000 mcg in sodium chloride 0.9 % 250 mL infusion      sodium chloride      sodium chloride          LDA:   Peripheral IV 10/24/24 Right;Anterior Cephalic (Active)   Number of days: 0       Urinary Catheter 10/23/24 (Active)   Number of days: 0

## 2024-10-24 NOTE — PROGRESS NOTES
Occupational Therapy  [facility]  Occupational Therapy Not Seen    DATE: 10/24/2024    NAME: Noel Valle  MRN: [id number]   : 1955    Patient not seen this date for Occupational Therapy due to:      [] Cancel by RN or physician due to:    [] Hemodialysis    [] Critical Lab Value Level     [] Blood transfusion in progress    [] Acute or unstable cardiovascular status   _MAP < 55 or more than >115  _HR < 40 or > 130    [] Acute or unstable pulmonary status   -FiO2 > 60%   _RR < 5 or >40    _O2 sats < 85%    [] Strict Bedrest    [] Off Unit for surgery or procedure    [] Off Unit for testing       [] Pending imaging to R/O fracture    [] Refusal by Patient      [x] Other: Pt in bed sleeping in B wrist restraints on precedex drip and just received pain meds. Not appropriate for therapy at this time. Will con to follow and needs REASSESSED for standing/mobility.      [] OT being discontinued at this time. Patient independent. No further needs.     [] OT being discontinued at this time as the patient has been transferred to hospice care. No further needs.      ZUNILDA Granado
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
## 2024-10-24 NOTE — PLAN OF CARE
Problem: Discharge Planning  Goal: Discharge to home or other facility with appropriate resources  10/24/2024 1130 by Sarah Buitrago RN  Outcome: Progressing  Flowsheets (Taken 10/24/2024 0800)  Discharge to home or other facility with appropriate resources:   Identify barriers to discharge with patient and caregiver   Arrange for needed discharge resources and transportation as appropriate   Identify discharge learning needs (meds, wound care, etc)   Arrange for interpreters to assist at discharge as needed  10/24/2024 0031 by Dorina Weber RN  Outcome: Progressing     Problem: Respiratory - Adult  Goal: Achieves optimal ventilation and oxygenation  10/24/2024 1130 by Sarah Buitrago RN  Outcome: Progressing  Flowsheets (Taken 10/24/2024 0800)  Achieves optimal ventilation and oxygenation:   Assess for changes in respiratory status   Assess for changes in mentation and behavior   Position to facilitate oxygenation and minimize respiratory effort   Oxygen supplementation based on oxygen saturation or arterial blood gases   Initiate smoking cessation protocol as indicated  10/24/2024 0031 by Dorina Weber RN  Outcome: Progressing     Problem: Pain  Goal: Verbalizes/displays adequate comfort level or baseline comfort level  10/24/2024 1130 by Sarah Buitrago RN  Outcome: Progressing  10/24/2024 0031 by Dorina Weber RN  Outcome: Progressing     Problem: Skin/Tissue Integrity  Goal: Absence of new skin breakdown  Description: 1.  Monitor for areas of redness and/or skin breakdown  2.  Assess vascular access sites hourly  3.  Every 4-6 hours minimum:  Change oxygen saturation probe site  4.  Every 4-6 hours:  If on nasal continuous positive airway pressure, respiratory therapy assess nares and determine need for appliance change or resting period.  10/24/2024 1130 by Sarah Buitrago RN  Outcome: Progressing  10/24/2024 0031 by Dorina Weber RN  Outcome: Progressing     Problem: Safety - Adult  Goal: Free from  direction  4. Decrease environmental stimuli, including noise as appropriate  5. Monitor and intervene to maintain adequate nutrition, hydration, elimination, sleep and activity  6. If unable to ensure safety without constant attention obtain sitter and review sitter guidelines with assigned personnel  7. Initiate Psychosocial CNS and Spiritual Care consult, as indicated  10/24/2024 1130 by Sarah Buitrago, RN  Outcome: Progressing  Flowsheets (Taken 10/24/2024 0800)  Effect of thought disturbance (confusion, delirium, dementia, or psychosis) are managed with adequate functional status:   Assess for contributors to thought disturbance, including medications, impaired vision or hearing, underlying metabolic abnormalities, dehydration, psychiatric diagnoses, notify [facility]   [place] high risk fall precautions, as indicated   Provide frequent short contacts to provide reality reorientation, refocusing and direction   Monitor and intervene to maintain adequate nutrition, hydration, elimination, sleep and activity   Decrease environmental stimuli, including noise as appropriate   If unable to ensure safety without constant attention obtain sitter and review sitter guidelines with assigned personnel   Initiate Psychosocial Clinical Nurse Specialist and Spiritual Care consult, as indicated  10/24/2024 0031 by Dorina Weber RN  Outcome: Progressing     Problem: Safety - Medical Restraint  Goal: Remains free of injury from restraints (Restraint for Interference with Medical Device)  Description: INTERVENTIONS:  1. Determine that other, less restrictive measures have been tried or would not be effective before applying the restraint  2. Evaluate the patient's condition at the time of restraint application  3. Inform patient/family regarding the reason for restraint  4. Q2H: Monitor safety, psychosocial status, comfort, nutrition and hydration  10/24/2024 1130 by Sarah Buitrago, RN  Outcome: Progressing  Flowsheets  Taken

## 2024-10-24 NOTE — PROGRESS NOTES
169 158     BMP:    Recent Labs     10/22/24  1121 10/23/24  0437 10/24/24  0648    142 139   K 3.6* 3.5* 3.8    104 106   CO2 28 28 25   BUN 19 16 12   CREATININE 0.7 0.7 0.5*   GLUCOSE 92 74 78         Lab Results   Component Value Date    TRIG 222 (H) 10/11/2024    ALT 54 (H) 10/20/2024    AST 24 10/20/2024    TSH 0.01 (L) 10/22/2024    [id number] 1213 10/22/2024    MG 1.7 10/09/2024    PHOS 3.6 10/09/2024       No results found for: \"PHENYTOIN\", \"PHENOBARB\", \"VALPROATE\", \"CBMZ\"      Imaging/Diagnostics:      EEG:       No results found for this or any previous visit.    No results found for this or any previous visit.    No results found for this or any previous visit.    No results found for this or any previous visit.    No results found for this or any previous visit.    Results for orders placed during the hospital encounter of 10/09/24    MRI BRAIN W WO CONTRAST    Narrative  EXAMINATION:  MRI OF THE BRAIN WITHOUT AND WITH CONTRAST  10/22/2024 12:53 pm    TECHNIQUE:  Multiplanar multisequence MRI of the head/brain was performed without and  with the administration of intravenous contrast.    COMPARISON:  None.    HISTORY:  ORDERING SYSTEM PROVIDED HISTORY: ams      FINDINGS:  INTRACRANIAL STRUCTURES/VENTRICLES: No evidence of an acute infarct or other  acute parenchymal process. No evidence of acute intracranial hemorrhage.  There is no evidence of an intracranial mass or extraaxial fluid collection.  No significant mass effect or midline shift. Scattered patchy foci of  T2/FLAIR hyperintensity are present within the supratentorial white matter  which is a nonspecific finding but likely represents mild chronic  microvascular ischemia. There is mild generalized volume loss. Ventricular  enlargement concordant with the degree of parenchymal volume loss.  The  sellar/suprasellar regions appear unremarkable. The normal signal voids  within the major intracranial vessels appear maintained.  No

## 2024-10-24 NOTE — PLAN OF CARE
Problem: Safety - Medical Restraint  Goal: Remains free of injury from restraints (Restraint for Interference with Medical Device)  Description: INTERVENTIONS:  1. Determine that other, less restrictive measures have been tried or would not be effective before applying the restraint  2. Evaluate the patient's condition at the time of restraint application  3. Inform patient/family regarding the reason for restraint  4. Q2H: Monitor safety, psychosocial status, comfort, nutrition and hydration  Recent Flowsheet Documentation  Taken 10/23/2024 2334 by Dorina Weber RN  Remains free of injury from restraints (restraint for interference with medical device): Evaluate the patient's condition at the time of restraint application     Problem: Safety - Medical Restraint  Goal: Remains free of injury from restraints (Restraint for Interference with Medical Device)  Description: INTERVENTIONS:  1. Determine that other, less restrictive measures have been tried or would not be effective before applying the restraint  2. Evaluate the patient's condition at the time of restraint application  3. Inform patient/family regarding the reason for restraint  4. Q2H: Monitor safety, psychosocial status, comfort, nutrition and hydration  Recent Flowsheet Documentation  Taken 10/23/2024 2334 by Dorina Weber RN  Remains free of injury from restraints (restraint for interference with medical device): Evaluate the patient's condition at the time of restraint application
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
## 2024-10-24 NOTE — PROGRESS NOTES
Physical Therapy  DATE: 10/24/2024    NAME: Noel Valle  MRN: [id number]   : 1955    Patient not seen this date for Physical Therapy due to:      [] Cancel by RN or physician due to:    [] Hemodialysis    [] Critical Lab Value Level     [] Blood transfusion in progress    [] Acute or unstable cardiovascular status   _MAP < 55 or more than >115  _HR < 40 or > 130    [] Acute or unstable pulmonary status   -FiO2 > 60%   _RR < 5 or >40    _O2 sats < 85%    [] Strict Bedrest    [] Off Unit for surgery or procedure    [] Off Unit for testing       [] Pending imaging to R/O fracture    [] Refusal by Patient      [x] Other  Per RN and notes patient became agitated last night and Bin wrist restraints reapplied and precedex on. Pt in bed sleeping very soundly, finally getting rest. Not appropriate for therapy at this time. Will con to follow and needs REASSESSED for standing/mobility.       [] PT being discontinued at this time. Patient independent. No further needs.     [] PT being discontinued at this time as the patient has been transferred to hospice care. No further needs.      Mini Kulkarni, PTA

## 2024-10-24 NOTE — PROGRESS NOTES
Pulmonary Critical Care Progress Note    Patient seen for the follow up of Angioedema of tongue     Subjective:    He is now lethargic on Precedex 0.2.  He was combative overnight per RN.  Sister is at bedside..  He is off oxygen.  He took part of his medications today.    Examination:    Vitals: /69   Pulse 57   Temp 97.9 °F (36.6 °C) (Axillary)   Resp 19   Ht 1.66 m (5' 5.35\")   Wt 91.3 kg (201 lb 3.2 oz)   SpO2 100%   BMI 33.12 kg/m²   SpO2  Av.6 %  Min: 92 %  Max: 100 %  General appearance: Sleepy no distress  Neck: No JVD  Lungs: Decreased breath sound no crackles or wheeze  Heart: regular rate and rhythm, S1, S2 normal, no gallop  Abdomen: Soft, non tender, + BS  Extremities: no cyanosis or clubbing. No significant edema    LABs:    CBC:   Recent Labs     10/23/24  0437 10/24/24  0648   WBC 15.2* 13.1*   HGB 14.8 13.5   HCT 44.3 43.4    158     BMP:   Recent Labs     10/23/24  0437 10/24/24  0648    139   K 3.5* 3.8   CO2 28 25   BUN 16 12   CREATININE 0.7 0.5*   LABGLOM >90 >90   GLUCOSE 74 78      Latest Reference Range & Units 10/14/24 06:00 10/14/24 12:31   POC TCO2 22 - 30 mmol/L  27   POC HCO3 21.0 - 28.0 mmol/L 28.1 (H) 26.6   POC O2 SAT 94.0 - 98.0 % 95.6 95.8   POC pCO2 35.0 - 48.0 mm Hg 43.2 42.0   POC pH 7.350 - 7.450  7.421 7.410   POC PO2 83.0 - 108.0 mm Hg 78.4 (L) 79.8 (L)   (H): Data is abnormally high  (L): Data is abnormally low      Radiology:  Chest x-ray 10/20  Poor inspiratory effort.       Chest x-ray 10/17 reviewed  1. Probable mild atelectatic changes at the base of the left lung.  2. Small left pleural effusion versus pleural thickening.  3. No other acute cardiopulmonary process.      Chest x-ray 10/16  Reviewed  Small left effusion with atelectasis.            Impression/recommendations:    Acute respiratory insufficiency due to upper airway instruction/angioedema with significant swelling upper airway/atelectasis with small effusion  Oxygen by nasal

## 2024-10-24 NOTE — PROGRESS NOTES
MIGUEL SWAIN notified at 2056 that patients has not urinated and had 517 in bladder. Patient stated he refuse to have a hatfield or straight cath unless he receives pain medication. Awaiting response

## 2024-10-24 NOTE — PROGRESS NOTES
Hospitalist - Progress Note      Patient: Noel Valle    Unit/Bed:1114/1114-01  YOB: 1955  MRN: [id number]   Acct: [id number]   PCP: Ramakrishna Silver MD    Date of Service: Pt seen/examined on 10/24/24  and Admitted to Inpatient with expected LOS greater than two midnights due to medical therapy.     Chief Complaint: Tongue swelling 62-year-old male    Assessment and Plan:-    Acute Respiratory Insufficiency due to upper airway obstruction secondary to Angioedema  S/P extubation 10-13-24  Drowsy as he was extubated and sedative meds weaned off  On 3 L nasal canula  Solumedrol transitioned to Po prednisone  Critical care following.       Hypotension - resolved  Hypertension  Monitor BP   On norvasc and clonidine. Off coreg due to bradycardia   On prn Hydralazine.     HELENE  resolved    Tobacco abuse disorder/history of possible COPD  Sched DuSchuylerbs.  Will  on tobacco cessation when patient is alert      Alcohol withdrawal  On protocol  Overnight precedex restarted due to hallucinations and agitation, irritation  Continue to monitor at this time  On zyprexa, valproate and librium restarted  NPO except for meds with apple sauce.-Diet advanced  CIWA protocol    Agitation secondary to alcohol withdrawal, metabolic disturbances, polypharmacy  Haldol prn  Consult Neuro  D/w RN  Prednisone may be contributing per neurology  Thiamine added per neuro  Okay to add Librium as needed per neuro  Neurochecks  EEG    Subclinical hyperthyroidism    Hypernatremia  Free water flushes. improved    Sister at bedside.   Mentation fluctuates  Was much more calm yesterday  Confused, agitated today  Needed physical restraints  Recognizes sister on bedside  Asking me to listen to his sister's lungs. I informed since sh eis not our pt, we cannot. Pt got agitated again  Discussed options, mgmt, in detail w sister, RN  Critical Care, Neuro input reviewed  Was given Klonopin, Geodon  Will Consult

## 2024-10-25 LAB
ANION GAP SERPL CALCULATED.3IONS-SCNC: 11 MMOL/L (ref 9–17)
BASOPHILS # BLD: 0.03 K/UL (ref 0–0.2)
BASOPHILS NFR BLD: 0 % (ref 0–2)
BUN SERPL-MCNC: 9 MG/DL (ref 8–23)
BUN/CREAT SERPL: 15 (ref 9–20)
CALCIUM SERPL-MCNC: 8.5 MG/DL (ref 8.6–10.4)
CHLORIDE SERPL-SCNC: 102 MMOL/L (ref 98–107)
CO2 SERPL-SCNC: 28 MMOL/L (ref 20–31)
CREAT SERPL-MCNC: 0.6 MG/DL (ref 0.7–1.2)
EOSINOPHIL # BLD: 0.21 K/UL (ref 0–0.44)
EOSINOPHILS RELATIVE PERCENT: 2 % (ref 1–4)
ERYTHROCYTE [DISTWIDTH] IN BLOOD BY AUTOMATED COUNT: 11.3 % (ref 11.8–14.4)
GFR, ESTIMATED: >90 ML/MIN/1.73M2
GLUCOSE SERPL-MCNC: 65 MG/DL (ref 70–99)
HCT VFR BLD AUTO: 43.9 % (ref 40.7–50.3)
HGB BLD-MCNC: 14.4 G/DL (ref 13–17)
IMM GRANULOCYTES # BLD AUTO: 0.07 K/UL (ref 0–0.3)
IMM GRANULOCYTES NFR BLD: 1 %
LYMPHOCYTES NFR BLD: 1.61 K/UL (ref 1.1–3.7)
LYMPHOCYTES RELATIVE PERCENT: 13 % (ref 24–43)
MAGNESIUM SERPL-MCNC: 2 MG/DL (ref 1.6–2.6)
MCH RBC QN AUTO: 33.6 PG (ref 25.2–33.5)
MCHC RBC AUTO-ENTMCNC: 32.8 G/DL (ref 28.4–34.8)
MCV RBC AUTO: 102.3 FL (ref 82.6–102.9)
MICROORGANISM SPEC CULT: ABNORMAL
MONOCYTES NFR BLD: 0.84 K/UL (ref 0.1–1.2)
MONOCYTES NFR BLD: 7 % (ref 3–12)
NEUTROPHILS NFR BLD: 77 % (ref 36–65)
NEUTS SEG NFR BLD: 9.59 K/UL (ref 1.5–8.1)
NRBC BLD-RTO: 0 PER 100 WBC
PLATELET # BLD AUTO: 180 K/UL (ref 138–453)
PMV BLD AUTO: 11.3 FL (ref 8.1–13.5)
POTASSIUM SERPL-SCNC: 4.1 MMOL/L (ref 3.7–5.3)
RBC # BLD AUTO: 4.29 M/UL (ref 4.21–5.77)
SERVICE CMNT-IMP: ABNORMAL
SODIUM SERPL-SCNC: 141 MMOL/L (ref 135–144)
SPECIMEN DESCRIPTION: ABNORMAL
WBC OTHER # BLD: 12.4 K/UL (ref 3.5–11.3)

## 2024-10-25 PROCEDURE — 80048 BASIC METABOLIC PNL TOTAL CA: CPT

## 2024-10-25 PROCEDURE — 97530 THERAPEUTIC ACTIVITIES: CPT

## 2024-10-25 PROCEDURE — 83735 ASSAY OF MAGNESIUM: CPT

## 2024-10-25 PROCEDURE — 6360000002 HC RX W HCPCS: Performed by: FAMILY MEDICINE

## 2024-10-25 PROCEDURE — 85025 COMPLETE CBC W/AUTO DIFF WBC: CPT

## 2024-10-25 PROCEDURE — 97110 THERAPEUTIC EXERCISES: CPT

## 2024-10-25 PROCEDURE — 97112 NEUROMUSCULAR REEDUCATION: CPT

## 2024-10-25 PROCEDURE — 6370000000 HC RX 637 (ALT 250 FOR IP): Performed by: NURSE PRACTITIONER

## 2024-10-25 PROCEDURE — 36415 COLL VENOUS BLD VENIPUNCTURE: CPT

## 2024-10-25 PROCEDURE — 2580000003 HC RX 258: Performed by: FAMILY MEDICINE

## 2024-10-25 PROCEDURE — 6370000000 HC RX 637 (ALT 250 FOR IP): Performed by: INTERNAL MEDICINE

## 2024-10-25 PROCEDURE — 99221 1ST HOSP IP/OBS SF/LOW 40: CPT

## 2024-10-25 PROCEDURE — 6370000000 HC RX 637 (ALT 250 FOR IP): Performed by: HOSPITALIST

## 2024-10-25 PROCEDURE — 2000000000 HC ICU R&B

## 2024-10-25 PROCEDURE — 6370000000 HC RX 637 (ALT 250 FOR IP): Performed by: FAMILY MEDICINE

## 2024-10-25 PROCEDURE — 51702 INSERT TEMP BLADDER CATH: CPT

## 2024-10-25 PROCEDURE — 94761 N-INVAS EAR/PLS OXIMETRY MLT: CPT

## 2024-10-25 PROCEDURE — 6370000000 HC RX 637 (ALT 250 FOR IP)

## 2024-10-25 PROCEDURE — 2580000003 HC RX 258: Performed by: NURSE PRACTITIONER

## 2024-10-25 RX ORDER — OLANZAPINE 10 MG/2ML
2.5 INJECTION, POWDER, FOR SOLUTION INTRAMUSCULAR 3 TIMES DAILY PRN
Status: DISCONTINUED | OUTPATIENT
Start: 2024-10-25 | End: 2024-11-08 | Stop reason: HOSPADM

## 2024-10-25 RX ORDER — CLONAZEPAM 0.5 MG/1
0.5 TABLET ORAL EVERY 12 HOURS
Status: DISCONTINUED | OUTPATIENT
Start: 2024-10-25 | End: 2024-10-28

## 2024-10-25 RX ADMIN — FAMOTIDINE 20 MG: 20 TABLET, FILM COATED ORAL at 20:53

## 2024-10-25 RX ADMIN — ATORVASTATIN CALCIUM 40 MG: 40 TABLET, FILM COATED ORAL at 08:50

## 2024-10-25 RX ADMIN — CLONAZEPAM 1 MG: 0.5 TABLET ORAL at 08:50

## 2024-10-25 RX ADMIN — MULTIVITAMIN TABLET 1 TABLET: TABLET at 08:50

## 2024-10-25 RX ADMIN — SODIUM CHLORIDE, PRESERVATIVE FREE 10 ML: 5 INJECTION INTRAVENOUS at 08:59

## 2024-10-25 RX ADMIN — FOLIC ACID 1 MG: 1 TABLET ORAL at 08:58

## 2024-10-25 RX ADMIN — CLONIDINE HYDROCHLORIDE 0.2 MG: 0.2 TABLET ORAL at 20:53

## 2024-10-25 RX ADMIN — SODIUM CHLORIDE, PRESERVATIVE FREE 10 ML: 5 INJECTION INTRAVENOUS at 21:02

## 2024-10-25 RX ADMIN — SODIUM CHLORIDE, PRESERVATIVE FREE 10 ML: 5 INJECTION INTRAVENOUS at 20:54

## 2024-10-25 RX ADMIN — FAMOTIDINE 20 MG: 20 TABLET, FILM COATED ORAL at 08:50

## 2024-10-25 RX ADMIN — AMLODIPINE BESYLATE 10 MG: 10 TABLET ORAL at 08:50

## 2024-10-25 RX ADMIN — VALPROIC ACID 250 MG: 250 CAPSULE, LIQUID FILLED ORAL at 14:26

## 2024-10-25 RX ADMIN — ANTI-FUNGAL POWDER MICONAZOLE NITRATE TALC FREE: 1.42 POWDER TOPICAL at 08:59

## 2024-10-25 RX ADMIN — CLONAZEPAM 0.5 MG: 0.5 TABLET ORAL at 20:53

## 2024-10-25 RX ADMIN — HYDROCODONE BITARTRATE AND ACETAMINOPHEN 1 TABLET: 5; 325 TABLET ORAL at 16:19

## 2024-10-25 RX ADMIN — Medication 100 MG: at 08:50

## 2024-10-25 RX ADMIN — HYDROCODONE BITARTRATE AND ACETAMINOPHEN 1 TABLET: 5; 325 TABLET ORAL at 07:30

## 2024-10-25 RX ADMIN — ANTI-FUNGAL POWDER MICONAZOLE NITRATE TALC FREE: 1.42 POWDER TOPICAL at 21:02

## 2024-10-25 RX ADMIN — CLONIDINE HYDROCHLORIDE 0.2 MG: 0.2 TABLET ORAL at 08:50

## 2024-10-25 RX ADMIN — VALPROIC ACID 250 MG: 250 CAPSULE, LIQUID FILLED ORAL at 08:50

## 2024-10-25 RX ADMIN — PREDNISONE 10 MG: 10 TABLET ORAL at 08:50

## 2024-10-25 RX ADMIN — OLANZAPINE 10 MG: 5 TABLET, FILM COATED ORAL at 20:53

## 2024-10-25 RX ADMIN — ENOXAPARIN SODIUM 40 MG: 100 INJECTION SUBCUTANEOUS at 08:50

## 2024-10-25 RX ADMIN — VALPROIC ACID 250 MG: 250 CAPSULE, LIQUID FILLED ORAL at 20:53

## 2024-10-25 ASSESSMENT — PAIN DESCRIPTION - DESCRIPTORS
DESCRIPTORS: ACHING

## 2024-10-25 ASSESSMENT — PAIN DESCRIPTION - ORIENTATION
ORIENTATION: MID

## 2024-10-25 ASSESSMENT — PAIN SCALES - GENERAL
PAINLEVEL_OUTOF10: 0
PAINLEVEL_OUTOF10: 2
PAINLEVEL_OUTOF10: 3
PAINLEVEL_OUTOF10: 3
PAINLEVEL_OUTOF10: 0
PAINLEVEL_OUTOF10: 8
PAINLEVEL_OUTOF10: 8

## 2024-10-25 ASSESSMENT — PAIN DESCRIPTION - LOCATION
LOCATION: BACK

## 2024-10-25 ASSESSMENT — PAIN DESCRIPTION - ONSET
ONSET: ON-GOING

## 2024-10-25 ASSESSMENT — PAIN DESCRIPTION - FREQUENCY
FREQUENCY: INTERMITTENT

## 2024-10-25 ASSESSMENT — PAIN - FUNCTIONAL ASSESSMENT
PAIN_FUNCTIONAL_ASSESSMENT: ACTIVITIES ARE NOT PREVENTED

## 2024-10-25 ASSESSMENT — PAIN DESCRIPTION - PAIN TYPE
TYPE: ACUTE PAIN

## 2024-10-25 NOTE — PROGRESS NOTES
Patient was agitated in the start of my shift, ativan and Geodon given. Patient then allowed writer to give him medication. Patient fell asleep and Patient woke up at 5am AxOx4

## 2024-10-25 NOTE — PROGRESS NOTES
Physical Therapy  Facility/Department: [facility] ICU  Daily Treatment Note  NAME: Noel Valle  : 1955  MRN: [id number]    Date of Service: 10/25/2024    Discharge Recommendations:  Patient would benefit from continued therapy after discharge    Pt currently functioning below baseline.  Recommend daily inpatient skilled therapy at time of discharge to maximize long term outcomes and prevent re-admission. Please refer to AM-PAC score for current level of function.     Patient Diagnosis(es): The encounter diagnosis was Angioedema, initial encounter.    Assessment  Assessment: Patient noted to be having a Lt lateral lean and posterior during seated EOB. Patient significantly weak and deconditioned but Rt LE weaker and coordination deficits in Rt hand. Patient required MAX 1-2 during session to maintain seated balance with glo LE on bed railings. Max x 2 A for sit to supine and then performed supine glo LE AAROM x 10 reps.  Patient is below his baseline and would benefit from continued skilled PT services for focus on strengthening, seated balance, core stability, postural correction with progression to standing and gait.  Activity Tolerance: Patient limited by fatigue;Patient limited by endurance;Patient tolerated treatment well    Plan  Physical Therapy Plan  General Plan: 5-7 times per week  Current Treatment Recommendations: Strengthening;ROM;Patient/Caregiver education & training;Functional mobility training;Therapeutic activities;Safety education & training;Positioning;Balance training    Restrictions  Restrictions/Precautions  Restrictions/Precautions: General Precautions, Fall Risk, Seizure, Bed Alarm  Required Braces or Orthoses?: No  Position Activity Restriction  Other position/activity restrictions: FMS, ex cath, telemetry, continuous pulse ox, alarms     Subjective   Subjective  Subjective: Patient agreeable for PT treatment and calm. YURI Cordon reported patient medically stable for PT treatment,  pelvis into neutral position. Posterior and left lateral lean throughout seated session. Bin UE used to help self assistance with balance. AAROM for bin LE marches and LAQ x 10 reps while OT assisted with posture. Patient also sat EOB x 5+ mins talking to psychiatry.    PT Exercises  A/AROM Exercises: Supine: AAROM for bilat. LE with Rt. LE needing more assist (however L very weak as well). Can initiate heel slide but needs full assist Rt. LE. AAROM for heel sldies bilat, as well as hip abd/add and ankle pumps.  Circulation/Endurance Exercises: ankle pumps as tolerated; at end of session reapplied vlue PRAFO boots  Pressure Relief Exercises: Rolling L/R Max x 2  Static Sitting Balance Exercises: Sat EOB x 25 mins to focus on posture and attempted seated HEP.  Postural Correction Exercises: Performed same technique as evaluating therapists with LARSEN behind patient working on extending thoracic spine and retraction of shoulders manually, while writer in front of patient assisting with pulling lumbar spine into ext and ant tilting the pelvis. Patient asked to look up right to correct severe forwar head/kyphosis.  Breathing Techniques: activity pacing, pursed lip breathing technique     Safety Devices  Type of Devices: All fall risk precautions in place;Patient at risk for falls;Call light within reach;Nurse notified;Gait belt;Heels elevated for pressure relief;Bed alarm in place;Left in bed (Sister present in room)  Restraints  Restraints Initially in Place: No      Goals  Short Term Goals  Time Frame for Short Term Goals: 12 visits  Short Term Goal 1: Patient will demonstrate bed mobility with min A  Short Term Goal 2: Patient will verbalize and demonstrate follow through of pressure relief techniques in order to promote healing and maintain good skin integrity  Short Term Goal 3: Patient will verbalize and demonstrate follow through of energy conservation techniques including compliance with incentive spirometer in

## 2024-10-25 NOTE — CARE COORDINATION
Discharge planning    Psych to see. On geodon and ativan for ETOH WD. On RA and precedex off. Agitated at times. Plan is [facility] vs SNF. [place] following.

## 2024-10-25 NOTE — PROGRESS NOTES
Pt calm and cooperative at this time. Acknowledges previous verbal and physical aggression and states he \"will be good\". Restraints discontinued at this time.

## 2024-10-25 NOTE — PROGRESS NOTES
End Of Shift Note  [facility]    Summary of shift: Pt calmer today, restraints removed and pt remains cooperative, remained calm and pleasant throughout the day. Pt took all meds without difficulty. Appetite poor. Pt did continue to have hallucinations at times, more pronounced in the afternoon. Pt also more confused in the afternoon. Sister in to visit and updated. Pt c/o back pain and medicated x 2 per orders, meds effective. Participated with therapy and sat at side of bed. Tolerated well. Safety measures remain in place.     Vitals:    Vitals:    10/25/24 1200 10/25/24 1600 10/25/24 1619 10/25/24 1649   BP: 102/79 103/67     Pulse: 99 85     Resp: 22 29 24 18   Temp: 98.4 °F (36.9 °C) 98.1 °F (36.7 °C)     TempSrc: Oral Oral     SpO2: 96% 92%     Weight:       Height:            I&O:   Intake/Output Summary (Last 24 hours) at 10/25/2024 1743  Last data filed at 10/25/2024 1400  Gross per 24 hour   Intake 580 ml   Output 600 ml   Net -20 ml       Resp Status: Pt remains on room air.    Ventilator Settings:  Vent Mode: CPAP/PS Resp Rate (Set): 18 bpm/Vt (Set, mL): 500 mL/ /FiO2 : 30 %    Critical Care IV infusions:   dexmedeTOMIDine (PRECEDEX) 1,000 mcg in sodium chloride 0.9 % 250 mL infusion      sodium chloride      sodium chloride          LDA:   Peripheral IV 10/24/24 Right;Anterior Cephalic (Active)   Number of days: 1       Urinary Catheter 10/23/24 (Active)   Number of days: 1

## 2024-10-25 NOTE — PLAN OF CARE
Problem: Respiratory - Adult  Goal: Achieves optimal ventilation and oxygenation  10/25/2024 1249 by Vicenta Hurley RN  Outcome: Progressing  Flowsheets (Taken 10/25/2024 0730)  Achieves optimal ventilation and oxygenation:   Assess for changes in respiratory status   Assess for changes in mentation and behavior   Position to facilitate oxygenation and minimize respiratory effort   Oxygen supplementation based on oxygen saturation or arterial blood gases     Problem: Pain  Goal: Verbalizes/displays adequate comfort level or baseline comfort level  10/25/2024 1249 by Vicenta Hurley RN  Outcome: Progressing     Problem: Safety - Adult  Goal: Free from fall injury  10/25/2024 1249 by Vicenta Hurley RN  Outcome: Progressing     Problem: Neurosensory - Adult  Goal: Achieves stable or improved neurological status  10/25/2024 1249 by Vicenta Hurley RN  Outcome: Progressing  Flowsheets (Taken 10/25/2024 0730)  Achieves stable or improved neurological status: Initiate measures to prevent increased intracranial pressure     Problem: Musculoskeletal - Adult  Goal: Return mobility to safest level of function  10/25/2024 1249 by Vicenta Hurley RN  Outcome: Progressing  Flowsheets (Taken 10/25/2024 0730)  Return Mobility to Safest Level of Function:   Assess patient stability and activity tolerance for standing, transferring and ambulating with or without assistive devices   Assist with transfers and ambulation using safe patient handling equipment as needed   Ensure adequate protection for wounds/incisions during mobilization   Obtain physical therapy/occupational therapy consults as needed   Instruct patient/family in ordered activity level   Apply continuous passive motion per provider or physical therapy orders to increase flexion toward goal

## 2024-10-25 NOTE — PLAN OF CARE
Problem: Discharge Planning  Goal: Discharge to home or other facility with appropriate resources  10/24/2024 2303 by Dorina Weber RN  Outcome: Progressing     Problem: Safety - Medical Restraint  Goal: Remains free of injury from restraints (Restraint for Interference with Medical Device)  Description: INTERVENTIONS:  1. Determine that other, less restrictive measures have been tried or would not be effective before applying the restraint  2. Evaluate the patient's condition at the time of restraint application  3. Inform patient/family regarding the reason for restraint  4. Q2H: Monitor safety, psychosocial status, comfort, nutrition and hydration  10/24/2024 2303 by Dorina Weber RN  Outcome: Progressing  Flowsheets  Taken 10/24/2024 2223 by Dorina Weber RN  Remains free of injury from restraints (restraint for interference with medical device): Inform patient/family regarding the reason for restraint    Problem: Respiratory - Adult  Goal: Achieves optimal ventilation and oxygenation  10/24/2024 2303 by Dorina Weber RN  Outcome: Progressing     Problem: Skin/Tissue Integrity - Adult  Goal: Skin integrity remains intact  10/24/2024 2303 by Dorina Weber RN  Outcome: Progressing     Problem: Cardiovascular - Adult  Goal: Maintains optimal cardiac output and hemodynamic stability  10/24/2024 2303 by Dorina Weber RN  Outcome: Progressing     Problem: Pain  Goal: Verbalizes/displays adequate comfort level or baseline comfort level  10/24/2024 2303 by Dorina Weber RN  Outcome: Progressing     Problem: Skin/Tissue Integrity  Goal: Absence of new skin breakdown  Description: 1.  Monitor for areas of redness and/or skin breakdown  2.  Assess vascular access sites hourly  3.  Every 4-6 hours minimum:  Change oxygen saturation probe site  4.  Every 4-6 hours:  If on nasal continuous positive airway pressure, respiratory therapy assess nares and determine need for appliance change or resting period.  10/24/2024 2303

## 2024-10-25 NOTE — PROGRESS NOTES
Pulmonary Critical Care Progress Note    Patient seen for the follow up of Angioedema of tongue     Subjective:    He is more awake alert off Precedex he is calm today cooperating.  Sister is at bedside..  He is off oxygen.  He took part of his medications today.    Examination:    Vitals: /79   Pulse 99   Temp 98.4 °F (36.9 °C) (Oral)   Resp 24   Ht 1.66 m (5' 5.35\")   Wt 91.3 kg (201 lb 3.2 oz)   SpO2 96%   BMI 33.12 kg/m²   SpO2  Av.8 %  Min: 91 %  Max: 97 %  General appearance: Awake alert no distress  Neck: No JVD  Lungs: Decreased breath sound no crackles or wheeze  Heart: regular rate and rhythm, S1, S2 normal, no gallop  Abdomen: Soft, non tender, + BS  Extremities: no cyanosis or clubbing. No significant edema    LABs:    CBC:   Recent Labs     10/24/24  0648 10/25/24  0455   WBC 13.1* 12.4*   HGB 13.5 14.4   HCT 43.4 43.9    180     BMP:   Recent Labs     10/24/24  0648 10/25/24  0455    141   K 3.8 4.1   CO2 25 28   BUN 12 9   CREATININE 0.5* 0.6*   LABGLOM >90 >90   GLUCOSE 78 65*      Latest Reference Range & Units 10/14/24 06:00 10/14/24 12:31   POC TCO2 22 - 30 mmol/L  27   POC HCO3 21.0 - 28.0 mmol/L 28.1 (H) 26.6   POC O2 SAT 94.0 - 98.0 % 95.6 95.8   POC pCO2 35.0 - 48.0 mm Hg 43.2 42.0   POC pH 7.350 - 7.450  7.421 7.410   POC PO2 83.0 - 108.0 mm Hg 78.4 (L) 79.8 (L)   (H): Data is abnormally high  (L): Data is abnormally low      Radiology:  Chest x-ray 10/20  Poor inspiratory effort.       Chest x-ray 10/17 reviewed  1. Probable mild atelectatic changes at the base of the left lung.  2. Small left pleural effusion versus pleural thickening.  3. No other acute cardiopulmonary process.      Chest x-ray 10/16  Reviewed  Small left effusion with atelectasis.            Impression/recommendations:    Acute respiratory insufficiency due to upper airway instruction/angioedema with significant swelling upper airway/atelectasis with small effusion  Oxygen by nasal

## 2024-10-25 NOTE — PROGRESS NOTES
MIGUEL Hollingsworth NP notified that patient is refusing medications. Pt. Is still in restraints and AxOxself only. MIGUEL Hollingsworth NP acknowledge messaged

## 2024-10-25 NOTE — PROGRESS NOTES
Occupational Therapy  Facility/Department: [facility] ICU  Rehabilitation Occupational Therapy Daily Treatment Note    Date: 10/25/24  Patient Name: Noel Valle       Room: 1114/1114-01  MRN: [id number]  Account: [id number]   : 1955  (69 y.o.) Gender: male      YURI Grewal reports patient is medically stable for therapy treatment this date. Chart reviewed prior to treatment and patient is agreeable for therapy.  All lines intact and patient positioned comfortably at end of treatment.  All patient needs addressed prior to ending therapy session.      Pt currently functioning below baseline.  Recommend daily inpatient skilled therapy at time of discharge to maximize long term outcomes and prevent re-admission. Please refer to AM-PAC score for current level of function.               Past Medical History:  has no past medical history on file.  Past Surgical History:   has no past surgical history on file.    Restrictions  Restrictions/Precautions: General Precautions, Fall Risk, Seizure, Bed Alarm  Other position/activity restrictions: FMS, ex cath, telemetry, continuous pulse ox, alarms  Required Braces or Orthoses?: No    Subjective  Subjective: Pt resting in bed upon arrival agreeable to therapy. Pt's sister present throughout session.  Restrictions/Precautions: General Precautions;Fall Risk;Seizure;Bed Alarm             Objective     Cognition  Overall Cognitive Status: Exceptions  Arousal/Alertness: Appears intact  Following Commands: Follows one step commands with increased time  Attention Span: Attends with cues to redirect;Unable to maintain attention;Difficulty dividing attention;Difficulty attending to directions  Memory: Impaired  Safety Judgement: Impaired  Problem Solving: Impaired  Insights: Decreased awareness of deficits  Initiation: Requires cues for all  Sequencing: Requires cues for all  Cognition Comment: Pt following commands better this date and not agitiated at all during session. Pt was pleasant  0-100% Score: 66.57 (10/25/24 1435)  ADL Inpatient CMS G-Code Modifier : CL (10/25/24 1435)      Therapy Time   Individual Concurrent Group Co-treatment   Time In 1336         Time Out 1430         Minutes 54               Co-treatment with PTA Charli warranted secondary to decreased safety and independence requiring 2 skilled therapy professionals to address individual discipline's goals. OT addressing preparation for ADL transfer, sitting balance for increased ADL performance, sitting/activity tolerance, functional reaching, environmental safety/scanning, fall prevention, functional mobility for ADL transfers, ability to sequence and follow directions, bed mobility tech, and functional UE strength.     ZUNILDA GARRISON

## 2024-10-25 NOTE — CONSULTS
Department of Psychiatry  Consult Service   Psychiatric Assessment        REASON FOR CONSULT: Agitation    CONSULTING PHYSICIAN: Natasha     History obtained from: Patient, sister, EMR    HISTORY OF PRESENT ILLNESS:          The patient is a 69 y.o. male with significant psychiatric history of alcohol use disorder who is admitted medically for angioedema of the tongue.  Patient came in on 10/9/2024 and had to be intubated for angioedema.  Patient is on Benazepril for the past 15 years and woke up with tongue swelling around 6 AM that day.  After extubation the patient became agitated and confused.  Found to be in possible alcohol withdrawal with delirium tremens.  Has been receiving benzodiazepines and Depacon.  Patient was also on Precedex.  Precedex was stopped yesterday 10/24/2024.    Information was obtained from sister.  Sister states the patient has no psychiatric history.  States she was not aware that he was drinking every day.  She states he has about every other day where he is in and out of agitation and delirium symptoms but overall is improving in cognition.  She states his baseline is independent and alert and oriented x 4.  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    Patient states he has been very stressed at home to care for his wife.  He admits to drinking an average of 8 beers daily since his 50s.  He denies any other psychiatric history.  He states he has been experiencing intermittent visual hallucinations since being extubated.  States overall this has improved.  He denies any history of depression or anxiety.  He does have a documented history of situational anxiety and depression without any psychiatric admissions or suicidal thoughts.  He currently denies any suicidal or homicidal thoughts.  He is alert and oriented x 4.  He denies any history of trauma or abuse.  He denies any history of bipolar symptoms.  He does admit to increasing his alcohol intake due to home and relationship  IntraVENous, 2 times per day  sodium chloride flush 0.9 % injection 5-40 mL, 5-40 mL, IntraVENous, PRN  0.9 % sodium chloride infusion, , IntraVENous, PRN  magnesium sulfate 2000 mg in 50 mL IVPB premix, 2,000 mg, IntraVENous, PRN  enoxaparin (LOVENOX) injection 40 mg, 40 mg, SubCUTAneous, Daily  ondansetron (ZOFRAN-ODT) disintegrating tablet 4 mg, 4 mg, Oral, Q8H PRN **OR** ondansetron (ZOFRAN) injection 4 mg, 4 mg, IntraVENous, Q6H PRN  polyethylene glycol (GLYCOLAX) packet 17 g, 17 g, Oral, Daily PRN  [Held by provider] acetaminophen (TYLENOL) tablet 650 mg, 650 mg, Oral, Q6H PRN **OR** [Held by provider] acetaminophen (TYLENOL) suppository 650 mg, 650 mg, Rectal, Q6H PRN  sodium phosphate 15 mmol in sodium chloride 0.9 % 250 mL IVPB, 15 mmol, IntraVENous, PRN  aluminum & magnesium hydroxide-simethicone (MAALOX) 200-200-20 MG/5ML suspension 30 mL, 30 mL, Oral, Q6H PRN     Physical:    Vitals:  /79   Pulse 99   Temp 98.4 °F (36.9 °C) (Oral)   Resp 22   Ht 1.66 m (5' 5.35\")   Wt 91.3 kg (201 lb 3.2 oz)   SpO2 96%   BMI 33.12 kg/m²      Qtc:437    Neuro Exam:   Muscle Strength & Tone: full ROM, normal    Involuntary Movements: No    Mental Status Examination:  Level of consciousness:  Awake and alert  Appearance: hospital attire, lying in bed, fair grooming  Behavior/Motor:  no abnormalities noted  Attitude toward examiner:  cooperative, attentive and good eye contact  Speech:  Spontaneous, normal rate and volume  Mood: Okay\"  Affect: mood congruent  Thought processes:  Linear, goal directed, coherent  Thought content: Denies suicidal ideations   Denies homicidal ideations    Endorses intermittent visual hallucinations   Occasionally exhibiting delusions and paranoia  Cognition:  Oriented to self, situation, location, date  Concentration clinically adequate  Memory age appropriate  Insight & Judgment: Improving    DSM-5 DIAGNOSIS: Delirium tremens   Alcohol use disorder       Stressors     Severity of

## 2024-10-26 LAB
ANION GAP SERPL CALCULATED.3IONS-SCNC: 11 MMOL/L (ref 9–17)
BASOPHILS # BLD: 0.03 K/UL (ref 0–0.2)
BASOPHILS NFR BLD: 0 % (ref 0–2)
BUN SERPL-MCNC: 11 MG/DL (ref 8–23)
BUN/CREAT SERPL: 18 (ref 9–20)
CALCIUM SERPL-MCNC: 8.4 MG/DL (ref 8.6–10.4)
CHLORIDE SERPL-SCNC: 102 MMOL/L (ref 98–107)
CO2 SERPL-SCNC: 28 MMOL/L (ref 20–31)
CREAT SERPL-MCNC: 0.6 MG/DL (ref 0.7–1.2)
EOSINOPHIL # BLD: 0.25 K/UL (ref 0–0.44)
EOSINOPHILS RELATIVE PERCENT: 2 % (ref 1–4)
ERYTHROCYTE [DISTWIDTH] IN BLOOD BY AUTOMATED COUNT: 11.4 % (ref 11.8–14.4)
GFR, ESTIMATED: >90 ML/MIN/1.73M2
GLUCOSE SERPL-MCNC: 81 MG/DL (ref 70–99)
HCT VFR BLD AUTO: 43.4 % (ref 40.7–50.3)
HGB BLD-MCNC: 14 G/DL (ref 13–17)
IMM GRANULOCYTES # BLD AUTO: 0.07 K/UL (ref 0–0.3)
IMM GRANULOCYTES NFR BLD: 1 %
LYMPHOCYTES NFR BLD: 1.78 K/UL (ref 1.1–3.7)
LYMPHOCYTES RELATIVE PERCENT: 17 % (ref 24–43)
MCH RBC QN AUTO: 32.8 PG (ref 25.2–33.5)
MCHC RBC AUTO-ENTMCNC: 32.3 G/DL (ref 28.4–34.8)
MCV RBC AUTO: 101.6 FL (ref 82.6–102.9)
MONOCYTES NFR BLD: 0.79 K/UL (ref 0.1–1.2)
MONOCYTES NFR BLD: 8 % (ref 3–12)
NEUTROPHILS NFR BLD: 72 % (ref 36–65)
NEUTS SEG NFR BLD: 7.29 K/UL (ref 1.5–8.1)
NRBC BLD-RTO: 0 PER 100 WBC
PLATELET # BLD AUTO: 193 K/UL (ref 138–453)
PMV BLD AUTO: 10.9 FL (ref 8.1–13.5)
POTASSIUM SERPL-SCNC: 3.7 MMOL/L (ref 3.7–5.3)
RBC # BLD AUTO: 4.27 M/UL (ref 4.21–5.77)
SODIUM SERPL-SCNC: 141 MMOL/L (ref 135–144)
VIT B1 PYROPHOSHATE BLD-SCNC: 189 NMOL/L (ref 70–180)
WBC OTHER # BLD: 10.2 K/UL (ref 3.5–11.3)

## 2024-10-26 PROCEDURE — 6370000000 HC RX 637 (ALT 250 FOR IP): Performed by: NURSE PRACTITIONER

## 2024-10-26 PROCEDURE — 85025 COMPLETE CBC W/AUTO DIFF WBC: CPT

## 2024-10-26 PROCEDURE — 6370000000 HC RX 637 (ALT 250 FOR IP): Performed by: INTERNAL MEDICINE

## 2024-10-26 PROCEDURE — 2000000000 HC ICU R&B

## 2024-10-26 PROCEDURE — 6370000000 HC RX 637 (ALT 250 FOR IP)

## 2024-10-26 PROCEDURE — 2580000003 HC RX 258: Performed by: FAMILY MEDICINE

## 2024-10-26 PROCEDURE — 6370000000 HC RX 637 (ALT 250 FOR IP): Performed by: HOSPITALIST

## 2024-10-26 PROCEDURE — 6360000002 HC RX W HCPCS: Performed by: FAMILY MEDICINE

## 2024-10-26 PROCEDURE — 80048 BASIC METABOLIC PNL TOTAL CA: CPT

## 2024-10-26 PROCEDURE — 2580000003 HC RX 258: Performed by: NURSE PRACTITIONER

## 2024-10-26 PROCEDURE — 6370000000 HC RX 637 (ALT 250 FOR IP): Performed by: FAMILY MEDICINE

## 2024-10-26 PROCEDURE — 36415 COLL VENOUS BLD VENIPUNCTURE: CPT

## 2024-10-26 PROCEDURE — 94761 N-INVAS EAR/PLS OXIMETRY MLT: CPT

## 2024-10-26 RX ORDER — CIPROFLOXACIN 500 MG/1
500 TABLET, FILM COATED ORAL EVERY 12 HOURS SCHEDULED
Status: DISCONTINUED | OUTPATIENT
Start: 2024-10-26 | End: 2024-10-28

## 2024-10-26 RX ADMIN — Medication 100 MG: at 09:15

## 2024-10-26 RX ADMIN — HYDROCODONE BITARTRATE AND ACETAMINOPHEN 1 TABLET: 5; 325 TABLET ORAL at 03:01

## 2024-10-26 RX ADMIN — ANTI-FUNGAL POWDER MICONAZOLE NITRATE TALC FREE: 1.42 POWDER TOPICAL at 09:26

## 2024-10-26 RX ADMIN — MULTIVITAMIN TABLET 1 TABLET: TABLET at 09:15

## 2024-10-26 RX ADMIN — FAMOTIDINE 20 MG: 20 TABLET, FILM COATED ORAL at 09:15

## 2024-10-26 RX ADMIN — CIPROFLOXACIN 500 MG: 500 TABLET, FILM COATED ORAL at 09:51

## 2024-10-26 RX ADMIN — ANTI-FUNGAL POWDER MICONAZOLE NITRATE TALC FREE: 1.42 POWDER TOPICAL at 20:25

## 2024-10-26 RX ADMIN — AMLODIPINE BESYLATE 10 MG: 10 TABLET ORAL at 09:14

## 2024-10-26 RX ADMIN — CLONAZEPAM 0.5 MG: 0.5 TABLET ORAL at 20:25

## 2024-10-26 RX ADMIN — CLONAZEPAM 0.5 MG: 0.5 TABLET ORAL at 09:15

## 2024-10-26 RX ADMIN — CLONIDINE HYDROCHLORIDE 0.2 MG: 0.2 TABLET ORAL at 20:24

## 2024-10-26 RX ADMIN — CLONIDINE HYDROCHLORIDE 0.2 MG: 0.2 TABLET ORAL at 09:15

## 2024-10-26 RX ADMIN — HYDROCODONE BITARTRATE AND ACETAMINOPHEN 1 TABLET: 5; 325 TABLET ORAL at 16:32

## 2024-10-26 RX ADMIN — OLANZAPINE 10 MG: 5 TABLET, FILM COATED ORAL at 20:24

## 2024-10-26 RX ADMIN — VALPROIC ACID 250 MG: 250 CAPSULE, LIQUID FILLED ORAL at 20:24

## 2024-10-26 RX ADMIN — CIPROFLOXACIN 500 MG: 500 TABLET, FILM COATED ORAL at 20:25

## 2024-10-26 RX ADMIN — FAMOTIDINE 20 MG: 20 TABLET, FILM COATED ORAL at 20:25

## 2024-10-26 RX ADMIN — ENOXAPARIN SODIUM 40 MG: 100 INJECTION SUBCUTANEOUS at 09:14

## 2024-10-26 RX ADMIN — SODIUM CHLORIDE, PRESERVATIVE FREE 10 ML: 5 INJECTION INTRAVENOUS at 09:29

## 2024-10-26 RX ADMIN — VALPROIC ACID 250 MG: 250 CAPSULE, LIQUID FILLED ORAL at 09:14

## 2024-10-26 RX ADMIN — ATORVASTATIN CALCIUM 40 MG: 40 TABLET, FILM COATED ORAL at 09:15

## 2024-10-26 RX ADMIN — HYDROCODONE BITARTRATE AND ACETAMINOPHEN 1 TABLET: 5; 325 TABLET ORAL at 09:14

## 2024-10-26 RX ADMIN — SODIUM CHLORIDE, PRESERVATIVE FREE 10 ML: 5 INJECTION INTRAVENOUS at 20:25

## 2024-10-26 RX ADMIN — VALPROIC ACID 250 MG: 250 CAPSULE, LIQUID FILLED ORAL at 14:17

## 2024-10-26 RX ADMIN — FOLIC ACID 1 MG: 1 TABLET ORAL at 09:15

## 2024-10-26 RX ADMIN — SODIUM CHLORIDE, PRESERVATIVE FREE 10 ML: 5 INJECTION INTRAVENOUS at 09:25

## 2024-10-26 ASSESSMENT — PAIN SCALES - GENERAL
PAINLEVEL_OUTOF10: 2
PAINLEVEL_OUTOF10: 2
PAINLEVEL_OUTOF10: 0
PAINLEVEL_OUTOF10: 9
PAINLEVEL_OUTOF10: 2

## 2024-10-26 ASSESSMENT — PAIN DESCRIPTION - LOCATION
LOCATION: FOOT
LOCATION: NECK
LOCATION: KNEE;BACK

## 2024-10-26 ASSESSMENT — PAIN DESCRIPTION - ORIENTATION
ORIENTATION: POSTERIOR
ORIENTATION: RIGHT
ORIENTATION: RIGHT;MID

## 2024-10-26 ASSESSMENT — PAIN SCALES - WONG BAKER
WONGBAKER_NUMERICALRESPONSE: NO HURT
WONGBAKER_NUMERICALRESPONSE: NO HURT

## 2024-10-26 ASSESSMENT — PAIN DESCRIPTION - DESCRIPTORS
DESCRIPTORS: ACHING
DESCRIPTORS: SHOOTING;THROBBING
DESCRIPTORS: THROBBING;ACHING

## 2024-10-26 ASSESSMENT — PAIN - FUNCTIONAL ASSESSMENT
PAIN_FUNCTIONAL_ASSESSMENT: PREVENTS OR INTERFERES SOME ACTIVE ACTIVITIES AND ADLS
PAIN_FUNCTIONAL_ASSESSMENT: PREVENTS OR INTERFERES SOME ACTIVE ACTIVITIES AND ADLS

## 2024-10-26 NOTE — PLAN OF CARE
Problem: Discharge Planning  Goal: Discharge to home or other facility with appropriate resources  10/25/2024 2115 by Gale Sweeney RN  Outcome: Progressing  10/25/2024 1249 by Vicenta Hurley RN  Outcome: Progressing  Flowsheets (Taken 10/25/2024 0730)  Discharge to home or other facility with appropriate resources:   Identify barriers to discharge with patient and caregiver   Identify discharge learning needs (meds, wound care, etc)   Arrange for needed discharge resources and transportation as appropriate     Problem: Respiratory - Adult  Goal: Achieves optimal ventilation and oxygenation  10/25/2024 2115 by Gale Sweeney RN  Outcome: Progressing  10/25/2024 1249 by Vicenta Hurley RN  Outcome: Progressing  Flowsheets (Taken 10/25/2024 0730)  Achieves optimal ventilation and oxygenation:   Assess for changes in respiratory status   Assess for changes in mentation and behavior   Position to facilitate oxygenation and minimize respiratory effort   Oxygen supplementation based on oxygen saturation or arterial blood gases     Problem: Pain  Goal: Verbalizes/displays adequate comfort level or baseline comfort level  10/25/2024 2115 by Gale Sweeney RN  Outcome: Progressing  10/25/2024 1249 by Vicenta Hurley RN  Outcome: Progressing  Flowsheets (Taken 10/25/2024 0800 by Karla, Michelle M, RN)  Verbalizes/displays adequate comfort level or baseline comfort level:   Encourage patient to monitor pain and request assistance   Assess pain using appropriate pain scale     Problem: Skin/Tissue Integrity  Goal: Absence of new skin breakdown  Description: 1.  Monitor for areas of redness and/or skin breakdown  2.  Assess vascular access sites hourly  3.  Every 4-6 hours minimum:  Change oxygen saturation probe site  4.  Every 4-6 hours:  If on nasal continuous positive airway pressure, respiratory therapy assess nares and determine need for appliance change or resting period.  10/25/2024 2115 by Gale Sweeney  assistive devices   Assist with transfers and ambulation using safe patient handling equipment as needed   Ensure adequate protection for wounds/incisions during mobilization   Obtain physical therapy/occupational therapy consults as needed   Instruct patient/family in ordered activity level   Apply continuous passive motion per provider or physical therapy orders to increase flexion toward goal     Problem: Gastrointestinal - Adult  Goal: Maintains adequate nutritional intake  10/25/2024 2115 by Gale Sweeney RN  Outcome: Progressing  10/25/2024 1249 by Vicenta Hurley RN  Outcome: Progressing  Flowsheets (Taken 10/25/2024 0730)  Maintains adequate nutritional intake: Monitor percentage of each meal consumed     Problem: Genitourinary - Adult  Goal: Absence of urinary retention  10/25/2024 2115 by Gale Sweeney RN  Outcome: Progressing  10/25/2024 1249 by Vicenta Hurley RN  Outcome: Progressing  Flowsheets (Taken 10/25/2024 0730)  Absence of urinary retention:   Monitor intake/output and perform bladder scan as needed   Assess patient’s ability to void and empty bladder   Place urinary catheter per Licensed Independent Practitioner order if needed     Problem: Coping  Goal: Pt/Family able to verbalize concerns and demonstrate effective coping strategies  Description: INTERVENTIONS:  1. Assist patient/family to identify coping skills, available support systems and cultural and spiritual values  2. Provide emotional support, including active listening and acknowledgement of concerns of patient and caregivers  3. Reduce environmental stimuli, as able  4. Instruct patient/family in relaxation techniques, as appropriate  5. Assess for spiritual pain/suffering and initiate Spiritual Care, Psychosocial Clinical Specialist consults as needed  10/25/2024 2115 by Gale Sweeney RN  Outcome: Progressing  10/25/2024 1249 by Vicenta Hurley RN  Outcome: Progressing  Flowsheets (Taken 10/25/2024 0730)  Patient/family able

## 2024-10-26 NOTE — PROGRESS NOTES
Hospitalist - Progress Note      Patient: Noel Valle    Unit/Bed:1114/1114-01  YOB: 1955  MRN: [id number]   Acct: [id number]   PCP: Ramakrishna Silver MD    Date of Service: Pt seen/examined on 10/25/24  and Admitted to Inpatient with expected LOS greater than two midnights due to medical therapy.     Chief Complaint: Tongue swelling 62-year-old male    Assessment and Plan:-    Acute Respiratory Insufficiency due to upper airway obstruction secondary to Angioedema  S/P extubation 10-13-24  Drowsy as he was extubated and sedative meds weaned off  On 3 L nasal canula  Solumedrol transitioned to Po prednisone  Critical care following.       Hypotension - resolved  Hypertension  Monitor BP   On norvasc and clonidine. Off coreg due to bradycardia   On prn Hydralazine.     HELENE  resolved    Tobacco abuse disorder/history of possible COPD  Sched Dave.  Will  on tobacco cessation when patient is alert      Alcohol withdrawal  On protocol  Overnight precedex restarted due to hallucinations and agitation, irritation  Continue to monitor at this time  On zyprexa, valproate and librium restarted  NPO except for meds with apple sauce.-Diet advanced  CIWA protocol    Agitation secondary to alcohol withdrawal, metabolic disturbances, polypharmacy  Haldol prn  Consult Neuro  D/w RN  Prednisone may be contributing per neurology  Thiamine added per neuro  Okay to add Librium as needed per neuro  Neurochecks  EEG  Psych consult   DC Ativan, Geodon per Psych  Wean off Klonopin  Zyprexa 10 hs  Depakote 250 tid    Subclinical hyperthyroidism    Hypernatremia  Free water flushes. improved          Subjective:  Patient resting.  Much more pleasant  Mentation fluctuates   Discussed with RN        History Of Present Illness:    69-year-old male coming into the hospital for difficulty swallowing.  In the ER, patient found to have tongue swelling, trouble swallowing with acute distress, tachycardia, ill-appearing  Dinner; Standard High Calorie/High Protein Oral Supplement  ADULT DIET; Dysphagia - Soft and Bite Sized; Safety Tray; Safety Tray (Disposables No Utensils)  ADULT ORAL NUTRITION SUPPLEMENT; Dinner; Frozen Oral Supplement    Review of systems:   Pertinent positives as noted in the HPI. All other systems reviewed and negative.    PHYSICAL EXAM:  /76   Pulse (!) 107   Temp 98.3 °F (36.8 °C) (Oral)   Resp 27   Ht 1.66 m (5' 5.35\")   Wt 91.3 kg (201 lb 3.2 oz)   SpO2 92%   BMI 33.12 kg/m²   General appearance: alert   HEENT: Normal cephalic, atraumatic, EOMI, MM dry  Neck: Supple, with full range of motion. No jugular venous distention. Trachea midline.  Respiratory:  Normal respiratory effort. Clear   Cardiovascular: Regular rate and rhythm with normal S1/S2 without murmurs, rubs or gallops.  Abdomen: Soft, non-tender, non-distended with normal bowel sounds.  Musculoskeletal:  No clubbing, cyanosis or edema bilaterally.  Skin: Skin color, texture, turgor normal.  No rashes or lesions.  Neurologic:  grossly normal  Psychiatric: Resting  Capillary Refill: Brisk,< 3 seconds   Peripheral Pulses: +2 palpable, equal bilaterally     Labs:   Recent Labs     10/23/24  0437 10/24/24  0648 10/25/24  0455   WBC 15.2* 13.1* 12.4*   HGB 14.8 13.5 14.4   HCT 44.3 43.4 43.9    158 180     Recent Labs     10/23/24  0437 10/24/24  0648 10/25/24  0455    139 141   K 3.5* 3.8 4.1    106 102   CO2 28 25 28   BUN 16 12 9   CREATININE 0.7 0.5* 0.6*   CALCIUM 8.6 8.2* 8.5*     No results for input(s): \"AST\", \"ALT\", \"BILIDIR\", \"BILITOT\", \"ALKPHOS\" in the last 72 hours.      No results for input(s): \"INR\" in the last 72 hours.  No results for input(s): \"CKTOTAL\", \"TROPONINI\" in the last 72 hours.    Urinalysis:    No results found for: \"NITRU\", \"WBCUA\", \"BACTERIA\", \"RBCUA\", \"BLOODU\", \"SPECGRAV\", \"GLUCOSEU\"    Radiology:   XR CHEST (SINGLE VIEW FRONTAL)   Final Result   Mild left lower lobe atelectatic changes. No

## 2024-10-26 NOTE — PROGRESS NOTES
Pulmonary Critical Care Progress Note    Patient seen for the follow up of encephalopathy     Subjective: denies any pain or SOB    Examination:    Vitals: BP (!) 123/103   Pulse (!) 108   Temp 98.1 °F (36.7 °C) (Oral)   Resp 24   Ht 1.66 m (5' 5.35\")   Wt 91.3 kg (201 lb 3.2 oz)   SpO2 95%   BMI 33.12 kg/m²   SpO2  Av.3 %  Min: 89 %  Max: 96 %  General appearance: Awake alert, confused no distress, room air  Neck: No JVD  Lungs: Decreased breath sound no crackles or wheeze  Heart: regular rate and rhythm, S1, S2 normal, no gallop  Abdomen: Soft, non tender, + BS  Extremities: no cyanosis or clubbing. No significant edema    LABs:    CBC:   Recent Labs     10/25/24  0455 10/26/24  0716   WBC 12.4* 10.2   HGB 14.4 14.0   HCT 43.9 43.4    193     BMP:   Recent Labs     10/25/24  0455 10/26/24  0716    141   K 4.1 3.7   CO2 28 28   BUN 9 11   CREATININE 0.6* 0.6*   LABGLOM >90 >90   GLUCOSE 65* 81      Latest Reference Range & Units 10/14/24 06:00 10/14/24 12:31   POC TCO2 22 - 30 mmol/L  27   POC HCO3 21.0 - 28.0 mmol/L 28.1 (H) 26.6   POC O2 SAT 94.0 - 98.0 % 95.6 95.8   POC pCO2 35.0 - 48.0 mm Hg 43.2 42.0   POC pH 7.350 - 7.450  7.421 7.410   POC PO2 83.0 - 108.0 mm Hg 78.4 (L) 79.8 (L)   (H): Data is abnormally high  (L): Data is abnormally low      Radiology:  Chest x-ray 10/20  Poor inspiratory effort.       Chest x-ray 10/17 reviewed  1. Probable mild atelectatic changes at the base of the left lung.  2. Small left pleural effusion versus pleural thickening.  3. No other acute cardiopulmonary process.      Chest x-ray 10/16  Reviewed  Small left effusion with atelectasis.            Impression/recommendations:    Acute respiratory insufficiency due to upper airway instruction/angioedema - RESOLVED  Keep off ACE inhibitor for life  Discontinue prednisone 10 mg p.o. daily    COPD/smoker   DuoNeb by nebulizer .       alcohol withdrawal/metabolic encephalopathy  Precedex drip titrate to  RASS of 0 as needed  Ativan 1 mg IV every 2 hours as needed off CIWA for now  Geodon 10 mg as needed for severe agitation   Zyprexa 10 mg p.o. at bedtime  Depakote 250 IV 3 times daily     UTI  7 d cipro    Hypernatremia - resolved  Monitor sodium    suspected sleep apnea/obesity  Outpatient sleep evaluation     Discussed with RN   Peptic ulcer disease prophylaxis  DVT prophylaxis    This is a late note on patient seen by me earlier today.  Electronically signed by Evelio Tineo MD on 10/26/24 at 9:22 PM

## 2024-10-26 NOTE — PROGRESS NOTES
Hospitalist - Progress Note      Patient: Noel Valle    Unit/Bed:1114/1114-01  YOB: 1955  MRN: [id number]   Acct: [id number]   PCP: Ramakrishna Silver MD    Date of Service: Pt seen/examined on 10/26/24  and Admitted to Inpatient with expected LOS greater than two midnights due to medical therapy.     Chief Complaint: Tongue swelling 62-year-old male    Assessment and Plan:-    Acute Respiratory Insufficiency due to upper airway obstruction secondary to Angioedema  S/P extubation 10-13-24  Drowsy as he was extubated and sedative meds weaned off  On 3 L nasal canula  Solumedrol transitioned to Po prednisone  Critical care following.       Hypotension - resolved  Hypertension  Monitor BP   On norvasc and clonidine. Off coreg due to bradycardia   On prn Hydralazine.     HELENE  resolved    Tobacco abuse disorder/history of possible COPD  Sched Dave.  Will  on tobacco cessation when patient is alert      Alcohol withdrawal  On protocol  Overnight precedex restarted due to hallucinations and agitation, irritation  Continue to monitor at this time  On zyprexa, valproate and librium restarted  NPO except for meds with apple sauce.-Diet advanced  CIWA protocol    Agitation secondary to alcohol withdrawal, metabolic disturbances, polypharmacy  Haldol prn  Consult Neuro  D/w RN  Prednisone may be contributing per neurology  Thiamine added per neuro  Okay to add Librium as needed per neuro  Neurochecks  EEG  Psych consult   DC Ativan, Geodon per Psych  Wean off Klonopin  Zyprexa 10 hs  Depakote 250 tid    Subclinical hyperthyroidism    Hypernatremia  Free water flushes. improved      Rehab DC planning    Subjective:  Patient resting.  Much more pleasant  Mentation fluctuates   Looks out of window  Discussed with RN        History Of Present Illness:    69-year-old male coming into the hospital for difficulty swallowing.  In the ER, patient found to have tongue swelling, trouble swallowing with  lower lobe atelectatic changes. No consolidative infiltrates.         MRI BRAIN W WO CONTRAST   Final Result   1. No acute infarct or other acute intracranial process.   2. Mild chronic microvascular ischemic changes.         XR CHEST (SINGLE VIEW FRONTAL)   Final Result   1. Poor inspiratory effort.         XR CHEST (SINGLE VIEW FRONTAL)   Final Result   1. Probable mild atelectatic changes at the base of the left lung.   2. Small left pleural effusion versus pleural thickening.   3. No other acute cardiopulmonary process.         XR CHEST PORTABLE   Final Result   Small left effusion with atelectasis.         XR CHEST PORTABLE   Final Result   1. Low lung volumes, without clear evidence of acute cardiopulmonary   abnormality.   2. Enteric tube extends into the stomach.         XR ABDOMEN FOR NG/OG/NE TUBE PLACEMENT   Final Result   Nasogastric tube tip is in the stomach         XR CHEST PORTABLE   Final Result   No evidence of acute cardiopulmonary process.         XR CHEST PORTABLE   Final Result   1. The endotracheal tube is now approximately 5.9 cm above the jignesh.   2. Small lung volumes with no confluent airspace consolidation.         XR CHEST PORTABLE   Final Result   1. Low lung volumes.   2. No significant change in the appearance of the chest.         XR CHEST PORTABLE   Final Result   No significant change in the appearance of the chest.         XR CHEST PORTABLE   Final Result   Bibasilar infiltrates.      Endotracheal tube with the tip in the midtrachea.           No results found.      EKG:     Electronically signed by Theresa Uribe MD on 10/26/2024 at 5:23 PM

## 2024-10-26 NOTE — PROGRESS NOTES
End Of Shift Note  [facility]  Summary of shift: Patient alert, oriented x4. Has periods of confusion and continues to have hallucinations, some agitation during episodes of confusion but easily redirectable this shift. Follows commands appropriately. Tolerated meals well. VSS. Has pain 8/10 in back/neck/ right knee, prn medication helped. Started po cipro for UTI.     Vitals:    Vitals:    10/26/24 1632 10/26/24 1700 10/26/24 1702 10/26/24 1800   BP:  101/77  101/61   Pulse:  69  74   Resp: 22 17 18 19   Temp: 97.6 °F (36.4 °C)      TempSrc: Oral      SpO2:  90%  (!) 89%   Weight:       Height:            I&O:   Intake/Output Summary (Last 24 hours) at 10/26/2024 1806  Last data filed at 10/26/2024 1800  Gross per 24 hour   Intake 480 ml   Output 950 ml   Net -470 ml       Resp Status: room air, lungs clear/diminished     Ventilator Settings:  Vent Mode: CPAP/PS Resp Rate (Set): 18 bpm/Vt (Set, mL): 500 mL/ /FiO2 : 30 %    Critical Care IV infusions:   dexmedeTOMIDine (PRECEDEX) 1,000 mcg in sodium chloride 0.9 % 250 mL infusion      sodium chloride      sodium chloride          LDA:   Peripheral IV 10/24/24 Right;Anterior Cephalic (Active)   Number of days: 2       Urinary Catheter 10/23/24 (Active)   Number of days: 2

## 2024-10-26 NOTE — PLAN OF CARE
Problem: Respiratory - Adult  Goal: Achieves optimal ventilation and oxygenation  Outcome: Progressing  Flowsheets (Taken 10/26/2024 0800)  Achieves optimal ventilation and oxygenation:   Assess for changes in respiratory status   Assess for changes in mentation and behavior   Position to facilitate oxygenation and minimize respiratory effort   Oxygen supplementation based on oxygen saturation or arterial blood gases   Respiratory therapy support as indicated     Problem: Neurosensory - Adult  Goal: Achieves stable or improved neurological status  Outcome: Progressing  Flowsheets (Taken 10/26/2024 0800)  Achieves stable or improved neurological status: Assess for and report changes in neurological status     Problem: Cardiovascular - Adult  Goal: Maintains optimal cardiac output and hemodynamic stability  Outcome: Progressing  Flowsheets (Taken 10/26/2024 0800)  Maintains optimal cardiac output and hemodynamic stability:   Monitor blood pressure and heart rate   Monitor urine output and notify Licensed Independent Practitioner for values outside of normal range   Administer vasoactive medications as ordered     Problem: Skin/Tissue Integrity - Adult  Goal: Skin integrity remains intact  Outcome: Progressing  Flowsheets (Taken 10/26/2024 0800)  Skin Integrity Remains Intact: Monitor for areas of redness and/or skin breakdown     Problem: Musculoskeletal - Adult  Goal: Return mobility to safest level of function  Outcome: Progressing  Flowsheets (Taken 10/26/2024 0800)  Return Mobility to Safest Level of Function:   Assess patient stability and activity tolerance for standing, transferring and ambulating with or without assistive devices   Assist with transfers and ambulation using safe patient handling equipment as needed   Obtain physical therapy/occupational therapy consults as needed     Problem: Gastrointestinal - Adult  Goal: Maintains adequate nutritional intake  Outcome: Progressing  Flowsheets (Taken  10/26/2024 0800)  Maintains adequate nutritional intake:   Monitor percentage of each meal consumed   Assist with meals as needed     Problem: Pain  Goal: Verbalizes/displays adequate comfort level or baseline comfort level  Outcome: Progressing  Flowsheets (Taken 10/26/2024 0800)  Verbalizes/displays adequate comfort level or baseline comfort level:   Encourage patient to monitor pain and request assistance   Assess pain using appropriate pain scale     Problem: Coping  Goal: Pt/Family able to verbalize concerns and demonstrate effective coping strategies  Description: INTERVENTIONS:  1. Assist patient/family to identify coping skills, available support systems and cultural and spiritual values  2. Provide emotional support, including active listening and acknowledgement of concerns of patient and caregivers  3. Reduce environmental stimuli, as able  4. Instruct patient/family in relaxation techniques, as appropriate  5. Assess for spiritual pain/suffering and initiate Spiritual Care, Psychosocial Clinical Specialist consults as needed  Outcome: Progressing  Flowsheets (Taken 10/26/2024 0800)  Patient/family able to verbalize anxieties, fears, and concerns, and demonstrate effective coping:   Assist patient/family to identify coping skills, available support systems and cultural and spiritual values   Provide emotional support, including active listening and acknowledgement of concerns of patient and caregivers   Reduce environmental stimuli, as able     Problem: Confusion  Goal: Confusion, delirium, dementia, or psychosis is improved or at baseline  Description: INTERVENTIONS:  1. Assess for possible contributors to thought disturbance, including medications, impaired vision or hearing, underlying metabolic abnormalities, dehydration, psychiatric diagnoses, and notify attending LIP  2. [place] high risk fall precautions, as indicated  3. Provide frequent short contacts to provide reality reorientation,

## 2024-10-26 NOTE — PROGRESS NOTES
End Of Shift Note  [facility]  Summary of shift: Pt had uneventful shift. A/Ox4 but forgetful at times. He does still have mild hallucinations. PRN Norco given x1 for R foot pain: pt satisfied. All meds given whole with pudding. Pt placed on bedpan per request x2 but unable to have BM. 350mL onelia u/o from [place]. BP soft throughout night but MAP sustained >65. No PRN Zyprexa given.     Vitals:    Vitals:    10/26/24 0300 10/26/24 0400 10/26/24 0500 10/26/24 0600   BP: 112/61 (!) 93/57 (!) 91/57 104/65   Pulse: 67 68 65 69   Resp: 23 20 22 24   Temp:  98.1 °F (36.7 °C)     TempSrc:  Oral     SpO2: 96% 93% 94% 96%   Weight:       Height:            I&O:   Intake/Output Summary (Last 24 hours) at 10/26/2024 0629  Last data filed at 10/26/2024 0000  Gross per 24 hour   Intake 820 ml   Output 650 ml   Net 170 ml       Resp Status: RA    Ventilator Settings:  Vent Mode: CPAP/PS Resp Rate (Set): 18 bpm/Vt (Set, mL): 500 mL/ /FiO2 : 30 %    Critical Care IV infusions:   dexmedeTOMIDine (PRECEDEX) 1,000 mcg in sodium chloride 0.9 % 250 mL infusion      sodium chloride      sodium chloride          LDA:   Peripheral IV 10/24/24 Right;Anterior Cephalic (Active)   Number of days: 1       Urinary Catheter 10/23/24 (Active)   Number of days: 2

## 2024-10-27 PROCEDURE — 2580000003 HC RX 258: Performed by: NURSE PRACTITIONER

## 2024-10-27 PROCEDURE — 6370000000 HC RX 637 (ALT 250 FOR IP): Performed by: FAMILY MEDICINE

## 2024-10-27 PROCEDURE — 6370000000 HC RX 637 (ALT 250 FOR IP): Performed by: HOSPITALIST

## 2024-10-27 PROCEDURE — 2000000000 HC ICU R&B

## 2024-10-27 PROCEDURE — 6360000002 HC RX W HCPCS: Performed by: FAMILY MEDICINE

## 2024-10-27 PROCEDURE — 6370000000 HC RX 637 (ALT 250 FOR IP): Performed by: NURSE PRACTITIONER

## 2024-10-27 PROCEDURE — 6360000002 HC RX W HCPCS

## 2024-10-27 PROCEDURE — 94761 N-INVAS EAR/PLS OXIMETRY MLT: CPT

## 2024-10-27 PROCEDURE — 6370000000 HC RX 637 (ALT 250 FOR IP): Performed by: INTERNAL MEDICINE

## 2024-10-27 PROCEDURE — 97110 THERAPEUTIC EXERCISES: CPT

## 2024-10-27 PROCEDURE — 6370000000 HC RX 637 (ALT 250 FOR IP)

## 2024-10-27 RX ORDER — DOCUSATE SODIUM 100 MG/1
100 CAPSULE, LIQUID FILLED ORAL 2 TIMES DAILY PRN
Status: DISCONTINUED | OUTPATIENT
Start: 2024-10-27 | End: 2024-11-08 | Stop reason: HOSPADM

## 2024-10-27 RX ORDER — SODIUM PHOSPHATE, DIBASIC AND SODIUM PHOSPHATE, MONOBASIC 7; 19 G/230ML; G/230ML
1 ENEMA RECTAL ONCE
Status: DISCONTINUED | OUTPATIENT
Start: 2024-10-27 | End: 2024-11-08 | Stop reason: HOSPADM

## 2024-10-27 RX ADMIN — ATORVASTATIN CALCIUM 40 MG: 40 TABLET, FILM COATED ORAL at 08:26

## 2024-10-27 RX ADMIN — Medication 100 MG: at 08:27

## 2024-10-27 RX ADMIN — HYDROCODONE BITARTRATE AND ACETAMINOPHEN 1 TABLET: 5; 325 TABLET ORAL at 23:43

## 2024-10-27 RX ADMIN — FAMOTIDINE 20 MG: 20 TABLET, FILM COATED ORAL at 08:26

## 2024-10-27 RX ADMIN — SODIUM CHLORIDE, PRESERVATIVE FREE 10 ML: 5 INJECTION INTRAVENOUS at 08:40

## 2024-10-27 RX ADMIN — OLANZAPINE 2.5 MG: 10 INJECTION, POWDER, FOR SOLUTION INTRAMUSCULAR at 01:05

## 2024-10-27 RX ADMIN — VALPROIC ACID 250 MG: 250 CAPSULE, LIQUID FILLED ORAL at 20:16

## 2024-10-27 RX ADMIN — CIPROFLOXACIN 500 MG: 500 TABLET, FILM COATED ORAL at 08:26

## 2024-10-27 RX ADMIN — ANTI-FUNGAL POWDER MICONAZOLE NITRATE TALC FREE: 1.42 POWDER TOPICAL at 20:17

## 2024-10-27 RX ADMIN — FOLIC ACID 1 MG: 1 TABLET ORAL at 08:26

## 2024-10-27 RX ADMIN — MULTIVITAMIN TABLET 1 TABLET: TABLET at 08:26

## 2024-10-27 RX ADMIN — CLONIDINE HYDROCHLORIDE 0.2 MG: 0.2 TABLET ORAL at 08:26

## 2024-10-27 RX ADMIN — VALPROIC ACID 250 MG: 250 CAPSULE, LIQUID FILLED ORAL at 14:40

## 2024-10-27 RX ADMIN — CLONAZEPAM 0.5 MG: 0.5 TABLET ORAL at 08:26

## 2024-10-27 RX ADMIN — VALPROIC ACID 250 MG: 250 CAPSULE, LIQUID FILLED ORAL at 08:26

## 2024-10-27 RX ADMIN — OLANZAPINE 10 MG: 5 TABLET, FILM COATED ORAL at 20:17

## 2024-10-27 RX ADMIN — DOCUSATE SODIUM 100 MG: 100 CAPSULE, LIQUID FILLED ORAL at 14:40

## 2024-10-27 RX ADMIN — ENOXAPARIN SODIUM 40 MG: 100 INJECTION SUBCUTANEOUS at 08:25

## 2024-10-27 RX ADMIN — ANTI-FUNGAL POWDER MICONAZOLE NITRATE TALC FREE: 1.42 POWDER TOPICAL at 08:41

## 2024-10-27 RX ADMIN — CLONIDINE HYDROCHLORIDE 0.2 MG: 0.2 TABLET ORAL at 20:16

## 2024-10-27 RX ADMIN — SODIUM CHLORIDE, PRESERVATIVE FREE 10 ML: 5 INJECTION INTRAVENOUS at 20:17

## 2024-10-27 RX ADMIN — CLONAZEPAM 0.5 MG: 0.5 TABLET ORAL at 20:17

## 2024-10-27 RX ADMIN — FAMOTIDINE 20 MG: 20 TABLET, FILM COATED ORAL at 20:17

## 2024-10-27 RX ADMIN — HYDROCODONE BITARTRATE AND ACETAMINOPHEN 1 TABLET: 5; 325 TABLET ORAL at 15:53

## 2024-10-27 RX ADMIN — HYDROCODONE BITARTRATE AND ACETAMINOPHEN 1 TABLET: 5; 325 TABLET ORAL at 08:28

## 2024-10-27 RX ADMIN — CIPROFLOXACIN 500 MG: 500 TABLET, FILM COATED ORAL at 20:17

## 2024-10-27 ASSESSMENT — PAIN DESCRIPTION - ORIENTATION
ORIENTATION: RIGHT;LEFT
ORIENTATION: RIGHT;LEFT
ORIENTATION: RIGHT

## 2024-10-27 ASSESSMENT — PAIN SCALES - GENERAL
PAINLEVEL_OUTOF10: 2
PAINLEVEL_OUTOF10: 9

## 2024-10-27 ASSESSMENT — PAIN SCALES - WONG BAKER
WONGBAKER_NUMERICALRESPONSE: NO HURT

## 2024-10-27 ASSESSMENT — PAIN DESCRIPTION - DESCRIPTORS
DESCRIPTORS: ACHING;SHOOTING;STABBING;THROBBING
DESCRIPTORS: ACHING;SORE
DESCRIPTORS: ACHING

## 2024-10-27 ASSESSMENT — PAIN DESCRIPTION - LOCATION
LOCATION: FOOT
LOCATION: KNEE
LOCATION: BACK;FOOT

## 2024-10-27 NOTE — PROGRESS NOTES
Last documented bowel movement 10/9, patient states he had a bowel movement a couple days ago. Patient feels urge to go but has been unable to. Attending notified, orders received for fleet enema once and colace 100 mg bid. Patient informed, he refused enema at this time but agreeable to colace. Patient educated and still refused at this time

## 2024-10-27 NOTE — PROGRESS NOTES
End Of Shift Note  [facility]  Summary of shift: Patient started shift confused and agitated, throughout shift confusion decreased, he was pleasant, calm and cooperative majority of shift. Alert and oriented to person, place and time. Agreed to wear foam boots and worked with PT exercises in bed. No BM, colace prn started, passing gas. Wife and sister visited with patient, patient very pleasant and appreciative after. U/o 225 mL, patient encouraged to increase oral intake of fluids and meals, assisted as needed.    Vitals:    Vitals:    10/27/24 1600 10/27/24 1620 10/27/24 1623 10/27/24 1710   BP: (!) 109/96   (!) 101/44   Pulse: 85   68   Resp: 24 22 21   Temp:  97.8 °F (36.6 °C)     TempSrc:  Oral     SpO2: 96%   94%   Weight:       Height:            I&O:   Intake/Output Summary (Last 24 hours) at 10/27/2024 1754  Last data filed at 10/27/2024 1750  Gross per 24 hour   Intake 420 ml   Output 600 ml   Net -180 ml       Resp Status: room air, lungs clear/diminished    Ventilator Settings:  Vent Mode: CPAP/PS Resp Rate (Set): 18 bpm/Vt (Set, mL): 500 mL/ /FiO2 : 30 %    Critical Care IV infusions:   dexmedeTOMIDine (PRECEDEX) 1,000 mcg in sodium chloride 0.9 % 250 mL infusion      sodium chloride      sodium chloride          LDA:   Peripheral IV 10/24/24 Right;Anterior Cephalic (Active)   Number of days: 3       Urinary Catheter 10/23/24 (Active)   Number of days: 3

## 2024-10-27 NOTE — PLAN OF CARE
Problem: Discharge Planning  Goal: Discharge to home or other facility with appropriate resources  10/26/2024 2238 by Gale Sweeney RN  Outcome: Progressing  10/26/2024 1454 by Rosie Ronquillo RN  Outcome: Progressing  Flowsheets (Taken 10/26/2024 0800)  Discharge to home or other facility with appropriate resources:   Identify barriers to discharge with patient and caregiver   Arrange for needed discharge resources and transportation as appropriate   Identify discharge learning needs (meds, wound care, etc)   Refer to discharge planning if patient needs post-hospital services based on physician order or complex needs related to functional status, cognitive ability or social support system     Problem: Respiratory - Adult  Goal: Achieves optimal ventilation and oxygenation  10/26/2024 2238 by Gale Sweeney RN  Outcome: Progressing  10/26/2024 1454 by Rosie Ronquillo RN  Outcome: Progressing  Flowsheets (Taken 10/26/2024 0800)  Achieves optimal ventilation and oxygenation:   Assess for changes in respiratory status   Assess for changes in mentation and behavior   Position to facilitate oxygenation and minimize respiratory effort   Oxygen supplementation based on oxygen saturation or arterial blood gases   Respiratory therapy support as indicated     Problem: Pain  Goal: Verbalizes/displays adequate comfort level or baseline comfort level  10/26/2024 2238 by Gale Sweeney RN  Outcome: Progressing  10/26/2024 1454 by Rosie Ronquillo RN  Outcome: Progressing  Flowsheets (Taken 10/26/2024 0800)  Verbalizes/displays adequate comfort level or baseline comfort level:   Encourage patient to monitor pain and request assistance   Assess pain using appropriate pain scale     Problem: Skin/Tissue Integrity  Goal: Absence of new skin breakdown  Description: 1.  Monitor for areas of redness and/or skin breakdown  2.  Assess vascular access sites hourly  3.  Every 4-6 hours minimum:  Change oxygen saturation probe  environmental stimuli, including noise as appropriate   Monitor and intervene to maintain adequate nutrition, hydration, elimination, sleep and activity

## 2024-10-27 NOTE — PROGRESS NOTES
Physical Therapy  Facility/Department: [facility] ICU        NAME: Nole Valle  : 1955 (69 y.o.)  MRN: [id number]  CODE STATUS: Full Code    Date of Service: 10/27/24      History reviewed. No pertinent past medical history.  No past surgical history on file.    Chart Reviewed: Yes  Family / Caregiver Present: No    Restrictions:  Restrictions/Precautions: General Precautions;Fall Risk;Seizure;Bed Alarm  Position Activity Restriction  Other position/activity restrictions: ex cath, telemetry, continuous pulse ox, alarms     SUBJECTIVE  Subjective: RN and pt agreed to PT, pt resting upon arrival and difficult to fully arouse from sleep; pt reports pain but did not rate or note location      OBJECTIVE    Functional Mobility  Bed mobility  Supine to Sit: Unable to assess  Bed Mobility Comments: Not safe to attempt this date d/t overall weakness; Pt requiring 2 assist for mobility and demo's decreased level of alertness      PT Exercises  Exercise Treatment: BUE/LE AAROM x 10 in joints and planes; PROM R hip flex  PROM Exercises: BLE gastroc and HS stretches  A/AROM Exercises: Cervical rotation, flex, ext AROM x 10    ASSESSMENT       Activity Tolerance  Activity Tolerance: Patient limited by fatigue;Patient limited by endurance;Patient tolerated treatment well    Assessment  Assessment: 68 y/o male referred to PT. Patient displays decreased alertness but able to follow commands and participate with ROM exercises.pt with eyes closed most of session. Patient pleasant, cooperative and motivated throughout session.  Unsafe to attempt mobility with single assist this date due to generalized weakness and level of arousal. Patient is a high fall risk; functioning below baseline and would benefit from continued PT.  Therapy Prognosis: Good  Decision Making: High Complexity  Discharge Recommendations: Patient would benefit from continued therapy after discharge    CLINICAL IMPRESSION   68 y/o male referred to PT. Patient  back to your side while in a flat bed without using bedrails?: Total  How much help is needed moving from lying on your back to sitting on the side of a flat bed without using bedrails?: Total  How much help is needed moving to and from a bed to a chair?: Total  How much help is needed standing up from a chair using your arms?: Total  How much help is needed walking in hospital room?: Total  How much help is needed climbing 3-5 steps with a railing?: Total  AM-PAC Inpatient Mobility Raw Score : 6  AM-PAC Inpatient T-Scale Score : 23.55  Mobility Inpatient CMS 0-100% Score: 100  Mobility Inpatient CMS G-Code Modifier : CN         Therapy Time   Individual Concurrent Group Co-treatment   Time In 1022         Time Out 1054         Minutes 32                   Alexa Lopes PTA, 10/27/24 at 11:33 AM

## 2024-10-27 NOTE — PROGRESS NOTES
Pulmonary Critical Care Progress Note    Patient seen for the follow up of encephalopathy     Subjective: denies any pain or SOB    Examination:    Vitals: BP (!) 88/60   Pulse 65   Temp 97.1 °F (36.2 °C) (Temporal)   Resp 23   Ht 1.66 m (5' 5.35\")   Wt 91.3 kg (201 lb 3.2 oz)   SpO2 95%   BMI 33.12 kg/m²   SpO2  Av.7 %  Min: 89 %  Max: 95 %  General appearance: Awake alert, confused no distress, room air  Neck: No JVD  Lungs: Decreased breath sound no crackles or wheeze  Heart: regular rate and rhythm, S1, S2 normal, no gallop  Abdomen: Soft, non tender, + BS  Extremities: no cyanosis or clubbing. No significant edema    LABs:    CBC:   Recent Labs     10/25/24  0455 10/26/24  0716   WBC 12.4* 10.2   HGB 14.4 14.0   HCT 43.9 43.4    193     BMP:   Recent Labs     10/25/24  0455 10/26/24  0716    141   K 4.1 3.7   CO2 28 28   BUN 9 11   CREATININE 0.6* 0.6*   LABGLOM >90 >90   GLUCOSE 65* 81      Latest Reference Range & Units 10/14/24 06:00 10/14/24 12:31   POC TCO2 22 - 30 mmol/L  27   POC HCO3 21.0 - 28.0 mmol/L 28.1 (H) 26.6   POC O2 SAT 94.0 - 98.0 % 95.6 95.8   POC pCO2 35.0 - 48.0 mm Hg 43.2 42.0   POC pH 7.350 - 7.450  7.421 7.410   POC PO2 83.0 - 108.0 mm Hg 78.4 (L) 79.8 (L)   (H): Data is abnormally high  (L): Data is abnormally low      Radiology:  Chest x-ray 10/20  Poor inspiratory effort.       Chest x-ray 10/17 reviewed  1. Probable mild atelectatic changes at the base of the left lung.  2. Small left pleural effusion versus pleural thickening.  3. No other acute cardiopulmonary process.      Chest x-ray 10/16  Reviewed  Small left effusion with atelectasis.            Impression/recommendations:    Acute respiratory insufficiency due to upper airway instruction/angioedema - RESOLVED  Keep off ACE inhibitor for life  Discontinue prednisone 10 mg p.o. daily    COPD/smoker   DuoNeb by nebulizer .    alcohol withdrawal/metabolic encephalopathy  Precedex drip titrate to RASS of 0

## 2024-10-27 NOTE — PROGRESS NOTES
0030: Staff entered room d/t patient ripping ECG monitoring off. Pt stated, \"Do you see that pig over there.\"  Pt was redirectable but states that \"I want to take one last walk before I die.\" Pt disoriented to time and situation but oriented to person and place. ECG patches reapplied and pt left alone after re-oriented.     0100: Pt called out using call light, staff entered room and pt threw styrofoam cup of ginger ale at the wall attempting to throw at staff. Staff attempted to re-orient but patient made the shape of a gun with fingers and pointed his fingers at Tahir RN. Pt also attempted to use fingers to jab Josué RNs eyes.     0105: IM PRN Zyprexa given for agitation.

## 2024-10-27 NOTE — PROGRESS NOTES
difficulty swallowing.  In the ER, patient found to have tongue swelling, trouble swallowing with acute distress, tachycardia, ill-appearing status.  Symptoms began earlier today at 6 AM.  He noticed severe swelling of his throat.  Patient denies any allergies, medications.  Patient is not on any ACE inhibitor.  Patient was able to talk but his words were muffled.  In the ER, case was discussed with the anesthesiologist to take the patient to the OR.  Patient was intubated under the supervision of surgery due to concerns for potential tracheostomy.  Patient was given rocuronium, propofol.  He continued to wake up and thrash around for which reason Versed 60 mg IV push was ordered followed by fentanyl 50 mg IV push.  He was then put on fentanyl drip.  His blood pressure did drop after this.  ICU ordered fluid boluses.  He does have a history of COPD, tobacco abuse.  He was transferred to the ICU thereafter.      Past Medical History:    History reviewed. No pertinent past medical history.    Past Surgical History:    No past surgical history on file.    Home Medications:   No current facility-administered medications on file prior to encounter.     Current Outpatient Medications on File Prior to Encounter   Medication Sig Dispense Refill    albuterol sulfate HFA (VENTOLIN HFA) 108 (90 Base) MCG/ACT inhaler Inhale 2 puffs into the lungs every 6 hours as needed for Wheezing or Shortness of Breath      amLODIPine-atorvastatatin (CADUET) 10-40 MG per tablet Take 1 tablet by mouth daily      HYDROcodone-acetaminophen (NORCO)  MG per tablet Take 1 tablet by mouth every 8 hours as needed for Pain. Max Daily Amount: 3 tablets      ibuprofen (ADVIL;MOTRIN) 800 MG tablet Take 1 tablet by mouth every 8 hours as needed for Pain or Fever 15 tablet 0       Allergies:    Benazepril hcl    Social History:    reports that he has been smoking. He has never used smokeless tobacco. He reports current alcohol use of about 84.0  (SINGLE VIEW FRONTAL)   Final Result   Mild left lower lobe atelectatic changes. No consolidative infiltrates.         MRI BRAIN W WO CONTRAST   Final Result   1. No acute infarct or other acute intracranial process.   2. Mild chronic microvascular ischemic changes.         XR CHEST (SINGLE VIEW FRONTAL)   Final Result   1. Poor inspiratory effort.         XR CHEST (SINGLE VIEW FRONTAL)   Final Result   1. Probable mild atelectatic changes at the base of the left lung.   2. Small left pleural effusion versus pleural thickening.   3. No other acute cardiopulmonary process.         XR CHEST PORTABLE   Final Result   Small left effusion with atelectasis.         XR CHEST PORTABLE   Final Result   1. Low lung volumes, without clear evidence of acute cardiopulmonary   abnormality.   2. Enteric tube extends into the stomach.         XR ABDOMEN FOR NG/OG/NE TUBE PLACEMENT   Final Result   Nasogastric tube tip is in the stomach         XR CHEST PORTABLE   Final Result   No evidence of acute cardiopulmonary process.         XR CHEST PORTABLE   Final Result   1. The endotracheal tube is now approximately 5.9 cm above the jignesh.   2. Small lung volumes with no confluent airspace consolidation.         XR CHEST PORTABLE   Final Result   1. Low lung volumes.   2. No significant change in the appearance of the chest.         XR CHEST PORTABLE   Final Result   No significant change in the appearance of the chest.         XR CHEST PORTABLE   Final Result   Bibasilar infiltrates.      Endotracheal tube with the tip in the midtrachea.           No results found.      EKG:     Electronically signed by Theresa Uribe MD on 10/27/2024 at 4:56 PM

## 2024-10-27 NOTE — PROGRESS NOTES
End Of Shift Note  [facility]  Summary of shift: Pt A/Ox4 and tearful/lethargic at start of shift. Pt spoke to wife over phone and began to cry with Nelli RN at bedside. Throughout night pt became more confused and combative with staff- see prev note. IM Zyprexa given and pt tolerated well. No BM, 200mL u/o from [place]. No PRN pain med given. Pt refusing foot drop boots throughout night despite multiple attempts. Alicia Castorena, and Jeni updated via phone.     Vitals:    Vitals:    10/27/24 0300 10/27/24 0342 10/27/24 0400 10/27/24 0500   BP: 93/67  96/64 110/70   Pulse: 58 58 59 60   Resp: 23 20 22 21   Temp:   97.1 °F (36.2 °C)    TempSrc:   Temporal    SpO2: 95% 93% 94% 93%   Weight:       Height:            I&O:   Intake/Output Summary (Last 24 hours) at 10/27/2024 0637  Last data filed at 10/27/2024 0600  Gross per 24 hour   Intake 480 ml   Output 400 ml   Net 80 ml       Resp Status: RA    Ventilator Settings:  Vent Mode: CPAP/PS Resp Rate (Set): 18 bpm/Vt (Set, mL): 500 mL/ /FiO2 : 30 %    Critical Care IV infusions:   dexmedeTOMIDine (PRECEDEX) 1,000 mcg in sodium chloride 0.9 % 250 mL infusion      sodium chloride      sodium chloride          LDA:   Peripheral IV 10/24/24 Right;Anterior Cephalic (Active)   Number of days: 2       Urinary Catheter 10/23/24 (Active)   Number of days: 3

## 2024-10-27 NOTE — PLAN OF CARE
Problem: Respiratory - Adult  Goal: Achieves optimal ventilation and oxygenation  Outcome: Progressing  Flowsheets (Taken 10/27/2024 0700)  Achieves optimal ventilation and oxygenation:   Assess for changes in respiratory status   Assess for changes in mentation and behavior   Position to facilitate oxygenation and minimize respiratory effort   Oxygen supplementation based on oxygen saturation or arterial blood gases     Problem: Neurosensory - Adult  Goal: Achieves stable or improved neurological status  Outcome: Progressing  Flowsheets (Taken 10/27/2024 0700)  Achieves stable or improved neurological status: Assess for and report changes in neurological status     Problem: Cardiovascular - Adult  Goal: Maintains optimal cardiac output and hemodynamic stability  Outcome: Progressing  Flowsheets (Taken 10/27/2024 0700)  Maintains optimal cardiac output and hemodynamic stability:   Monitor blood pressure and heart rate   Administer vasoactive medications as ordered     Problem: Skin/Tissue Integrity - Adult  Goal: Skin integrity remains intact  Outcome: Progressing  Flowsheets (Taken 10/27/2024 0700)  Skin Integrity Remains Intact: Monitor for areas of redness and/or skin breakdown     Problem: Gastrointestinal - Adult  Goal: Maintains adequate nutritional intake  Outcome: Progressing  Flowsheets (Taken 10/27/2024 0700)  Maintains adequate nutritional intake:   Monitor percentage of each meal consumed   Assist with meals as needed   Identify factors contributing to decreased intake, treat as appropriate     Problem: Pain  Goal: Verbalizes/displays adequate comfort level or baseline comfort level  Outcome: Progressing     Problem: Safety - Adult  Goal: Free from fall injury  Outcome: Progressing     Problem: Coping  Goal: Pt/Family able to verbalize concerns and demonstrate effective coping strategies  Description: INTERVENTIONS:  1. Assist patient/family to identify coping skills, available support systems and  cultural and spiritual values  2. Provide emotional support, including active listening and acknowledgement of concerns of patient and caregivers  3. Reduce environmental stimuli, as able  4. Instruct patient/family in relaxation techniques, as appropriate  5. Assess for spiritual pain/suffering and initiate Spiritual Care, Psychosocial Clinical Specialist consults as needed  Outcome: Progressing  Flowsheets (Taken 10/27/2024 0700)  Patient/family able to verbalize anxieties, fears, and concerns, and demonstrate effective coping:   Assist patient/family to identify coping skills, available support systems and cultural and spiritual values   Provide emotional support, including active listening and acknowledgement of concerns of patient and caregivers   Reduce environmental stimuli, as able     Problem: Nutrition Deficit:  Goal: Optimize nutritional status  Outcome: Progressing     Problem: Confusion  Goal: Confusion, delirium, dementia, or psychosis is improved or at baseline  Description: INTERVENTIONS:  1. Assess for possible contributors to thought disturbance, including medications, impaired vision or hearing, underlying metabolic abnormalities, dehydration, psychiatric diagnoses, and notify attending LIP  2. [place] high risk fall precautions, as indicated  3. Provide frequent short contacts to provide reality reorientation, refocusing and direction  4. Decrease environmental stimuli, including noise as appropriate  5. Monitor and intervene to maintain adequate nutrition, hydration, elimination, sleep and activity  6. If unable to ensure safety without constant attention obtain sitter and review sitter guidelines with assigned personnel  7. Initiate Psychosocial CNS and Spiritual Care consult, as indicated  Outcome: Progressing

## 2024-10-28 LAB
ANION GAP SERPL CALCULATED.3IONS-SCNC: 8 MMOL/L (ref 9–17)
BUN SERPL-MCNC: 9 MG/DL (ref 8–23)
BUN/CREAT SERPL: 13 (ref 9–20)
CALCIUM SERPL-MCNC: 8.7 MG/DL (ref 8.6–10.4)
CHLORIDE SERPL-SCNC: 104 MMOL/L (ref 98–107)
CO2 SERPL-SCNC: 31 MMOL/L (ref 20–31)
CREAT SERPL-MCNC: 0.7 MG/DL (ref 0.7–1.2)
ERYTHROCYTE [DISTWIDTH] IN BLOOD BY AUTOMATED COUNT: 11.5 % (ref 11.8–14.4)
GFR, ESTIMATED: >90 ML/MIN/1.73M2
GLUCOSE SERPL-MCNC: 96 MG/DL (ref 70–99)
HCT VFR BLD AUTO: 42.4 % (ref 40.7–50.3)
HGB BLD-MCNC: 13.8 G/DL (ref 13–17)
MCH RBC QN AUTO: 33 PG (ref 25.2–33.5)
MCHC RBC AUTO-ENTMCNC: 32.5 G/DL (ref 28.4–34.8)
MCV RBC AUTO: 101.4 FL (ref 82.6–102.9)
NRBC BLD-RTO: 0 PER 100 WBC
PLATELET # BLD AUTO: 221 K/UL (ref 138–453)
PMV BLD AUTO: 11 FL (ref 8.1–13.5)
POTASSIUM SERPL-SCNC: 3.3 MMOL/L (ref 3.7–5.3)
RBC # BLD AUTO: 4.18 M/UL (ref 4.21–5.77)
SODIUM SERPL-SCNC: 143 MMOL/L (ref 135–144)
WBC OTHER # BLD: 8.5 K/UL (ref 3.5–11.3)

## 2024-10-28 PROCEDURE — 6370000000 HC RX 637 (ALT 250 FOR IP): Performed by: HOSPITALIST

## 2024-10-28 PROCEDURE — 6370000000 HC RX 637 (ALT 250 FOR IP): Performed by: NURSE PRACTITIONER

## 2024-10-28 PROCEDURE — 6370000000 HC RX 637 (ALT 250 FOR IP): Performed by: FAMILY MEDICINE

## 2024-10-28 PROCEDURE — 2060000000 HC ICU INTERMEDIATE R&B

## 2024-10-28 PROCEDURE — 80048 BASIC METABOLIC PNL TOTAL CA: CPT

## 2024-10-28 PROCEDURE — 97535 SELF CARE MNGMENT TRAINING: CPT

## 2024-10-28 PROCEDURE — 2580000003 HC RX 258: Performed by: NURSE PRACTITIONER

## 2024-10-28 PROCEDURE — 36415 COLL VENOUS BLD VENIPUNCTURE: CPT

## 2024-10-28 PROCEDURE — 51702 INSERT TEMP BLADDER CATH: CPT

## 2024-10-28 PROCEDURE — 97530 THERAPEUTIC ACTIVITIES: CPT

## 2024-10-28 PROCEDURE — 6360000002 HC RX W HCPCS: Performed by: INTERNAL MEDICINE

## 2024-10-28 PROCEDURE — 94761 N-INVAS EAR/PLS OXIMETRY MLT: CPT

## 2024-10-28 PROCEDURE — 2700000000 HC OXYGEN THERAPY PER DAY

## 2024-10-28 PROCEDURE — 97110 THERAPEUTIC EXERCISES: CPT

## 2024-10-28 PROCEDURE — 6370000000 HC RX 637 (ALT 250 FOR IP)

## 2024-10-28 PROCEDURE — 2580000003 HC RX 258: Performed by: FAMILY MEDICINE

## 2024-10-28 PROCEDURE — 85027 COMPLETE CBC AUTOMATED: CPT

## 2024-10-28 PROCEDURE — 6360000002 HC RX W HCPCS: Performed by: FAMILY MEDICINE

## 2024-10-28 PROCEDURE — 6370000000 HC RX 637 (ALT 250 FOR IP): Performed by: INTERNAL MEDICINE

## 2024-10-28 RX ORDER — CLONAZEPAM 0.5 MG/1
0.25 TABLET ORAL EVERY 12 HOURS
Status: DISCONTINUED | OUTPATIENT
Start: 2024-10-28 | End: 2024-11-03

## 2024-10-28 RX ORDER — POTASSIUM CHLORIDE 7.45 MG/ML
10 INJECTION INTRAVENOUS PRN
Status: ACTIVE | OUTPATIENT
Start: 2024-10-28 | End: 2024-11-04

## 2024-10-28 RX ORDER — AMOXICILLIN 875 MG/1
875 TABLET, COATED ORAL EVERY 12 HOURS SCHEDULED
Status: COMPLETED | OUTPATIENT
Start: 2024-10-28 | End: 2024-10-30

## 2024-10-28 RX ORDER — POTASSIUM CHLORIDE 1500 MG/1
40 TABLET, EXTENDED RELEASE ORAL PRN
Status: DISPENSED | OUTPATIENT
Start: 2024-10-28 | End: 2024-11-04

## 2024-10-28 RX ORDER — FENTANYL CITRATE 0.05 MG/ML
25 INJECTION, SOLUTION INTRAMUSCULAR; INTRAVENOUS
Status: DISCONTINUED | OUTPATIENT
Start: 2024-10-28 | End: 2024-11-08 | Stop reason: HOSPADM

## 2024-10-28 RX ADMIN — VALPROIC ACID 250 MG: 250 CAPSULE, LIQUID FILLED ORAL at 20:31

## 2024-10-28 RX ADMIN — ANTI-FUNGAL POWDER MICONAZOLE NITRATE TALC FREE: 1.42 POWDER TOPICAL at 09:38

## 2024-10-28 RX ADMIN — CLONAZEPAM 0.25 MG: 0.5 TABLET ORAL at 20:31

## 2024-10-28 RX ADMIN — CIPROFLOXACIN 500 MG: 500 TABLET, FILM COATED ORAL at 09:26

## 2024-10-28 RX ADMIN — AMLODIPINE BESYLATE 10 MG: 10 TABLET ORAL at 09:26

## 2024-10-28 RX ADMIN — CLONIDINE HYDROCHLORIDE 0.2 MG: 0.2 TABLET ORAL at 09:27

## 2024-10-28 RX ADMIN — MULTIVITAMIN TABLET 1 TABLET: TABLET at 09:26

## 2024-10-28 RX ADMIN — FAMOTIDINE 20 MG: 20 TABLET, FILM COATED ORAL at 19:51

## 2024-10-28 RX ADMIN — SODIUM CHLORIDE, PRESERVATIVE FREE 10 ML: 5 INJECTION INTRAVENOUS at 09:27

## 2024-10-28 RX ADMIN — SODIUM CHLORIDE, PRESERVATIVE FREE 10 ML: 5 INJECTION INTRAVENOUS at 21:00

## 2024-10-28 RX ADMIN — ENOXAPARIN SODIUM 40 MG: 100 INJECTION SUBCUTANEOUS at 09:26

## 2024-10-28 RX ADMIN — VALPROIC ACID 250 MG: 250 CAPSULE, LIQUID FILLED ORAL at 09:27

## 2024-10-28 RX ADMIN — CLONIDINE HYDROCHLORIDE 0.2 MG: 0.2 TABLET ORAL at 19:51

## 2024-10-28 RX ADMIN — FENTANYL CITRATE 25 MCG: 0.05 INJECTION, SOLUTION INTRAMUSCULAR; INTRAVENOUS at 19:51

## 2024-10-28 RX ADMIN — Medication 100 MG: at 09:27

## 2024-10-28 RX ADMIN — HYDROCODONE BITARTRATE AND ACETAMINOPHEN 1 TABLET: 5; 325 TABLET ORAL at 09:27

## 2024-10-28 RX ADMIN — VALPROIC ACID 250 MG: 250 CAPSULE, LIQUID FILLED ORAL at 13:40

## 2024-10-28 RX ADMIN — FENTANYL CITRATE 25 MCG: 0.05 INJECTION, SOLUTION INTRAMUSCULAR; INTRAVENOUS at 18:37

## 2024-10-28 RX ADMIN — AMOXICILLIN 875 MG: 875 TABLET, FILM COATED ORAL at 20:31

## 2024-10-28 RX ADMIN — OLANZAPINE 10 MG: 5 TABLET, FILM COATED ORAL at 19:51

## 2024-10-28 RX ADMIN — CLONAZEPAM 0.5 MG: 0.5 TABLET ORAL at 09:27

## 2024-10-28 RX ADMIN — SODIUM CHLORIDE, PRESERVATIVE FREE 10 ML: 5 INJECTION INTRAVENOUS at 09:30

## 2024-10-28 RX ADMIN — POTASSIUM CHLORIDE 40 MEQ: 1500 TABLET, EXTENDED RELEASE ORAL at 13:40

## 2024-10-28 RX ADMIN — ANTI-FUNGAL POWDER MICONAZOLE NITRATE TALC FREE: 1.42 POWDER TOPICAL at 21:00

## 2024-10-28 RX ADMIN — FOLIC ACID 1 MG: 1 TABLET ORAL at 09:26

## 2024-10-28 RX ADMIN — ATORVASTATIN CALCIUM 40 MG: 40 TABLET, FILM COATED ORAL at 09:26

## 2024-10-28 RX ADMIN — FAMOTIDINE 20 MG: 20 TABLET, FILM COATED ORAL at 09:27

## 2024-10-28 ASSESSMENT — PAIN DESCRIPTION - DIRECTION: RADIATING_TOWARDS: NON

## 2024-10-28 ASSESSMENT — PAIN SCALES - GENERAL
PAINLEVEL_OUTOF10: 5
PAINLEVEL_OUTOF10: 8
PAINLEVEL_OUTOF10: 0
PAINLEVEL_OUTOF10: 9
PAINLEVEL_OUTOF10: 10
PAINLEVEL_OUTOF10: 0
PAINLEVEL_OUTOF10: 5

## 2024-10-28 ASSESSMENT — PAIN DESCRIPTION - LOCATION
LOCATION: ANKLE
LOCATION: FOOT
LOCATION: ANKLE
LOCATION: ANKLE
LOCATION: FOOT

## 2024-10-28 ASSESSMENT — PAIN DESCRIPTION - ONSET
ONSET: ON-GOING

## 2024-10-28 ASSESSMENT — PAIN - FUNCTIONAL ASSESSMENT
PAIN_FUNCTIONAL_ASSESSMENT: PREVENTS OR INTERFERES SOME ACTIVE ACTIVITIES AND ADLS
PAIN_FUNCTIONAL_ASSESSMENT: ACTIVITIES ARE NOT PREVENTED
PAIN_FUNCTIONAL_ASSESSMENT: ACTIVITIES ARE NOT PREVENTED
PAIN_FUNCTIONAL_ASSESSMENT: PREVENTS OR INTERFERES SOME ACTIVE ACTIVITIES AND ADLS
PAIN_FUNCTIONAL_ASSESSMENT: ACTIVITIES ARE NOT PREVENTED

## 2024-10-28 ASSESSMENT — PAIN DESCRIPTION - DESCRIPTORS
DESCRIPTORS: ACHING;STABBING
DESCRIPTORS: ACHING
DESCRIPTORS: ACHING;STABBING
DESCRIPTORS: ACHING;STABBING
DESCRIPTORS: ACHING

## 2024-10-28 ASSESSMENT — PAIN DESCRIPTION - FREQUENCY
FREQUENCY: INTERMITTENT

## 2024-10-28 ASSESSMENT — PAIN DESCRIPTION - ORIENTATION
ORIENTATION: RIGHT;LEFT

## 2024-10-28 ASSESSMENT — PAIN DESCRIPTION - PAIN TYPE
TYPE: ACUTE PAIN

## 2024-10-28 ASSESSMENT — PAIN SCALES - WONG BAKER: WONGBAKER_NUMERICALRESPONSE: NO HURT

## 2024-10-28 NOTE — PROGRESS NOTES
Patient has been pleasant and cooperative all day. A&O x 4. Therapy worked with patient today. He is cooperative. Patient is passing a lot of gas. No complaints.

## 2024-10-28 NOTE — PROGRESS NOTES
Occupational Therapy  Facility/Department: [facility] ICU  Daily Treatment Note  NAME: Noel Valle  : 1955  MRN: [id number]    Date of Service: 10/28/2024    RN reports patient is medically stable for therapy treatment this date.    Chart reviewed prior to treatment and patient is agreeable for therapy.  All lines intact and patient positioned comfortably at end of treatment.  All patient needs addressed prior to ending therapy session.      Discharge Recommendations:  Patient would benefit from continued therapy after discharge  Pt currently functioning below baseline.  Recommend daily inpatient skilled therapy at time of discharge to maximize long term outcomes and prevent re-admission. Please refer to AM-PAC score for current level of function.  OT Equipment Recommendations  Equipment Needed: Yes (CTA)    Patient Diagnosis(es): The encounter diagnosis was Angioedema, initial encounter.     Assessment   Assessment: Pt progressing toward goals able to tolerate increasing sitting tolerance this session but still needing Mod A x2 for completion of all bed mobility. Pt sitting EOB ~27 minutes this date to complete grooming tasks and functional reach activities for increaseding sitting toelracnce/balance. Pt needing support ranging from SBA to mod Ax1. Pt using bed rails for UB assist seated. Pt was pleasant and following all commands with increased time. Pt would benefit from reassessment of fucntional mobility OOB if pt mentation continues to improve and sitting balance is advancing. Skilled OT is indicated to maximize I/safety with ADL performance as well as improve overall balance/act darin/ROM/Strength to impact function.  Activity Tolerance: Patient limited by fatigue;Patient tolerated treatment well  Discharge Recommendations: Patient would benefit from continued therapy after discharge  Equipment Needed: Yes (CTA)     Plan  Occupational Therapy Plan  Times Per Week: 5x/wk, 1x/day  Current Treatment

## 2024-10-28 NOTE — PROGRESS NOTES
End Of Shift Note  [facility]  Summary of shift: Pt A/Ox4 throughout shift. Refused foot drop boots and compression cuffs despite education. Took all meds with applesauce. PRN Norco given x1 for bilateral foot pain. No episodes of agitation or hallucinations noted. No BM, 175 mL onelia output from [place]. Blood pressure soft but MAP >65. Oral intake encouraged but pt only able to drink 250mL.     Vitals:    Vitals:    10/28/24 0300 10/28/24 0400 10/28/24 0441 10/28/24 0500   BP: (!) 83/64 95/62  (!) 81/69   Pulse: 63 72 64 64   Resp: 19 18 21 21   Temp:  97.6 °F (36.4 °C)     TempSrc:  Oral     SpO2: 90% 93% 92% 93%   Weight:       Height:            I&O:   Intake/Output Summary (Last 24 hours) at 10/28/2024 0604  Last data filed at 10/27/2024 1806  Gross per 24 hour   Intake 420 ml   Output 225 ml   Net 195 ml       Resp Status: 2L/RA    Ventilator Settings:  Vent Mode: CPAP/PS Resp Rate (Set): 18 bpm/Vt (Set, mL): 500 mL/ /FiO2 : 30 %    Critical Care IV infusions:   dexmedeTOMIDine (PRECEDEX) 1,000 mcg in sodium chloride 0.9 % 250 mL infusion      sodium chloride      sodium chloride          LDA:   Peripheral IV 10/24/24 Right;Anterior Cephalic (Active)   Number of days: 3       Urinary Catheter 10/23/24 (Active)   Number of days: 4

## 2024-10-28 NOTE — PROGRESS NOTES
End Of Shift Note  [facility] ICU    Summary of shift: Pt calm and cooperative today. Pt continues to have hallucinations at times but he is aware of them and denies them causing anxiety. Pt worked with therapy and took all meds today. Appetite is improving. Pt given Norco once for pain and was effective. Dr. Gamboa in and discontinued Norco and added PRN fentanyl for pt. Fentanyl given once. Pt's sister in to visit today and updated.     Vitals:    Vitals:    10/28/24 1300 10/28/24 1519 10/28/24 1530 10/28/24 1600   BP:    (!) 109/58   Pulse: 73   67   Resp: 21   19   Temp:  98.4 °F (36.9 °C)     TempSrc:  Oral     SpO2:   (!) 89% 94%   Weight:       Height:            I&O:   Intake/Output Summary (Last 24 hours) at 10/28/2024 1755  Last data filed at 10/28/2024 1704  Gross per 24 hour   Intake 730 ml   Output 425 ml   Net 305 ml       Resp Status: Pt's O2 saturation dropping when he sleeps. Oxygen applied at 2L which improves oxygen saturation. Pt frequently removing oxygen, stating he is taking a break. Educated on need to use oxygen d/t dropping saturation. He voices understanding but does need reinforecment.     Ventilator Settings:  Vent Mode: CPAP/PS Resp Rate (Set): 18 bpm/Vt (Set, mL): 500 mL/ /FiO2 : 30 %    Critical Care IV infusions:   dexmedeTOMIDine (PRECEDEX) 1,000 mcg in sodium chloride 0.9 % 250 mL infusion      sodium chloride      sodium chloride          LDA:   Peripheral IV 10/24/24 Right;Anterior Cephalic (Active)   Number of days: 4       Urinary Catheter 10/23/24 (Active)   Number of days: 4

## 2024-10-28 NOTE — PLAN OF CARE
Problem: Discharge Planning  Goal: Discharge to home or other facility with appropriate resources  10/27/2024 2051 by Gale Sweeney RN  Outcome: Progressing  10/27/2024 1411 by Rosie Ronquillo RN  Outcome: Progressing  Flowsheets (Taken 10/27/2024 0700)  Discharge to home or other facility with appropriate resources:   Identify barriers to discharge with patient and caregiver   Arrange for needed discharge resources and transportation as appropriate   Identify discharge learning needs (meds, wound care, etc)   Refer to discharge planning if patient needs post-hospital services based on physician order or complex needs related to functional status, cognitive ability or social support system     Problem: Respiratory - Adult  Goal: Achieves optimal ventilation and oxygenation  10/27/2024 2051 by Gale Sweeney RN  Outcome: Progressing  10/27/2024 1411 by Rosie Ronquillo RN  Outcome: Progressing  Flowsheets (Taken 10/27/2024 0700)  Achieves optimal ventilation and oxygenation:   Assess for changes in respiratory status   Assess for changes in mentation and behavior   Position to facilitate oxygenation and minimize respiratory effort   Oxygen supplementation based on oxygen saturation or arterial blood gases     Problem: Pain  Goal: Verbalizes/displays adequate comfort level or baseline comfort level  10/27/2024 2051 by Gale Sweeney RN  Outcome: Progressing  10/27/2024 1411 by Rosie Ronquillo RN  Outcome: Progressing     Problem: Skin/Tissue Integrity  Goal: Absence of new skin breakdown  Description: 1.  Monitor for areas of redness and/or skin breakdown  2.  Assess vascular access sites hourly  3.  Every 4-6 hours minimum:  Change oxygen saturation probe site  4.  Every 4-6 hours:  If on nasal continuous positive airway pressure, respiratory therapy assess nares and determine need for appliance change or resting period.  10/27/2024 2051 by Gale Sweeney RN  Outcome: Progressing  10/27/2024 1411 by  intake:   Monitor percentage of each meal consumed   Assist with meals as needed   Identify factors contributing to decreased intake, treat as appropriate     Problem: Genitourinary - Adult  Goal: Absence of urinary retention  10/27/2024 2051 by Gale Sweeney RN  Outcome: Progressing  10/27/2024 1411 by Rosie Ronquillo RN  Outcome: Progressing     Problem: Coping  Goal: Pt/Family able to verbalize concerns and demonstrate effective coping strategies  Description: INTERVENTIONS:  1. Assist patient/family to identify coping skills, available support systems and cultural and spiritual values  2. Provide emotional support, including active listening and acknowledgement of concerns of patient and caregivers  3. Reduce environmental stimuli, as able  4. Instruct patient/family in relaxation techniques, as appropriate  5. Assess for spiritual pain/suffering and initiate Spiritual Care, Psychosocial Clinical Specialist consults as needed  10/27/2024 2051 by Gale Sweeney RN  Outcome: Progressing  10/27/2024 1411 by Rosie Ronquillo RN  Outcome: Progressing  Flowsheets (Taken 10/27/2024 0700)  Patient/family able to verbalize anxieties, fears, and concerns, and demonstrate effective coping:   Assist patient/family to identify coping skills, available support systems and cultural and spiritual values   Provide emotional support, including active listening and acknowledgement of concerns of patient and caregivers   Reduce environmental stimuli, as able     Problem: Nutrition Deficit:  Goal: Optimize nutritional status  10/27/2024 2051 by Gale Sweeney RN  Outcome: Progressing  10/27/2024 1411 by Rosie Ronquillo RN  Outcome: Progressing     Problem: ABCDS Injury Assessment  Goal: Absence of physical injury  10/27/2024 2051 by Gale Sweeney RN  Outcome: Progressing  10/27/2024 1411 by Rosie Ronquillo RN  Outcome: Progressing     Problem: Confusion  Goal: Confusion, delirium, dementia, or psychosis is improved

## 2024-10-28 NOTE — PROGRESS NOTES
Hospitalist - Progress Note      Patient: Noel Valle    Unit/Bed:1114/1114-01  YOB: 1955  MRN: [id number]   Acct: [id number]   PCP: Ramakrishna Silver MD    Date of Service: Pt seen/examined on 10/28/24  and Admitted to Inpatient with expected LOS greater than two midnights due to medical therapy.     Chief Complaint: Tongue swelling 62-year-old male    Assessment and Plan:-    Acute Respiratory Insufficiency due to upper airway obstruction secondary to Angioedema  S/P extubation 10-13-24  Drowsy as he was extubated and sedative meds weaned off  On 3 L nasal canula  Solumedrol transitioned to Po prednisone  Critical care following.       Hypotension - resolved  Hypertension  Monitor BP   On norvasc and clonidine. Off coreg due to bradycardia   On prn Hydralazine.     HELENE  resolved    Tobacco abuse disorder/history of possible COPD  Sched DuJanice.  Will  on tobacco cessation when patient is alert      Alcohol withdrawal  On protocol  Overnight precedex restarted due to hallucinations and agitation, irritation  Continue to monitor at this time  On zyprexa, valproate and librium restarted  NPO except for meds with apple sauce.-Diet advanced  CIWA protocol    Agitation secondary to alcohol withdrawal, metabolic disturbances, polypharmacy  Haldol prn  Consult Neuro  D/w RN  Prednisone may be contributing per neurology  Thiamine added per neuro  Okay to add Librium as needed per neuro  Neurochecks  EEG  Psych consult   DC Ativan, Geodon per Psych  Wean off Klonopin  Zyprexa 10 hs  Depakote 250 tid    Subclinical hyperthyroidism    Constipation   Fleet enema  Colace prn    Hypernatremia  Free water flushes. improved      DC planning- SNF  [facility] refused - does not meet criteria      Subjective:      I have seen and examined patient today, patient last 24 hours events were reviewed also discussed with nursing staff  No new complaints  Overnight patient did not had any acute issues  No nausea  the last 72 hours.      No results for input(s): \"INR\" in the last 72 hours.  No results for input(s): \"CKTOTAL\", \"TROPONINI\" in the last 72 hours.    Urinalysis:    No results found for: \"NITRU\", \"WBCUA\", \"BACTERIA\", \"RBCUA\", \"BLOODU\", \"SPECGRAV\", \"GLUCOSEU\"    Radiology:   XR CHEST (SINGLE VIEW FRONTAL)   Final Result   Mild left lower lobe atelectatic changes. No consolidative infiltrates.         MRI BRAIN W WO CONTRAST   Final Result   1. No acute infarct or other acute intracranial process.   2. Mild chronic microvascular ischemic changes.         XR CHEST (SINGLE VIEW FRONTAL)   Final Result   1. Poor inspiratory effort.         XR CHEST (SINGLE VIEW FRONTAL)   Final Result   1. Probable mild atelectatic changes at the base of the left lung.   2. Small left pleural effusion versus pleural thickening.   3. No other acute cardiopulmonary process.         XR CHEST PORTABLE   Final Result   Small left effusion with atelectasis.         XR CHEST PORTABLE   Final Result   1. Low lung volumes, without clear evidence of acute cardiopulmonary   abnormality.   2. Enteric tube extends into the stomach.         XR ABDOMEN FOR NG/OG/NE TUBE PLACEMENT   Final Result   Nasogastric tube tip is in the stomach         XR CHEST PORTABLE   Final Result   No evidence of acute cardiopulmonary process.         XR CHEST PORTABLE   Final Result   1. The endotracheal tube is now approximately 5.9 cm above the jignesh.   2. Small lung volumes with no confluent airspace consolidation.         XR CHEST PORTABLE   Final Result   1. Low lung volumes.   2. No significant change in the appearance of the chest.         XR CHEST PORTABLE   Final Result   No significant change in the appearance of the chest.         XR CHEST PORTABLE   Final Result   Bibasilar infiltrates.      Endotracheal tube with the tip in the midtrachea.           No results found.      EKG:     Electronically signed by MADELYN MADERA MD on 10/28/2024 at 3:00

## 2024-10-28 NOTE — PLAN OF CARE
Problem: Respiratory - Adult  Goal: Achieves optimal ventilation and oxygenation  Outcome: Progressing  Flowsheets (Taken 10/28/2024 0800 by Vicenta Hurley RN)  Achieves optimal ventilation and oxygenation:   Assess for changes in respiratory status   Assess for changes in mentation and behavior   Oxygen supplementation based on oxygen saturation or arterial blood gases     Problem: Neurosensory - Adult  Goal: Achieves stable or improved neurological status  Outcome: Progressing  Flowsheets (Taken 10/28/2024 0800 by Vicenta Hurley RN)  Achieves stable or improved neurological status: Assess for and report changes in neurological status     Problem: Cardiovascular - Adult  Goal: Maintains optimal cardiac output and hemodynamic stability  Outcome: Progressing  Flowsheets (Taken 10/28/2024 0800 by Vicenta Hurley RN)  Maintains optimal cardiac output and hemodynamic stability:   Monitor blood pressure and heart rate   Monitor urine output and notify Licensed Independent Practitioner for values outside of normal range   Assess for signs of decreased cardiac output     Problem: Skin/Tissue Integrity - Adult  Goal: Skin integrity remains intact  Outcome: Progressing  Flowsheets (Taken 10/28/2024 0800 by Vicenta Hurley RN)  Skin Integrity Remains Intact: Monitor for areas of redness and/or skin breakdown     Problem: Musculoskeletal - Adult  Goal: Return mobility to safest level of function  Recent Flowsheet Documentation  Taken 10/28/2024 0800 by Vicenta Hurley RN  Return Mobility to Safest Level of Function:   Assess patient stability and activity tolerance for standing, transferring and ambulating with or without assistive devices   Obtain physical therapy/occupational therapy consults as needed     Problem: Gastrointestinal - Adult  Goal: Maintains adequate nutritional intake  Outcome: Progressing  Flowsheets (Taken 10/28/2024 0800 by Vicenta Hurley RN)  Maintains adequate nutritional intake:   Monitor

## 2024-10-28 NOTE — PROGRESS NOTES
Physical Therapy  Facility/Department: [facility] ICU  Rehabilitation Physical Therapy Treatment Note    NAME: Noel Valle  : 1955 (69 y.o.)  MRN: [id number]  CODE STATUS: Full Code    Date of Service: 10/28/24     Pt currently functioning below baseline.  Recommend daily inpatient skilled therapy at time of discharge to maximize long term outcomes and prevent re-admission. Please refer to AM-PAC score for current level of function.     Restrictions:  Restrictions/Precautions: General Precautions, Fall Risk, Seizure, Bed Alarm  Position Activity Restriction  Other position/activity restrictions: ex cath, telemetry, continuous pulse ox, alarms     SUBJECTIVE  Subjective  Subjective: Patient agreeable for PT treatment and calm. RN Neris reported patient medically stable for PT treatment, restraints off and doing well today.    OBJECTIVE  Cognition  Overall Cognitive Status: Exceptions  Arousal/Alertness: Appears intact  Following Commands: Follows one step commands with increased time  Attention Span: Attends with cues to redirect;Unable to maintain attention;Difficulty dividing attention;Difficulty attending to directions  Memory: Impaired  Safety Judgement: Impaired  Problem Solving: Impaired  Insights: Decreased awareness of deficits  Initiation: Requires cues for all  Sequencing: Requires cues for all  Orientation  Overall Orientation Status: Impaired  Orientation Level: Oriented X4;Oriented to time;Oriented to situation;Oriented to person;Oriented to place    Functional Mobility  Bed Mobility  Overall Assistance Level: Moderate Assistance;Requires x 2 Assistance  Additional Factors: Set-up;Verbal cues;Increased time to complete;Head of bed flat;Head of bed raised  Bridging  Assistance Level: Moderate assistance;Requires x 2 assistance  Roll Left  Assistance Level: Moderate assistance;Requires x 2 assistance  Skilled Clinical Factors: vc's for hand placement and technique  Roll Right  Assistance Level:  Moderate assistance;Requires x 2 assistance  Sit to Supine  Assistance Level: Moderate assistance;Requires x 2 assistance  Skilled Clinical Factors: vc's for hand placement and technique patient unable to self promote to HOB and [place] feature on bed utilized fo positioning into comfort  Supine to Sit  Assistance Level: Moderate assistance;Requires x 2 assistance  Skilled Clinical Factors: Vc's for hand placement and progression to upright posture with assist for support and stability  Scooting  Assistance Level: Moderate assistance;Requires x 2 assistance  Skilled Clinical Factors: vc's for anterior hip progression into seated EOB posture with assist for motion initiation skills.  Balance  Sitting Balance: Moderate assistance (Patient intially a MOD x1 however with increased time progresses to a SBA x1.Sat EOB x23 minutes)    Neuromuscular Education: Yes  NDT Treatment: Sitting;Lower extremity    PT Exercises  A/AROM Exercises: Patient able to perform seated EOB LAQ and multiple core exercises including reaching across mid line and reaching outside RAYNE for hand taps.  Patient able to perform trunk rotation tasks and perform anterior posterior rocking to engage core muscles.      ASSESSMENT/PROGRESS TOWARDS GOALS     AM[facility] Inpatient Mobility Raw Score 8   [facility] 28.52   Mobility Inpatient CMS 0-100% Score 86.62   Mobility Inpatient CMS G-Code Modifier CM       Assessment  Assessment: Patient with progression in tolerance and ability to follow 1 step commands this treatment.  Patient able to tolerate seated balance with an initial assist of a MOD x1 progressing to a SBA x1.  Patient would benefit from continued skilled PT treatment to maximize return to PLOF.  Activity Tolerance: Patient limited by fatigue;Patient limited by endurance;Patient tolerated treatment well  Discharge Recommendations: Patient would benefit from continued therapy after discharge    Goals  Patient Goals   Patient

## 2024-10-28 NOTE — PROGRESS NOTES
Nutrition Assessment     Type and Reason for Visit: Reassess    Nutrition Recommendations/Plan:   ADULT DIET; Dysphagia - Soft and Bite Sized; Safety Tray; Safety Tray (Disposables No Utensils);   Magic Cup at dinner  Ensure Plus High Protein 2x/day  Monitor p.o intakes and labs     Malnutrition Assessment:  Malnutrition Status: At risk for malnutrition (Comment)    Nutrition Assessment:  Patient had increased agitation and combativness over the weekend but he is now much calmer and off Precedex. Patient admits his appetite has not returned and he is not eating very much at meals. Patient unable to give percentage of food consumed at breakfast or lunch today and did states that he ate pancakes, eggs and lipscomb. Patient is not drinking the Ensure Plus High Protein but did states that he was going to start consuming them. Patient gave flavor preference for strawberry or chocolate. Patient likes the Magic Cup frozensupplements. Will monitor p.o intakes and labs.    Estimated Daily Nutrient Needs:  Energy (kcal):  0403-4510 kcal (15-18 kcal/kg) Weight Used for Energy Requirements: Current     Protein (g):  76-88 gm of protein (1.2-1.4 gm/kg) Weight Used for Protein Requirements: Ideal        Fluid (ml/day):  1640 mL Method Used for Fluid Requirements: 1 ml/kcal    Nutrition Related Findings:   Trace BLE edema. Active bowel sounds. Off Precedex Wound Type: None    Current Nutrition Therapies:    ADULT ORAL NUTRITION SUPPLEMENT; Breakfast, Dinner; Standard High Calorie/High Protein Oral Supplement  ADULT DIET; Dysphagia - Soft and Bite Sized; Safety Tray; Safety Tray (Disposables No Utensils)  ADULT ORAL NUTRITION SUPPLEMENT; Dinner; Frozen Oral Supplement    Anthropometric Measures:  Height: 166 cm (5' 5.35\")  Current Body Wt: 9.1 kg (20 lb 1 oz)   BMI: 3.3    Nutrition Diagnosis:   Inadequate oral intake related to inadequate protein-energy intake as evidenced by intake 26-50%, intake 0-25%    Nutrition Interventions:

## 2024-10-28 NOTE — PROGRESS NOTES
Pulmonary Critical Care Progress Note    Patient seen for the follow up of Angioedema of tongue     Subjective:    He is more awake alert off Precedex he is calm today cooperating.  Sister is at bedside..  He is off oxygen.  He is able to tolerate oral intake.  Blood pressure soft.    Examination:    Vitals: BP (!) 82/47   Pulse 88   Temp 98.2 °F (36.8 °C) (Oral)   Resp 21   Ht 1.66 m (5' 5.35\")   Wt 91.1 kg (200 lb 13.4 oz)   SpO2 91%   BMI 33.06 kg/m²   SpO2  Av.2 %  Min: 90 %  Max: 96 %  General appearance: Awake alert no distress  Neck: No JVD  Lungs: Decreased breath sound no crackles or wheeze  Heart: regular rate and rhythm, S1, S2 normal, no gallop  Abdomen: Soft, non tender, + BS  Extremities: no cyanosis or clubbing. No significant edema    LABs:    CBC:   Recent Labs     10/26/24  0716 10/28/24  0630   WBC 10.2 8.5   HGB 14.0 13.8   HCT 43.4 42.4    221     BMP:   Recent Labs     10/26/24  0716 10/28/24  0630    143   K 3.7 3.3*   CO2 28 31   BUN 11 9   CREATININE 0.6* 0.7   LABGLOM >90 >90   GLUCOSE 81 96      Latest Reference Range & Units 10/14/24 06:00 10/14/24 12:31   POC TCO2 22 - 30 mmol/L  27   POC HCO3 21.0 - 28.0 mmol/L 28.1 (H) 26.6   POC O2 SAT 94.0 - 98.0 % 95.6 95.8   POC pCO2 35.0 - 48.0 mm Hg 43.2 42.0   POC pH 7.350 - 7.450  7.421 7.410   POC PO2 83.0 - 108.0 mm Hg 78.4 (L) 79.8 (L)   (H): Data is abnormally high  (L): Data is abnormally low      Radiology:  Chest x-ray 10/20  Poor inspiratory effort.       Chest x-ray 10/17 reviewed  1. Probable mild atelectatic changes at the base of the left lung.  2. Small left pleural effusion versus pleural thickening.  3. No other acute cardiopulmonary process.      Chest x-ray 10/16  Reviewed  Small left effusion with atelectasis.            Impression/recommendations:    Acute respiratory insufficiency due to upper airway instruction/angioedema with significant swelling upper airway/atelectasis with small effusion  Oxygen

## 2024-10-29 LAB
ANION GAP SERPL CALCULATED.3IONS-SCNC: 10 MMOL/L (ref 9–17)
BUN SERPL-MCNC: 7 MG/DL (ref 8–23)
BUN/CREAT SERPL: 12 (ref 9–20)
CALCIUM SERPL-MCNC: 8.5 MG/DL (ref 8.6–10.4)
CHLORIDE SERPL-SCNC: 106 MMOL/L (ref 98–107)
CO2 SERPL-SCNC: 27 MMOL/L (ref 20–31)
CREAT SERPL-MCNC: 0.6 MG/DL (ref 0.7–1.2)
ERYTHROCYTE [DISTWIDTH] IN BLOOD BY AUTOMATED COUNT: 11.4 % (ref 11.8–14.4)
GFR, ESTIMATED: >90 ML/MIN/1.73M2
GLUCOSE SERPL-MCNC: 99 MG/DL (ref 70–99)
HCT VFR BLD AUTO: 39.8 % (ref 40.7–50.3)
HGB BLD-MCNC: 13.1 G/DL (ref 13–17)
MCH RBC QN AUTO: 32.9 PG (ref 25.2–33.5)
MCHC RBC AUTO-ENTMCNC: 32.9 G/DL (ref 28.4–34.8)
MCV RBC AUTO: 100 FL (ref 82.6–102.9)
NRBC BLD-RTO: 0 PER 100 WBC
PLATELET # BLD AUTO: 200 K/UL (ref 138–453)
PMV BLD AUTO: 10.7 FL (ref 8.1–13.5)
POTASSIUM SERPL-SCNC: 4 MMOL/L (ref 3.7–5.3)
RBC # BLD AUTO: 3.98 M/UL (ref 4.21–5.77)
SODIUM SERPL-SCNC: 143 MMOL/L (ref 135–144)
WBC OTHER # BLD: 9.2 K/UL (ref 3.5–11.3)

## 2024-10-29 PROCEDURE — 97164 PT RE-EVAL EST PLAN CARE: CPT

## 2024-10-29 PROCEDURE — 97535 SELF CARE MNGMENT TRAINING: CPT

## 2024-10-29 PROCEDURE — 36415 COLL VENOUS BLD VENIPUNCTURE: CPT

## 2024-10-29 PROCEDURE — 6360000002 HC RX W HCPCS: Performed by: FAMILY MEDICINE

## 2024-10-29 PROCEDURE — 2580000003 HC RX 258: Performed by: FAMILY MEDICINE

## 2024-10-29 PROCEDURE — 6370000000 HC RX 637 (ALT 250 FOR IP): Performed by: FAMILY MEDICINE

## 2024-10-29 PROCEDURE — 80048 BASIC METABOLIC PNL TOTAL CA: CPT

## 2024-10-29 PROCEDURE — 97110 THERAPEUTIC EXERCISES: CPT

## 2024-10-29 PROCEDURE — 97530 THERAPEUTIC ACTIVITIES: CPT

## 2024-10-29 PROCEDURE — 6370000000 HC RX 637 (ALT 250 FOR IP): Performed by: INTERNAL MEDICINE

## 2024-10-29 PROCEDURE — 2580000003 HC RX 258: Performed by: NURSE PRACTITIONER

## 2024-10-29 PROCEDURE — 6370000000 HC RX 637 (ALT 250 FOR IP): Performed by: HOSPITALIST

## 2024-10-29 PROCEDURE — 2060000000 HC ICU INTERMEDIATE R&B

## 2024-10-29 PROCEDURE — 85027 COMPLETE CBC AUTOMATED: CPT

## 2024-10-29 PROCEDURE — 97168 OT RE-EVAL EST PLAN CARE: CPT

## 2024-10-29 RX ORDER — PSEUDOEPHEDRINE HCL 30 MG
100 TABLET ORAL 2 TIMES DAILY PRN
COMMUNITY
Start: 2024-10-29

## 2024-10-29 RX ORDER — AMLODIPINE BESYLATE 10 MG/1
10 TABLET ORAL DAILY
Qty: 30 TABLET | Refills: 3 | Status: SHIPPED | OUTPATIENT
Start: 2024-10-30

## 2024-10-29 RX ORDER — OLANZAPINE 10 MG/1
10 TABLET ORAL NIGHTLY
Qty: 30 TABLET | Refills: 3 | Status: SHIPPED | OUTPATIENT
Start: 2024-10-29 | End: 2024-11-08 | Stop reason: HOSPADM

## 2024-10-29 RX ORDER — CLONIDINE HYDROCHLORIDE 0.2 MG/1
0.2 TABLET ORAL 2 TIMES DAILY
Qty: 60 TABLET | Refills: 3 | Status: SHIPPED | OUTPATIENT
Start: 2024-10-29 | End: 2024-11-08 | Stop reason: HOSPADM

## 2024-10-29 RX ORDER — ATORVASTATIN CALCIUM 40 MG/1
40 TABLET, FILM COATED ORAL DAILY
Qty: 30 TABLET | Refills: 3 | Status: SHIPPED | OUTPATIENT
Start: 2024-10-30

## 2024-10-29 RX ORDER — CLONAZEPAM 0.5 MG/1
0.25 TABLET ORAL EVERY 12 HOURS
Qty: 6 TABLET | Refills: 0 | Status: SHIPPED | OUTPATIENT
Start: 2024-10-29 | End: 2024-11-08 | Stop reason: HOSPADM

## 2024-10-29 RX ORDER — THIAMINE MONONITRATE (VIT B1) 100 MG
100 TABLET ORAL DAILY
COMMUNITY
Start: 2024-10-30

## 2024-10-29 RX ORDER — HYDROCODONE BITARTRATE AND ACETAMINOPHEN 5; 325 MG/1; MG/1
1 TABLET ORAL ONCE
Status: COMPLETED | OUTPATIENT
Start: 2024-10-29 | End: 2024-10-29

## 2024-10-29 RX ORDER — FOLIC ACID 1 MG/1
1 TABLET ORAL DAILY
Qty: 30 TABLET | Refills: 3 | Status: SHIPPED | OUTPATIENT
Start: 2024-10-30

## 2024-10-29 RX ORDER — MULTIVITAMIN WITH IRON
1 TABLET ORAL DAILY
COMMUNITY
Start: 2024-10-30

## 2024-10-29 RX ORDER — VALPROIC ACID 250 MG/1
250 CAPSULE, LIQUID FILLED ORAL 3 TIMES DAILY
Qty: 120 CAPSULE | Refills: 3 | Status: SHIPPED | OUTPATIENT
Start: 2024-10-29

## 2024-10-29 RX ORDER — POLYETHYLENE GLYCOL 3350 17 G/17G
17 POWDER, FOR SOLUTION ORAL DAILY PRN
Qty: 30 PACKET | Refills: 0 | Status: SHIPPED | OUTPATIENT
Start: 2024-10-29 | End: 2024-11-28

## 2024-10-29 RX ADMIN — ANTI-FUNGAL POWDER MICONAZOLE NITRATE TALC FREE: 1.42 POWDER TOPICAL at 21:50

## 2024-10-29 RX ADMIN — ENOXAPARIN SODIUM 40 MG: 100 INJECTION SUBCUTANEOUS at 10:14

## 2024-10-29 RX ADMIN — CLONAZEPAM 0.25 MG: 0.5 TABLET ORAL at 10:16

## 2024-10-29 RX ADMIN — CLONAZEPAM 0.25 MG: 0.5 TABLET ORAL at 21:45

## 2024-10-29 RX ADMIN — AMOXICILLIN 875 MG: 875 TABLET, FILM COATED ORAL at 21:46

## 2024-10-29 RX ADMIN — ACETAMINOPHEN 650 MG: 325 TABLET ORAL at 13:40

## 2024-10-29 RX ADMIN — MULTIVITAMIN TABLET 1 TABLET: TABLET at 10:15

## 2024-10-29 RX ADMIN — ANTI-FUNGAL POWDER MICONAZOLE NITRATE TALC FREE: 1.42 POWDER TOPICAL at 10:17

## 2024-10-29 RX ADMIN — ATORVASTATIN CALCIUM 40 MG: 40 TABLET, FILM COATED ORAL at 10:15

## 2024-10-29 RX ADMIN — Medication 100 MG: at 10:15

## 2024-10-29 RX ADMIN — FAMOTIDINE 20 MG: 20 TABLET, FILM COATED ORAL at 10:15

## 2024-10-29 RX ADMIN — FAMOTIDINE 20 MG: 20 TABLET, FILM COATED ORAL at 21:46

## 2024-10-29 RX ADMIN — OLANZAPINE 10 MG: 5 TABLET, FILM COATED ORAL at 21:46

## 2024-10-29 RX ADMIN — CLONIDINE HYDROCHLORIDE 0.2 MG: 0.2 TABLET ORAL at 10:15

## 2024-10-29 RX ADMIN — CLONIDINE HYDROCHLORIDE 0.2 MG: 0.2 TABLET ORAL at 21:48

## 2024-10-29 RX ADMIN — HYDROCODONE BITARTRATE AND ACETAMINOPHEN 1 TABLET: 5; 325 TABLET ORAL at 21:46

## 2024-10-29 RX ADMIN — SODIUM CHLORIDE, PRESERVATIVE FREE 10 ML: 5 INJECTION INTRAVENOUS at 21:50

## 2024-10-29 RX ADMIN — FOLIC ACID 1 MG: 1 TABLET ORAL at 10:15

## 2024-10-29 RX ADMIN — SODIUM CHLORIDE, PRESERVATIVE FREE 10 ML: 5 INJECTION INTRAVENOUS at 09:00

## 2024-10-29 RX ADMIN — VALPROIC ACID 250 MG: 250 CAPSULE, LIQUID FILLED ORAL at 21:46

## 2024-10-29 RX ADMIN — VALPROIC ACID 250 MG: 250 CAPSULE, LIQUID FILLED ORAL at 10:15

## 2024-10-29 RX ADMIN — AMLODIPINE BESYLATE 10 MG: 10 TABLET ORAL at 10:16

## 2024-10-29 RX ADMIN — VALPROIC ACID 250 MG: 250 CAPSULE, LIQUID FILLED ORAL at 14:38

## 2024-10-29 RX ADMIN — AMOXICILLIN 875 MG: 875 TABLET, FILM COATED ORAL at 10:14

## 2024-10-29 ASSESSMENT — PAIN DESCRIPTION - PAIN TYPE
TYPE: CHRONIC PAIN
TYPE: CHRONIC PAIN

## 2024-10-29 ASSESSMENT — PAIN SCALES - GENERAL
PAINLEVEL_OUTOF10: 3
PAINLEVEL_OUTOF10: 7
PAINLEVEL_OUTOF10: 9
PAINLEVEL_OUTOF10: 3

## 2024-10-29 ASSESSMENT — PAIN DESCRIPTION - FREQUENCY
FREQUENCY: CONTINUOUS
FREQUENCY: INTERMITTENT

## 2024-10-29 ASSESSMENT — PAIN - FUNCTIONAL ASSESSMENT
PAIN_FUNCTIONAL_ASSESSMENT: ACTIVITIES ARE NOT PREVENTED
PAIN_FUNCTIONAL_ASSESSMENT: PREVENTS OR INTERFERES SOME ACTIVE ACTIVITIES AND ADLS

## 2024-10-29 ASSESSMENT — PAIN DESCRIPTION - ONSET
ONSET: ON-GOING
ONSET: ON-GOING

## 2024-10-29 ASSESSMENT — PAIN DESCRIPTION - DESCRIPTORS
DESCRIPTORS: THROBBING
DESCRIPTORS: STABBING

## 2024-10-29 ASSESSMENT — PAIN DESCRIPTION - LOCATION
LOCATION: FOOT;ANKLE
LOCATION: LEG

## 2024-10-29 ASSESSMENT — PAIN DESCRIPTION - ORIENTATION
ORIENTATION: LEFT;RIGHT
ORIENTATION: RIGHT;LEFT

## 2024-10-29 NOTE — PROGRESS NOTES
1015    famotidine (PEPCID) tablet 20 mg, 20 mg, Oral, BID, Theresa Uribe MD, 20 mg at 10/29/24 1015    loperamide (IMODIUM) capsule 2 mg, 2 mg, Oral, 4x Daily PRN, Lump, Saundra A, APRN - CNP, 2 mg at 10/19/24 0230    ipratropium 0.5 mg-albuterol 2.5 mg (DUONEB) nebulizer solution 1 Dose, 1 Dose, Inhalation, Q4H PRN, Carlos Gamboa MD    hydrALAZINE (APRESOLINE) injection 10 mg, 10 mg, IntraVENous, Q6H PRN, Carlos Gamboa MD, 10 mg at 10/17/24 2309    labetalol (NORMODYNE;TRANDATE) injection 10 mg, 10 mg, IntraVENous, Once, Lump, Saundra A, APRN - CNP    amLODIPine (NORVASC) tablet 10 mg, 10 mg, Oral, Daily, 10 mg at 10/29/24 1016 **AND** atorvastatin (LIPITOR) tablet 40 mg, 40 mg, Oral, Daily, Viktor Hernandez MD, 40 mg at 10/29/24 1015    metoprolol (LOPRESSOR) injection 5 mg, 5 mg, IntraVENous, Q6H PRN, Manuel, Mohsin Mohammad, MD, 5 mg at 10/24/24 2230    sodium chloride flush 0.9 % injection 5-40 mL, 5-40 mL, IntraVENous, 2 times per day, Lump, Saundra A, APRN - CNP, 10 mL at 10/29/24 0900    sodium chloride flush 0.9 % injection 5-40 mL, 5-40 mL, IntraVENous, PRN, Lump, Saundra A, APRN - CNP    0.9 % sodium chloride infusion, , IntraVENous, PRN, Lump, Saundra A, APRN - CNP    albuterol (PROVENTIL) (2.5 MG/3ML) 0.083% nebulizer solution 2.5 mg, 2.5 mg, Nebulization, Q6H PRN, Carlos Gamboa MD, 2.5 mg at 10/12/24 0826    sodium chloride flush 0.9 % injection 5-40 mL, 5-40 mL, IntraVENous, 2 times per day, Reza, Mohsin Mohammad, MD, 10 mL at 10/29/24 0900    sodium chloride flush 0.9 % injection 5-40 mL, 5-40 mL, IntraVENous, PRN, Reza, Mohsin Mohammad, MD    0.9 % sodium chloride infusion, , IntraVENous, PRN, Reza, Mohsin Mohammad, MD    magnesium sulfate 2000 mg in 50 mL IVPB premix, 2,000 mg, IntraVENous, PRN, Reza, Mohsin Mohammad, MD    enoxaparin (LOVENOX) injection 40 mg, 40 mg, SubCUTAneous, Daily, Reza, Mohsin Mohammad, MD, 40 mg at 10/29/24 1014    ondansetron (ZOFRAN-ODT) disintegrating tablet 4 mg, 4

## 2024-10-29 NOTE — PROGRESS NOTES
End Of Shift Note  [facility]  Summary of shift: Uneventful shift. Slept several hours. Patient occasionally would be confused but would always answer orientation questions correctly. Minimal urine output from [place]- approximately 250 ml. Requested for EPC cuffs to be taken off this morning, refused boots all night. Patient calling out to staff this morning, legs hanging out of bed, patient attempting to get out of bed on his own. Requested to sit at side of bed, with the help of other caregivers patient positioned on side of bed and currently dangling with bedside table in front of him. Patient provided with new water and pop.     Vitals:    Vitals:    10/28/24 1951 10/28/24 2000 10/29/24 0000 10/29/24 0400   BP:  124/73 90/79 96/60   Pulse:  (!) 119 76 65   Resp:  19 18 22   Temp:  97.9 °F (36.6 °C) 98.2 °F (36.8 °C) 98.4 °F (36.9 °C)   TempSrc:  Oral Axillary Oral   SpO2: 94% 94% 93% 95%   Weight:       Height:            I&O:   Intake/Output Summary (Last 24 hours) at 10/29/2024 0539  Last data filed at 10/29/2024 0400  Gross per 24 hour   Intake 730 ml   Output 1050 ml   Net -320 ml       Resp Status: RA/ 2L nasal cannula    Ventilator Settings:  Vent Mode: CPAP/PS Resp Rate (Set): 18 bpm/Vt (Set, mL): 500 mL/ /FiO2 : 30 %    Critical Care IV infusions:   dexmedeTOMIDine (PRECEDEX) 1,000 mcg in sodium chloride 0.9 % 250 mL infusion      sodium chloride      sodium chloride          LDA:   Peripheral IV 10/24/24 Right;Anterior Cephalic (Active)   Number of days: 4       Urinary Catheter 10/23/24 (Active)   Number of days: 5

## 2024-10-29 NOTE — PROGRESS NOTES
End Of Shift Note  [place] [facility]  Summary of shift: Uneventful shift; intermittent confusion and hallucinations but admits to hallucinations; C/O pain in legs 9/10, specifically asking for Norco because it helps; notified Dr. Olmstead, received x1 dose of Norco 5/325 PO, pt asleep. Called for bed pan x3 without success; no violence noted, never physically nor verbally abusive to any staff this shift; refused to eat any meals this shift and refused help to eat meals; refused to drink nutrition supplement but enjoyed drinking ice water, coffee, and ginger ale.    Vitals:    Vitals:    10/29/24 1535 10/29/24 1600 10/29/24 1700 10/29/24 1800   BP:  104/75     Pulse:  78 79 69   Resp:       Temp: 98.3 °F (36.8 °C)      TempSrc: Oral      SpO2:  91% 90% (!) 89%   Weight:       Height:            I&O:   Intake/Output Summary (Last 24 hours) at 10/29/2024 1931  Last data filed at 10/29/2024 1800  Gross per 24 hour   Intake --   Output 490 ml   Net -490 ml       Resp Status: Clear/diminished throughout; needs at least 2L while sleeping, desats 88% on RA when sleeping.    Ventilator Settings:  Vent Mode: CPAP/PS Resp Rate (Set): 18 bpm/Vt (Set, mL): 500 mL/ /FiO2 : 30 %    Critical Care IV infusions:   dexmedeTOMIDine (PRECEDEX) 1,000 mcg in sodium chloride 0.9 % 250 mL infusion      sodium chloride      sodium chloride          LDA:   Peripheral IV 10/24/24 Right;Anterior Cephalic (Active)   Number of days: 5       Urinary Catheter 10/23/24 (Active)   Number of days: 5

## 2024-10-29 NOTE — PROGRESS NOTES
Occupational Therapy  Facility/Department: [facility]  Occupational Therapy Re-Assessment    Name: Noel Valle  : 1955  MRN: [id number]  Date of Service: 10/29/2024    YURI Glover reports patient is medically stable for therapy treatment this date.    Chart reviewed prior to treatment and patient is agreeable for therapy.  All lines intact and patient positioned comfortably at end of treatment.  All patient needs addressed prior to ending therapy session.       Pt currently functioning below baseline.  Recommend daily inpatient skilled therapy at time of discharge to maximize long term outcomes and prevent re-admission. Please refer to AM-PAC score for current level of function.     Discharge Recommendations:  Patient would benefit from continued therapy after discharge  OT Equipment Recommendations  Equipment Needed: Yes (CTA)       History Of Present Illness:    69-year-old male coming into the hospital for difficulty swallowing.  In the ER, patient found to have tongue swelling, trouble swallowing with acute distress, tachycardia, ill-appearing status.  Symptoms began earlier today at 6 AM.  He noticed severe swelling of his throat.  Patient denies any allergies, medications.  Patient is not on any ACE inhibitor.  Patient was able to talk but his words were muffled.  In the ER, case was discussed with the anesthesiologist to take the patient to the OR.  Patient was intubated under the supervision of surgery due to concerns for potential tracheostomy.  Patient was given rocuronium, propofol.  He continued to wake up and thrash around for which reason Versed 60 mg IV push was ordered followed by fentanyl 50 mg IV push.  He was then put on fentanyl drip.  His blood pressure did drop after this.  ICU ordered fluid boluses.  He does have a history of COPD, tobacco abuse.  He was transferred to the ICU thereafter.        Patient Diagnosis(es): The encounter diagnosis was Angioedema, initial encounter.  Past

## 2024-10-29 NOTE — PLAN OF CARE
Problem: Discharge Planning  Goal: Discharge to home or other facility with appropriate resources  Outcome: Progressing     Problem: Respiratory - Adult  Goal: Achieves optimal ventilation and oxygenation  10/28/2024 2008 by Lorna Luna RN  Outcome: Progressing  10/28/2024 1429 by Michelle Acosta RN  Outcome: Progressing  Flowsheets (Taken 10/28/2024 0800 by Vicenta Hurley, RN)  Achieves optimal ventilation and oxygenation:   Assess for changes in respiratory status   Assess for changes in mentation and behavior   Oxygen supplementation based on oxygen saturation or arterial blood gases     Problem: Pain  Goal: Verbalizes/displays adequate comfort level or baseline comfort level  10/28/2024 2008 by Lorna Luna RN  Outcome: Progressing  10/28/2024 1429 by Michelle Acosta RN  Outcome: Progressing     Problem: Skin/Tissue Integrity  Goal: Absence of new skin breakdown  Description: 1.  Monitor for areas of redness and/or skin breakdown  2.  Assess vascular access sites hourly  3.  Every 4-6 hours minimum:  Change oxygen saturation probe site  4.  Every 4-6 hours:  If on nasal continuous positive airway pressure, respiratory therapy assess nares and determine need for appliance change or resting period.  10/28/2024 2008 by Lorna Luna RN  Outcome: Progressing  10/28/2024 1429 by Michelle Acosta RN  Outcome: Progressing     Problem: Safety - Adult  Goal: Free from fall injury  10/28/2024 2008 by Lorna Luna RN  Outcome: Progressing  10/28/2024 1429 by Michelle Acosta RN  Outcome: Progressing     Problem: Neurosensory - Adult  Goal: Achieves stable or improved neurological status  10/28/2024 2008 by Lorna Luna RN  Outcome: Progressing  10/28/2024 1429 by Michelle Acosta RN  Outcome: Progressing  Flowsheets (Taken 10/28/2024 0800 by Vicenta Hurley RN)  Achieves stable or improved neurological status: Assess for and report changes in neurological status    restoring, or maintaining nutritional needs:   Assess nutritional status and recommend course of action   Monitor oral intake, labs, and treatment plans  10/28/2024 1429 by Michelle Acosta, RN  Outcome: Progressing     Problem: ABCDS Injury Assessment  Goal: Absence of physical injury  10/28/2024 2008 by Lorna Luna RN  Outcome: Progressing  10/28/2024 1429 by Michelle Acosta, RN  Outcome: Progressing     Problem: Confusion  Goal: Confusion, delirium, dementia, or psychosis is improved or at baseline  Description: INTERVENTIONS:  1. Assess for possible contributors to thought disturbance, including medications, impaired vision or hearing, underlying metabolic abnormalities, dehydration, psychiatric diagnoses, and notify attending LIP  2. [place] high risk fall precautions, as indicated  3. Provide frequent short contacts to provide reality reorientation, refocusing and direction  4. Decrease environmental stimuli, including noise as appropriate  5. Monitor and intervene to maintain adequate nutrition, hydration, elimination, sleep and activity  6. If unable to ensure safety without constant attention obtain sitter and review sitter guidelines with assigned personnel  7. Initiate Psychosocial CNS and Spiritual Care consult, as indicated  10/28/2024 2008 by Lorna Luna, RN  Outcome: Progressing  10/28/2024 1429 by Michelle Acosta, RN  Outcome: Progressing  Flowsheets (Taken 10/28/2024 0800 by Vicenta Hurley, RN)  Effect of thought disturbance (confusion, delirium, dementia, or psychosis) are managed with adequate functional status:   Assess for contributors to thought disturbance, including medications, impaired vision or hearing, underlying metabolic abnormalities, dehydration, psychiatric diagnoses, notify LIP   [place] high risk fall precautions, as indicated   Provide frequent short contacts to provide reality reorientation, refocusing and direction   Decrease environmental

## 2024-10-29 NOTE — PLAN OF CARE
Problem: Discharge Planning  Goal: Discharge to home or other facility with appropriate resources  Outcome: Progressing  Flowsheets (Taken 10/29/2024 0800)  Discharge to home or other facility with appropriate resources: Arrange for needed discharge resources and transportation as appropriate     Problem: Respiratory - Adult  Goal: Achieves optimal ventilation and oxygenation  Outcome: Progressing  Flowsheets (Taken 10/29/2024 0800)  Achieves optimal ventilation and oxygenation: Assess for changes in respiratory status     Problem: Neurosensory - Adult  Goal: Achieves stable or improved neurological status  Outcome: Progressing  Flowsheets (Taken 10/29/2024 0800)  Achieves stable or improved neurological status: Assess for and report changes in neurological status     Problem: Cardiovascular - Adult  Goal: Maintains optimal cardiac output and hemodynamic stability  Outcome: Progressing  Flowsheets (Taken 10/29/2024 0800)  Maintains optimal cardiac output and hemodynamic stability: Monitor blood pressure and heart rate     Problem: Skin/Tissue Integrity - Adult  Goal: Skin integrity remains intact  Outcome: Progressing  Flowsheets  Taken 10/29/2024 1920  Skin Integrity Remains Intact: Monitor for areas of redness and/or skin breakdown  Taken 10/29/2024 0800  Skin Integrity Remains Intact: Monitor for areas of redness and/or skin breakdown     Problem: Musculoskeletal - Adult  Goal: Return mobility to safest level of function  Outcome: Progressing  Flowsheets (Taken 10/29/2024 0800)  Return Mobility to Safest Level of Function: Assess patient stability and activity tolerance for standing, transferring and ambulating with or without assistive devices     Problem: Gastrointestinal - Adult  Goal: Maintains adequate nutritional intake  Outcome: Progressing  Flowsheets (Taken 10/29/2024 0800)  Maintains adequate nutritional intake: Monitor percentage of each meal consumed     Problem: Genitourinary - Adult  Goal: Absence

## 2024-10-29 NOTE — PROGRESS NOTES
Physical Therapy  Facility/Department: [facility]  Physical Therapy Re-Eval    Name: Noel Valle  : 1955  MRN: [id number]  Date of Service: 10/29/2024    YURI Glover reports patient is medically stable for therapy treatment this date.    Chart reviewed prior to treatment and patient is agreeable for therapy.  All lines intact and patient positioned comfortably at end of treatment.  All patient needs addressed prior to ending therapy session.     Discharge Recommendations:  Patient would benefit from continued therapy after discharge   Pt currently functioning below baseline.  Recommend daily inpatient skilled therapy at time of discharge to maximize long term outcomes and prevent re-admission. Please refer to AM-PAC score for current level of function.          Patient Diagnosis(es): The encounter diagnosis was Angioedema, initial encounter.  Past Medical History:  has no past medical history on file.  Past Surgical History:  has no past surgical history on file.    Assessment  Body Structures, Functions, Activity Limitations Requiring Skilled Therapeutic Intervention: Decreased functional mobility ;Decreased endurance;Decreased balance;Decreased strength;Decreased safe awareness;Decreased posture;Decreased ROM;Decreased cognition  Assessment: Re-eval complete to assess transfers and update goals. Patient alert, pleasant and cooperative with confusion intermittently.  R LE weakness compared to L observed. Patient required 2 person assist with bed mobility; and attempt at transfers using dave stedy.  Unsafe to attempt gait. Recommend continued use of maxi rita during transfer from bed<>bedside recliner with staff when appropriate. Patient is a high fall risk; functioning below baseline and would benefit from continued PT.  Therapy Prognosis: Good  Decision Making: High Complexity  Requires PT Follow-Up: Yes  Activity Tolerance  Activity Tolerance: Patient limited by fatigue;Patient tolerated treatment

## 2024-10-29 NOTE — PROGRESS NOTES
Pulmonary Critical Care Progress Note    Patient seen for the follow up of Angioedema of tongue     Subjective:    He is more awake alert off Precedex he is calm today cooperating.  Sister is at bedside..  He is off oxygen.  He is able to tolerate oral intake.  He still getting significant scary visual hallucinations    Examination:    Vitals: BP 96/60   Pulse 65   Temp 98.4 °F (36.9 °C) (Oral)   Resp 22   Ht 1.66 m (5' 5.35\")   Wt 91.1 kg (200 lb 13.4 oz)   SpO2 95%   BMI 33.06 kg/m²   SpO2  Av.2 %  Min: 89 %  Max: 95 %  General appearance: Awake alert no distress  Neck: No JVD  Lungs: Decreased breath sound no crackles or wheeze  Heart: regular rate and rhythm, S1, S2 normal, no gallop  Abdomen: Soft, non tender, + BS  Extremities: no cyanosis or clubbing. No significant edema    LABs:    CBC:   Recent Labs     10/28/24  0630 10/29/24  0446   WBC 8.5 9.2   HGB 13.8 13.1   HCT 42.4 39.8*    200     BMP:   Recent Labs     10/28/24  0630 10/29/24  0446    143   K 3.3* 4.0   CO2 31 27   BUN 9 7*   CREATININE 0.7 0.6*   LABGLOM >90 >90   GLUCOSE 96 99      Latest Reference Range & Units 10/14/24 06:00 10/14/24 12:31   POC TCO2 22 - 30 mmol/L  27   POC HCO3 21.0 - 28.0 mmol/L 28.1 (H) 26.6   POC O2 SAT 94.0 - 98.0 % 95.6 95.8   POC pCO2 35.0 - 48.0 mm Hg 43.2 42.0   POC pH 7.350 - 7.450  7.421 7.410   POC PO2 83.0 - 108.0 mm Hg 78.4 (L) 79.8 (L)   (H): Data is abnormally high  (L): Data is abnormally low      Radiology:  Chest x-ray 10/20  Poor inspiratory effort.       Chest x-ray 10/17 reviewed  1. Probable mild atelectatic changes at the base of the left lung.  2. Small left pleural effusion versus pleural thickening.  3. No other acute cardiopulmonary process.      Chest x-ray 10/16  Reviewed  Small left effusion with atelectasis.            Impression/recommendations:    Acute respiratory insufficiency due to upper airway instruction/angioedema with significant swelling upper

## 2024-10-29 NOTE — PROGRESS NOTES
Spiritual Health History and Assessment/Progress Note  [facility]    (P) Spiritual/Emotional Needs,  ,  ,      Name: Noel Valle MRN: [id number]    Age: 69 y.o.     Sex: male   Language: English   Roman Catholic: New Religionist   Angioedema of tongue     Date: 10/29/2024            Total Time Calculated: (P) 4 min              Spiritual Assessment continued in [facility] ICU        Referral/Consult From: (P) Rounding   Encounter Overview/Reason: (P) Spiritual/Emotional Needs  Service Provided For: (P) Patient    Patient was sleeping soundly and did not respond to 's prompts but previously has valued  support and prayers when able to be aware and oriented.    Kailee, Belief, Meaning:   Patient Other: Patient was sleeping soundly and did not respond to  prompts.  Family/Friends No family/friends present      Importance and Influence:  Patient unable to assess at this time  Family/Friends No family/friends present    Community:  Patient feels well-supported. Support system includes: Other: family members  Family/Friends No family/friends present    Assessment and Plan of Care:     Patient Interventions include: Other: supportive spiritual care presence  Family/Friends Interventions include: No family/friends present    Patient Plan of Care: Spiritual Care available upon further referral  Family/Friends Plan of Care: Spiritual Care available upon further referral    Electronically signed by Chaplain Nighat on 10/29/2024 at 1:14 PM

## 2024-10-29 NOTE — CARE COORDINATION
Discharge planning    Chart reviewed. Patient is from home with spouse. Has cane if needed. Social work has note that [facility] will re evaluate to see if can accept.     Psych did see on 10/25 and notes inpatient psych stay not warranted.

## 2024-10-30 LAB
ANION GAP SERPL CALCULATED.3IONS-SCNC: 9 MMOL/L (ref 9–17)
BUN SERPL-MCNC: 9 MG/DL (ref 8–23)
BUN/CREAT SERPL: 15 (ref 9–20)
CALCIUM SERPL-MCNC: 8.8 MG/DL (ref 8.6–10.4)
CHLORIDE SERPL-SCNC: 103 MMOL/L (ref 98–107)
CO2 SERPL-SCNC: 30 MMOL/L (ref 20–31)
CREAT SERPL-MCNC: 0.6 MG/DL (ref 0.7–1.2)
ERYTHROCYTE [DISTWIDTH] IN BLOOD BY AUTOMATED COUNT: 11.4 % (ref 11.8–14.4)
GFR, ESTIMATED: >90 ML/MIN/1.73M2
GLUCOSE SERPL-MCNC: 107 MG/DL (ref 70–99)
HCT VFR BLD AUTO: 40.1 % (ref 40.7–50.3)
HGB BLD-MCNC: 13 G/DL (ref 13–17)
MCH RBC QN AUTO: 32.7 PG (ref 25.2–33.5)
MCHC RBC AUTO-ENTMCNC: 32.4 G/DL (ref 28.4–34.8)
MCV RBC AUTO: 100.8 FL (ref 82.6–102.9)
NRBC BLD-RTO: 0 PER 100 WBC
PLATELET # BLD AUTO: 203 K/UL (ref 138–453)
PMV BLD AUTO: 10.4 FL (ref 8.1–13.5)
POTASSIUM SERPL-SCNC: 3.9 MMOL/L (ref 3.7–5.3)
RBC # BLD AUTO: 3.98 M/UL (ref 4.21–5.77)
SODIUM SERPL-SCNC: 142 MMOL/L (ref 135–144)
WBC OTHER # BLD: 7.5 K/UL (ref 3.5–11.3)

## 2024-10-30 PROCEDURE — 6370000000 HC RX 637 (ALT 250 FOR IP): Performed by: INTERNAL MEDICINE

## 2024-10-30 PROCEDURE — 36415 COLL VENOUS BLD VENIPUNCTURE: CPT

## 2024-10-30 PROCEDURE — 6360000002 HC RX W HCPCS: Performed by: FAMILY MEDICINE

## 2024-10-30 PROCEDURE — 2580000003 HC RX 258: Performed by: NURSE PRACTITIONER

## 2024-10-30 PROCEDURE — 97530 THERAPEUTIC ACTIVITIES: CPT

## 2024-10-30 PROCEDURE — 97110 THERAPEUTIC EXERCISES: CPT

## 2024-10-30 PROCEDURE — 2580000003 HC RX 258: Performed by: FAMILY MEDICINE

## 2024-10-30 PROCEDURE — 6370000000 HC RX 637 (ALT 250 FOR IP): Performed by: FAMILY MEDICINE

## 2024-10-30 PROCEDURE — 80048 BASIC METABOLIC PNL TOTAL CA: CPT

## 2024-10-30 PROCEDURE — 85027 COMPLETE CBC AUTOMATED: CPT

## 2024-10-30 PROCEDURE — 94761 N-INVAS EAR/PLS OXIMETRY MLT: CPT

## 2024-10-30 PROCEDURE — 97535 SELF CARE MNGMENT TRAINING: CPT

## 2024-10-30 PROCEDURE — 6370000000 HC RX 637 (ALT 250 FOR IP): Performed by: HOSPITALIST

## 2024-10-30 PROCEDURE — 2060000000 HC ICU INTERMEDIATE R&B

## 2024-10-30 RX ORDER — MIDODRINE HYDROCHLORIDE 5 MG/1
5 TABLET ORAL
Status: DISCONTINUED | OUTPATIENT
Start: 2024-10-30 | End: 2024-10-31

## 2024-10-30 RX ORDER — TAMSULOSIN HYDROCHLORIDE 0.4 MG/1
0.4 CAPSULE ORAL DAILY
Status: DISCONTINUED | OUTPATIENT
Start: 2024-10-30 | End: 2024-11-08 | Stop reason: HOSPADM

## 2024-10-30 RX ORDER — HYDROCODONE BITARTRATE AND ACETAMINOPHEN 5; 325 MG/1; MG/1
1 TABLET ORAL EVERY 6 HOURS PRN
Status: DISCONTINUED | OUTPATIENT
Start: 2024-10-30 | End: 2024-11-08 | Stop reason: HOSPADM

## 2024-10-30 RX ADMIN — CLONAZEPAM 0.25 MG: 0.5 TABLET ORAL at 08:02

## 2024-10-30 RX ADMIN — MIDODRINE HYDROCHLORIDE 5 MG: 5 TABLET ORAL at 15:54

## 2024-10-30 RX ADMIN — SODIUM CHLORIDE, PRESERVATIVE FREE 10 ML: 5 INJECTION INTRAVENOUS at 08:06

## 2024-10-30 RX ADMIN — Medication 100 MG: at 08:03

## 2024-10-30 RX ADMIN — FAMOTIDINE 20 MG: 20 TABLET, FILM COATED ORAL at 22:51

## 2024-10-30 RX ADMIN — MULTIVITAMIN TABLET 1 TABLET: TABLET at 08:01

## 2024-10-30 RX ADMIN — CLONAZEPAM 0.25 MG: 0.5 TABLET ORAL at 22:51

## 2024-10-30 RX ADMIN — SODIUM CHLORIDE, PRESERVATIVE FREE 10 ML: 5 INJECTION INTRAVENOUS at 22:50

## 2024-10-30 RX ADMIN — AMOXICILLIN 875 MG: 875 TABLET, FILM COATED ORAL at 08:00

## 2024-10-30 RX ADMIN — OLANZAPINE 10 MG: 5 TABLET, FILM COATED ORAL at 22:51

## 2024-10-30 RX ADMIN — AMLODIPINE BESYLATE 10 MG: 10 TABLET ORAL at 08:03

## 2024-10-30 RX ADMIN — VALPROIC ACID 250 MG: 250 CAPSULE, LIQUID FILLED ORAL at 08:00

## 2024-10-30 RX ADMIN — FOLIC ACID 1 MG: 1 TABLET ORAL at 08:03

## 2024-10-30 RX ADMIN — ANTI-FUNGAL POWDER MICONAZOLE NITRATE TALC FREE: 1.42 POWDER TOPICAL at 07:57

## 2024-10-30 RX ADMIN — SODIUM CHLORIDE, PRESERVATIVE FREE 10 ML: 5 INJECTION INTRAVENOUS at 08:07

## 2024-10-30 RX ADMIN — ENOXAPARIN SODIUM 40 MG: 100 INJECTION SUBCUTANEOUS at 07:56

## 2024-10-30 RX ADMIN — AMOXICILLIN 875 MG: 875 TABLET, FILM COATED ORAL at 22:51

## 2024-10-30 RX ADMIN — FAMOTIDINE 20 MG: 20 TABLET, FILM COATED ORAL at 08:03

## 2024-10-30 RX ADMIN — HYDROCODONE BITARTRATE AND ACETAMINOPHEN 1 TABLET: 5; 325 TABLET ORAL at 18:25

## 2024-10-30 RX ADMIN — VALPROIC ACID 250 MG: 250 CAPSULE, LIQUID FILLED ORAL at 14:09

## 2024-10-30 RX ADMIN — CLONIDINE HYDROCHLORIDE 0.2 MG: 0.2 TABLET ORAL at 08:00

## 2024-10-30 RX ADMIN — VALPROIC ACID 250 MG: 250 CAPSULE, LIQUID FILLED ORAL at 22:51

## 2024-10-30 RX ADMIN — ATORVASTATIN CALCIUM 40 MG: 40 TABLET, FILM COATED ORAL at 08:01

## 2024-10-30 RX ADMIN — TAMSULOSIN HYDROCHLORIDE 0.4 MG: 0.4 CAPSULE ORAL at 14:08

## 2024-10-30 RX ADMIN — CLONIDINE HYDROCHLORIDE 0.2 MG: 0.2 TABLET ORAL at 22:51

## 2024-10-30 ASSESSMENT — PAIN SCALES - GENERAL: PAINLEVEL_OUTOF10: 10

## 2024-10-30 ASSESSMENT — PAIN DESCRIPTION - LOCATION: LOCATION: LEG

## 2024-10-30 ASSESSMENT — PAIN DESCRIPTION - ORIENTATION: ORIENTATION: RIGHT

## 2024-10-30 ASSESSMENT — PAIN DESCRIPTION - DESCRIPTORS: DESCRIPTORS: ACHING

## 2024-10-30 NOTE — PROGRESS NOTES
PROGRESS NOTE  [facility] Hospitalist        10/30/2024   3:41 PM    Name:  Noel Valle  MRN:    [id number]     Acct:     [id number]   Room:  [address] [place] [address]  IP Day: 21     Admit Date: 10/9/2024 10:05 AM  PCP: Ramakrishna Silver MD    C/C:   Chief Complaint   Patient presents with    Angioedema       Assessment/ Plan:          Acute Respiratory Insufficiency due to upper airway obstruction secondary to Angioedema  S/P extubation 10-13-24  Drowsy as he was extubated and sedative meds weaned off  On 3 L nasal canula  Solumedrol transitioned to Po prednisone, completed  Pulmonology was following.         Hypotension - resolved  Hypertension  Monitor BP   On norvasc and clonidine. Off coreg due to bradycardia   On prn Hydralazine.      HELENE  resolved     Tobacco abuse disorder/history of possible COPD  Sched DuoNebs.  Will  on tobacco cessation when patient is alert        Alcohol withdrawal  On protocol  Off Precedex  Continue to monitor at this time  On zyprexa, valproate and librium as needed  NPO except for meds with apple sauce initially.-->Diet advanced  CIWA protocol     Agitation secondary to alcohol withdrawal, metabolic disturbances, polypharmacy  Haldol prn  Consult Neuro appreciated  D/w RN  Prednisone may be contributing per neurology  Thiamine added per neuro  Okay to add Librium as needed per neuro  Neurochecks  EEG  Psych consult   DC Ativan, Geodon per Psych  Wean off Klonopin as outpatient  Zyprexa 10 hs  Depakote 250 tid     Subclinical hyperthyroidism     Constipation   Fleet enema  Colace prn     Hypernatremia  Free water flushes. Improved  UTI  For urine culture shows Enterococcus faecalis, pansensitive  Continue amoxicillin        DC planning- SNF  [facility] refused - does not meet criteria  Blood pressure on the lower side, added midodrine      DVT and GI prophylaxis  Continue to monitor/telemetry/CBC with differential daily/BMP daily  Continue medications as below    Scheduled

## 2024-10-30 NOTE — PROGRESS NOTES
rolls.  Patient does reach across body towards opposite bed rail upon request.  Sit to Supine  Assistance Level: Maximum assistance;Requires x 2 assistance  Supine to Sit  Assistance Level: Maximum assistance;Requires x 2 assistance  Skilled Clinical Factors: Vc's for hand placement and progression to upright posture with assist for support and stability  Scooting  Assistance Level: Maximum assistance;Requires x 2 assistance;Dependent  Balance  Sitting Balance: Maximum assistance  Standing Balance  Time: approx 5 min  Comments: pt with poor sitting balance LOB posterior x 2 requiring MAX A x2 for assist back upright  Sit to Stand  Assistance Level: Maximum assistance;Requires x 2 assistance;Dependent  Skilled Clinical Factors: 3 attempts at standing from bed with dave pratt pt unable to achieve lift off from bed with bed raised at this time pt only offering approx 10 percent of effort due to general deconditioning and weakness for safety pt postioned back to supine with MAX A x2 and over head rita lift utilized for transfer to chair  Bed To/From Chair  Technique:  (rita lift)  Assistance Level: Dependent                  PT Exercises  Exercise Treatment: issued pt HEP king seated LE AROM reviewed all ex with pt and family  PROM Exercises: BLE gastroc and HS stretches  Circulation/Endurance Exercises: ankle pumps as tolerated;    Seated postural ex: scapular retraction, and head lifts to neutral position        ASSESSMENT/PROGRESS TOWARDS GOALS  Vital Signs  BP Location: Left upper arm  O2 Device: None (Room air)    Assessment  Assessment: pt unable to stand from edge of bed with bed raised with MAX A x2, rita lift used to transfer pt to bedside lazyboy chair  Activity Tolerance: Patient limited by fatigue;Patient tolerated treatment well  Discharge Recommendations: Patient would benefit from continued therapy after discharge  PT Equipment Recommendations  Other: Pt currently functioning below baseline.  Recommend daily  Inpatient CMS G-Code Modifier:CM        Therapy Time   cotx Concurrent Group Co-treatment   Time In 1045         Time Out 1151         Minutes 66                 Co-treatment with OT warranted secondary to decreased safety and independence requiring 2 skilled therapy professionals to address individual discipline's goals. PT addressing preparation for  Transfers, gait and pre gait activities,  sitting balance for increased  sitting/activity tolerance, functional mobility, environmental safety/scanning, fall prevention, functional mobility  transfers and functional LE strength.  Upon writer exit, call light within reach, pt retired to bed. All lines intact and patient positioned comfortably. All patient needs addressed prior to ending therapy session. Chart reviewed prior to treatment and patient is agreeable for therapy.  RN reports patient is medically stable for therapy treatment this date.       ASHLEY POWELL, PTA, 10/30/24 at 12:10 PM

## 2024-10-30 NOTE — PROGRESS NOTES
End Of Shift Note  [facility]  Summary of shift: Patient remained oriented and cooperative all night. Very appropriate with staff, used call light correctly. Did attempt to have a bowel movement a couple times with no success. Low urine output from [place], patient encouraged to drink more fluid. Gave one time dose of norco, patients pain remained well controlled all night.     Vitals:    Vitals:    10/29/24 2000 10/29/24 2148 10/30/24 0004 10/30/24 0417   BP: 98/65 121/77 92/60    Pulse: 62 74 58 64   Resp: 18  17 23   Temp: 98 °F (36.7 °C)  97.8 °F (36.6 °C)    TempSrc: Oral  Oral    SpO2: 96% 92% 93% 92%   Weight:       Height:            I&O:   Intake/Output Summary (Last 24 hours) at 10/30/2024 0503  Last data filed at 10/29/2024 1800  Gross per 24 hour   Intake --   Output 240 ml   Net -240 ml       Resp Status: 2 liters    Ventilator Settings:  Vent Mode: CPAP/PS Resp Rate (Set): 18 bpm/Vt (Set, mL): 500 mL/ /FiO2 : 30 %    Critical Care IV infusions:   dexmedeTOMIDine (PRECEDEX) 1,000 mcg in sodium chloride 0.9 % 250 mL infusion      sodium chloride      sodium chloride          LDA:   Peripheral IV 10/24/24 Right;Anterior Cephalic (Active)   Number of days: 5       Urinary Catheter 10/23/24 (Active)   Number of days: 6

## 2024-10-30 NOTE — CONSULTS
Clinical Ethics Consultation Note    History and Context:  Noel Valle is a 69 y.o. male whose current LOS is 21 days and location is [address] [place] [address].      Currently, pt is \"calm and cooperative\" per notes but having self reported visual hallucinations from alcohol withdraw secondary to chief complaint.  Pt intubated shortly after arrival to ED due to \"angioedema\" and extubated while in ICU.    Pt does not have advanced directives on file.     Pt's decision maker is his sister, Alexia, though he does have a spouse that he is the care taker for, and is reported unable to be decision maker.  Pt making his own informed decisions at the time of this note.    Pt's current code status is full.       Admit Date & Time: 10/9/2024 10:05 AM    Length of Stay: 21  Valid medical advanced directives?: No    Process:  Writer reviewed chart and spoke to medical team    Ethical Question(s) and Concern(s):  21 day screen per protocol    Analysis:  Case management awaiting pre-cert for facility.  Difficult discharge due to behavior and determining appropriate placement, though behavior is reportedly better.    Recommendations:  No recommendations at this time.      Closing:  No further follow up is needed at this time.    Electronically signed by Michelle Garcia on 10/30/2024 at 2:01 PM.  [facility]  [facility]   [phone]

## 2024-10-30 NOTE — CARE COORDINATION
Social work:  Spoke to [facility], they cannot accept.   Writer called Alicia/sister to discuss new choices, she is questioning if patient would qualify for [facility] as he continues to have hallucinations and  not himself.   Writer also discussed with her he may need a facility with behavioral unit, list discussed, she does not want any of the facilities that have behavioral units d/t Medicare.gov ratings.  SW sent referral to [phone].    Update 14:28- Spoke to intake/[facility], referral is being reviewed by physicians.   Referral also faxed to [facility], [facility] and [facility].     Lynda Montero is O[facility].   [facility] cannot accept.    Update 16:10- [facility] declined patient.   Spoke to Alicia/sister to discuss additional choices, choice given of [facility] and [facility].  Referrals sent.  HENS started.

## 2024-10-30 NOTE — CARE COORDINATION
Name: Thong Valle : 1955 (69 yrs)   Address: [address] [place] [address] Sex: Male   City: [facility] [address]         Marital Status:    Employer: [employer]         Gnosticism: New Religionist   Primary Care Provider: Ramakrishna Silver MD         Primary Phone: [phone]   EMERGENCY CONTACT   Name Home Phone Work Phone Mobile Phone Relationship Lgl GrMARYBETH Ramey [phone] Brother/Sis*     KAITLYNN VALLE [phone]     Spouse No         GUARANTOR            Guarantor: Thong Valle     : 1955   Address: [address] [place] [address] Sex: [place] [address]     Relation to Patient: Self       Home Phone: [phone]   Guarantor ID: [id number]       Work Phone:     Guarantor Employer: [employer]         Status: FULL TIME      COVERAGE        PRIMARY INSURANCE   Payor: [facility] MEDICARE Plan: [facility]*   Payor Address: [address] [address]       Group Number: [id number] Insurance Type: INDEMNITY   Subscriber Name: THONG VALLE Subscriber : 1955   Subscriber ID: [id number] Pat. Rel. to Sub: Self   SECONDARY INSURANCE   Payor:   Plan:     Payor Address:  ,           Group Number:   Insurance Type:     Subscriber Name:   Subscriber :     Subscriber ID:   Pat. Rel. to Sub:

## 2024-10-30 NOTE — PROGRESS NOTES
upper airway/atelectasis with small effusion  Oxygen by nasal cannula  Off BiPAP  Keep off ACE inhibitor for life  Status post icatibant  Off prednisone       COPD/smoker   DuoNeb by nebulizer .  Smoking cessation /discontinue nicotine patch for tachycardia  PFT outpatient     alcohol withdrawal/metabolic encephalopathy  Diet as tolerated  Off Precedex  Off CIWA protocol  Geodon 10 mg as needed for severe agitation   Zyprexa 10 mg p.o. at bedtime  Depakote 250 3 times a day  Wean off Klonopin 0.25 twice daily  Off Librium  Thiamine folate multivitamin    Bradycardia/hypertension/status post hypotension due to sedation/suspect fluid overload  Monitor blood pressure   Norvasc 10 daily/clonidine 0.2 twice daily  Off Coreg  Hydralazine 10 IV every 4 hours for blood pressure above 170 systolic         Enterococcus UTIs/hypernatremia   Monitor sodium  Discontinue amoxicillin finished    suspected sleep apnea/obesity  Outpatient sleep evaluation     Discussed with sister at bedside again today  Discussed with RN   Discussed with clinical pharmacist  Physical therapy/ambulate  Rehab discharge evaluation; advised patient had issues with placement because of aggressive behavior before ;now not having any problems over the last few days very remorseful cries when he recalls   peptic ulcer disease prophylaxis  DVT prophylaxis      Carlos Gamboa MD, MD, [facility]  Pulmonary Critical Care and Sleep Medicine,  [facility]  Cell: [phone]  Office: [phone]

## 2024-10-30 NOTE — PLAN OF CARE
Problem: Discharge Planning  Goal: Discharge to home or other facility with appropriate resources  10/29/2024 2247 by Kathie Hickey RN  Outcome: Progressing  10/29/2024 1922 by Lesly Collier RN  Outcome: Progressing  Flowsheets (Taken 10/29/2024 0800)  Discharge to home or other facility with appropriate resources: Arrange for needed discharge resources and transportation as appropriate     Problem: Respiratory - Adult  Goal: Achieves optimal ventilation and oxygenation  10/29/2024 2247 by Kathie Hickey RN  Outcome: Progressing  10/29/2024 1922 by Lesly Collier RN  Outcome: Progressing  Flowsheets (Taken 10/29/2024 0800)  Achieves optimal ventilation and oxygenation: Assess for changes in respiratory status     Problem: Pain  Goal: Verbalizes/displays adequate comfort level or baseline comfort level  10/29/2024 2247 by Kathie Hickey RN  Outcome: Progressing  10/29/2024 1922 by Lesly Collier RN  Outcome: Progressing  Flowsheets  Taken 10/29/2024 1600  Verbalizes/displays adequate comfort level or baseline comfort level: Assess pain using appropriate pain scale  Taken 10/29/2024 1200  Verbalizes/displays adequate comfort level or baseline comfort level: Assess pain using appropriate pain scale  Taken 10/29/2024 0800  Verbalizes/displays adequate comfort level or baseline comfort level: Assess pain using appropriate pain scale     Problem: Skin/Tissue Integrity  Goal: Absence of new skin breakdown  Description: 1.  Monitor for areas of redness and/or skin breakdown  2.  Assess vascular access sites hourly  3.  Every 4-6 hours minimum:  Change oxygen saturation probe site  4.  Every 4-6 hours:  If on nasal continuous positive airway pressure, respiratory therapy assess nares and determine need for appliance change or resting period.  10/29/2024 2247 by Kathie Hickey RN  Outcome: Progressing  10/29/2024 1922 by Lesly Collier RN  Outcome: Progressing     Problem: Safety - Adult  Goal: Free from  fall injury  10/29/2024 2247 by Kathie Hickey RN  Outcome: Progressing  10/29/2024 1922 by Lesly Collier RN  Outcome: Progressing  Flowsheets (Taken 10/29/2024 1920)  Free From Fall Injury: Instruct family/caregiver on patient safety     Problem: Neurosensory - Adult  Goal: Achieves stable or improved neurological status  10/29/2024 2247 by Kathie Hickey RN  Outcome: Progressing  10/29/2024 1922 by Lesly Collier RN  Outcome: Progressing  Flowsheets (Taken 10/29/2024 0800)  Achieves stable or improved neurological status: Assess for and report changes in neurological status     Problem: Cardiovascular - Adult  Goal: Maintains optimal cardiac output and hemodynamic stability  10/29/2024 2247 by Kathie Hickey RN  Outcome: Progressing  10/29/2024 1922 by Lesly Collier RN  Outcome: Progressing  Flowsheets (Taken 10/29/2024 0800)  Maintains optimal cardiac output and hemodynamic stability: Monitor blood pressure and heart rate     Problem: Skin/Tissue Integrity - Adult  Goal: Skin integrity remains intact  10/29/2024 2247 by Kathie Hickey RN  Outcome: Progressing  10/29/2024 1922 by Lesly Collier RN  Outcome: Progressing  Flowsheets  Taken 10/29/2024 1920  Skin Integrity Remains Intact: Monitor for areas of redness and/or skin breakdown  Taken 10/29/2024 0800  Skin Integrity Remains Intact: Monitor for areas of redness and/or skin breakdown     Problem: Musculoskeletal - Adult  Goal: Return mobility to safest level of function  10/29/2024 2247 by Kathie Hickey RN  Outcome: Progressing  10/29/2024 1922 by Lesly Collier RN  Outcome: Progressing  Flowsheets (Taken 10/29/2024 0800)  Return Mobility to Safest Level of Function: Assess patient stability and activity tolerance for standing, transferring and ambulating with or without assistive devices     Problem: Gastrointestinal - Adult  Goal: Maintains adequate nutritional intake  10/29/2024 2247 by Kathie Hickey RN  Outcome:

## 2024-10-30 NOTE — PROGRESS NOTES
Occupational Therapy  Facility/Department: [facility] ICU  Rehabilitation Occupational Therapy Daily Treatment Note    Date: 10/30/24  Patient Name: Noel Valle       Room: 1114/1114-01  MRN: [id number]  Account: [id number]   : 1955  (69 y.o.) Gender: male     Past Medical History:  has no past medical history on file.  Past Surgical History:   has no past surgical history on file.    Restrictions  Restrictions/Precautions: General Precautions, Fall Risk, Seizure, Bed Alarm  Other position/activity restrictions: Up w/ assist, continuous pulse ox, RUE IV, Hill catheter, telemetry  Required Braces or Orthoses?: No    Subjective  Subjective: Pt. supine in bed and willing to participate in treatment.  Restrictions/Precautions: General Precautions;Fall Risk;Seizure;Bed Alarm  Objective     Cognition  Overall Cognitive Status: Exceptions  Arousal/Alertness: Inconsistent responses to stimuli  Following Commands: Follows one step commands with increased time;Follows one step commands with repetition  Attention Span: Attends with cues to redirect  Memory: Impaired  Safety Judgement: Decreased awareness of need for safety;Decreased awareness of need for assistance  Problem Solving: Assistance required to identify errors made;Assistance required to correct errors made;Assistance required to implement solutions  Insights: Decreased awareness of deficits  Initiation: Requires cues for all  Sequencing: Requires cues for all  Cognition Comment: Pt. pleasant and cooperative but displayed increased fatigue with closing eyes and hanging head forward as to be resting requiring constant cues to correct posture.  Orientation  Overall Orientation Status: Within Functional Limits         ADL  Functional Mobility  Device:  (Dave stedy and rita lift)  Activity:  (ADL transfers from bed to chair.)  Assistance Level: Maximum assistance;Dependent;Requires x 2 assistance  Skilled Clinical Factors: Pt. attempted 3x to stand with dave stedy

## 2024-10-31 PROCEDURE — 6370000000 HC RX 637 (ALT 250 FOR IP): Performed by: HOSPITALIST

## 2024-10-31 PROCEDURE — 97535 SELF CARE MNGMENT TRAINING: CPT

## 2024-10-31 PROCEDURE — 6370000000 HC RX 637 (ALT 250 FOR IP): Performed by: FAMILY MEDICINE

## 2024-10-31 PROCEDURE — 6360000002 HC RX W HCPCS: Performed by: FAMILY MEDICINE

## 2024-10-31 PROCEDURE — 51798 US URINE CAPACITY MEASURE: CPT

## 2024-10-31 PROCEDURE — 2580000003 HC RX 258: Performed by: NURSE PRACTITIONER

## 2024-10-31 PROCEDURE — 6360000002 HC RX W HCPCS: Performed by: INTERNAL MEDICINE

## 2024-10-31 PROCEDURE — 2500000003 HC RX 250 WO HCPCS: Performed by: FAMILY MEDICINE

## 2024-10-31 PROCEDURE — 6370000000 HC RX 637 (ALT 250 FOR IP): Performed by: INTERNAL MEDICINE

## 2024-10-31 PROCEDURE — 97530 THERAPEUTIC ACTIVITIES: CPT

## 2024-10-31 PROCEDURE — 51701 INSERT BLADDER CATHETER: CPT

## 2024-10-31 PROCEDURE — 2060000000 HC ICU INTERMEDIATE R&B

## 2024-10-31 RX ORDER — MIDODRINE HYDROCHLORIDE 5 MG/1
5 TABLET ORAL 3 TIMES DAILY PRN
Status: DISCONTINUED | OUTPATIENT
Start: 2024-10-31 | End: 2024-11-08 | Stop reason: HOSPADM

## 2024-10-31 RX ORDER — BENZOCAINE/MENTHOL 6 MG-10 MG
LOZENGE MUCOUS MEMBRANE 2 TIMES DAILY
Status: DISCONTINUED | OUTPATIENT
Start: 2024-10-31 | End: 2024-11-08 | Stop reason: HOSPADM

## 2024-10-31 RX ORDER — 0.9 % SODIUM CHLORIDE 0.9 %
250 INTRAVENOUS SOLUTION INTRAVENOUS ONCE
Status: COMPLETED | OUTPATIENT
Start: 2024-10-31 | End: 2024-10-31

## 2024-10-31 RX ADMIN — HYDROCODONE BITARTRATE AND ACETAMINOPHEN 1 TABLET: 5; 325 TABLET ORAL at 00:46

## 2024-10-31 RX ADMIN — VALPROIC ACID 250 MG: 250 CAPSULE, LIQUID FILLED ORAL at 21:30

## 2024-10-31 RX ADMIN — MULTIVITAMIN TABLET 1 TABLET: TABLET at 10:34

## 2024-10-31 RX ADMIN — HYDROCORTISONE: 1 CREAM TOPICAL at 22:31

## 2024-10-31 RX ADMIN — HYDROCODONE BITARTRATE AND ACETAMINOPHEN 1 TABLET: 5; 325 TABLET ORAL at 07:25

## 2024-10-31 RX ADMIN — TAMSULOSIN HYDROCHLORIDE 0.4 MG: 0.4 CAPSULE ORAL at 10:34

## 2024-10-31 RX ADMIN — FAMOTIDINE 20 MG: 20 TABLET, FILM COATED ORAL at 22:17

## 2024-10-31 RX ADMIN — SODIUM CHLORIDE, PRESERVATIVE FREE 10 ML: 5 INJECTION INTRAVENOUS at 22:31

## 2024-10-31 RX ADMIN — AMLODIPINE BESYLATE 10 MG: 10 TABLET ORAL at 10:33

## 2024-10-31 RX ADMIN — FOLIC ACID 1 MG: 1 TABLET ORAL at 10:36

## 2024-10-31 RX ADMIN — Medication 100 MG: at 10:33

## 2024-10-31 RX ADMIN — CLONAZEPAM 0.25 MG: 0.5 TABLET ORAL at 21:30

## 2024-10-31 RX ADMIN — VALPROIC ACID 250 MG: 250 CAPSULE, LIQUID FILLED ORAL at 10:33

## 2024-10-31 RX ADMIN — CLONAZEPAM 0.25 MG: 0.5 TABLET ORAL at 10:33

## 2024-10-31 RX ADMIN — ENOXAPARIN SODIUM 40 MG: 100 INJECTION SUBCUTANEOUS at 10:34

## 2024-10-31 RX ADMIN — FENTANYL CITRATE 25 MCG: 0.05 INJECTION, SOLUTION INTRAMUSCULAR; INTRAVENOUS at 10:39

## 2024-10-31 RX ADMIN — SODIUM CHLORIDE 250 ML: 9 INJECTION, SOLUTION INTRAVENOUS at 07:29

## 2024-10-31 RX ADMIN — FAMOTIDINE 20 MG: 20 TABLET, FILM COATED ORAL at 10:34

## 2024-10-31 RX ADMIN — OLANZAPINE 10 MG: 5 TABLET, FILM COATED ORAL at 22:17

## 2024-10-31 RX ADMIN — ATORVASTATIN CALCIUM 40 MG: 40 TABLET, FILM COATED ORAL at 10:33

## 2024-10-31 RX ADMIN — MIDODRINE HYDROCHLORIDE 5 MG: 5 TABLET ORAL at 10:36

## 2024-10-31 RX ADMIN — SODIUM CHLORIDE, PRESERVATIVE FREE 10 ML: 5 INJECTION INTRAVENOUS at 10:37

## 2024-10-31 RX ADMIN — CLONIDINE HYDROCHLORIDE 0.2 MG: 0.2 TABLET ORAL at 10:33

## 2024-10-31 RX ADMIN — VALPROIC ACID 250 MG: 250 CAPSULE, LIQUID FILLED ORAL at 13:45

## 2024-10-31 ASSESSMENT — PAIN DESCRIPTION - DESCRIPTORS
DESCRIPTORS: THROBBING;SHARP
DESCRIPTORS: SHARP

## 2024-10-31 ASSESSMENT — PAIN DESCRIPTION - ORIENTATION
ORIENTATION: RIGHT;LOWER;MID;OUTER
ORIENTATION: RIGHT;LEFT

## 2024-10-31 ASSESSMENT — PAIN SCALES - GENERAL
PAINLEVEL_OUTOF10: 8
PAINLEVEL_OUTOF10: 8
PAINLEVEL_OUTOF10: 10
PAINLEVEL_OUTOF10: 5

## 2024-10-31 ASSESSMENT — PAIN DESCRIPTION - LOCATION
LOCATION: LEG;FOOT
LOCATION: FOOT;NECK;BACK
LOCATION: LEG

## 2024-10-31 ASSESSMENT — PAIN DESCRIPTION - FREQUENCY: FREQUENCY: INTERMITTENT

## 2024-10-31 ASSESSMENT — PAIN DESCRIPTION - PAIN TYPE: TYPE: CHRONIC PAIN

## 2024-10-31 ASSESSMENT — PAIN - FUNCTIONAL ASSESSMENT
PAIN_FUNCTIONAL_ASSESSMENT: PREVENTS OR INTERFERES WITH MANY ACTIVE NOT PASSIVE ACTIVITIES
PAIN_FUNCTIONAL_ASSESSMENT: PREVENTS OR INTERFERES SOME ACTIVE ACTIVITIES AND ADLS

## 2024-10-31 ASSESSMENT — PAIN DESCRIPTION - ONSET: ONSET: GRADUAL

## 2024-10-31 NOTE — PLAN OF CARE
Problem: Discharge Planning  Goal: Discharge to home or other facility with appropriate resources  10/31/2024 0447 by Emeli Keys, RN  Outcome: Progressing  Flowsheets (Taken 10/30/2024 2000)  Discharge to home or other facility with appropriate resources:   Identify barriers to discharge with patient and caregiver   Arrange for needed discharge resources and transportation as appropriate   Identify discharge learning needs (meds, wound care, etc)   Arrange for interpreters to assist at discharge as needed   Refer to discharge planning if patient needs post-hospital services based on physician order or complex needs related to functional status, cognitive ability or social support system     Problem: Respiratory - Adult  Goal: Achieves optimal ventilation and oxygenation  10/31/2024 0447 by Emeli Keys, RN  Outcome: Progressing  Flowsheets (Taken 10/30/2024 2000)  Achieves optimal ventilation and oxygenation:   Assess for changes in mentation and behavior   Assess for changes in respiratory status   Position to facilitate oxygenation and minimize respiratory effort   Oxygen supplementation based on oxygen saturation or arterial blood gases   Initiate smoking cessation protocol as indicated   Assess and instruct to report shortness of breath or any respiratory difficulty   Encourage broncho-pulmonary hygiene including cough, deep breathe, incentive spirometry   Assess the need for suctioning and aspirate as needed   Respiratory therapy support as indicated     Problem: Neurosensory - Adult  Goal: Achieves stable or improved neurological status  10/31/2024 0447 by Emeli Keys, RN  Outcome: Progressing     Problem: Cardiovascular - Adult  Goal: Maintains optimal cardiac output and hemodynamic stability  10/31/2024 0447 by Emeli Keys, RN  Outcome: Progressing  Flowsheets (Taken 10/30/2024 2000)  Maintains optimal cardiac output and hemodynamic stability:   Monitor blood pressure and heart rate   Monitor

## 2024-10-31 NOTE — PROGRESS NOTES
Comprehensive Nutrition Assessment    Type and Reason for Visit:  Reassess    Nutrition Recommendations/Plan:   Continue current diet and Ensure/Magic Cup supplements  Encourage good po intakes  RD to follow/monitor labs, intakes, weight, POC     Malnutrition Assessment:  Malnutrition Status:  At risk for malnutrition (Comment) (10/14/24 2197)        Nutrition Assessment:    Patient continues in ICU, awaiting placement, still with some hallucinations. He is on a soft and bite sized diet with a safety tray, getting Ensure and Magic Cup. He really likes the Magic Cup, has been working on drinking Ensure at times. He refused meals the other day, reports just sometimes not being hungry, used to eating one meal per day at home. Reported to RD that he ate 75% of pancake. Denies needs at this time. Labs, meds, PMH reviewed.    Nutrition Related Findings:    Weight is stable. Trace edema. LBM 10/30. Meds include folvite, mvi, thiamine. Wound Type: None       Current Nutrition Intake & Therapies:    Average Meal Intake: 1-25%, 26-50%  Average Supplements Intake: Unable to assess  ADULT ORAL NUTRITION SUPPLEMENT; Breakfast, Dinner; Standard High Calorie/High Protein Oral Supplement  ADULT DIET; Dysphagia - Soft and Bite Sized; Safety Tray; Safety Tray (Disposables No Utensils)  ADULT ORAL NUTRITION SUPPLEMENT; Dinner; Frozen Oral Supplement    Anthropometric Measures:  Height: 166 cm (5' 5.35\")  Ideal Body Weight (IBW): 138 lbs (63 kg)       Current Body Weight: 90.7 kg (200 lb), 152.7 % IBW. Weight Source: Bed Scale  Current BMI (kg/m2): 32.9  Usual Body Weight: 97.5 kg (215 lb)     % Weight Change (Calculated): -2                    BMI Categories: Obese Class 1 (BMI 30.0-34.9)    Estimated Daily Nutrient Needs:  Energy Requirements Based On: Kcal/kg  Weight Used for Energy Requirements: Current  Energy (kcal/day): 6453-3963 kcal (15-18 kcal/kg)  Weight Used for Protein Requirements: Ideal  Protein (g/day): 76-88 gm of  protein (1.2-1.4 gm/kg)  Method Used for Fluid Requirements: 1 ml/kcal  Fluid (ml/day): 1640 mL    Nutrition Diagnosis:   Inadequate oral intake related to inadequate protein-energy intake as evidenced by intake 26-50%, intake 0-25%    Nutrition Interventions:   Food and/or Nutrient Delivery: Continue Current Diet, Continue Oral Nutrition Supplement  Nutrition Education/Counseling: Education not indicated  Coordination of Nutrition Care: Continue to monitor while inpatient       Goals:  Goals: PO intake 75% or greater          Nutrition Monitoring and Evaluation:      Food/Nutrient Intake Outcomes: Food and Nutrient Intake, Supplement Intake  Physical Signs/Symptoms Outcomes: Biochemical Data, Fluid Status or Edema, Skin, Weight    Discharge Planning:    Continue current diet, Continue Oral Nutrition Supplement     Rosey Arias RD  Contact: [phone]

## 2024-10-31 NOTE — PROGRESS NOTES
No urine output overnight, bladder scan showed 130 mLs. Notified on call NP via [employer], no new orders at this time.

## 2024-10-31 NOTE — PROGRESS NOTES
Physical Therapy  Facility/Department: [facility]  Rehabilitation Physical Therapy Treatment Note    NAME: Noel Valle  : 1955 (69 y.o.)  MRN: [id number]  CODE STATUS: Full Code    Date of Service: 10/31/24       Restrictions:  Restrictions/Precautions: General Precautions, Fall Risk, Seizure, Bed Alarm  Position Activity Restriction  Other position/activity restrictions: Up w/ assist, continuous pulse ox, RUE IV, external catheter, telemetry     SUBJECTIVE  Subjective  Subjective: pt supine in bed forward head posture agreeable to PT.   RN ok's PT          OBJECTIVE  Cognition  Overall Cognitive Status: Exceptions  Arousal/Alertness: Inconsistent responses to stimuli  Following Commands: Follows one step commands with increased time;Follows one step commands with repetition  Attention Span: Attends with cues to redirect  Memory: Impaired  Safety Judgement: Decreased awareness of need for safety;Decreased awareness of need for assistance  Problem Solving: Assistance required to identify errors made;Assistance required to correct errors made;Assistance required to implement solutions  Insights: Decreased awareness of deficits  Initiation: Requires cues for all  Sequencing: Requires cues for all  Cognition Comment: Pt. alert but displaying confusion with thoughts requiring constant cues to remain focused on task at hand.  Orientation  Overall Orientation Status: Within Functional Limits  Orientation Level: Oriented to person;Oriented to place;Oriented to time    Functional Mobility  Bed Mobility  Overall Assistance Level: Maximum Assistance;Dependent;Requires x 2 Assistance  Additional Factors: Set-up;Verbal cues;Increased time to complete;Head of bed flat;Head of bed raised  Bridging  Assistance Level: Moderate assistance;Requires x 2 assistance  Skilled Clinical Factors: Unable to complete  Roll Left  Assistance Level: Maximum assistance;Requires x 2 assistance  Skilled Clinical Factors: vc's for hand  incentive spirometer in order to improve activity tolerance  Short Term Goal 4: Patient will tolerate sitting EOB with UE support >20 minutes requiring SBA  Short Term Goal 5: Patient will be indep with HEP for B LE following handout  Short Term Goal 6: Patient will demonstrate STS using dave stedy safely with mod A  Short Term Goal 7: Patient will tolerate supported stand for >2minutes with mod A    PLAN OF CARE/SAFETY  Physical Therapy Plan  General Plan: 5-7 times per week  Current Treatment Recommendations: Strengthening;ROM;Patient/Caregiver education & training;Functional mobility training;Therapeutic activities;Safety education & training;Positioning;Balance training;Transfer training  Safety Devices  Type of Devices: All fall risk precautions in place;Patient at risk for falls;Call light within reach;Nurse notified;Heels elevated for pressure relief;Gait belt;Chair alarm in place;Left in chair    EDUCATION  Education  Education Given To: Patient;Family  Education Provided: Role of Therapy;Plan of Care;IADL Function;Precautions;Energy Conservation;Safety;Mobility Training;Home Exercise Program;ADL Function;Transfer Training  Education Provided Comments: attempted education on the importance of contrinued mobllity and movement to prevent secondary complications.    AM-PAC Score    AM-PAC Inpatient Mobility Raw Score : 12  AM-PAC Inpatient T-Scale Score : 35.33  Mobility Inpatient CMS 0-100% Score: 68.66  Mobility Inpatient CMS G-Code Modifier:CL        Therapy Time   cotx Concurrent Group Co-treatment   Time In 0954         Time Out 1020         Minutes 26                 Co-treatment with OT warranted secondary to decreased safety and independence requiring 2 skilled therapy professionals to address individual discipline's goals. PT addressing preparation for  Transfers, gait and pre gait activities,  sitting balance for increased  sitting/activity tolerance, functional mobility, environmental

## 2024-10-31 NOTE — PROGRESS NOTES
PROGRESS NOTE  [facility] Hospitalist        10/31/2024   5:15 PM    Name:  Noel Valle  MRN:    [id number]     Acct:     [id number]   Room:  [address] [place] [address]  IP Day: 22     Admit Date: 10/9/2024 10:05 AM  PCP: Ramakrishna Silver MD    C/C:   Chief Complaint   Patient presents with    Angioedema       Assessment/ Plan:          Acute Respiratory Insufficiency due to upper airway obstruction secondary to Angioedema  S/P extubation 10-13-24  Drowsy as he was extubated and sedative meds weaned off  On 3 L nasal canula  Solumedrol transitioned to Po prednisone, completed  Pulmonology was following.         Hypotension - resolved  Hypertension  Monitor BP   On norvasc and clonidine. Off coreg due to bradycardia   On prn Hydralazine.      HELENE  resolved     Tobacco abuse disorder/history of possible COPD  Sched DuoNebs.  Will  on tobacco cessation when patient is alert        Alcohol withdrawal  On protocol  Off Precedex  Continue to monitor at this time  On zyprexa, valproate and librium as needed  NPO except for meds with apple sauce initially.-->Diet advanced  CIWA protocol     Agitation secondary to alcohol withdrawal, metabolic disturbances, polypharmacy  Haldol prn  Consult Neuro appreciated  D/w RN  Prednisone may be contributing per neurology  Thiamine added per neuro  Okay to add Librium as needed per neuro  Neurochecks  EEG  Psych consult   DC Ativan, Geodon per Psych  Wean off Klonopin as outpatient  Zyprexa 10 hs  Depakote 250 tid     Subclinical hyperthyroidism     Constipation   Fleet enema  Colace prn     Hypernatremia  Free water flushes. Improved    UTI  For urine culture shows Enterococcus faecalis, pansensitive  Continue amoxicillin        DC planning- SNF  [facility] refused - does not meet criteria  Blood pressure on the lower side, added midodrine      DVT and GI prophylaxis  Continue to monitor/telemetry/CBC with differential daily/BMP daily  Continue medications as below    Scheduled

## 2024-10-31 NOTE — PLAN OF CARE
Problem: Discharge Planning  Goal: Discharge to home or other facility with appropriate resources  10/31/2024 1642 by Carolyn Lynch RN  Outcome: Progressing  10/31/2024 0447 by Emeli Keys RN  Outcome: Progressing  Flowsheets (Taken 10/30/2024 2000)  Discharge to home or other facility with appropriate resources:   Identify barriers to discharge with patient and caregiver   Arrange for needed discharge resources and transportation as appropriate   Identify discharge learning needs (meds, wound care, etc)   Arrange for interpreters to assist at discharge as needed   Refer to discharge planning if patient needs post-hospital services based on physician order or complex needs related to functional status, cognitive ability or social support system     Problem: Respiratory - Adult  Goal: Achieves optimal ventilation and oxygenation  10/31/2024 1642 by Carolyn Lynch RN  Outcome: Progressing  10/31/2024 0447 by Emeli Keys, RN  Outcome: Progressing  Flowsheets (Taken 10/30/2024 2000)  Achieves optimal ventilation and oxygenation:   Assess for changes in mentation and behavior   Assess for changes in respiratory status   Position to facilitate oxygenation and minimize respiratory effort   Oxygen supplementation based on oxygen saturation or arterial blood gases   Initiate smoking cessation protocol as indicated   Assess and instruct to report shortness of breath or any respiratory difficulty   Encourage broncho-pulmonary hygiene including cough, deep breathe, incentive spirometry   Assess the need for suctioning and aspirate as needed   Respiratory therapy support as indicated     Problem: Pain  Goal: Verbalizes/displays adequate comfort level or baseline comfort level  10/31/2024 1642 by Carolyn Lynch RN  Outcome: Progressing  10/31/2024 0447 by Emeli Keys RN  Outcome: Progressing     Problem: Skin/Tissue Integrity  Goal: Absence of new skin breakdown  Description: 1.  Monitor for areas of

## 2024-10-31 NOTE — PROGRESS NOTES
Spiritual Health History and Assessment/Progress Note  [facility]    (P) Spiritual/Emotional Needs,  ,  ,      Name: Noel Valle MRN: [id number]    Age: 69 y.o.     Sex: male   Language: English   Christian: New Religionist   Angioedema of tongue     Date: 10/31/2024            Total Time Calculated: (P) 12 min              Spiritual Assessment continued in [facility] ICU        Referral/Consult From: (P) Rounding   Encounter Overview/Reason: (P) Spiritual/Emotional Needs  Service Provided For: (P) Patient    Patient values  support and encouragement.    Ka[place], Belief, Meaning:   Patient has beliefs or practices that help with coping during difficult times  Family/Friends No family/friends present      Importance and Influence:  Patient has spiritual/personal beliefs that influence decisions regarding their health  Family/Friends No family/friends present    Community:  Patient feels well-supported. Support system includes: Other: family members  Family/Friends No family/friends present    Assessment and Plan of Care:     Patient Interventions include: Facilitated expression of thoughts and feelings, Explored spiritual coping/struggle/distress, Affirmed coping skills/support systems, and Facilitated life review and/ or legacy  Family/Friends Interventions include: No family/friends present    Patient Plan of Care: Spiritual Care available upon further referral  Family/Friends Plan of Care: Spiritual Care available upon further referral    Electronically signed by Chaplain Nighat on 10/31/2024 at 1:58 PM

## 2024-10-31 NOTE — PROGRESS NOTES
Occupational Therapy  Facility/Department: [facility] ICU  Rehabilitation Occupational Therapy Daily Treatment Note    Date: 10/31/24  Patient Name: Noel Valle       Room: 1114/1114-01  MRN: [id number]  Account: [id number]   : 1955  (69 y.o.) Gender: male   Past Medical History:  has no past medical history on file.  Past Surgical History:   has no past surgical history on file.    Restrictions  Restrictions/Precautions: General Precautions, Fall Risk, Seizure, Bed Alarm  Other position/activity restrictions: Up w/ assist, continuous pulse ox, RUE IV, Hill catheter, telemetry  Required Braces or Orthoses?: No    Subjective  Subjective: Pt. supine in bed and willing to participate in treatment.  Restrictions/Precautions: General Precautions;Fall Risk;Seizure;Bed Alarm  Objective     Cognition  Overall Cognitive Status: Exceptions  Arousal/Alertness: Inconsistent responses to stimuli  Following Commands: Follows one step commands with increased time;Follows one step commands with repetition  Attention Span: Attends with cues to redirect  Memory: Impaired  Safety Judgement: Decreased awareness of need for safety;Decreased awareness of need for assistance  Problem Solving: Assistance required to identify errors made;Assistance required to correct errors made;Assistance required to implement solutions  Insights: Decreased awareness of deficits  Initiation: Requires cues for all  Sequencing: Requires cues for all  Cognition Comment: Pt. alert but displaying confusion with thoughts requiring constant cues to remain focused on task at hand.  Orientation  Overall Orientation Status: Within Functional Limits  Orientation Level: Oriented to person;Oriented to place;Oriented to time         ADL  Putting On/Taking Off Footwear  Assistance Level: Dependent  Toileting  Assistance Level: Dependent  Skilled Clinical Factors: external cath in place and no other needs     Functional Mobility  Activity:  (ADL  from continued therapy after discharge  Safety Devices  Safety Devices in place: Yes  Type of devices: All fall risk precautions in place;Call light within reach;Chair alarm in place;Gait belt;Nurse notified;Left in chair  Restraints  Initially in place: No    Patient Education  Education  Education Given To: Patient  Education Provided: Role of Therapy;Cognition;Mobility Training;Safety;Home Exercise Program  Education Provided Comments: Pt. educated on safety awareness for each movement and orientation to use of dave stedy and rita lift for safety.  Education Method: Demonstration;Verbal  Barriers to Learning: Cognition  Education Outcome: Continued education needed    Plan  Occupational Therapy Plan  Times Per Week: 5x/wk, 1x/day  Current Treatment Recommendations: Strengthening;ROM;Functional mobility training;Endurance training;Safety education & training;Patient/Caregiver education & training;Equipment evaluation, education, & procurement;Positioning;Self-Care / ADL;Home management training;Co-Treatment;Pain management;Cognitive reorientation;Balance training  Additional Comments: Cont with stated POC    Goals  Patient Goals   Patient goals : No goals stated.  Short Term Goals  Time Frame for Short Term Goals: By discharge, Pt will  Short Term Goal 1: Pt/caregiver demo & verb good understanding of education provided on ADL techniques, bed mobility and functional mobility techniques, EC/WS, and d/c recommendations.  Short Term Goal 2: Pt will demo bed mobility with mod Ax1 to increase indep with ADLs.  Short Term Goal 3: Pt will demo grooming/UB ADLs with SBA to increase indep with ADLs.  Short Term Goal 4: Pt will participate in BUE HEP with min A to increase strenght and endurance for ADLs.  Short Term Goal 5: Pt to demo ADL transfers to ModAx2 with Min cues for safety and use of AD/appropriate safety device as needed.  Additional Goals?: No  Long Term Goals  Long Term Goal 1: Pt will tolerate reassessment for

## 2024-11-01 PROCEDURE — 6360000002 HC RX W HCPCS

## 2024-11-01 PROCEDURE — 2060000000 HC ICU INTERMEDIATE R&B

## 2024-11-01 PROCEDURE — 97535 SELF CARE MNGMENT TRAINING: CPT

## 2024-11-01 PROCEDURE — 2580000003 HC RX 258: Performed by: FAMILY MEDICINE

## 2024-11-01 PROCEDURE — 6370000000 HC RX 637 (ALT 250 FOR IP): Performed by: FAMILY MEDICINE

## 2024-11-01 PROCEDURE — 97112 NEUROMUSCULAR REEDUCATION: CPT

## 2024-11-01 PROCEDURE — 97530 THERAPEUTIC ACTIVITIES: CPT

## 2024-11-01 PROCEDURE — 97110 THERAPEUTIC EXERCISES: CPT

## 2024-11-01 PROCEDURE — 51701 INSERT BLADDER CATHETER: CPT

## 2024-11-01 PROCEDURE — 94761 N-INVAS EAR/PLS OXIMETRY MLT: CPT

## 2024-11-01 PROCEDURE — 6370000000 HC RX 637 (ALT 250 FOR IP): Performed by: INTERNAL MEDICINE

## 2024-11-01 PROCEDURE — 2580000003 HC RX 258: Performed by: NURSE PRACTITIONER

## 2024-11-01 PROCEDURE — 51798 US URINE CAPACITY MEASURE: CPT

## 2024-11-01 PROCEDURE — 6360000002 HC RX W HCPCS: Performed by: FAMILY MEDICINE

## 2024-11-01 PROCEDURE — 2500000003 HC RX 250 WO HCPCS: Performed by: FAMILY MEDICINE

## 2024-11-01 PROCEDURE — 6370000000 HC RX 637 (ALT 250 FOR IP): Performed by: HOSPITALIST

## 2024-11-01 RX ADMIN — ATORVASTATIN CALCIUM 40 MG: 40 TABLET, FILM COATED ORAL at 08:40

## 2024-11-01 RX ADMIN — FAMOTIDINE 20 MG: 20 TABLET, FILM COATED ORAL at 19:39

## 2024-11-01 RX ADMIN — HYDROCODONE BITARTRATE AND ACETAMINOPHEN 1 TABLET: 5; 325 TABLET ORAL at 19:52

## 2024-11-01 RX ADMIN — ANTI-FUNGAL POWDER MICONAZOLE NITRATE TALC FREE: 1.42 POWDER TOPICAL at 08:43

## 2024-11-01 RX ADMIN — SODIUM CHLORIDE, PRESERVATIVE FREE 10 ML: 5 INJECTION INTRAVENOUS at 19:41

## 2024-11-01 RX ADMIN — ENOXAPARIN SODIUM 40 MG: 100 INJECTION SUBCUTANEOUS at 08:41

## 2024-11-01 RX ADMIN — Medication 100 MG: at 08:41

## 2024-11-01 RX ADMIN — OLANZAPINE 10 MG: 5 TABLET, FILM COATED ORAL at 19:39

## 2024-11-01 RX ADMIN — AMLODIPINE BESYLATE 10 MG: 10 TABLET ORAL at 08:40

## 2024-11-01 RX ADMIN — ANTI-FUNGAL POWDER MICONAZOLE NITRATE TALC FREE: 1.42 POWDER TOPICAL at 19:45

## 2024-11-01 RX ADMIN — CLONAZEPAM 0.25 MG: 0.5 TABLET ORAL at 19:39

## 2024-11-01 RX ADMIN — METOPROLOL TARTRATE 5 MG: 5 INJECTION INTRAVENOUS at 11:00

## 2024-11-01 RX ADMIN — HYDROCORTISONE: 1 CREAM TOPICAL at 19:41

## 2024-11-01 RX ADMIN — HYDROCODONE BITARTRATE AND ACETAMINOPHEN 1 TABLET: 5; 325 TABLET ORAL at 11:11

## 2024-11-01 RX ADMIN — SODIUM CHLORIDE, PRESERVATIVE FREE 10 ML: 5 INJECTION INTRAVENOUS at 08:43

## 2024-11-01 RX ADMIN — HYDROCORTISONE: 1 CREAM TOPICAL at 08:42

## 2024-11-01 RX ADMIN — FAMOTIDINE 20 MG: 20 TABLET, FILM COATED ORAL at 08:40

## 2024-11-01 RX ADMIN — VALPROIC ACID 250 MG: 250 CAPSULE, LIQUID FILLED ORAL at 19:38

## 2024-11-01 RX ADMIN — MULTIVITAMIN TABLET 1 TABLET: TABLET at 08:40

## 2024-11-01 RX ADMIN — VALPROIC ACID 250 MG: 250 CAPSULE, LIQUID FILLED ORAL at 14:13

## 2024-11-01 RX ADMIN — TAMSULOSIN HYDROCHLORIDE 0.4 MG: 0.4 CAPSULE ORAL at 08:40

## 2024-11-01 RX ADMIN — CLONAZEPAM 0.25 MG: 0.5 TABLET ORAL at 08:41

## 2024-11-01 RX ADMIN — OLANZAPINE 2.5 MG: 10 INJECTION, POWDER, FOR SOLUTION INTRAMUSCULAR at 06:38

## 2024-11-01 RX ADMIN — FOLIC ACID 1 MG: 1 TABLET ORAL at 08:40

## 2024-11-01 RX ADMIN — VALPROIC ACID 250 MG: 250 CAPSULE, LIQUID FILLED ORAL at 08:40

## 2024-11-01 ASSESSMENT — PAIN DESCRIPTION - ONSET: ONSET: GRADUAL

## 2024-11-01 ASSESSMENT — PAIN DESCRIPTION - ORIENTATION: ORIENTATION: MID

## 2024-11-01 ASSESSMENT — PAIN DESCRIPTION - LOCATION
LOCATION: LEG
LOCATION: BACK

## 2024-11-01 ASSESSMENT — PAIN DESCRIPTION - FREQUENCY: FREQUENCY: INTERMITTENT

## 2024-11-01 ASSESSMENT — PAIN SCALES - GENERAL
PAINLEVEL_OUTOF10: 9
PAINLEVEL_OUTOF10: 3
PAINLEVEL_OUTOF10: 0
PAINLEVEL_OUTOF10: 0
PAINLEVEL_OUTOF10: 6

## 2024-11-01 ASSESSMENT — PAIN DESCRIPTION - DESCRIPTORS
DESCRIPTORS: ACHING
DESCRIPTORS: ACHING;DISCOMFORT

## 2024-11-01 ASSESSMENT — PAIN - FUNCTIONAL ASSESSMENT
PAIN_FUNCTIONAL_ASSESSMENT: ACTIVITIES ARE NOT PREVENTED
PAIN_FUNCTIONAL_ASSESSMENT: PREVENTS OR INTERFERES WITH MANY ACTIVE NOT PASSIVE ACTIVITIES

## 2024-11-01 ASSESSMENT — PAIN DESCRIPTION - PAIN TYPE: TYPE: CHRONIC PAIN

## 2024-11-01 NOTE — FLOWSHEET NOTE
11/01/24 0500   Treatment Team Notification   Reason for Communication Evaluate  (Pt has not urinated for shift.)   Name of Team Member Notified CRISTHIAN Hollingsworth   Treatment Team Role Advanced Practice Nurse   Method of Communication Secure Message   Response Waiting for response   Notification Time 0515     Order to straight cath patient placed.     Requested Zyprexa from pharmacy to help with behavior for procedure.     0640 Zyprexa given.

## 2024-11-01 NOTE — PLAN OF CARE
Problem: Discharge Planning  Goal: Discharge to home or other facility with appropriate resources  11/1/2024 1604 by Lesly Dominguez, RN  Outcome: Progressing  Flowsheets (Taken 11/1/2024 0800)  Discharge to home or other facility with appropriate resources: Identify barriers to discharge with patient and caregiver  11/1/2024 0718 by Grace Adkins RN  Outcome: Progressing  Flowsheets (Taken 10/30/2024 2000 by Emeli Keys, RN)  Discharge to home or other facility with appropriate resources:   Identify barriers to discharge with patient and caregiver   Arrange for needed discharge resources and transportation as appropriate   Identify discharge learning needs (meds, wound care, etc)   Arrange for interpreters to assist at discharge as needed   Refer to discharge planning if patient needs post-hospital services based on physician order or complex needs related to functional status, cognitive ability or social support system     Problem: Respiratory - Adult  Goal: Achieves optimal ventilation and oxygenation  11/1/2024 1604 by Lesly Dominguez RN  Outcome: Progressing  Flowsheets (Taken 11/1/2024 0800)  Achieves optimal ventilation and oxygenation:   Assess for changes in respiratory status   Assess for changes in mentation and behavior  11/1/2024 0718 by Grace Adkins, RN  Outcome: Progressing  Flowsheets (Taken 10/30/2024 2000 by Emeli Keys, RN)  Achieves optimal ventilation and oxygenation:   Assess for changes in mentation and behavior   Assess for changes in respiratory status   Position to facilitate oxygenation and minimize respiratory effort   Oxygen supplementation based on oxygen saturation or arterial blood gases   Initiate smoking cessation protocol as indicated   Assess and instruct to report shortness of breath or any respiratory difficulty   Encourage broncho-pulmonary hygiene including cough, deep breathe, incentive spirometry   Assess the need for suctioning and aspirate as  needed   Respiratory therapy support as indicated     Problem: Pain  Goal: Verbalizes/displays adequate comfort level or baseline comfort level  Outcome: Progressing     Problem: Skin/Tissue Integrity  Goal: Absence of new skin breakdown  Description: 1.  Monitor for areas of redness and/or skin breakdown  2.  Assess vascular access sites hourly  3.  Every 4-6 hours minimum:  Change oxygen saturation probe site  4.  Every 4-6 hours:  If on nasal continuous positive airway pressure, respiratory therapy assess nares and determine need for appliance change or resting period.  Outcome: Progressing     Problem: Safety - Adult  Goal: Free from fall injury  Outcome: Progressing  Flowsheets (Taken 11/1/2024 1603)  Free From Fall Injury: Instruct family/caregiver on patient safety     Problem: Neurosensory - Adult  Goal: Achieves stable or improved neurological status  11/1/2024 1604 by Lesly Dominguez, RN  Outcome: Progressing  Flowsheets (Taken 11/1/2024 0800)  Achieves stable or improved neurological status: Assess for and report changes in neurological status  11/1/2024 0718 by Grace Adkins, RN  Outcome: Progressing  Flowsheets (Taken 10/29/2024 0800 by Lesly Collier, RN)  Achieves stable or improved neurological status: Assess for and report changes in neurological status     Problem: Cardiovascular - Adult  Goal: Maintains optimal cardiac output and hemodynamic stability  Outcome: Progressing  Flowsheets (Taken 11/1/2024 0800)  Maintains optimal cardiac output and hemodynamic stability:   Monitor blood pressure and heart rate   Monitor urine output and notify Licensed Independent Practitioner for values outside of normal range     Problem: Skin/Tissue Integrity - Adult  Goal: Skin integrity remains intact  Outcome: Progressing  Flowsheets  Taken 11/1/2024 1603  Skin Integrity Remains Intact: Assess vascular access sites hourly  Taken 11/1/2024 0800  Skin Integrity Remains Intact: Assess vascular access

## 2024-11-01 NOTE — PROGRESS NOTES
PROGRESS NOTE  [facility] Hospitalist        11/1/2024   4:50 PM    Name:  Noel Valle  MRN:    [id number]     Acct:     [id number]   Room:  [address] [place] [address]  IP Day: 23     Admit Date: 10/9/2024 10:05 AM  PCP: Ramakrishna Silver MD    C/C:   Chief Complaint   Patient presents with    Angioedema       Assessment/ Plan:          Acute Respiratory Insufficiency due to upper airway obstruction secondary to Angioedema  S/P extubation 10-13-24  Drowsy as he was extubated and sedative meds weaned off  On 3 L nasal canula  Solumedrol transitioned to Po prednisone, completed  Pulmonology was following.         Hypotension - resolved  Hypertension  Monitor BP   On norvasc and clonidine. Off coreg due to bradycardia   On prn Hydralazine.      HELENE  resolved     Tobacco abuse disorder/history of possible COPD  Sched DuoNebs.  Will  on tobacco cessation when patient is alert        Alcohol withdrawal  On protocol  Off Precedex  Continue to monitor at this time  On zyprexa, valproate and librium as needed  NPO except for meds with apple sauce initially.-->Diet advanced  CIWA protocol     Agitation secondary to alcohol withdrawal, metabolic disturbances, polypharmacy  Haldol prn  Consult Neuro appreciated  D/w RN  Prednisone may be contributing per neurology  Thiamine added per neuro  Okay to add Librium as needed per neuro  Neurochecks  EEG  Psych consult   DC Ativan, Geodon per Psych  Wean off Klonopin as outpatient  Zyprexa 10 hs  Depakote 250 tid     Subclinical hyperthyroidism     Constipation   Fleet enema  Colace prn     Hypernatremia  Free water flushes. Improved    UTI  For urine culture shows Enterococcus faecalis, pansensitive  Continue amoxicillin        DC planning- SNF  [facility] refused - does not meet criteria  Blood pressure on the lower side, added midodrine    Overall patient is feeling the same, awaiting for disposition      DVT and GI prophylaxis  Continue to monitor/telemetry/CBC with  1 mg, 1 mg, IntraVENous, Q2H PRN, Carlos Gamboa MD, 1 mg at 10/24/24 2015    miconazole (MICOTIN) 2 % powder, , Topical, BID, Mitch Hollingsworth APRN - CNP, Given at 11/01/24 0843    valproic acid (DEPAKENE) capsule 250 mg, 250 mg, Oral, TID, Carlos Gamboa MD, 250 mg at 11/01/24 1413    folic acid (FOLVITE) tablet 1 mg, 1 mg, Oral, Daily, Theresa Uribe MD, 1 mg at 11/01/24 0840    thiamine mononitrate tablet 100 mg, 100 mg, Oral, Daily, Theresa Uribe MD, 100 mg at 11/01/24 0841    famotidine (PEPCID) tablet 20 mg, 20 mg, Oral, BID, Theresa Uribe MD, 20 mg at 11/01/24 0840    loperamide (IMODIUM) capsule 2 mg, 2 mg, Oral, 4x Daily PRN, Lump, Saundra A, APRN - CNP, 2 mg at 10/19/24 0230    ipratropium 0.5 mg-albuterol 2.5 mg (DUONEB) nebulizer solution 1 Dose, 1 Dose, Inhalation, Q4H PRN, Carlos Gamboa MD    hydrALAZINE (APRESOLINE) injection 10 mg, 10 mg, IntraVENous, Q6H PRN, Carlos Gamboa MD, 10 mg at 10/17/24 2309    labetalol (NORMODYNE;TRANDATE) injection 10 mg, 10 mg, IntraVENous, Once, Lump, Saundra A, APRN - CNP    amLODIPine (NORVASC) tablet 10 mg, 10 mg, Oral, Daily, 10 mg at 11/01/24 0840 **AND** atorvastatin (LIPITOR) tablet 40 mg, 40 mg, Oral, Daily, Viktor Hernandez MD, 40 mg at 11/01/24 0840    metoprolol (LOPRESSOR) injection 5 mg, 5 mg, IntraVENous, Q6H PRN, Reza, Mohsin Mohammad, MD, 5 mg at 11/01/24 1100    sodium chloride flush 0.9 % injection 5-40 mL, 5-40 mL, IntraVENous, 2 times per day, Lump, Saundra A, APRN - CNP, 10 mL at 11/01/24 0843    sodium chloride flush 0.9 % injection 5-40 mL, 5-40 mL, IntraVENous, PRN, Lump, Saundra A, APRN - CNP    0.9 % sodium chloride infusion, , IntraVENous, PRN, Lump, Saundra A, APRN - CNP    albuterol (PROVENTIL) (2.5 MG/3ML) 0.083% nebulizer solution 2.5 mg, 2.5 mg, Nebulization, Q6H PRN, Carlos Gamboa MD, 2.5 mg at 10/12/24 0826    sodium chloride flush 0.9 % injection 5-40 mL, 5-40 mL, IntraVENous, 2 times per day, Reza, Mohsin Mohammad, MD, 10

## 2024-11-01 NOTE — PLAN OF CARE
Problem: Discharge Planning  Goal: Discharge to home or other facility with appropriate resources  Outcome: Progressing  Discharge to home or other facility with appropriate resources:   Identify barriers to discharge with patient and caregiver   Arrange for needed discharge resources and transportation as appropriate   Identify discharge learning needs (meds, wound care, etc)   Arrange for interpreters to assist at discharge as needed   Refer to discharge planning if patient needs post-hospital services based on physician order or complex needs related to functional status, cognitive ability or social support system      Problem: Respiratory - Adult  Goal: Achieves optimal ventilation and oxygenation  Outcome: Progressing  Achieves optimal ventilation and oxygenation:   Assess for changes in mentation and behavior   Assess for changes in respiratory status   Position to facilitate oxygenation and minimize respiratory effort   Oxygen supplementation based on oxygen saturation or arterial blood gases   Initiate smoking cessation protocol as indicated   Assess and instruct to report shortness of breath or any respiratory difficulty   Encourage broncho-pulmonary hygiene including cough, deep breathe, incentive spirometry   Assess the need for suctioning and aspirate as needed   Respiratory therapy support as indicated      Problem: Neurosensory - Adult  Goal: Achieves stable or improved neurological status  Outcome: Progressing  Achieves stable or improved neurological status: Assess for and report changes in neurological status

## 2024-11-01 NOTE — PROGRESS NOTES
Physical Therapy  Facility/Department: [facility] ICU  Daily Treatment Note  NAME: Noel Valle  : 1955  MRN: [id number]    Date of Service: 2024    Discharge Recommendations:  Patient would benefit from continued therapy after discharge    Pt currently functioning below baseline.  Recommend daily inpatient skilled therapy at time of discharge to maximize long term outcomes and prevent re-admission. Please refer to AM-PAC score for current level of function.     Patient Diagnosis(es): The primary encounter diagnosis was Angioedema, initial encounter. A diagnosis of Seizure-like activity (HCC) was also pertinent to this visit.    Assessment  Assessment: Patient rolled LT<>RT x 2 reps for replaced of maxi rita lift sling and sheets d/t soiled liens, then trasnferred to recliner. Tolerated multiple attempts for STS in dave stedy from recliner but unable to fully stable upright. Then focused on seated activity while engaging core stability/anterior pelvic tilt. Patient progressing toward STGs but is significantly below his baseline and would benefit from continued skilled PT services.  Activity Tolerance: Patient limited by fatigue;Patient limited by endurance    Plan  Physical Therapy Plan  General Plan: 5-7 times per week  Current Treatment Recommendations: Strengthening;ROM;Patient/Caregiver education & training;Functional mobility training;Therapeutic activities;Safety education & training;Positioning;Balance training;Transfer training    Restrictions  Restrictions/Precautions  Restrictions/Precautions: General Precautions, Fall Risk, Seizure, Bed Alarm  Required Braces or Orthoses?: No  Position Activity Restriction  Other position/activity restrictions: Up w/ assist, continuous pulse ox, RUE IV, external catheter, telemetry     Subjective   Subjective  Subjective: Patient in bed resting upon arrival. He reported having a bad night, adknowlegdes having more hallucinations and nightmares. Having a hard time  for >2minutes with mod A  Patient Goals   Patient Goals : Return to PLOF    Education  Patient Education  Education Given To: Patient  Education Provided: Role of Therapy;Energy Conservation;Transfer Training;Fall Prevention Strategies  Education Provided Comments: Pt educated on purpose of acute PT treatment, importance of continued mobility throughout admission, safety awareness,safe transfers with dave pratt, circulation ex's, seated HEP, pressure relief, breathing techniques,prevention of sedentary/secondary complications,incentive spirometer usage ,and PT POC. Pt demonstrated GOOD carryover  Pt requires continued reinforcement of education.  Education Method: Verbal;Demonstration  Barriers to Learning: Cognition  Education Outcome: Verbalized understanding;Continued education needed    AM-PAC - Mobility    AM-PAC Basic Mobility - Inpatient   How much help is needed turning from your back to your side while in a flat bed without using bedrails?: A Lot  How much help is needed moving from lying on your back to sitting on the side of a flat bed without using bedrails?: A Lot  How much help is needed moving to and from a bed to a chair?: Total  How much help is needed standing up from a chair using your arms?: Total  How much help is needed walking in hospital room?: Total  How much help is needed climbing 3-5 steps with a railing?: Total  AM-PAC Inpatient Mobility Raw Score : 8  AM-PAC Inpatient T-Scale Score : 28.52  Mobility Inpatient CMS 0-100% Score: 86.62  Mobility Inpatient CMS G-Code Modifier : CM         Therapy Time   Individual Concurrent Group Co-treatment   Time In       0856   Time Out       0951   Minutes       55       Co-treatment with OT warranted secondary to decreased safety and independence requiring 2 skilled therapy professionals to address individual discipline's goals. PT addressing pre gait trunk strengthening, weight shifting prior to transfers, transfer training, and postural control in

## 2024-11-01 NOTE — PROGRESS NOTES
Pulmonary Critical Care Progress Note    Patient seen for the follow up of Angioedema of tongue     Subjective:    He is more awake alert off Precedex he is calm today cooperating.  Sister is at bedside..  He is off oxygen.  He is able to tolerate oral intake.  He still getting significant scary visual hallucinations less than before    Examination:    Vitals: BP (!) 114/95   Pulse 79   Temp 98.1 °F (36.7 °C)   Resp 22   Ht 1.66 m (5' 5.35\")   Wt 91.1 kg (200 lb 13.4 oz)   SpO2 96%   BMI 33.06 kg/m²   SpO2  Av %  Min: 92 %  Max: 96 %  General appearance: Awake alert no distress significant seborrhea over face  Neck: No JVD  Lungs: Decreased breath sound no crackles or wheeze  Heart: regular rate and rhythm, S1, S2 normal, no gallop  Abdomen: Soft, non tender, + BS  Extremities: no cyanosis or clubbing. No significant edema    LABs:    CBC:   Recent Labs     10/30/24  0404   WBC 7.5   HGB 13.0   HCT 40.1*        BMP:   Recent Labs     10/30/24  0404      K 3.9   CO2 30   BUN 9   CREATININE 0.6*   LABGLOM >90   GLUCOSE 107*      Latest Reference Range & Units 10/14/24 06:00 10/14/24 12:31   POC TCO2 22 - 30 mmol/L  27   POC HCO3 21.0 - 28.0 mmol/L 28.1 (H) 26.6   POC O2 SAT 94.0 - 98.0 % 95.6 95.8   POC pCO2 35.0 - 48.0 mm Hg 43.2 42.0   POC pH 7.350 - 7.450  7.421 7.410   POC PO2 83.0 - 108.0 mm Hg 78.4 (L) 79.8 (L)   (H): Data is abnormally high  (L): Data is abnormally low      Radiology:  Chest x-ray 10/20  Poor inspiratory effort.       Chest x-ray 10/17 reviewed  1. Probable mild atelectatic changes at the base of the left lung.  2. Small left pleural effusion versus pleural thickening.  3. No other acute cardiopulmonary process.      Chest x-ray 10/16  Reviewed  Small left effusion with atelectasis.            Impression/recommendations:    Acute respiratory insufficiency due to upper airway instruction/angioedema with significant swelling upper airway/atelectasis with small

## 2024-11-01 NOTE — PROGRESS NOTES
Pt verbally threatening staff and throwing items at writer and others. All objects removed from bed side. Pt continues to have hallucinations with these episodes. Heart rate was 140s /145 writer attempted to given PRN lopressor but pt refused to cooperate and and was trying to hide arm with IV. Removed telemetry at this time. Charge nurse and house supervior notified.     2224- Behavior improved since charge nurse Rolando spoke with patient. New vitals take, heart rate still in 120s. /61.

## 2024-11-01 NOTE — PROGRESS NOTES
..End Of Shift Note  [facility]  Summary of shift: Patient was up to chair twice to day, tolerated well. Patient ate small bites of breakfast and lunch and a moderate amount of dinner. Patient did not void bladder scanned had 536 in bladder did straight cath patient got 450 out with about 86 in residual. Had to reorient patient several times when having hallucinations. Patient sister did visit working on getting patient into extended care facility.     Vitals:    Vitals:    11/01/24 1141 11/01/24 1200 11/01/24 1512 11/01/24 1600   BP:  (!) 114/95  100/80   Pulse:  79  (!) 114   Resp: 19 22 19   Temp:  98.3 °F (36.8 °C) 98.1 °F (36.7 °C) 98.2 °F (36.8 °C)   TempSrc:  Oral  Oral   SpO2:  96%  92%   Weight:       Height:            I&O:   Intake/Output Summary (Last 24 hours) at 11/1/2024 1911  Last data filed at 11/1/2024 1300  Gross per 24 hour   Intake --   Output 530 ml   Net -530 ml       Resp Status: Room Air     Ventilator Settings:  Vent Mode: CPAP/PS Resp Rate (Set): 18 bpm/Vt (Set, mL): 500 mL/ /FiO2 : 30 %    Critical Care IV infusions:   dexmedeTOMIDine (PRECEDEX) 1,000 mcg in sodium chloride 0.9 % 250 mL infusion      sodium chloride      sodium chloride          LDA:   Peripheral IV Right;Anterior Arm (Active)   Number of days:

## 2024-11-01 NOTE — PROGRESS NOTES
Occupational Therapy  Facility/Department: [facility] ICU  Rehabilitation Occupational Therapy Daily Treatment Note    Date: 24  Patient Name: Noel Valle       Room: 1114/1114-01  MRN: [id number]  Account: [id number]   : 1955  (69 y.o.) Gender: male   Past Medical History:  has no past medical history on file.  Past Surgical History:   has no past surgical history on file.    Restrictions  Restrictions/Precautions: General Precautions, Fall Risk, Seizure, Bed Alarm  Other position/activity restrictions: Up w/ assist, continuous pulse ox, RUE IV, external catheter, telemetry  Required Braces or Orthoses?: No    Subjective  Subjective: Pt. supine in bed and willing to participate in treatment.  Restrictions/Precautions: General Precautions;Fall Risk;Seizure;Bed Alarm   Objective     Cognition  Overall Cognitive Status: Exceptions  Arousal/Alertness: Inconsistent responses to stimuli  Following Commands: Follows one step commands with increased time;Follows one step commands with repetition  Attention Span: Attends with cues to redirect  Memory: Impaired  Safety Judgement: Decreased awareness of need for safety;Decreased awareness of need for assistance  Problem Solving: Assistance required to identify errors made;Assistance required to correct errors made;Assistance required to implement solutions  Insights: Decreased awareness of deficits  Initiation: Requires cues for all  Sequencing: Requires cues for all  Cognition Comment: Pt. alert but displaying confusion with thoughts requiring constant cues to remain focused on task at hand.  Orientation  Overall Orientation Status: Impaired  Orientation Level: Oriented to person (Pt. displaying increased confusion with hallucinations today)         ADL  Feeding  Equipment Provided: Feeding utensils  Assistance Level: Stand by assist  Skilled Clinical Factors: Built up handle placed on fork. Pt. able to carry fork to mouth while sitting uright in bedside recliner.  Training;Safety;Home Exercise Program  Education Provided Comments: Pt. educated on safety awareness for each movement and orientation to use of dave stedy and rita lift for safety.  Education Method: Demonstration;Verbal  Barriers to Learning: Cognition  Education Outcome: Continued education needed    Plan  Occupational Therapy Plan  Times Per Week: 5x/wk, 1x/day  Specific Instructions for Next Treatment: Reassess for supine>sit as tolerating if pt is alert and following commands.  Current Treatment Recommendations: Strengthening;ROM;Functional mobility training;Endurance training;Safety education & training;Patient/Caregiver education & training;Equipment evaluation, education, & procurement;Positioning;Self-Care / ADL;Home management training;Co-Treatment;Pain management;Cognitive reorientation;Balance training  Additional Comments: Cont with stated POC    Goals  Patient Goals   Patient goals : No goals stated.  Short Term Goals  Time Frame for Short Term Goals: By discharge, Pt will  Short Term Goal 1: Pt/caregiver demo & verb good understanding of education provided on ADL techniques, bed mobility and functional mobility techniques, EC/WS, and d/c recommendations.  Short Term Goal 2: Pt will demo bed mobility with mod Ax1 to increase indep with ADLs.  Short Term Goal 3: Pt will demo grooming/UB ADLs with SBA to increase indep with ADLs.  Short Term Goal 4: Pt will participate in BUE HEP with min A to increase strenght and endurance for ADLs.  Short Term Goal 5: Pt to demo ADL transfers to ModAx2 with Min cues for safety and use of AD/appropriate safety device as needed.  Additional Goals?: No  Long Term Goals  Long Term Goal 1: Pt will tolerate reassessment for functional mobility OOB to update POC when appropriate.    AM-PAC Score        AM-[facility] Inpatient Daily Activity Raw Score: 9 (11/01/24 1006)  AM-PAC Inpatient ADL T-Scale Score : 25.33 (11/01/24 1006)  ADL Inpatient CMS 0-100% Score: 79.59 (11/01/24

## 2024-11-01 NOTE — CARE COORDINATION
Social work: Spoke to [facility], they are not able to accept.   Met with sister for additional choices, she would like referral to [facility].  Explained insurance may not cover.  If snf needed, she is agreeable to [facility].   SW submitted precert via [employer] for [facility].   [facility] can also accept as b/u plan.

## 2024-11-02 ENCOUNTER — APPOINTMENT (OUTPATIENT)
Dept: CT IMAGING | Age: 69
DRG: 915 | End: 2024-11-02
Payer: MEDICARE

## 2024-11-02 LAB — D DIMER PPP FEU-MCNC: 2.09 UG/ML FEU (ref 0–0.59)

## 2024-11-02 PROCEDURE — 51798 US URINE CAPACITY MEASURE: CPT

## 2024-11-02 PROCEDURE — 6360000004 HC RX CONTRAST MEDICATION: Performed by: INTERNAL MEDICINE

## 2024-11-02 PROCEDURE — 6370000000 HC RX 637 (ALT 250 FOR IP): Performed by: FAMILY MEDICINE

## 2024-11-02 PROCEDURE — 6370000000 HC RX 637 (ALT 250 FOR IP): Performed by: HOSPITALIST

## 2024-11-02 PROCEDURE — 6360000002 HC RX W HCPCS: Performed by: FAMILY MEDICINE

## 2024-11-02 PROCEDURE — 2580000003 HC RX 258: Performed by: NURSE PRACTITIONER

## 2024-11-02 PROCEDURE — 71260 CT THORAX DX C+: CPT

## 2024-11-02 PROCEDURE — 97110 THERAPEUTIC EXERCISES: CPT

## 2024-11-02 PROCEDURE — 6370000000 HC RX 637 (ALT 250 FOR IP): Performed by: INTERNAL MEDICINE

## 2024-11-02 PROCEDURE — 94761 N-INVAS EAR/PLS OXIMETRY MLT: CPT

## 2024-11-02 PROCEDURE — 97112 NEUROMUSCULAR REEDUCATION: CPT

## 2024-11-02 PROCEDURE — 2580000003 HC RX 258: Performed by: INTERNAL MEDICINE

## 2024-11-02 PROCEDURE — 36415 COLL VENOUS BLD VENIPUNCTURE: CPT

## 2024-11-02 PROCEDURE — 85379 FIBRIN DEGRADATION QUANT: CPT

## 2024-11-02 PROCEDURE — 2060000000 HC ICU INTERMEDIATE R&B

## 2024-11-02 PROCEDURE — 2580000003 HC RX 258: Performed by: FAMILY MEDICINE

## 2024-11-02 PROCEDURE — 97530 THERAPEUTIC ACTIVITIES: CPT

## 2024-11-02 RX ORDER — IOPAMIDOL 755 MG/ML
75 INJECTION, SOLUTION INTRAVASCULAR
Status: COMPLETED | OUTPATIENT
Start: 2024-11-02 | End: 2024-11-02

## 2024-11-02 RX ORDER — SODIUM CHLORIDE 0.9 % (FLUSH) 0.9 %
10 SYRINGE (ML) INJECTION PRN
Status: DISCONTINUED | OUTPATIENT
Start: 2024-11-02 | End: 2024-11-08 | Stop reason: HOSPADM

## 2024-11-02 RX ORDER — SODIUM CHLORIDE, SODIUM LACTATE, POTASSIUM CHLORIDE, AND CALCIUM CHLORIDE .6; .31; .03; .02 G/100ML; G/100ML; G/100ML; G/100ML
500 INJECTION, SOLUTION INTRAVENOUS ONCE
Status: COMPLETED | OUTPATIENT
Start: 2024-11-02 | End: 2024-11-02

## 2024-11-02 RX ORDER — 0.9 % SODIUM CHLORIDE 0.9 %
100 INTRAVENOUS SOLUTION INTRAVENOUS ONCE
Status: COMPLETED | OUTPATIENT
Start: 2024-11-02 | End: 2024-11-02

## 2024-11-02 RX ADMIN — CLONAZEPAM 0.25 MG: 0.5 TABLET ORAL at 19:41

## 2024-11-02 RX ADMIN — ATORVASTATIN CALCIUM 40 MG: 40 TABLET, FILM COATED ORAL at 08:52

## 2024-11-02 RX ADMIN — OLANZAPINE 10 MG: 5 TABLET, FILM COATED ORAL at 19:41

## 2024-11-02 RX ADMIN — VALPROIC ACID 250 MG: 250 CAPSULE, LIQUID FILLED ORAL at 14:12

## 2024-11-02 RX ADMIN — AMLODIPINE BESYLATE 10 MG: 10 TABLET ORAL at 08:52

## 2024-11-02 RX ADMIN — HYDROCORTISONE: 1 CREAM TOPICAL at 19:42

## 2024-11-02 RX ADMIN — HYDROCORTISONE: 1 CREAM TOPICAL at 08:50

## 2024-11-02 RX ADMIN — ENOXAPARIN SODIUM 40 MG: 100 INJECTION SUBCUTANEOUS at 08:52

## 2024-11-02 RX ADMIN — HYDROCODONE BITARTRATE AND ACETAMINOPHEN 1 TABLET: 5; 325 TABLET ORAL at 04:00

## 2024-11-02 RX ADMIN — IOPAMIDOL 75 ML: 755 INJECTION, SOLUTION INTRAVENOUS at 14:56

## 2024-11-02 RX ADMIN — CLONAZEPAM 0.25 MG: 0.5 TABLET ORAL at 08:51

## 2024-11-02 RX ADMIN — HYDROCODONE BITARTRATE AND ACETAMINOPHEN 1 TABLET: 5; 325 TABLET ORAL at 17:15

## 2024-11-02 RX ADMIN — SODIUM CHLORIDE, PRESERVATIVE FREE 10 ML: 5 INJECTION INTRAVENOUS at 14:56

## 2024-11-02 RX ADMIN — SODIUM CHLORIDE, PRESERVATIVE FREE 10 ML: 5 INJECTION INTRAVENOUS at 19:43

## 2024-11-02 RX ADMIN — FAMOTIDINE 20 MG: 20 TABLET, FILM COATED ORAL at 08:52

## 2024-11-02 RX ADMIN — SODIUM CHLORIDE, PRESERVATIVE FREE 10 ML: 5 INJECTION INTRAVENOUS at 08:53

## 2024-11-02 RX ADMIN — ANTI-FUNGAL POWDER MICONAZOLE NITRATE TALC FREE: 1.42 POWDER TOPICAL at 19:42

## 2024-11-02 RX ADMIN — ANTI-FUNGAL POWDER MICONAZOLE NITRATE TALC FREE: 1.42 POWDER TOPICAL at 08:50

## 2024-11-02 RX ADMIN — SODIUM CHLORIDE 100 ML: 9 INJECTION, SOLUTION INTRAVENOUS at 14:56

## 2024-11-02 RX ADMIN — SODIUM CHLORIDE, POTASSIUM CHLORIDE, SODIUM LACTATE AND CALCIUM CHLORIDE 500 ML: 600; 310; 30; 20 INJECTION, SOLUTION INTRAVENOUS at 11:45

## 2024-11-02 RX ADMIN — FAMOTIDINE 20 MG: 20 TABLET, FILM COATED ORAL at 19:42

## 2024-11-02 RX ADMIN — Medication 100 MG: at 08:52

## 2024-11-02 RX ADMIN — VALPROIC ACID 250 MG: 250 CAPSULE, LIQUID FILLED ORAL at 19:41

## 2024-11-02 RX ADMIN — MULTIVITAMIN TABLET 1 TABLET: TABLET at 08:52

## 2024-11-02 RX ADMIN — FOLIC ACID 1 MG: 1 TABLET ORAL at 08:52

## 2024-11-02 RX ADMIN — SODIUM CHLORIDE, PRESERVATIVE FREE 10 ML: 5 INJECTION INTRAVENOUS at 19:42

## 2024-11-02 RX ADMIN — VALPROIC ACID 250 MG: 250 CAPSULE, LIQUID FILLED ORAL at 08:52

## 2024-11-02 RX ADMIN — DOCUSATE SODIUM 100 MG: 100 CAPSULE, LIQUID FILLED ORAL at 10:36

## 2024-11-02 RX ADMIN — TAMSULOSIN HYDROCHLORIDE 0.4 MG: 0.4 CAPSULE ORAL at 08:52

## 2024-11-02 ASSESSMENT — PAIN SCALES - GENERAL
PAINLEVEL_OUTOF10: 9
PAINLEVEL_OUTOF10: 0
PAINLEVEL_OUTOF10: 6

## 2024-11-02 ASSESSMENT — PAIN DESCRIPTION - PAIN TYPE
TYPE: CHRONIC PAIN

## 2024-11-02 ASSESSMENT — PAIN DESCRIPTION - LOCATION
LOCATION: FOOT
LOCATION: LEG
LOCATION: FOOT

## 2024-11-02 ASSESSMENT — PAIN DESCRIPTION - ONSET
ONSET: GRADUAL

## 2024-11-02 ASSESSMENT — PAIN - FUNCTIONAL ASSESSMENT
PAIN_FUNCTIONAL_ASSESSMENT: PREVENTS OR INTERFERES SOME ACTIVE ACTIVITIES AND ADLS
PAIN_FUNCTIONAL_ASSESSMENT: ACTIVITIES ARE NOT PREVENTED
PAIN_FUNCTIONAL_ASSESSMENT: ACTIVITIES ARE NOT PREVENTED

## 2024-11-02 ASSESSMENT — PAIN DESCRIPTION - ORIENTATION
ORIENTATION: RIGHT;LEFT
ORIENTATION: RIGHT;LEFT

## 2024-11-02 ASSESSMENT — PAIN DESCRIPTION - FREQUENCY
FREQUENCY: INTERMITTENT

## 2024-11-02 ASSESSMENT — PAIN DESCRIPTION - DESCRIPTORS
DESCRIPTORS: ACHING;THROBBING
DESCRIPTORS: ACHING;THROBBING
DESCRIPTORS: ACHING

## 2024-11-02 ASSESSMENT — PAIN SCALES - WONG BAKER: WONGBAKER_NUMERICALRESPONSE: NO HURT

## 2024-11-02 NOTE — PROGRESS NOTES
Pulmonary Critical Care Progress Note    Patient seen for the follow up of Angioedema of tongue     Subjective:    He is tachycardic has borderline oxygen saturation drops to 85% on sitting up with physical therapy at bedside.  He is able to tolerate oral intake.  He still getting significant scary visual hallucinations less than before    Examination:    Vitals: /64   Pulse 90   Temp 97.6 °F (36.4 °C) (Oral)   Resp 17   Ht 1.66 m (5' 5.35\")   Wt 91.1 kg (200 lb 13.4 oz)   SpO2 (!) 88%   BMI 33.06 kg/m²   SpO2  Av %  Min: 88 %  Max: 96 %  General appearance: Awake alert no distress significant seborrhea over face  Neck: No JVD  Lungs: Decreased breath sound no crackles or wheeze  Heart: regular rate and rhythm, S1, S2 normal, no gallop  Abdomen: Soft, non tender, + BS  Extremities: no cyanosis or clubbing. No significant edema    LABs:    CBC:   No results for input(s): \"WBC\", \"HGB\", \"HCT\", \"PLT\" in the last 72 hours.    BMP:   No results for input(s): \"NA\", \"K\", \"CO2\", \"BUN\", \"CREATININE\", \"LABGLOM\", \"GLUCOSE\" in the last 72 hours.     Latest Reference Range & Units 10/14/24 06:00 10/14/24 12:31   POC TCO2 22 - 30 mmol/L  27   POC HCO3 21.0 - 28.0 mmol/L 28.1 (H) 26.6   POC O2 SAT 94.0 - 98.0 % 95.6 95.8   POC pCO2 35.0 - 48.0 mm Hg 43.2 42.0   POC pH 7.350 - 7.450  7.421 7.410   POC PO2 83.0 - 108.0 mm Hg 78.4 (L) 79.8 (L)   (H): Data is abnormally high  (L): Data is abnormally low      Radiology:  Chest x-ray 10/20  Poor inspiratory effort.       Chest x-ray 10/17 reviewed  1. Probable mild atelectatic changes at the base of the left lung.  2. Small left pleural effusion versus pleural thickening.  3. No other acute cardiopulmonary process.      Chest x-ray 10/16  Reviewed  Small left effusion with atelectasis.            Impression/recommendations:    Acute respiratory insufficiency due to upper airway instruction/angioedema with significant swelling upper airway/atelectasis with small

## 2024-11-02 NOTE — PROGRESS NOTES
Physical Therapy  Facility/Department: [facility] ICU  Daily Treatment Note  NAME: Noel Valle  : 1955  MRN: [id number]    Date of Service: 2024    Discharge Recommendations:  Patient would benefit from continued therapy after discharge    Pt currently functioning below baseline.  Recommend daily inpatient skilled therapy at time of discharge to maximize long term outcomes and prevent re-admission. Please refer to AM-PAC score for current level of function.     Patient Diagnosis(es): The primary encounter diagnosis was Angioedema, initial encounter. A diagnosis of Seizure-like activity (HCC) was also pertinent to this visit.    Assessment  Assessment: Patient not appropriate to for STS from EOB today d/t increased HR and desat of SpO2. Patient sat EOB about 25 mins. Worked on bed mobility technique prior to sitting up and after to be placed on bed pan. Patient is signifcantly below his baseline and would benefit from continued skilled PT services.  Activity Tolerance: Patient limited by fatigue;Patient limited by endurance;Treatment limited secondary to medical complications    Plan  Physical Therapy Plan  General Plan: 5-7 times per week  Specific Instructions for Next Treatment: Continue to work on core with bed mobility, unsupported seated activity/exercises and standing in dave stedy.  Current Treatment Recommendations: Strengthening;ROM;Patient/Caregiver education & training;Functional mobility training;Therapeutic activities;Safety education & training;Positioning;Balance training;Transfer training    Restrictions  Restrictions/Precautions  Restrictions/Precautions: General Precautions, Fall Risk, Seizure, Bed Alarm  Required Braces or Orthoses?: No  Position Activity Restriction  Other position/activity restrictions: Up w/ assist, continuous pulse ox, RUE IV, external catheter, telemetry     Subjective   Subjective  Subjective: Patient in bed resting upon arrival. He reported wanting to walk into the  your side while in a flat bed without using bedrails?: A Lot  How much help is needed moving from lying on your back to sitting on the side of a flat bed without using bedrails?: A Lot  How much help is needed moving to and from a bed to a chair?: Total  How much help is needed standing up from a chair using your arms?: Total  How much help is needed walking in hospital room?: Total  How much help is needed climbing 3-5 steps with a railing?: Total  AM-PAC Inpatient Mobility Raw Score : 8  AM-PAC Inpatient T-Scale Score : 28.52  Mobility Inpatient CMS 0-100% Score: 86.62  Mobility Inpatient CMS G-Code Modifier : CM         Therapy Time   Individual Concurrent Group Co-treatment   Time In 0926         Time Out 1021         Minutes 55                 Mini Kulkarni, PTA

## 2024-11-02 NOTE — PROGRESS NOTES
Occupational Therapy  DATE: 2024    NAME: Noel Valle  MRN: [id number]   : 1955    Patient not seen this date for Occupational Therapy due to:      [] Cancel by RN or physician due to:    [] Hemodialysis    [] Critical Lab Value Level     [] Blood transfusion in progress    [] Acute or unstable cardiovascular status   _MAP < 55 or more than >115  _HR < 40 or > 130    [] Acute or unstable pulmonary status   -FiO2 > 60%   _RR < 5 or >40    _O2 sats < 85%    [] Strict Bedrest    [] Off Unit for surgery or procedure    [] Off Unit for testing       [] Pending imaging to R/O fracture    [] Refusal by Patient      [x] Other: Pt. Not seen by OT d/t increased heart rate after Physical therapy and pt. Returned to bed to rest.  OT will cont to follow.     [] OT being discontinued at this time. Patient independent. No further needs.     [] OT being discontinued at this time as the patient has been transferred to hospice care. No further needs.      ZUNILDA AMBRIZ

## 2024-11-02 NOTE — PROGRESS NOTES
End Of Shift Note  [facility] ICU  Summary of shift: Uneventful shift. Pt pleasant and A/O x 4. Pt received PRN norco x 2 for leg pain. See previous note about straight cath. No further urine output since cath.   Vitals:    Vitals:    11/01/24 1952 11/01/24 2000 11/02/24 0000 11/02/24 0400   BP:       Pulse:       Resp: 21   15   Temp:  98.1 °F (36.7 °C) 97.9 °F (36.6 °C) 97.7 °F (36.5 °C)   TempSrc:  Oral Oral Oral   SpO2:       Weight:       Height:            I&O:   Intake/Output Summary (Last 24 hours) at 11/2/2024 0715  Last data filed at 11/1/2024 1300  Gross per 24 hour   Intake --   Output 530 ml   Net -530 ml       Resp Status: Room air     Ventilator Settings:  Vent Mode: CPAP/PS Resp Rate (Set): 18 bpm/Vt (Set, mL): 500 mL/ /FiO2 : 30 %    Critical Care IV infusions:   dexmedeTOMIDine (PRECEDEX) 1,000 mcg in sodium chloride 0.9 % 250 mL infusion      sodium chloride      sodium chloride          LDA:   Peripheral IV Right;Anterior Arm (Active)   Number of days:

## 2024-11-02 NOTE — PROGRESS NOTES
Pt has not voided this shift. Encouraged to void but unable. Pt given 500ml bolus of LR and still unable to void. Bladder scanned for 270mls. Pt denies discomfort.

## 2024-11-02 NOTE — PROGRESS NOTES
Pt has not voided today, bladder scan in the morning shows 270 mls. Pt states he felt like he needed to void. Although pt was unable to void. Bladder scan again in the afternoon and shoed 411mls of urine in bladder. Notified Dr. Olmstead and ordered obtained to place hatfield. Hatfield placed with 650mls of immediate urine return. Urine onelia in color.

## 2024-11-02 NOTE — PROGRESS NOTES
PROGRESS NOTE  [facility] Hospitalist        11/2/2024   3:03 PM    Name:  Noel Valle  MRN:    [id number]     Acct:     [id number]   Room:  [address] [place] [address]  IP Day: 24     Admit Date: 10/9/2024 10:05 AM  PCP: Ramakrishna Silver MD    C/C:   Chief Complaint   Patient presents with    Angioedema       Assessment/ Plan:          Acute Respiratory Insufficiency due to upper airway obstruction secondary to Angioedema  S/P extubation 10-13-24  Drowsy as he was extubated and sedative meds weaned off  On 3 L nasal canula  Solumedrol transitioned to Po prednisone, completed  Pulmonology was following.         Hypotension - resolved  Hypertension  Monitor BP   On norvasc and clonidine. Off coreg due to bradycardia   On prn Hydralazine.      HELENE  resolved     Tobacco abuse disorder/history of possible COPD  Sched DuoNebs.  Will  on tobacco cessation when patient is alert        Alcohol withdrawal  On protocol  Off Precedex  Continue to monitor at this time  On zyprexa, valproate and librium as needed  NPO except for meds with apple sauce initially.-->Diet advanced  CIWA protocol     Agitation secondary to alcohol withdrawal, metabolic disturbances, polypharmacy  Haldol prn  Consult Neuro appreciated  D/w RN  Prednisone may be contributing per neurology  Thiamine added per neuro  Okay to add Librium as needed per neuro  Neurochecks  EEG  Psych consult   DC Ativan, Geodon per Psych  Wean off Klonopin as outpatient  Zyprexa 10 hs  Depakote 250 tid     Subclinical hyperthyroidism     Constipation   Fleet enema  Colace prn     Hypernatremia  Free water flushes. Improved    UTI  For urine culture shows Enterococcus faecalis, pansensitive  Continue amoxicillin        DC planning- SNF  [facility] refused - does not meet criteria  Blood pressure on the lower side, added midodrine    Patient had episode when he was having some visual hallucination and then became more hypoxic, with physical therapy, now feeling  \"LACTACIDWB\" in the last 72 hours.    No results for input(s): \"LABALBU\", \"LABA1C\", \"D4UJKTV\", \"FT4\", \"TSH\", \"AST\", \"ALT\", \"LDH\", \"GGT\", \"ALKPHOS\", \"BILITOT\", \"BILIDIR\", \"AMMONIA\", \"AMYLASE\", \"LIPASE\", \"LACTATE\", \"CHOL\", \"HDL\", \"CHOLHDLRATIO\", \"TRIG\", \"VLDL\", \"DVX67EF\", \"PHENYTOIN\", \"PHENYF\", \"URICACID\", \"POCGLU\" in the last 72 hours.    Invalid input(s): \"PROT\", \"X4EWRVF\", \"LABGGT\", \"LDLCHOLESTEROL\"    Lab Results   Component Value Date/Time    SPECIAL Site: Urine  Hill catheter   10/23/2024 10:08 PM     Lab Results   Component Value Date/Time    CULTURE ENTEROCOCCUS FAECALIS >100,000 CFU/ML (A) 10/23/2024 10:08 PM       Lab Results   Component Value Date/Time    POCPH 7.410 10/14/2024 12:31 PM    POCPCO2 42.0 10/14/2024 12:31 PM    POCPO2 79.8 10/14/2024 12:31 PM    POCHCO3 26.6 10/14/2024 12:31 PM    NBEA 2.1 10/13/2024 10:49 AM    PBEA 1.7 10/14/2024 12:31 PM    FJUG0GTV 95.8 10/14/2024 12:31 PM    FIO2 30.0 10/14/2024 06:00 AM       Radiology:    No results found.      All radiological studies reviewed  Code Status:  Full Code        Electronically signed by MADELYN MADERA MD on 11/2/2024 at 3:03 PM    This note was created with the assistance of a speech-recognition program.  Although the intention is to generate a document that actually reflects the content of the visit, no guarantees can be provided that every mistake has been identified and corrected by editing.     Note was updated later by me after  physical examination and  completion of the assessment.

## 2024-11-02 NOTE — PLAN OF CARE
Problem: Discharge Planning  Goal: Discharge to home or other facility with appropriate resources  Outcome: Progressing     Problem: Respiratory - Adult  Goal: Achieves optimal ventilation and oxygenation  Outcome: Progressing     Problem: Pain  Goal: Verbalizes/displays adequate comfort level or baseline comfort level  Outcome: Progressing     Problem: Skin/Tissue Integrity  Goal: Absence of new skin breakdown  Description: 1.  Monitor for areas of redness and/or skin breakdown  2.  Assess vascular access sites hourly  3.  Every 4-6 hours minimum:  Change oxygen saturation probe site  4.  Every 4-6 hours:  If on nasal continuous positive airway pressure, respiratory therapy assess nares and determine need for appliance change or resting period.  Outcome: Progressing     Problem: Safety - Adult  Goal: Free from fall injury  Outcome: Progressing     Problem: Neurosensory - Adult  Goal: Achieves stable or improved neurological status  Outcome: Progressing  Flowsheets (Taken 11/1/2024 2000)  Achieves stable or improved neurological status:   Assess for and report changes in neurological status   Initiate measures to prevent increased intracranial pressure   Maintain blood pressure and fluid volume within ordered parameters to optimize cerebral perfusion and minimize risk of hemorrhage   Monitor temperature, glucose, and sodium. Initiate appropriate interventions as ordered     Problem: Cardiovascular - Adult  Goal: Maintains optimal cardiac output and hemodynamic stability  Outcome: Progressing     Problem: Skin/Tissue Integrity - Adult  Goal: Skin integrity remains intact  Outcome: Progressing     Problem: Musculoskeletal - Adult  Goal: Return mobility to safest level of function  Outcome: Progressing     Problem: Gastrointestinal - Adult  Goal: Maintains adequate nutritional intake  Outcome: Progressing     Problem: Genitourinary - Adult  Goal: Absence of urinary retention  Outcome: Progressing     Problem:

## 2024-11-02 NOTE — PROGRESS NOTES
End Of Shift Note  [place] ICU    Summary of shift: Pt had medium loose BM today. When pt sitting at side of bed with therapy, heart rate elevated to 140 and O2 sat dropped to upper 80s. Dr. Gamboa in and orders D-Dimer. D-Dimer elevated so he orders CT to r/o PE and doppler of BLE. Pt unable to void today, bladder scans showing 270 mls and 411 mls. Dr. Olmstead notified and hatfield placed with immediate urine return of 650mls. Pt medicated once with Norco for c/o pain to feet, and this was effective.     Vitals:    Vitals:    11/02/24 1200 11/02/24 1600 11/02/24 1715 11/02/24 1745   BP: 112/74 (!) 142/75     Pulse: (!) 102 99     Resp: 18 20 21 20   Temp: 97.8 °F (36.6 °C) 98 °F (36.7 °C)     TempSrc: Oral Oral     SpO2: 95% 94%     Weight:       Height:            I&O:   Intake/Output Summary (Last 24 hours) at 11/2/2024 1805  Last data filed at 11/2/2024 1745  Gross per 24 hour   Intake 660 ml   Output --   Net 660 ml       Resp Status: Pt on room air in the morning, O2 sat dipped to upper 80s and oxygen placed at 2L per NC. Pt maintained oxygen sat in 90s for the rest of the shift.     Ventilator Settings:  Vent Mode: CPAP/PS Resp Rate (Set): 18 bpm/Vt (Set, mL): 500 mL/ /FiO2 : 30 %    Critical Care IV infusions:   dexmedeTOMIDine (PRECEDEX) 1,000 mcg in sodium chloride 0.9 % 250 mL infusion      sodium chloride      sodium chloride          LDA:   Peripheral IV Right;Anterior Arm (Active)   Number of days:        Peripheral IV 11/02/24 Left Forearm (Active)   Number of days: 0       Urinary Catheter 11/02/24 2 Way (Active)   Number of days: 0

## 2024-11-02 NOTE — PROGRESS NOTES
Pt bladder scanned, 327 mL found. NP notified, straight cath ordered and completed, pt had 200 mL out. Will notify day shift nurse about pts lack of urination.

## 2024-11-03 PROCEDURE — 6360000002 HC RX W HCPCS

## 2024-11-03 PROCEDURE — 6370000000 HC RX 637 (ALT 250 FOR IP): Performed by: FAMILY MEDICINE

## 2024-11-03 PROCEDURE — 2580000003 HC RX 258: Performed by: FAMILY MEDICINE

## 2024-11-03 PROCEDURE — 6370000000 HC RX 637 (ALT 250 FOR IP): Performed by: INTERNAL MEDICINE

## 2024-11-03 PROCEDURE — 94761 N-INVAS EAR/PLS OXIMETRY MLT: CPT

## 2024-11-03 PROCEDURE — 2700000000 HC OXYGEN THERAPY PER DAY

## 2024-11-03 PROCEDURE — 6360000002 HC RX W HCPCS: Performed by: FAMILY MEDICINE

## 2024-11-03 PROCEDURE — 2580000003 HC RX 258: Performed by: NURSE PRACTITIONER

## 2024-11-03 PROCEDURE — 6370000000 HC RX 637 (ALT 250 FOR IP): Performed by: HOSPITALIST

## 2024-11-03 PROCEDURE — 2060000000 HC ICU INTERMEDIATE R&B

## 2024-11-03 RX ADMIN — HYDROCODONE BITARTRATE AND ACETAMINOPHEN 1 TABLET: 5; 325 TABLET ORAL at 20:25

## 2024-11-03 RX ADMIN — SODIUM CHLORIDE, PRESERVATIVE FREE 10 ML: 5 INJECTION INTRAVENOUS at 08:04

## 2024-11-03 RX ADMIN — ANTI-FUNGAL POWDER MICONAZOLE NITRATE TALC FREE: 1.42 POWDER TOPICAL at 20:32

## 2024-11-03 RX ADMIN — TAMSULOSIN HYDROCHLORIDE 0.4 MG: 0.4 CAPSULE ORAL at 08:01

## 2024-11-03 RX ADMIN — VALPROIC ACID 250 MG: 250 CAPSULE, LIQUID FILLED ORAL at 14:25

## 2024-11-03 RX ADMIN — FOLIC ACID 1 MG: 1 TABLET ORAL at 08:00

## 2024-11-03 RX ADMIN — HYDROCODONE BITARTRATE AND ACETAMINOPHEN 1 TABLET: 5; 325 TABLET ORAL at 00:34

## 2024-11-03 RX ADMIN — AMLODIPINE BESYLATE 10 MG: 10 TABLET ORAL at 08:01

## 2024-11-03 RX ADMIN — HYDROCORTISONE: 1 CREAM TOPICAL at 20:32

## 2024-11-03 RX ADMIN — CLONAZEPAM 0.25 MG: 0.5 TABLET ORAL at 08:00

## 2024-11-03 RX ADMIN — MULTIVITAMIN TABLET 1 TABLET: TABLET at 08:01

## 2024-11-03 RX ADMIN — OLANZAPINE 2.5 MG: 10 INJECTION, POWDER, FOR SOLUTION INTRAMUSCULAR at 23:00

## 2024-11-03 RX ADMIN — VALPROIC ACID 250 MG: 250 CAPSULE, LIQUID FILLED ORAL at 08:01

## 2024-11-03 RX ADMIN — SODIUM CHLORIDE, PRESERVATIVE FREE 10 ML: 5 INJECTION INTRAVENOUS at 20:29

## 2024-11-03 RX ADMIN — ENOXAPARIN SODIUM 40 MG: 100 INJECTION SUBCUTANEOUS at 07:59

## 2024-11-03 RX ADMIN — Medication 100 MG: at 08:01

## 2024-11-03 RX ADMIN — FAMOTIDINE 20 MG: 20 TABLET, FILM COATED ORAL at 08:00

## 2024-11-03 RX ADMIN — HYDROCORTISONE: 1 CREAM TOPICAL at 08:05

## 2024-11-03 RX ADMIN — HYDROCODONE BITARTRATE AND ACETAMINOPHEN 1 TABLET: 5; 325 TABLET ORAL at 08:00

## 2024-11-03 RX ADMIN — FAMOTIDINE 20 MG: 20 TABLET, FILM COATED ORAL at 20:25

## 2024-11-03 RX ADMIN — ANTI-FUNGAL POWDER MICONAZOLE NITRATE TALC FREE: 1.42 POWDER TOPICAL at 08:05

## 2024-11-03 RX ADMIN — ATORVASTATIN CALCIUM 40 MG: 40 TABLET, FILM COATED ORAL at 08:00

## 2024-11-03 RX ADMIN — VALPROIC ACID 250 MG: 250 CAPSULE, LIQUID FILLED ORAL at 20:25

## 2024-11-03 RX ADMIN — OLANZAPINE 10 MG: 5 TABLET, FILM COATED ORAL at 20:25

## 2024-11-03 ASSESSMENT — PAIN SCALES - GENERAL
PAINLEVEL_OUTOF10: 2
PAINLEVEL_OUTOF10: 7
PAINLEVEL_OUTOF10: 8
PAINLEVEL_OUTOF10: 0
PAINLEVEL_OUTOF10: 8
PAINLEVEL_OUTOF10: 7
PAINLEVEL_OUTOF10: 0
PAINLEVEL_OUTOF10: 6

## 2024-11-03 ASSESSMENT — PAIN DESCRIPTION - FREQUENCY
FREQUENCY: INTERMITTENT

## 2024-11-03 ASSESSMENT — PAIN SCALES - WONG BAKER
WONGBAKER_NUMERICALRESPONSE: HURTS A LITTLE BIT
WONGBAKER_NUMERICALRESPONSE: NO HURT
WONGBAKER_NUMERICALRESPONSE: NO HURT
WONGBAKER_NUMERICALRESPONSE: HURTS A LITTLE BIT
WONGBAKER_NUMERICALRESPONSE: HURTS A LITTLE BIT

## 2024-11-03 ASSESSMENT — PAIN DESCRIPTION - ORIENTATION
ORIENTATION: RIGHT;LEFT
ORIENTATION: LEFT;RIGHT
ORIENTATION: RIGHT;LEFT

## 2024-11-03 ASSESSMENT — PAIN - FUNCTIONAL ASSESSMENT
PAIN_FUNCTIONAL_ASSESSMENT: ACTIVITIES ARE NOT PREVENTED
PAIN_FUNCTIONAL_ASSESSMENT: PREVENTS OR INTERFERES SOME ACTIVE ACTIVITIES AND ADLS
PAIN_FUNCTIONAL_ASSESSMENT: ACTIVITIES ARE NOT PREVENTED

## 2024-11-03 ASSESSMENT — PAIN DESCRIPTION - PAIN TYPE
TYPE: CHRONIC PAIN

## 2024-11-03 ASSESSMENT — PAIN DESCRIPTION - LOCATION
LOCATION: LEG;FOOT
LOCATION: LEG
LOCATION: TOE (COMMENT WHICH ONE)
LOCATION: LEG;FOOT

## 2024-11-03 ASSESSMENT — PAIN DESCRIPTION - ONSET
ONSET: GRADUAL
ONSET: GRADUAL
ONSET: ON-GOING

## 2024-11-03 ASSESSMENT — PAIN DESCRIPTION - DESCRIPTORS
DESCRIPTORS: ACHING
DESCRIPTORS: DISCOMFORT
DESCRIPTORS: ACHING
DESCRIPTORS: ACHING

## 2024-11-03 NOTE — PROGRESS NOTES
Pulmonary Critical Care Progress Note    Patient seen for the follow up of Angioedema of tongue     Subjective:    He is on oxygen nasal cannula.  CTA chest done..  He is able to tolerate oral intake.  He still gets some hallucination.  No shortness of breath at rest.  Examination:    Vitals: /68   Pulse (!) 112   Temp 98.9 °F (37.2 °C) (Oral)   Resp 20   Ht 1.66 m (5' 5.35\")   Wt 91.1 kg (200 lb 13.4 oz)   SpO2 (!) 88%   BMI 33.06 kg/m²   SpO2  Av.8 %  Min: 87 %  Max: 98 %  General appearance: Awake alert no distress significant seborrhea over face  Neck: No JVD  Lungs: Decreased breath sound no crackles or wheeze  Heart: regular rate and rhythm, S1, S2 normal, no gallop  Abdomen: Soft, non tender, + BS  Extremities: no cyanosis or clubbing. No significant edema    LABs:    CBC:   No results for input(s): \"WBC\", \"HGB\", \"HCT\", \"PLT\" in the last 72 hours.    BMP:   No results for input(s): \"NA\", \"K\", \"CO2\", \"BUN\", \"CREATININE\", \"LABGLOM\", \"GLUCOSE\" in the last 72 hours.     Latest Reference Range & Units 10/14/24 06:00 10/14/24 12:31   POC TCO2 22 - 30 mmol/L  27   POC HCO3 21.0 - 28.0 mmol/L 28.1 (H) 26.6   POC O2 SAT 94.0 - 98.0 % 95.6 95.8   POC pCO2 35.0 - 48.0 mm Hg 43.2 42.0   POC pH 7.350 - 7.450  7.421 7.410   POC PO2 83.0 - 108.0 mm Hg 78.4 (L) 79.8 (L)   (H): Data is abnormally high  (L): Data is abnormally low   Latest Reference Range & Units 24 12:13   D-Dimer, Quant 0.00 - 0.59 ug/mL FEU 2.09 (H)   (H): Data is abnormally high    Radiology:  Chest x-ray 10/20  Poor inspiratory effort.       Chest x-ray 10/17 reviewed  1. Probable mild atelectatic changes at the base of the left lung.  2. Small left pleural effusion versus pleural thickening.  3. No other acute cardiopulmonary process.      Chest x-ray 10/16  Reviewed  Small left effusion with atelectasis.     CTA chest   1. Suboptimal contrast timing limits evaluation of the segmental branches. No  evidence for central

## 2024-11-03 NOTE — PLAN OF CARE
Mei, Vicenta, RN  Outcome: Progressing     Problem: Neurosensory - Adult  Goal: Achieves stable or improved neurological status  11/3/2024 0315 by Guy Greene RN  Outcome: Progressing  Flowsheets (Taken 11/2/2024 2000)  Achieves stable or improved neurological status:   Assess for and report changes in neurological status   Initiate measures to prevent increased intracranial pressure   Maintain blood pressure and fluid volume within ordered parameters to optimize cerebral perfusion and minimize risk of hemorrhage   Monitor temperature, glucose, and sodium. Initiate appropriate interventions as ordered  11/2/2024 1644 by Vicenta Hurley RN  Outcome: Progressing     Problem: Cardiovascular - Adult  Goal: Maintains optimal cardiac output and hemodynamic stability  11/3/2024 0315 by Guy Greene RN  Outcome: Progressing  Flowsheets (Taken 11/2/2024 2000)  Maintains optimal cardiac output and hemodynamic stability:   Monitor blood pressure and heart rate   Monitor urine output and notify Licensed Independent Practitioner for values outside of normal range   Assess for signs of decreased cardiac output   Administer fluid and/or volume expanders as ordered   Administer vasoactive medications as ordered  11/2/2024 1644 by Vicenta Hurley RN  Outcome: Progressing     Problem: Skin/Tissue Integrity - Adult  Goal: Skin integrity remains intact  11/3/2024 0315 by Guy Greene RN  Outcome: Progressing  11/2/2024 1644 by Vicenta Hurley RN  Outcome: Progressing     Problem: Musculoskeletal - Adult  Goal: Return mobility to safest level of function  11/3/2024 0315 by Guy Greene RN  Outcome: Progressing  11/2/2024 1644 by Vicenta Hurley RN  Outcome: Progressing     Problem: Gastrointestinal - Adult  Goal: Maintains adequate nutritional intake  11/3/2024 0315 by Guy Greene RN  Outcome: Progressing  Flowsheets (Taken 11/2/2024 2000)  Maintains adequate nutritional intake:   Monitor percentage of each meal consumed    spiritual pain/suffering and initiate Spiritual Care, Psychosocial Clinical Specialist consults as needed   Instruct patient/family in relaxation techniques, as appropriate  11/2/2024 1644 by Vicenta Hurley RN  Outcome: Progressing     Problem: Nutrition Deficit:  Goal: Optimize nutritional status  11/3/2024 0315 by Guy Greene RN  Outcome: Progressing  11/2/2024 1644 by Vicenta Hurley RN  Outcome: Progressing     Problem: ABCDS Injury Assessment  Goal: Absence of physical injury  11/3/2024 0315 by Guy Greene RN  Outcome: Progressing  11/2/2024 1644 by Vicenta Hurley RN  Outcome: Progressing     Problem: Confusion  Goal: Confusion, delirium, dementia, or psychosis is improved or at baseline  Description: INTERVENTIONS:  1. Assess for possible contributors to thought disturbance, including medications, impaired vision or hearing, underlying metabolic abnormalities, dehydration, psychiatric diagnoses, and notify attending LIP  2. [place] high risk fall precautions, as indicated  3. Provide frequent short contacts to provide reality reorientation, refocusing and direction  4. Decrease environmental stimuli, including noise as appropriate  5. Monitor and intervene to maintain adequate nutrition, hydration, elimination, sleep and activity  6. If unable to ensure safety without constant attention obtain sitter and review sitter guidelines with assigned personnel  7. Initiate Psychosocial CNS and Spiritual Care consult, as indicated  11/3/2024 0315 by Guy Greene RN  Outcome: Progressing  Flowsheets (Taken 11/2/2024 2000)  Effect of thought disturbance (confusion, delirium, dementia, or psychosis) are managed with adequate functional status:   Assess for contributors to thought disturbance, including medications, impaired vision or hearing, underlying metabolic abnormalities, dehydration, psychiatric diagnoses, notify LIP   [place] high risk fall precautions, as indicated   Provide frequent short contacts

## 2024-11-03 NOTE — PROGRESS NOTES
criteria        DVT and GI prophylaxis  Continue to monitor/telemetry/CBC with differential daily/BMP daily  Continue medications as below    Scheduled Meds:   hydrocortisone   Topical BID    tamsulosin  0.4 mg Oral Daily    sodium phosphate  1 enema Rectal Once    OLANZapine  10 mg Oral Nightly    multivitamin  1 tablet Oral Daily    miconazole   Topical BID    valproic acid  250 mg Oral TID    folic acid  1 mg Oral Daily    thiamine  100 mg Oral Daily    famotidine  20 mg Oral BID    labetalol  10 mg IntraVENous Once    amLODIPine  10 mg Oral Daily    And    atorvastatin  40 mg Oral Daily    sodium chloride flush  5-40 mL IntraVENous 2 times per day    sodium chloride flush  5-40 mL IntraVENous 2 times per day    enoxaparin  40 mg SubCUTAneous Daily     Continuous Infusions:   dexmedeTOMIDine (PRECEDEX) 1,000 mcg in sodium chloride 0.9 % 250 mL infusion      sodium chloride      sodium chloride       PRN Meds:  sodium chloride flush, 10 mL, PRN  midodrine, 5 mg, TID PRN  HYDROcodone 5 mg - acetaminophen, 1 tablet, Q6H PRN  fentanNYL, 25 mcg, Q1H PRN  potassium chloride, 40 mEq, PRN   Or  potassium alternative oral replacement, 40 mEq, PRN   Or  potassium chloride, 10 mEq, PRN  docusate sodium, 100 mg, BID PRN  OLANZapine, 2.5 mg, TID PRN  dexmedeTOMIDine (PRECEDEX) 1,000 mcg in sodium chloride 0.9 % 250 mL infusion, 0.1-1.5 mcg/kg/hr, Continuous PRN  [Held by provider] LORazepam, 1 mg, Q2H PRN  loperamide, 2 mg, 4x Daily PRN  ipratropium 0.5 mg-albuterol 2.5 mg, 1 Dose, Q4H PRN  hydrALAZINE, 10 mg, Q6H PRN  metoprolol, 5 mg, Q6H PRN  sodium chloride flush, 5-40 mL, PRN  sodium chloride, , PRN  albuterol, 2.5 mg, Q6H PRN  sodium chloride flush, 5-40 mL, PRN  sodium chloride, , PRN  magnesium sulfate, 2,000 mg, PRN  ondansetron, 4 mg, Q8H PRN   Or  ondansetron, 4 mg, Q6H PRN  polyethylene glycol, 17 g, Daily PRN  acetaminophen, 650 mg, Q6H PRN   Or  acetaminophen, 650 mg, Q6H PRN  sodium phosphate 15 mmol in sodium  2015    miconazole (MICOTIN) 2 % powder, , Topical, BID, Mitch Hollingsworth APRN - CNP, Given at 11/03/24 0805    valproic acid (DEPAKENE) capsule 250 mg, 250 mg, Oral, TID, Carlos Gamboa MD, 250 mg at 11/03/24 1425    folic acid (FOLVITE) tablet 1 mg, 1 mg, Oral, Daily, Theresa Uribe MD, 1 mg at 11/03/24 0800    thiamine mononitrate tablet 100 mg, 100 mg, Oral, Daily, Theresa Uribe MD, 100 mg at 11/03/24 0801    famotidine (PEPCID) tablet 20 mg, 20 mg, Oral, BID, Theresa Uribe MD, 20 mg at 11/03/24 0800    loperamide (IMODIUM) capsule 2 mg, 2 mg, Oral, 4x Daily PRN, Lump, Saundra A, APRN - CNP, 2 mg at 10/19/24 0230    ipratropium 0.5 mg-albuterol 2.5 mg (DUONEB) nebulizer solution 1 Dose, 1 Dose, Inhalation, Q4H PRN, Carlos Gamboa MD    hydrALAZINE (APRESOLINE) injection 10 mg, 10 mg, IntraVENous, Q6H PRN, Carlos Gamboa MD, 10 mg at 10/17/24 2309    labetalol (NORMODYNE;TRANDATE) injection 10 mg, 10 mg, IntraVENous, Once, Lump, Saundra A, APRN - CNP    amLODIPine (NORVASC) tablet 10 mg, 10 mg, Oral, Daily, 10 mg at 11/03/24 0801 **AND** atorvastatin (LIPITOR) tablet 40 mg, 40 mg, Oral, Daily, Viktor Hernandez MD, 40 mg at 11/03/24 0800    metoprolol (LOPRESSOR) injection 5 mg, 5 mg, IntraVENous, Q6H PRN, Reza, Mohsin Mohammad, MD, 5 mg at 11/01/24 1100    sodium chloride flush 0.9 % injection 5-40 mL, 5-40 mL, IntraVENous, 2 times per day, Lump, Saundra A, APRN - CNP, 10 mL at 11/03/24 0804    sodium chloride flush 0.9 % injection 5-40 mL, 5-40 mL, IntraVENous, PRN, Lump, Saundra A, APRN - CNP    0.9 % sodium chloride infusion, , IntraVENous, PRN, Lump, Saundra A, APRN - CNP    albuterol (PROVENTIL) (2.5 MG/3ML) 0.083% nebulizer solution 2.5 mg, 2.5 mg, Nebulization, Q6H PRN, Carlos Gamboa MD, 2.5 mg at 10/12/24 0826    sodium chloride flush 0.9 % injection 5-40 mL, 5-40 mL, IntraVENous, 2 times per day, Reza, Mohsin Mohammad, MD, 10 mL at 11/03/24 0804    sodium chloride flush 0.9 % injection 5-40 mL, 5-40

## 2024-11-03 NOTE — PROGRESS NOTES
End Of Shift Note  [facility]  Summary of shift: uneventful shift. Patient still confused at time but has been pleasant all day. No aggressive behavior. Sister brought Pino in for patient. This made his night.     Vitals:    Vitals:    11/03/24 0900 11/03/24 1120 11/03/24 1200 11/03/24 1615   BP:   120/68 115/72   Pulse:   (!) 112 (!) 109   Resp:   20 26   Temp:  98.3 °F (36.8 °C) 98.9 °F (37.2 °C) 97.4 °F (36.3 °C)   TempSrc:  Oral Oral Oral   SpO2: (!) 87%  (!) 88% 90%   Weight:       Height:            I&O: No intake or output data in the 24 hours ending 11/03/24 1827    Resp Status: 2 L NC    Ventilator Settings:  Vent Mode: CPAP/PS Resp Rate (Set): 18 bpm/Vt (Set, mL): 500 mL/ /FiO2 : 30 %    Critical Care IV infusions:   dexmedeTOMIDine (PRECEDEX) 1,000 mcg in sodium chloride 0.9 % 250 mL infusion      sodium chloride      sodium chloride          LDA:   Peripheral IV 11/02/24 Left Forearm (Active)   Number of days: 1       Urinary Catheter 11/02/24 2 Way (Active)   Number of days: 1

## 2024-11-03 NOTE — PLAN OF CARE
Problem: Respiratory - Adult  Goal: Achieves optimal ventilation and oxygenation  11/3/2024 0936 by Michelle Acosta, RN  Outcome: Progressing  Flowsheets (Taken 11/3/2024 0800)  Achieves optimal ventilation and oxygenation:   Assess for changes in respiratory status   Assess for changes in mentation and behavior  11/3/2024 0315 by Guy Greene RN  Outcome: Progressing  Flowsheets (Taken 11/2/2024 2000)  Achieves optimal ventilation and oxygenation:   Assess for changes in mentation and behavior   Assess for changes in respiratory status   Position to facilitate oxygenation and minimize respiratory effort   Oxygen supplementation based on oxygen saturation or arterial blood gases   Initiate smoking cessation protocol as indicated   Encourage broncho-pulmonary hygiene including cough, deep breathe, incentive spirometry   Assess the need for suctioning and aspirate as needed   Assess and instruct to report shortness of breath or any respiratory difficulty   Respiratory therapy support as indicated     Problem: Neurosensory - Adult  Goal: Achieves stable or improved neurological status  11/3/2024 0936 by Michelle Acosta, RN  Outcome: Progressing  Flowsheets (Taken 11/3/2024 0800)  Achieves stable or improved neurological status: Assess for and report changes in neurological status  11/3/2024 0315 by Guy Greene RN  Outcome: Progressing  Flowsheets (Taken 11/2/2024 2000)  Achieves stable or improved neurological status:   Assess for and report changes in neurological status   Initiate measures to prevent increased intracranial pressure   Maintain blood pressure and fluid volume within ordered parameters to optimize cerebral perfusion and minimize risk of hemorrhage   Monitor temperature, glucose, and sodium. Initiate appropriate interventions as ordered     Problem: Cardiovascular - Adult  Goal: Maintains optimal cardiac output and hemodynamic stability  11/3/2024 0936 by Mihcelle Acosta,

## 2024-11-03 NOTE — PROGRESS NOTES
End Of Shift Note  [facility]  Summary of shift: Uneventful shift. Pt A/O x 4, still having hallucinations but pleasant. Vital signs have remained stable. Pt received PRN norco for pain. No BM and urine output was 225.     Vitals:    Vitals:    11/03/24 0000 11/03/24 0034 11/03/24 0140 11/03/24 0400   BP: 111/71   110/66   Pulse: (!) 109  (!) 102 92   Resp: 21 22 23 17   Temp: 98.2 °F (36.8 °C)   98.1 °F (36.7 °C)   TempSrc: Oral   Oral   SpO2: 92%  98% 96%   Weight:       Height:            I&O:   Intake/Output Summary (Last 24 hours) at 11/3/2024 0655  Last data filed at 11/2/2024 1745  Gross per 24 hour   Intake 660 ml   Output --   Net 660 ml       Resp Status: 2 L NC     Ventilator Settings:  Vent Mode: CPAP/PS Resp Rate (Set): 18 bpm/Vt (Set, mL): 500 mL/ /FiO2 : 30 %    Critical Care IV infusions:   dexmedeTOMIDine (PRECEDEX) 1,000 mcg in sodium chloride 0.9 % 250 mL infusion      sodium chloride      sodium chloride          LDA:   Peripheral IV 11/02/24 Left Forearm (Active)   Number of days: 0       Urinary Catheter 11/02/24 2 Way (Active)   Number of days: 0

## 2024-11-04 ENCOUNTER — APPOINTMENT (OUTPATIENT)
Dept: VASCULAR LAB | Age: 69
DRG: 915 | End: 2024-11-04
Attending: INTERNAL MEDICINE
Payer: MEDICARE

## 2024-11-04 LAB
ANION GAP SERPL CALCULATED.3IONS-SCNC: 10 MMOL/L (ref 9–17)
BASOPHILS # BLD: 0.05 K/UL (ref 0–0.2)
BASOPHILS NFR BLD: 1 % (ref 0–2)
BUN SERPL-MCNC: 5 MG/DL (ref 8–23)
BUN/CREAT SERPL: 10 (ref 9–20)
CALCIUM SERPL-MCNC: 8.6 MG/DL (ref 8.6–10.4)
CHLORIDE SERPL-SCNC: 103 MMOL/L (ref 98–107)
CO2 SERPL-SCNC: 29 MMOL/L (ref 20–31)
CREAT SERPL-MCNC: 0.5 MG/DL (ref 0.7–1.2)
EOSINOPHIL # BLD: 0.33 K/UL (ref 0–0.44)
EOSINOPHILS RELATIVE PERCENT: 6 % (ref 1–4)
ERYTHROCYTE [DISTWIDTH] IN BLOOD BY AUTOMATED COUNT: 11.3 % (ref 11.8–14.4)
GFR, ESTIMATED: >90 ML/MIN/1.73M2
GLUCOSE SERPL-MCNC: 84 MG/DL (ref 70–99)
HCT VFR BLD AUTO: 42.1 % (ref 40.7–50.3)
HGB BLD-MCNC: 13.1 G/DL (ref 13–17)
IMM GRANULOCYTES # BLD AUTO: 0.02 K/UL (ref 0–0.3)
IMM GRANULOCYTES NFR BLD: 0 %
INR PPP: 1
LYMPHOCYTES NFR BLD: 1.67 K/UL (ref 1.1–3.7)
LYMPHOCYTES RELATIVE PERCENT: 31 % (ref 24–43)
MCH RBC QN AUTO: 32.8 PG (ref 25.2–33.5)
MCHC RBC AUTO-ENTMCNC: 31.1 G/DL (ref 28.4–34.8)
MCV RBC AUTO: 105.3 FL (ref 82.6–102.9)
MONOCYTES NFR BLD: 0.53 K/UL (ref 0.1–1.2)
MONOCYTES NFR BLD: 10 % (ref 3–12)
NEUTROPHILS NFR BLD: 52 % (ref 36–65)
NEUTS SEG NFR BLD: 2.78 K/UL (ref 1.5–8.1)
NRBC BLD-RTO: 0 PER 100 WBC
PARTIAL THROMBOPLASTIN TIME: 23 SEC (ref 23.9–33.8)
PLATELET # BLD AUTO: 227 K/UL (ref 138–453)
PMV BLD AUTO: 9.7 FL (ref 8.1–13.5)
POTASSIUM SERPL-SCNC: 3.5 MMOL/L (ref 3.7–5.3)
PROTHROMBIN TIME: 13 SEC (ref 11.5–14.2)
RBC # BLD AUTO: 4 M/UL (ref 4.21–5.77)
RBC # BLD: ABNORMAL 10*6/UL
SODIUM SERPL-SCNC: 142 MMOL/L (ref 135–144)
WBC OTHER # BLD: 5.4 K/UL (ref 3.5–11.3)

## 2024-11-04 PROCEDURE — 6370000000 HC RX 637 (ALT 250 FOR IP): Performed by: INTERNAL MEDICINE

## 2024-11-04 PROCEDURE — 85025 COMPLETE CBC W/AUTO DIFF WBC: CPT

## 2024-11-04 PROCEDURE — 94761 N-INVAS EAR/PLS OXIMETRY MLT: CPT

## 2024-11-04 PROCEDURE — 6370000000 HC RX 637 (ALT 250 FOR IP): Performed by: HOSPITALIST

## 2024-11-04 PROCEDURE — 6370000000 HC RX 637 (ALT 250 FOR IP): Performed by: FAMILY MEDICINE

## 2024-11-04 PROCEDURE — 6360000002 HC RX W HCPCS

## 2024-11-04 PROCEDURE — 85730 THROMBOPLASTIN TIME PARTIAL: CPT

## 2024-11-04 PROCEDURE — 80048 BASIC METABOLIC PNL TOTAL CA: CPT

## 2024-11-04 PROCEDURE — 2700000000 HC OXYGEN THERAPY PER DAY

## 2024-11-04 PROCEDURE — 6370000000 HC RX 637 (ALT 250 FOR IP): Performed by: NURSE PRACTITIONER

## 2024-11-04 PROCEDURE — 93970 EXTREMITY STUDY: CPT

## 2024-11-04 PROCEDURE — 36415 COLL VENOUS BLD VENIPUNCTURE: CPT

## 2024-11-04 PROCEDURE — 2060000000 HC ICU INTERMEDIATE R&B

## 2024-11-04 PROCEDURE — 85610 PROTHROMBIN TIME: CPT

## 2024-11-04 PROCEDURE — 2580000003 HC RX 258: Performed by: NURSE PRACTITIONER

## 2024-11-04 RX ORDER — NICOTINE 21 MG/24HR
1 PATCH, TRANSDERMAL 24 HOURS TRANSDERMAL DAILY
Status: DISCONTINUED | OUTPATIENT
Start: 2024-11-04 | End: 2024-11-07 | Stop reason: SINTOL

## 2024-11-04 RX ORDER — OLANZAPINE 2.5 MG/1
2.5 TABLET, FILM COATED ORAL DAILY
Status: DISCONTINUED | OUTPATIENT
Start: 2024-11-04 | End: 2024-11-08 | Stop reason: HOSPADM

## 2024-11-04 RX ADMIN — FAMOTIDINE 20 MG: 20 TABLET, FILM COATED ORAL at 20:11

## 2024-11-04 RX ADMIN — FOLIC ACID 1 MG: 1 TABLET ORAL at 08:26

## 2024-11-04 RX ADMIN — HYDROCORTISONE: 1 CREAM TOPICAL at 08:30

## 2024-11-04 RX ADMIN — HYDROCORTISONE: 1 CREAM TOPICAL at 20:10

## 2024-11-04 RX ADMIN — POTASSIUM BICARBONATE 40 MEQ: 782 TABLET, EFFERVESCENT ORAL at 04:41

## 2024-11-04 RX ADMIN — OLANZAPINE 10 MG: 5 TABLET, FILM COATED ORAL at 20:11

## 2024-11-04 RX ADMIN — OLANZAPINE 2.5 MG: 10 INJECTION, POWDER, FOR SOLUTION INTRAMUSCULAR at 21:33

## 2024-11-04 RX ADMIN — SODIUM CHLORIDE, PRESERVATIVE FREE 10 ML: 5 INJECTION INTRAVENOUS at 20:12

## 2024-11-04 RX ADMIN — VALPROIC ACID 250 MG: 250 CAPSULE, LIQUID FILLED ORAL at 15:32

## 2024-11-04 RX ADMIN — Medication 100 MG: at 08:26

## 2024-11-04 RX ADMIN — HYDROCODONE BITARTRATE AND ACETAMINOPHEN 1 TABLET: 5; 325 TABLET ORAL at 06:38

## 2024-11-04 RX ADMIN — ANTI-FUNGAL POWDER MICONAZOLE NITRATE TALC FREE: 1.42 POWDER TOPICAL at 20:10

## 2024-11-04 RX ADMIN — OLANZAPINE 2.5 MG: 2.5 TABLET, FILM COATED ORAL at 11:58

## 2024-11-04 RX ADMIN — TAMSULOSIN HYDROCHLORIDE 0.4 MG: 0.4 CAPSULE ORAL at 08:26

## 2024-11-04 RX ADMIN — OLANZAPINE 2.5 MG: 10 INJECTION, POWDER, FOR SOLUTION INTRAMUSCULAR at 02:54

## 2024-11-04 RX ADMIN — FAMOTIDINE 20 MG: 20 TABLET, FILM COATED ORAL at 08:25

## 2024-11-04 RX ADMIN — ATORVASTATIN CALCIUM 40 MG: 40 TABLET, FILM COATED ORAL at 08:25

## 2024-11-04 RX ADMIN — SODIUM CHLORIDE, PRESERVATIVE FREE 10 ML: 5 INJECTION INTRAVENOUS at 08:30

## 2024-11-04 RX ADMIN — VALPROIC ACID 250 MG: 250 CAPSULE, LIQUID FILLED ORAL at 08:26

## 2024-11-04 RX ADMIN — MULTIVITAMIN TABLET 1 TABLET: TABLET at 08:26

## 2024-11-04 RX ADMIN — APIXABAN 10 MG: 5 TABLET, FILM COATED ORAL at 10:47

## 2024-11-04 RX ADMIN — ANTI-FUNGAL POWDER MICONAZOLE NITRATE TALC FREE: 1.42 POWDER TOPICAL at 11:26

## 2024-11-04 RX ADMIN — VALPROIC ACID 250 MG: 250 CAPSULE, LIQUID FILLED ORAL at 20:11

## 2024-11-04 RX ADMIN — APIXABAN 10 MG: 5 TABLET, FILM COATED ORAL at 20:11

## 2024-11-04 ASSESSMENT — PAIN SCALES - WONG BAKER
WONGBAKER_NUMERICALRESPONSE: HURTS A LITTLE BIT

## 2024-11-04 ASSESSMENT — PAIN DESCRIPTION - DESCRIPTORS
DESCRIPTORS: DISCOMFORT
DESCRIPTORS: DISCOMFORT

## 2024-11-04 ASSESSMENT — PAIN SCALES - GENERAL
PAINLEVEL_OUTOF10: 0
PAINLEVEL_OUTOF10: 8

## 2024-11-04 ASSESSMENT — PAIN DESCRIPTION - ORIENTATION
ORIENTATION: RIGHT
ORIENTATION: RIGHT

## 2024-11-04 ASSESSMENT — PAIN - FUNCTIONAL ASSESSMENT: PAIN_FUNCTIONAL_ASSESSMENT: ACTIVITIES ARE NOT PREVENTED

## 2024-11-04 ASSESSMENT — PAIN DESCRIPTION - PAIN TYPE: TYPE: ACUTE PAIN

## 2024-11-04 ASSESSMENT — PAIN DESCRIPTION - LOCATION
LOCATION: FOOT
LOCATION: FOOT

## 2024-11-04 NOTE — PROGRESS NOTES
Physical Therapy  DATE: 2024    NAME: Noel Valle  MRN: [id number]   : 1955    Patient not seen this date for Physical Therapy due to:      [x] Cancel by RN or physician due to: Spoke with ICU manager about DVT situation and the patient had previously be on anticoagulants daily and approved session. Began waking patient up and getting room ready when PCT arrived to reported Dr. Gamboa said to hold therapy today d/t the new DVT.    [] Hemodialysis    [] Critical Lab Value Level     [] Blood transfusion in progress    [] Acute or unstable cardiovascular status   _MAP < 55 or more than >115  _HR < 40 or > 130    [] Acute or unstable pulmonary status   -FiO2 > 60%   _RR < 5 or >40    _O2 sats < 85%    [] Strict Bedrest    [] Off Unit for surgery or procedure    [] Off Unit for testing       [] Pending imaging to R/O fracture    [] Refusal by Patient      [] Other      [] PT being discontinued at this time. Patient independent. No further needs.     [] PT being discontinued at this time as the patient has been transferred to hospice care. No further needs.      Mini Kulkarni, PTA

## 2024-11-04 NOTE — PROGRESS NOTES
Pt now awake and pleasantly confused as he asked to get up to the bathroom and then reminded again that he has a new clot in his leg and Dr Gamboa does not want him up today so pt assisted on the bedpan for a BM (per pt agreeance). Pt has been reminded of this several times throughout the day but keeps asking to get up OOB or motioning like he wants to get up but is unable due to weakness. Will monitor.

## 2024-11-04 NOTE — CARE COORDINATION
Social work: Checked [facility], authorization pending approval.     Update 14:16- [facility] to [facility].  # to call is [phone], deadline is 3:30PM today. Message to Dr Meadows to assist.     Update 16:12- p2p denied.  Spoke to patient's wife to discuss next plans, she would like to pursue appeal for [facility].  SW will submit.  Spouse also asking if [facility] would reconsider, she will reach out to Jackie/semaj to discuss.      Update 17:00-c/b from Jackie/Waleska, she will re-evaluate patient for admission.

## 2024-11-04 NOTE — PROGRESS NOTES
End Of Shift Note  [facility]  Summary of shift: Pt rests in bed today confused hallucinates and was a little fiesty this am but had a quiet rest nap and woke up more pleasant. Pt has right leg DVT started eliquis today. PMR consult made and awaiting a dc plan. Pts sister visits and brings food for pt twice today. Pt complains of right foot pain but declines treatment, wears foot drop boot on right foot for much of the day.    Vitals:    Vitals:    11/04/24 1543 11/04/24 1600 11/04/24 1655 11/04/24 1810   BP:  120/72     Pulse: (!) 108 (!) 110 (!) 119 (!) 114   Resp: 19 24 23 17   Temp: 98 °F (36.7 °C)      TempSrc: Oral      SpO2: 95% 97% 95% (!) 89%   Weight:       Height:            I&O:   Intake/Output Summary (Last 24 hours) at 11/4/2024 1828  Last data filed at 11/4/2024 1813  Gross per 24 hour   Intake 650 ml   Output 600 ml   Net 50 ml       Resp Status: RA or 2L NC    Critical Care IV infusions:   sodium chloride      sodium chloride          LDA:   Peripheral IV 11/03/24 Right;Anterior Forearm (Active)   Number of days: 0       Urinary Catheter 11/02/24 2 Way (Active)   Number of days: 2

## 2024-11-04 NOTE — PLAN OF CARE
Problem: Discharge Planning  Goal: Discharge to home or other facility with appropriate resources  Outcome: Progressing     Problem: Respiratory - Adult  Goal: Achieves optimal ventilation and oxygenation  Outcome: Progressing     Problem: Pain  Goal: Verbalizes/displays adequate comfort level or baseline comfort level  Outcome: Progressing     Problem: Skin/Tissue Integrity  Goal: Absence of new skin breakdown  Description: 1.  Monitor for areas of redness and/or skin breakdown  2.  Assess vascular access sites hourly  3.  Every 4-6 hours minimum:  Change oxygen saturation probe site  4.  Every 4-6 hours:  If on nasal continuous positive airway pressure, respiratory therapy assess nares and determine need for appliance change or resting period.  Outcome: Progressing     Problem: Safety - Adult  Goal: Free from fall injury  Outcome: Progressing     Problem: Neurosensory - Adult  Goal: Achieves stable or improved neurological status  Outcome: Progressing     Problem: Cardiovascular - Adult  Goal: Maintains optimal cardiac output and hemodynamic stability  Outcome: Progressing     Problem: Skin/Tissue Integrity - Adult  Goal: Skin integrity remains intact  Outcome: Progressing     Problem: Musculoskeletal - Adult  Goal: Return mobility to safest level of function  Outcome: Progressing     Problem: Gastrointestinal - Adult  Goal: Maintains adequate nutritional intake  Outcome: Progressing     Problem: Genitourinary - Adult  Goal: Absence of urinary retention  Outcome: Progressing     Problem: Coping  Goal: Pt/Family able to verbalize concerns and demonstrate effective coping strategies  Description: INTERVENTIONS:  1. Assist patient/family to identify coping skills, available support systems and cultural and spiritual values  2. Provide emotional support, including active listening and acknowledgement of concerns of patient and caregivers  3. Reduce environmental stimuli, as able  4. Instruct patient/family in

## 2024-11-04 NOTE — PROGRESS NOTES
Pulmonary Critical Care Progress Note    Patient seen for the follow up of Angioedema of tongue     Subjective:    He is on oxygen nasal cannula.  CTA chest done..  He is able to tolerate oral intake.  He still gets some hallucination.  No shortness of breath at rest.  Examination:    Vitals: /71   Pulse (!) 103   Temp 98.1 °F (36.7 °C) (Oral)   Resp 20   Ht 1.66 m (5' 5.35\")   Wt 87.5 kg (192 lb 14.4 oz)   SpO2 93%   BMI 31.76 kg/m²   SpO2  Av.1 %  Min: 84 %  Max: 96 %  General appearance: Awake alert no distress significant seborrhea over face  Neck: No JVD  Lungs: Decreased breath sound no crackles or wheeze  Heart: regular rate and rhythm, S1, S2 normal, no gallop  Abdomen: Soft, non tender, + BS  Extremities: no cyanosis or clubbing. No significant edema    LABs:    CBC:   Recent Labs     24  0411   WBC 5.4   HGB 13.1   HCT 42.1          BMP:   Recent Labs     24  0411      K 3.5*   CO2 29   BUN 5*   CREATININE 0.5*   LABGLOM >90   GLUCOSE 84        Latest Reference Range & Units 10/14/24 06:00 10/14/24 12:31   POC TCO2 22 - 30 mmol/L  27   POC HCO3 21.0 - 28.0 mmol/L 28.1 (H) 26.6   POC O2 SAT 94.0 - 98.0 % 95.6 95.8   POC pCO2 35.0 - 48.0 mm Hg 43.2 42.0   POC pH 7.350 - 7.450  7.421 7.410   POC PO2 83.0 - 108.0 mm Hg 78.4 (L) 79.8 (L)   (H): Data is abnormally high  (L): Data is abnormally low   Latest Reference Range & Units 24 12:13   D-Dimer, Quant 0.00 - 0.59 ug/mL FEU 2.09 (H)   (H): Data is abnormally high    Radiology:  Chest x-ray 10/20  Poor inspiratory effort.       Chest x-ray 10/17 reviewed  1. Probable mild atelectatic changes at the base of the left lung.  2. Small left pleural effusion versus pleural thickening.  3. No other acute cardiopulmonary process.      Chest x-ray 10/16  Reviewed  Small left effusion with atelectasis.     CTA chest   1. Suboptimal contrast timing limits evaluation of the segmental branches. No  evidence for central  advised patient had issues with placement because of aggressive behavior before ;now not having any problems over the last few days very remorseful cries when he recalls   peptic ulcer disease prophylaxis  DVT prophylaxis      Carlos Gamboa MD, MD, [facility]  Pulmonary Critical Care and Sleep Medicine,  [facility]  Cell: [phone]  Office: [phone]

## 2024-11-04 NOTE — PROGRESS NOTES
End Of Shift Note  [facility]  Summary of shift: Pt had somewhat eventful night. Increased agitation towards self and pulling at electrodes and pulse ox, 2 doses of IM zyprexa needed to be given. They were effective for a short amount of time. Patient was redirectable and non aggressive with staff, just frustrated with himself not being able to get out of bed and kept asking staff for cigarette. Pt still weak and unsteady, was unable to sit up on the edge of the bed alone. Hill still remains in place, patent and draining. Potassium was replaced this AM. Plan is potential discharge with pre cert pending to [facility].     Vitals:    Vitals:    11/04/24 0011 11/04/24 0300 11/04/24 0400 11/04/24 0638   BP:   135/65    Pulse:  92 92    Resp:  21 19 19   Temp:   97.6 °F (36.4 °C)    TempSrc:   Axillary    SpO2: (S) 95% 96% 94%    Weight:   87.5 kg (192 lb 14.4 oz)    Height:            I&O:   Intake/Output Summary (Last 24 hours) at 11/4/2024 0647  Last data filed at 11/4/2024 0534  Gross per 24 hour   Intake --   Output 250 ml   Net -250 ml       Resp Status: 2L nasal cannula     Ventilator Settings:  Vent Mode: CPAP/PS Resp Rate (Set): 18 bpm/Vt (Set, mL): 500 mL/ /FiO2 : 30 %    Critical Care IV infusions:   dexmedeTOMIDine (PRECEDEX) 1,000 mcg in sodium chloride 0.9 % 250 mL infusion      sodium chloride      sodium chloride          LDA:   Peripheral IV 11/03/24 Right;Anterior Forearm (Active)   Number of days: 0       Urinary Catheter 11/02/24 2 Way (Active)   Number of days: 1

## 2024-11-04 NOTE — PROGRESS NOTES
Occupational Therapy  DATE: 2024    NAME: Noel Valle  MRN: [id number]   : 1955    Patient not seen this date for Occupational Therapy due to:      [x] Cancel by RN or physician due to: Hold per Dr. Gamboa; pt positive for DVT. OT will cont to follow.    [] Hemodialysis    [] Critical Lab Value Level     [] Blood transfusion in progress    [] Acute or unstable cardiovascular status   _MAP < 55 or more than >115  _HR < 40 or > 130    [] Acute or unstable pulmonary status   -FiO2 > 60%   _RR < 5 or >40    _O2 sats < 85%    [] Strict Bedrest    [] Off Unit for surgery or procedure    [] Off Unit for testing       [] Pending imaging to R/O fracture    [] Refusal by Patient      [] Other      [] OT being discontinued at this time. Patient independent. No further needs.     [] OT being discontinued at this time as the patient has been transferred to hospice care. No further needs.      DESTINY SANDRA, ZUNILDA

## 2024-11-04 NOTE — PROGRESS NOTES
Pt resting with eyes closed, no signs of respiratory distress. SpO2 88% and 2L NC added and SpO2 increased quickly to 90- 92%. Will continue to monitor.

## 2024-11-04 NOTE — PROGRESS NOTES
Pt is more confused than prior despite attempts to reorient pt. Pt is pleasant but unaware of his confusion stating odd comments. Pts sister at bedside to visit.

## 2024-11-04 NOTE — PROGRESS NOTES
PROGRESS NOTE  [facility] Hospitalist        11/4/2024   11:29 AM    Name:  Noel Valle  MRN:    [id number]     Acct:     [id number]   Room:  [address] [place] [address]  IP Day: 26     Admit Date: 10/9/2024 10:05 AM  PCP: Ramakrishna Silver MD    C/C:   Chief Complaint   Patient presents with    Angioedema       Assessment/ Plan:          Acute Respiratory Insufficiency due to upper airway obstruction secondary to Angioedema  S/P extubation 10-13-24   IV Solu-Medrol switch to prednisone, completed the course  Pulmonology following        Acute Extensive RLE DVT  - on anticoagulation.  - developed during this stay.   - vascular consulted by [facility].     Hypotension - resolved  -Midodrine as needed    Hypertension  Monitor BP   On norvasc and clonidine. Off coreg due to bradycardia   On prn Hydralazine.      HELENE  resolved     Tobacco abuse disorder/history of possible COPD  Sched DuSchuylerbs.  Will  on tobacco cessation when patient is alert        Alcohol withdrawal  CIWA protocol discontinued  Off Precedex, Librium  Continue to monitor at this time  On zyprexa, valproate and librium as needed  NPO except for meds with apple sauce initially.-->Diet advanced     Agitation secondary to alcohol withdrawal, metabolic disturbances, polypharmacy  Haldol prn   MRI no acute process  Librium, Ativan discontinued  Prednisone may be contributing per neurology  Started on thiamine  Wean off Klonopin  Neurochecks  EEG showed no abnormality  Psych consult   Geodon as needed  Depakote 250 mg t.I.d.  Zyprexa nightly  -Patient had an episode  in which he was having visual hallucination and then became more hypoxic, with physical therapy, now feeling better  Neurology was involved in the care       Subclinical hyperthyroidism     Constipation   Fleet enema  Colace prn     Hypernatremia  Free water flushes. Improved    UTI  - urine culture shows Enterococcus faecalis, pansensitive  Continue amoxicillin         DC planning-

## 2024-11-04 NOTE — PLAN OF CARE
Problem: Discharge Planning  Goal: Discharge to home or other facility with appropriate resources  11/4/2024 1542 by Camila Morse RN  Outcome: Progressing  Flowsheets (Taken 11/4/2024 0830)  Discharge to home or other facility with appropriate resources:   Identify barriers to discharge with patient and caregiver   Arrange for needed discharge resources and transportation as appropriate  11/4/2024 0236 by Sergio Doan RN  Outcome: Progressing     Problem: Respiratory - Adult  Goal: Achieves optimal ventilation and oxygenation  11/4/2024 1542 by Camila Morse RN  Outcome: Progressing  Flowsheets (Taken 11/4/2024 0830)  Achieves optimal ventilation and oxygenation:   Assess for changes in respiratory status   Assess for changes in mentation and behavior   Position to facilitate oxygenation and minimize respiratory effort   Oxygen supplementation based on oxygen saturation or arterial blood gases   Encourage broncho-pulmonary hygiene including cough, deep breathe, incentive spirometry   Assess the need for suctioning and aspirate as needed   Assess and instruct to report shortness of breath or any respiratory difficulty   Respiratory therapy support as indicated  11/4/2024 0236 by Sergio Doan RN  Outcome: Progressing     Problem: Pain  Goal: Verbalizes/displays adequate comfort level or baseline comfort level  11/4/2024 1542 by Camila Morse RN  Outcome: Progressing  Flowsheets (Taken 11/4/2024 0810)  Verbalizes/displays adequate comfort level or baseline comfort level:   Encourage patient to monitor pain and request assistance   Assess pain using appropriate pain scale   Administer analgesics based on type and severity of pain and evaluate response   Implement non-pharmacological measures as appropriate and evaluate response  11/4/2024 0236 by Sergio Doan RN  Outcome: Progressing     Problem: Skin/Tissue Integrity  Goal: Absence of new skin breakdown  Description: 1.  Monitor for areas of

## 2024-11-04 NOTE — PROGRESS NOTES
Per vascular tech, prelim results of vascular duplex of BLE is DVT of right femoral. Dr Cooper carroll.

## 2024-11-05 LAB
ANION GAP SERPL CALCULATED.3IONS-SCNC: 8 MMOL/L (ref 9–17)
BASOPHILS # BLD: 0.05 K/UL (ref 0–0.2)
BASOPHILS NFR BLD: 1 % (ref 0–2)
BUN SERPL-MCNC: 5 MG/DL (ref 8–23)
BUN/CREAT SERPL: 7 (ref 9–20)
CALCIUM SERPL-MCNC: 8.5 MG/DL (ref 8.6–10.4)
CHLORIDE SERPL-SCNC: 102 MMOL/L (ref 98–107)
CO2 SERPL-SCNC: 32 MMOL/L (ref 20–31)
CREAT SERPL-MCNC: 0.7 MG/DL (ref 0.7–1.2)
EOSINOPHIL # BLD: 0.39 K/UL (ref 0–0.44)
EOSINOPHILS RELATIVE PERCENT: 8 % (ref 1–4)
ERYTHROCYTE [DISTWIDTH] IN BLOOD BY AUTOMATED COUNT: 11.3 % (ref 11.8–14.4)
GFR, ESTIMATED: >90 ML/MIN/1.73M2
GLUCOSE SERPL-MCNC: 122 MG/DL (ref 70–99)
HCT VFR BLD AUTO: 37.9 % (ref 40.7–50.3)
HGB BLD-MCNC: 12.4 G/DL (ref 13–17)
IMM GRANULOCYTES # BLD AUTO: 0.02 K/UL (ref 0–0.3)
IMM GRANULOCYTES NFR BLD: 0 %
LYMPHOCYTES NFR BLD: 1.76 K/UL (ref 1.1–3.7)
LYMPHOCYTES RELATIVE PERCENT: 36 % (ref 24–43)
MCH RBC QN AUTO: 32.8 PG (ref 25.2–33.5)
MCHC RBC AUTO-ENTMCNC: 32.7 G/DL (ref 28.4–34.8)
MCV RBC AUTO: 100.3 FL (ref 82.6–102.9)
MONOCYTES NFR BLD: 0.39 K/UL (ref 0.1–1.2)
MONOCYTES NFR BLD: 8 % (ref 3–12)
NEUTROPHILS NFR BLD: 47 % (ref 36–65)
NEUTS SEG NFR BLD: 2.26 K/UL (ref 1.5–8.1)
NRBC BLD-RTO: 0 PER 100 WBC
PLATELET # BLD AUTO: 231 K/UL (ref 138–453)
PMV BLD AUTO: 9.8 FL (ref 8.1–13.5)
POTASSIUM SERPL-SCNC: 4.1 MMOL/L (ref 3.7–5.3)
RBC # BLD AUTO: 3.78 M/UL (ref 4.21–5.77)
SODIUM SERPL-SCNC: 142 MMOL/L (ref 135–144)
WBC OTHER # BLD: 4.9 K/UL (ref 3.5–11.3)

## 2024-11-05 PROCEDURE — 2580000003 HC RX 258: Performed by: NURSE PRACTITIONER

## 2024-11-05 PROCEDURE — 99232 SBSQ HOSP IP/OBS MODERATE 35: CPT | Performed by: NURSE PRACTITIONER

## 2024-11-05 PROCEDURE — 2060000000 HC ICU INTERMEDIATE R&B

## 2024-11-05 PROCEDURE — 2700000000 HC OXYGEN THERAPY PER DAY

## 2024-11-05 PROCEDURE — 6360000002 HC RX W HCPCS: Performed by: INTERNAL MEDICINE

## 2024-11-05 PROCEDURE — 97530 THERAPEUTIC ACTIVITIES: CPT

## 2024-11-05 PROCEDURE — 85025 COMPLETE CBC W/AUTO DIFF WBC: CPT

## 2024-11-05 PROCEDURE — 97535 SELF CARE MNGMENT TRAINING: CPT

## 2024-11-05 PROCEDURE — 6370000000 HC RX 637 (ALT 250 FOR IP): Performed by: NURSE PRACTITIONER

## 2024-11-05 PROCEDURE — 36415 COLL VENOUS BLD VENIPUNCTURE: CPT

## 2024-11-05 PROCEDURE — 6370000000 HC RX 637 (ALT 250 FOR IP): Performed by: FAMILY MEDICINE

## 2024-11-05 PROCEDURE — 6370000000 HC RX 637 (ALT 250 FOR IP): Performed by: HOSPITALIST

## 2024-11-05 PROCEDURE — 6370000000 HC RX 637 (ALT 250 FOR IP): Performed by: INTERNAL MEDICINE

## 2024-11-05 PROCEDURE — 94761 N-INVAS EAR/PLS OXIMETRY MLT: CPT

## 2024-11-05 PROCEDURE — 97112 NEUROMUSCULAR REEDUCATION: CPT

## 2024-11-05 PROCEDURE — 80048 BASIC METABOLIC PNL TOTAL CA: CPT

## 2024-11-05 RX ORDER — OLANZAPINE 5 MG/1
15 TABLET ORAL NIGHTLY
Status: DISCONTINUED | OUTPATIENT
Start: 2024-11-05 | End: 2024-11-08 | Stop reason: HOSPADM

## 2024-11-05 RX ADMIN — ATORVASTATIN CALCIUM 40 MG: 40 TABLET, FILM COATED ORAL at 08:16

## 2024-11-05 RX ADMIN — FENTANYL CITRATE 25 MCG: 0.05 INJECTION, SOLUTION INTRAMUSCULAR; INTRAVENOUS at 05:56

## 2024-11-05 RX ADMIN — FENTANYL CITRATE 25 MCG: 0.05 INJECTION, SOLUTION INTRAMUSCULAR; INTRAVENOUS at 15:52

## 2024-11-05 RX ADMIN — FOLIC ACID 1 MG: 1 TABLET ORAL at 08:16

## 2024-11-05 RX ADMIN — SODIUM CHLORIDE, PRESERVATIVE FREE 10 ML: 5 INJECTION INTRAVENOUS at 20:18

## 2024-11-05 RX ADMIN — HYDROCORTISONE: 1 CREAM TOPICAL at 08:24

## 2024-11-05 RX ADMIN — VALPROIC ACID 250 MG: 250 CAPSULE, LIQUID FILLED ORAL at 20:17

## 2024-11-05 RX ADMIN — FENTANYL CITRATE 25 MCG: 0.05 INJECTION, SOLUTION INTRAMUSCULAR; INTRAVENOUS at 20:18

## 2024-11-05 RX ADMIN — HYDROCODONE BITARTRATE AND ACETAMINOPHEN 1 TABLET: 5; 325 TABLET ORAL at 00:53

## 2024-11-05 RX ADMIN — VALPROIC ACID 250 MG: 250 CAPSULE, LIQUID FILLED ORAL at 14:24

## 2024-11-05 RX ADMIN — VALPROIC ACID 250 MG: 250 CAPSULE, LIQUID FILLED ORAL at 08:16

## 2024-11-05 RX ADMIN — OLANZAPINE 2.5 MG: 2.5 TABLET, FILM COATED ORAL at 08:16

## 2024-11-05 RX ADMIN — FAMOTIDINE 20 MG: 20 TABLET, FILM COATED ORAL at 20:18

## 2024-11-05 RX ADMIN — APIXABAN 10 MG: 5 TABLET, FILM COATED ORAL at 08:16

## 2024-11-05 RX ADMIN — FENTANYL CITRATE 25 MCG: 0.05 INJECTION, SOLUTION INTRAMUSCULAR; INTRAVENOUS at 01:25

## 2024-11-05 RX ADMIN — OLANZAPINE 15 MG: 5 TABLET, FILM COATED ORAL at 20:17

## 2024-11-05 RX ADMIN — MULTIVITAMIN TABLET 1 TABLET: TABLET at 08:16

## 2024-11-05 RX ADMIN — APIXABAN 10 MG: 5 TABLET, FILM COATED ORAL at 20:17

## 2024-11-05 RX ADMIN — SODIUM CHLORIDE, PRESERVATIVE FREE 10 ML: 5 INJECTION INTRAVENOUS at 08:11

## 2024-11-05 RX ADMIN — TAMSULOSIN HYDROCHLORIDE 0.4 MG: 0.4 CAPSULE ORAL at 08:16

## 2024-11-05 RX ADMIN — ANTI-FUNGAL POWDER MICONAZOLE NITRATE TALC FREE: 1.42 POWDER TOPICAL at 20:26

## 2024-11-05 RX ADMIN — FAMOTIDINE 20 MG: 20 TABLET, FILM COATED ORAL at 08:16

## 2024-11-05 RX ADMIN — FENTANYL CITRATE 25 MCG: 0.05 INJECTION, SOLUTION INTRAMUSCULAR; INTRAVENOUS at 08:10

## 2024-11-05 RX ADMIN — Medication 100 MG: at 08:16

## 2024-11-05 RX ADMIN — HYDROCORTISONE: 1 CREAM TOPICAL at 20:26

## 2024-11-05 ASSESSMENT — PAIN DESCRIPTION - PAIN TYPE
TYPE: ACUTE PAIN

## 2024-11-05 ASSESSMENT — PAIN - FUNCTIONAL ASSESSMENT
PAIN_FUNCTIONAL_ASSESSMENT: ACTIVITIES ARE NOT PREVENTED
PAIN_FUNCTIONAL_ASSESSMENT: PREVENTS OR INTERFERES WITH MANY ACTIVE NOT PASSIVE ACTIVITIES
PAIN_FUNCTIONAL_ASSESSMENT: PREVENTS OR INTERFERES SOME ACTIVE ACTIVITIES AND ADLS
PAIN_FUNCTIONAL_ASSESSMENT: PREVENTS OR INTERFERES WITH MANY ACTIVE NOT PASSIVE ACTIVITIES

## 2024-11-05 ASSESSMENT — PAIN DESCRIPTION - ONSET
ONSET: ON-GOING
ONSET: ON-GOING
ONSET: PROGRESSIVE
ONSET: ON-GOING
ONSET: ON-GOING

## 2024-11-05 ASSESSMENT — PAIN DESCRIPTION - DESCRIPTORS
DESCRIPTORS: ACHING;THROBBING
DESCRIPTORS: ACHING;SHARP;STABBING
DESCRIPTORS: SORE;STABBING;SHARP
DESCRIPTORS: SHARP;STABBING;SORE
DESCRIPTORS: THROBBING;STABBING;BURNING
DESCRIPTORS: THROBBING;SHARP

## 2024-11-05 ASSESSMENT — PAIN SCALES - WONG BAKER
WONGBAKER_NUMERICALRESPONSE: NO HURT
WONGBAKER_NUMERICALRESPONSE: NO HURT

## 2024-11-05 ASSESSMENT — PAIN SCALES - GENERAL
PAINLEVEL_OUTOF10: 8
PAINLEVEL_OUTOF10: 0
PAINLEVEL_OUTOF10: 8
PAINLEVEL_OUTOF10: 4
PAINLEVEL_OUTOF10: 0
PAINLEVEL_OUTOF10: 8
PAINLEVEL_OUTOF10: 6
PAINLEVEL_OUTOF10: 0

## 2024-11-05 ASSESSMENT — PAIN DESCRIPTION - FREQUENCY
FREQUENCY: CONTINUOUS

## 2024-11-05 ASSESSMENT — PAIN DESCRIPTION - ORIENTATION
ORIENTATION: RIGHT
ORIENTATION: RIGHT;LEFT
ORIENTATION: RIGHT

## 2024-11-05 ASSESSMENT — PAIN DESCRIPTION - LOCATION
LOCATION: LEG
LOCATION: HIP;LEG;FOOT
LOCATION: HIP;LEG
LOCATION: FOOT
LOCATION: LEG;HIP
LOCATION: FOOT

## 2024-11-05 NOTE — PROGRESS NOTES
hydrocortisone   Topical BID    tamsulosin  0.4 mg Oral Daily    sodium phosphate  1 enema Rectal Once    multivitamin  1 tablet Oral Daily    miconazole   Topical BID    valproic acid  250 mg Oral TID    folic acid  1 mg Oral Daily    thiamine  100 mg Oral Daily    famotidine  20 mg Oral BID    labetalol  10 mg IntraVENous Once    amLODIPine  10 mg Oral Daily    And    atorvastatin  40 mg Oral Daily    sodium chloride flush  5-40 mL IntraVENous 2 times per day    sodium chloride flush  5-40 mL IntraVENous 2 times per day     Continuous Infusions:   sodium chloride      sodium chloride       PRN Meds:  sodium chloride flush, 10 mL, PRN  midodrine, 5 mg, TID PRN  HYDROcodone 5 mg - acetaminophen, 1 tablet, Q6H PRN  fentanNYL, 25 mcg, Q1H PRN  docusate sodium, 100 mg, BID PRN  OLANZapine, 2.5 mg, TID PRN  [Held by provider] LORazepam, 1 mg, Q2H PRN  loperamide, 2 mg, 4x Daily PRN  ipratropium 0.5 mg-albuterol 2.5 mg, 1 Dose, Q4H PRN  hydrALAZINE, 10 mg, Q6H PRN  metoprolol, 5 mg, Q6H PRN  sodium chloride flush, 5-40 mL, PRN  sodium chloride, , PRN  albuterol, 2.5 mg, Q6H PRN  sodium chloride flush, 5-40 mL, PRN  sodium chloride, , PRN  magnesium sulfate, 2,000 mg, PRN  ondansetron, 4 mg, Q8H PRN   Or  ondansetron, 4 mg, Q6H PRN  polyethylene glycol, 17 g, Daily PRN  acetaminophen, 650 mg, Q6H PRN   Or  acetaminophen, 650 mg, Q6H PRN  sodium phosphate 15 mmol in sodium chloride 0.9 % 250 mL IVPB, 15 mmol, PRN  aluminum & magnesium hydroxide-simethicone, 30 mL, Q6H PRN            Subjective:     Patient seen and examined at bedside and reviewed  last 24 hrs events with nursing staff  Family at bedside. Updated on p2p denial. Sw/CM to discuss next steps      ROS:  A 10 point system reviewed and negative otherwise mentioned above.        Physical Examination:      Vitals:  /74   Pulse (!) 107   Temp 97.7 °F (36.5 °C) (Oral)   Resp 22   Ht 1.66 m (5' 5.35\")   Wt 87.5 kg (192 lb 14.4 oz)   SpO2 92%   BMI 31.76  magnesium hydroxide-simethicone (MAALOX) 200-200-20 MG/5ML suspension 30 mL, 30 mL, Oral, Q6H PRN, Reza, Mohsin Mohammad, MD      I/O (24Hr):    Intake/Output Summary (Last 24 hours) at 11/5/2024 1823  Last data filed at 11/5/2024 1813  Gross per 24 hour   Intake 450 ml   Output 725 ml   Net -275 ml       Data:           Labs:    Hematology:  Recent Labs     11/04/24  0411 11/05/24  0709   WBC 5.4 4.9   RBC 4.00* 3.78*   HGB 13.1 12.4*   HCT 42.1 37.9*   .3* 100.3   MCH 32.8 32.8   MCHC 31.1 32.7   RDW 11.3* 11.3*    231   MPV 9.7 9.8   INR 1.0  --      Chemistry:  Recent Labs     11/04/24  0411 11/05/24  0709    142   K 3.5* 4.1    102   CO2 29 32*   GLUCOSE 84 122*   BUN 5* 5*   CREATININE 0.5* 0.7   ANIONGAP 10 8*   LABGLOM >90 >90   CALCIUM 8.6 8.5*       No results for input(s): \"LABALBU\", \"LABA1C\", \"C6MXDIR\", \"FT4\", \"TSH\", \"AST\", \"ALT\", \"LDH\", \"GGT\", \"ALKPHOS\", \"BILITOT\", \"BILIDIR\", \"AMMONIA\", \"AMYLASE\", \"LIPASE\", \"LACTATE\", \"CHOL\", \"HDL\", \"CHOLHDLRATIO\", \"TRIG\", \"VLDL\", \"XME52PZ\", \"PHENYTOIN\", \"PHENYF\", \"URICACID\", \"POCGLU\" in the last 72 hours.    Invalid input(s): \"PROT\", \"A7TYOJV\", \"LABGGT\", \"LDLCHOLESTEROL\"    Lab Results   Component Value Date/Time    SPECIAL Site: Urine  Hill catheter   10/23/2024 10:08 PM     Lab Results   Component Value Date/Time    CULTURE ENTEROCOCCUS FAECALIS >100,000 CFU/ML (A) 10/23/2024 10:08 PM       Lab Results   Component Value Date/Time    POCPH 7.410 10/14/2024 12:31 PM    POCPCO2 42.0 10/14/2024 12:31 PM    POCPO2 79.8 10/14/2024 12:31 PM    POCHCO3 26.6 10/14/2024 12:31 PM    NBEA 2.1 10/13/2024 10:49 AM    PBEA 1.7 10/14/2024 12:31 PM    BFMO0ZDU 95.8 10/14/2024 12:31 PM    FIO2 30.0 10/14/2024 06:00 AM       Radiology:    No results found.      All radiological studies reviewed  Code Status:  Full Code        Electronically signed by Mohsin Mohammad Reza, MD on 11/5/2024 at 6:23 PM    This note was created with the assistance of millie

## 2024-11-05 NOTE — PROGRESS NOTES
Spiritual Health History and Assessment/Progress Note  [facility]    (P) Spiritual/Emotional Needs,  ,  ,      Name: Noel Valle MRN: [id number]    Age: 69 y.o.     Sex: male   Language: English   Hinduism: New Religionist   Angioedema of tongue     Date: 11/5/2024            Total Time Calculated: (P) 7 min              Spiritual Assessment continued in [facility] ICU        Referral/Consult From: (P) Rounding   Encounter Overview/Reason: (P) Spiritual/Emotional Needs  Service Provided For: (P) Patient    [place], Belief, Meaning:   Patient has beliefs or practices that help with coping during difficult times  Family/Friends No family/friends present      Importance and Influence:  Patient has spiritual/personal beliefs that influence decisions regarding their health  Family/Friends No family/friends present    Community:  Patient feels well-supported. Support system includes: Other: family members  Family/Friends No family/friends present    Assessment and Plan of Care:     Patient Interventions include: Facilitated expression of thoughts and feelings, Explored spiritual coping/struggle/distress, Affirmed coping skills/support systems, and Facilitated life review and/ or legacy  Family/Friends Interventions include: No family/friends present    Patient Plan of Care: Spiritual Care available upon further referral  Family/Friends Plan of Care: Spiritual Care available upon further referral    Electronically signed by Chaplain Nighat on 11/5/2024 at 12:04 PM

## 2024-11-05 NOTE — PROGRESS NOTES
therapy/ambulate  Rehab discharge evaluation; advised patient had issues with placement because of aggressive behavior before ;now not having any problems over the last few days very remorseful cries when he recalls   peptic ulcer disease prophylaxis  DVT prophylaxis      Carlos Gamboa MD, MD, [facility]  Pulmonary Critical Care and Sleep Medicine,  [facility]  Cell: [phone]  Office: [phone]

## 2024-11-05 NOTE — PLAN OF CARE
No change overnight  Pt very aggressive at the start of shift with moderate hallucinations and difficult reorientation, much more pleasant after medication (see MAR), apologetic to staff and seemingly more oriented to surroundings  Pain in right leg r/t DVT, managed with norco and fentanyl effectively  IM Zyprexa given, somewhat effective  Hill in place, no BM overnight  Plan for vascular eval for DVT, placement discussed, pt is agreeable  Free of falls, nausea, headache    Patient having pain on their right leg and rates it a 9. Pain interventions includefrequent position changes, correct body alignment/body mechanics, relaxation techniques, medication, medicate per physician recommendation/order, and medicate at the onset of pain before it interferes with regular functions . Patients goal for pain relief is 1. The need for pain and symptom management will be considered in the discharge planning process to ensure patients comfort.     Problem: Discharge Planning  Goal: Discharge to home or other facility with appropriate resources  Outcome: Progressing  Flowsheets (Taken 11/4/2024 2000)  Discharge to home or other facility with appropriate resources:   Identify barriers to discharge with patient and caregiver   Arrange for needed discharge resources and transportation as appropriate   Identify discharge learning needs (meds, wound care, etc)   Refer to discharge planning if patient needs post-hospital services based on physician order or complex needs related to functional status, cognitive ability or social support system     Problem: Respiratory - Adult  Goal: Achieves optimal ventilation and oxygenation  Outcome: Progressing  Flowsheets (Taken 11/4/2024 2000)  Achieves optimal ventilation and oxygenation:   Assess for changes in respiratory status   Assess for changes in mentation and behavior   Position to facilitate oxygenation and minimize respiratory effort   Oxygen supplementation based on oxygen  Nutrition Deficit:  Goal: Optimize nutritional status  Outcome: Progressing     Problem: ABCDS Injury Assessment  Goal: Absence of physical injury  Outcome: Progressing     Problem: Confusion  Goal: Confusion, delirium, dementia, or psychosis is improved or at baseline  Description: INTERVENTIONS:  1. Assess for possible contributors to thought disturbance, including medications, impaired vision or hearing, underlying metabolic abnormalities, dehydration, psychiatric diagnoses, and notify attending LIP  2. [place] high risk fall precautions, as indicated  3. Provide frequent short contacts to provide reality reorientation, refocusing and direction  4. Decrease environmental stimuli, including noise as appropriate  5. Monitor and intervene to maintain adequate nutrition, hydration, elimination, sleep and activity  6. If unable to ensure safety without constant attention obtain sitter and review sitter guidelines with assigned personnel  7. Initiate Psychosocial CNS and Spiritual Care consult, as indicated  Outcome: Progressing  Flowsheets (Taken 11/4/2024 2000)  Effect of thought disturbance (confusion, delirium, dementia, or psychosis) are managed with adequate functional status:   Assess for contributors to thought disturbance, including medications, impaired vision or hearing, underlying metabolic abnormalities, dehydration, psychiatric diagnoses, notify LIP   [place] high risk fall precautions, as indicated   Provide frequent short contacts to provide reality reorientation, refocusing and direction   Decrease environmental stimuli, including noise as appropriate   Monitor and intervene to maintain adequate nutrition, hydration, elimination, sleep and activity   If unable to ensure safety without constant attention obtain sitter and review sitter guidelines with assigned personnel   Initiate Psychosocial Clinical Nurse Specialist and Spiritual Care consult, as indicated

## 2024-11-05 NOTE — PROGRESS NOTES
Pt has asked if he can work with therapy yet since yesterday as he was informed with the new DVT diagnosis no activity till today per Dr Gamboa.  Today Dr Gamboa states he consulted Dr Peres and to check with him if pt can do PT/OT. Dr LEOLA Peres messaged.

## 2024-11-05 NOTE — PROGRESS NOTES
Physical Therapy  Facility/Department: [facility] ICU  Daily Treatment Note  NAME: Noel Valle  : 1955  MRN: [id number]    Date of Service: 2024    Discharge Recommendations:  Patient would benefit from continued therapy after discharge    Pt is currently functional below baseline and recommend comprehensive and intensive skilled therapy by a multidisciplinary team. Would expect patient to be able to tolerate 3 hours of therapy per day and able to tolerate at least one hour up in chair.  Please refer to AM-PAC score for current mobility/adl level.      Patient Diagnosis(es): The primary encounter diagnosis was Angioedema, initial encounter. Diagnoses of Seizure-like activity (HCC) and Angioedema of tongue were also pertinent to this visit.    Assessment  Assessment: New orders for compression stockings to be applied prior to mobility, RN applied prior to session. Patient performed bed mobility MOD x 2 A and sat EOB CGA with glo UE assisting. Performed STS x 3 reps with improved technique and MAX x 2 A with increased HR to 140bpm but given rest breaks with decreased HR to 110-115 bpm, RN notified. Patient performed some LE AROM and SAROM with sheet as a leg . Good understanding at the time but questionable retention therefore RN educated/updated on goals. Also discussed that rita lift sling is currently under patient and propably the most appropriate for patient to return to bed later.  Activity Tolerance: Patient limited by fatigue;Patient limited by endurance;Treatment limited secondary to medical complications    Plan  Physical Therapy Plan  General Plan: 5-7 times per week  Current Treatment Recommendations: Strengthening;ROM;Patient/Caregiver education & training;Functional mobility training;Therapeutic activities;Safety education & training;Positioning;Balance training;Transfer training    Restrictions  Restrictions/Precautions  Restrictions/Precautions: General Precautions, Fall Risk, Seizure, Bed  improved technique and core stability to perform. Raised the legs of recliner for elevated and practiced hooking foot with sheet as leg  d/t patient feeling like he could not mobility his Rt LE well in bed by himself, discussed still to engage his muscles to assist vs letting him UE to all the work. Required assistance to actually hook the sheet around his foot on both sides. Patient demo'd legs lifts x 5 reps each side, then once more each side to help while writer and OT placed pillows to further elevated glo LEs in chair. Patient performed AP x 10 reps with VCs and encouragement with increased effort and limited range in Rt LE with some IR. Discussed continuing APs during the day even using the sheet to help on Rt side with Dosriflexion. RN aware also to encourage patient.    PT Exercises  Static Sitting Balance Exercises: Sat EOB with UE support (Cues for upright posture) ~5 minutes  See Neuro session  Breathing Techniques: activity pacing, pursed lip breathing technique     Safety Devices  Type of Devices: All fall risk precautions in place;Patient at risk for falls;Call light within reach;Nurse notified;Heels elevated for pressure relief;Gait belt;Chair alarm in place;Left in chair  Restraints  Restraints Initially in Place: No      Goals  Short Term Goals  Time Frame for Short Term Goals: 12 visits  Short Term Goal 1: Patient will demonstrate bed mobility with SBA  Short Term Goal 2: Patient will verbalize and demonstrate follow through of pressure relief techniques in order to promote healing and maintain good skin integrity  Short Term Goal 3: Patient will verbalize and demonstrate follow through of energy conservation techniques including compliance with incentive spirometer in order to improve activity tolerance  Short Term Goal 4: Patient will tolerate sitting EOB with UE support >20 minutes requiring SBA  Short Term Goal 5: Patient will be indep with HEP for B LE following handout  Additional

## 2024-11-05 NOTE — CARE COORDINATION
Social work: [facility] is unable to accept d/t continued behaviors.   As such, family would like to pursue expedited appeal for [facility].   SW called [facility] appeals department to submit request- [phone] and clinicals faxed to [phone]  Ref # [id number].  Expedited appeal can take up to 72 hours for decision.   Opal Kolb continues to follow for placement needs if ARU denied.   Kell/spouse updated on above.

## 2024-11-05 NOTE — PROGRESS NOTES
Nutrition Assessment     Type and Reason for Visit: Reassess    Nutrition Recommendations/Plan:   ADULT DIET; Dysphagia - Soft and Bite Sized; Safety Tray; Safety Tray (Disposables No Utensils)  Magic Cup at dinner. Ensure Plus High Protein at breakfast and dinner     Malnutrition Assessment:  Malnutrition Status: At risk for malnutrition (Comment)    Nutrition Assessment:  Patient is still having hallucinations and was agressive last night. Patient was also trying to get out of bed. Patient is aware of his behavior and apologizes. Patient was found to have 3 blood clots in right leg and has been started on Eliquis. Patient ate [employer] and a Sweet Roll from [employer] last night for dinner. Patient's appetite is improving. Continue current diet and oral nutrition supplements. Monitor p.o intakes labs.    Estimated Daily Nutrient Needs:  Energy (kcal):  6474-8182 kcal (16-18 kcal/kg) Weight Used for Energy Requirements: Current     Protein (g):  76-88 gm of protein (1.2-1.4 gm/kg) Weight Used for Protein Requirements: Ideal        Fluid (ml/day):  7678-5883 mL Method Used for Fluid Requirements: 1 ml/kcal    Nutrition Related Findings:   Edema: +1 non-pitting RLE. Active bowel sounds. Right leg DVTs x3; right leg pain. Hallucinations and agitation at times- generally at night Wound Type: None    Current Nutrition Therapies:    ADULT ORAL NUTRITION SUPPLEMENT; Breakfast, Dinner; Standard High Calorie/High Protein Oral Supplement  ADULT DIET; Dysphagia - Soft and Bite Sized; Safety Tray; Safety Tray (Disposables No Utensils)  ADULT ORAL NUTRITION SUPPLEMENT; Dinner; Frozen Oral Supplement    Anthropometric Measures:  Height: 166 cm (5' 5.35\")  Current Body Wt: 87.5 kg (192 lb 14.4 oz)   BMI: 31.8        Nutrition Diagnosis:   Inadequate oral intake related to inadequate protein-energy intake as evidenced by intake 51-75%    Nutrition Interventions:   Food and/or Nutrient Delivery: Continue Current Diet, Continue Oral

## 2024-11-05 NOTE — PROGRESS NOTES
Occupational Therapy  Facility/Department: [facility] ICU  Daily Treatment Note  NAME: Noel Valle  : 1955  MRN: [id number]    Date of Service: 2024    RN Camila reports patient is medically stable for therapy treatment this date.    Chart reviewed prior to treatment and patient is agreeable for therapy.  All lines intact and patient positioned comfortably at end of treatment.  All patient needs addressed prior to ending therapy session.     Pt is currently functional below baseline and recommend comprehensive and intensive skilled therapy by a multidisciplinary team. Would expect patient to be able to tolerate 3 hours of therapy per day and able to tolerate at least one hour up in chair.  Please refer to AM-PAC score for current mobility/adl level.      Discharge Recommendations:  Patient would benefit from continued therapy after discharge  OT Equipment Recommendations  Equipment Needed: Yes (CTA)      Patient Diagnosis(es): The primary encounter diagnosis was Angioedema, initial encounter. Diagnoses of Seizure-like activity (HCC) and Angioedema of tongue were also pertinent to this visit.     Assessment   Assessment: Pt demonstrated Fair tolerance for functional activity this session and is continuing to make progress towards STGs. Pt ok to mobilize per Dr. Villegas, who had been consulted to address DVT in RLE. Pt able to complete bed mobility tasks with ModAx2 this session and tolerated x 4 transfer training trials using dave [employer] device and  MaxAx2. Pt continues to require significant assist for all LB care. Pt most limited this session by weakness, elevations in HR, decreased functional endurance/fatigue, and decreased balance. Continued skilled OT services are indicated at this time to maximize this pt's safety and IND with self care tasks and to facilitate safe return to PLOF as able.  Activity Tolerance: Patient limited by fatigue;Patient limited by endurance;Treatment limited secondary to

## 2024-11-05 NOTE — CONSULTS
0.5* 0.7   ANIONGAP 10 8*   LABGLOM >90 >90   CALCIUM 8.6 8.5*     No results for input(s): \"LABALBU\", \"EAG\", \"X4RFDOA\", \"FT4\", \"TSH\", \"CORTISOL\", \"PROLACTIN\", \"AST\", \"ALT\", \"LDH\", \"GGT\", \"ALKPHOS\", \"BILITOT\", \"BILIDIR\", \"AMMONIA\", \"AMYLASE\", \"LIPASE\", \"LACTATE\", \"CHOL\", \"HDL\", \"CHOLHDLRATIO\", \"TRIG\", \"VLDL\", \"PHENYTOIN\", \"PHENYF\", \"VALPROATE\" in the last 72 hours.    Invalid input(s): \"PROT\", \"H5SLHVS\", \"LABGGT\", \"LDLCHOLESTEROL\"  Glucose:No results for input(s): \"LABA1C\", \"POCGLU\", \"LABINSU\" in the last 72 hours.      Assessment:     Primary Problem  Angioedema of tongue  69-year-old male with acute right common femoral, femoral and popliteal deep venous thrombosis  Angioedema of the tongue  Alcohol abuse with recent withdrawal delirium tremens    Active Hospital Problems    Diagnosis Date Noted    Seizure-like activity (HCC) [R56.9] 10/23/2024    Angioedema of tongue [T78.3XXA] 10/09/2024       Plan:     Continue Eliquis which he will need to be on for approximately 6 months  Wear Jobst stockings on a daily basis to help with his lower extremity swelling  Okay to ambulate  Discharge planning      Electronically signed by Ehsan Peres MD on 11/5/2024 at 2:37 PM     Copy sent to Ramakrishna Paz MD
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
## 2024-11-05 NOTE — PROGRESS NOTES
Pt becoming aggressive toward staff, threatening to punch and swearing. Pt states he knows he is in the hospital but also believes he is at home and wants to go outside to smoke, reorientation attempted and patient became increasingly more aggressive. Writer offered nicotine patch, pt continued verbal aggression.

## 2024-11-05 NOTE — PLAN OF CARE
Problem: Discharge Planning  Goal: Discharge to home or other facility with appropriate resources  11/5/2024 1322 by Camila Morse RN  Outcome: Progressing  Flowsheets (Taken 11/5/2024 0735)  Discharge to home or other facility with appropriate resources: Identify barriers to discharge with patient and caregiver  11/5/2024 0601 by Chivo García RN  Outcome: Progressing  Flowsheets (Taken 11/4/2024 2000)  Discharge to home or other facility with appropriate resources:   Identify barriers to discharge with patient and caregiver   Arrange for needed discharge resources and transportation as appropriate   Identify discharge learning needs (meds, wound care, etc)   Refer to discharge planning if patient needs post-hospital services based on physician order or complex needs related to functional status, cognitive ability or social support system     Problem: Respiratory - Adult  Goal: Achieves optimal ventilation and oxygenation  11/5/2024 1322 by Camila Morse RN  Outcome: Progressing  Flowsheets (Taken 11/5/2024 0735)  Achieves optimal ventilation and oxygenation:   Assess for changes in respiratory status   Assess for changes in mentation and behavior   Position to facilitate oxygenation and minimize respiratory effort   Oxygen supplementation based on oxygen saturation or arterial blood gases   Initiate smoking cessation protocol as indicated   Encourage broncho-pulmonary hygiene including cough, deep breathe, incentive spirometry   Assess the need for suctioning and aspirate as needed   Assess and instruct to report shortness of breath or any respiratory difficulty   Respiratory therapy support as indicated  11/5/2024 0601 by Chivo García, RN  Outcome: Progressing  Flowsheets (Taken 11/4/2024 2000)  Achieves optimal ventilation and oxygenation:   Assess for changes in respiratory status   Assess for changes in mentation and behavior   Position to facilitate oxygenation and minimize respiratory  with or without assistive devices   Assist with transfers and ambulation using safe patient handling equipment as needed   Ensure adequate protection for wounds/incisions during mobilization     Problem: Gastrointestinal - Adult  Goal: Maintains adequate nutritional intake  11/5/2024 1322 by Camila Morse RN  Outcome: Progressing  Flowsheets (Taken 11/5/2024 0735)  Maintains adequate nutritional intake: Monitor percentage of each meal consumed  11/5/2024 0601 by Chivo García RN  Outcome: Progressing  Flowsheets (Taken 11/4/2024 2000)  Maintains adequate nutritional intake:   Monitor percentage of each meal consumed   Identify factors contributing to decreased intake, treat as appropriate     Problem: Genitourinary - Adult  Goal: Absence of urinary retention  11/5/2024 1322 by Camila Morse RN  Outcome: Progressing  Flowsheets (Taken 11/5/2024 0735)  Absence of urinary retention:   Assess patient’s ability to void and empty bladder   Monitor intake/output and perform bladder scan as needed  11/5/2024 0601 by Chivo García RN  Outcome: Progressing  Flowsheets (Taken 11/4/2024 2000)  Absence of urinary retention:   Assess patient’s ability to void and empty bladder   Monitor intake/output and perform bladder scan as needed   Place urinary catheter per Licensed Independent Practitioner order if needed     Problem: Coping  Goal: Pt/Family able to verbalize concerns and demonstrate effective coping strategies  Description: INTERVENTIONS:  1. Assist patient/family to identify coping skills, available support systems and cultural and spiritual values  2. Provide emotional support, including active listening and acknowledgement of concerns of patient and caregivers  3. Reduce environmental stimuli, as able  4. Instruct patient/family in relaxation techniques, as appropriate  5. Assess for spiritual pain/suffering and initiate Spiritual Care, Psychosocial Clinical Specialist consults as needed  11/5/2024

## 2024-11-05 NOTE — CONSULTS
Physical Medicine & Rehabilitation  Consult Note - RECORD REVIEW ONLY      Admitting Physician:   Reza, Mohsin Mohammad, MD    Primary Care Provider:   Ramakrishna Silver MD     Reason for Consult:  Acute Inpatient Rehabilitation    Chief Complaint:  Difficulty swallowing    History of Present Illness:  Referring Provider is requesting an evaluation for appropriate placement upon discharge from acute care. History from chart review.    Noel Valle is a 69 y.o. male with history of COPD, tobacco abuse admitted to  [facility]  on 10/9/2024.      He initially presented with difficulty swallowing and was found to have tongue swelling in the ED.  He was taken to the OR for intubation due to concerns about potentially needing a tracheostomy.  He developed hypotension due to increased sedation requirement.  He was treated with icatibant and steroids.  Extubated 10/13/24.  He also required treatment for alcohol withdrawal.  Psychiatry was consulted for agitation and adjusted medications.  MRI brain showed no acute intracranial abnormality.  He was given an antibiotic for UTI.  He was found to have extensive acute right lower limb DVT; eliquis was started, and vascular surgery has been consulted.    Review of Systems:  Record Review Only     Premorbid function:  Independent    Current function:    Physical Therapy    Restrictions/Precautions: General Precautions, Fall Risk, Seizure, Bed Alarm  Other position/activity restrictions: Up w/ assist, continuous pulse ox, RUE IV, external catheter, telemetry    Bed mobility  Bridging: Dependent/Total  Supine to Sit: Moderate assistance, 2 Person assistance  Sit to Supine: 2 Person assistance, Maximum assistance  Scooting: Maximal assistance, 2 Person assistance  Bed Mobility Comments: Pt required increased time/effort and ModAx2 for sup<>sit transfers. Max multimodal cues provided to initiate and sequence through movements. Manual assist given to advance LE's to EOB and assist  volumes with no confluent airspace consolidation.     XR CHEST PORTABLE    Result Date: 10/11/2024  EXAMINATION: ONE XRAY VIEW OF THE CHEST 10/11/2024 3:33 am COMPARISON: 10/10/2024. HISTORY: ORDERING SYSTEM PROVIDED HISTORY: vent TECHNOLOGIST PROVIDED HISTORY: vent Reason for Exam: vent Additional signs and symptoms: vent FINDINGS: Low lung volumes.  Endotracheal tube with the tip projecting 3.1 cm above the jignesh.  The cardiac silhouette is stable in size.  No convincing focal consolidation.  There is no pleural effusion or pneumothorax.     1. Low lung volumes. 2. No significant change in the appearance of the chest.     XR CHEST PORTABLE    Result Date: 10/10/2024  EXAMINATION: ONE XRAY VIEW OF THE CHEST 10/10/2024 3:22 am COMPARISON: 10/09/2024. HISTORY: ORDERING SYSTEM PROVIDED HISTORY: vent TECHNOLOGIST PROVIDED HISTORY: vent Reason for Exam: vent Additional signs and symptoms: vent FINDINGS: Endotracheal tube is unchanged in position.  The cardiac silhouette is within normal limits for size given portable technique.  Low lung volumes.  There is prominence of the central pulmonary vasculature.  Minimal bibasilar opacification.  There may be a small left pleural effusion.  No pneumothorax.     No significant change in the appearance of the chest.     XR CHEST PORTABLE    Result Date: 10/9/2024  EXAMINATION: ONE XRAY VIEW OF THE CHEST 10/9/2024 11:26 am COMPARISON: None. HISTORY: ORDERING SYSTEM PROVIDED HISTORY: placement ET TECHNOLOGIST PROVIDED HISTORY: placement ET Reason for Exam: allergic reaction, ET placement FINDINGS: There are bibasilar infiltrates.  There is no pneumothorax.  The mediastinal structures are unremarkable.  The upper abdomen unremarkable.  The extrathoracic soft tissues are unremarkable.  There is an endotracheal tube with the tip in the midtrachea.     Bibasilar infiltrates. Endotracheal tube with the tip in the midtrachea.       Impression:    Debility secondary to angioedema s/p

## 2024-11-06 LAB
ANION GAP SERPL CALCULATED.3IONS-SCNC: 11 MMOL/L (ref 9–16)
BASOPHILS # BLD: 0.05 K/UL (ref 0–0.2)
BASOPHILS NFR BLD: 1 % (ref 0–2)
BUN SERPL-MCNC: 6 MG/DL (ref 8–23)
CALCIUM SERPL-MCNC: 8.7 MG/DL (ref 8.8–10.2)
CHLORIDE SERPL-SCNC: 102 MMOL/L (ref 98–107)
CO2 SERPL-SCNC: 30 MMOL/L (ref 20–31)
CREAT SERPL-MCNC: 0.7 MG/DL (ref 0.7–1.2)
EOSINOPHIL # BLD: 0.41 K/UL (ref 0–0.44)
EOSINOPHILS RELATIVE PERCENT: 6 % (ref 1–4)
ERYTHROCYTE [DISTWIDTH] IN BLOOD BY AUTOMATED COUNT: 11.4 % (ref 11.8–14.4)
GFR, ESTIMATED: >90 ML/MIN/1.73M2
GLUCOSE SERPL-MCNC: 132 MG/DL (ref 82–115)
HCT VFR BLD AUTO: 38.5 % (ref 40.7–50.3)
HGB BLD-MCNC: 12.5 G/DL (ref 13–17)
IMM GRANULOCYTES # BLD AUTO: 0.03 K/UL (ref 0–0.3)
IMM GRANULOCYTES NFR BLD: 1 %
LYMPHOCYTES NFR BLD: 1.52 K/UL (ref 1.1–3.7)
LYMPHOCYTES RELATIVE PERCENT: 24 % (ref 24–43)
MCH RBC QN AUTO: 32.8 PG (ref 25.2–33.5)
MCHC RBC AUTO-ENTMCNC: 32.5 G/DL (ref 28.4–34.8)
MCV RBC AUTO: 101 FL (ref 82.6–102.9)
MONOCYTES NFR BLD: 0.48 K/UL (ref 0.1–1.2)
MONOCYTES NFR BLD: 8 % (ref 3–12)
NEUTROPHILS NFR BLD: 60 % (ref 36–65)
NEUTS SEG NFR BLD: 3.91 K/UL (ref 1.5–8.1)
NRBC BLD-RTO: 0 PER 100 WBC
PLATELET # BLD AUTO: 252 K/UL (ref 138–453)
PMV BLD AUTO: 9.6 FL (ref 8.1–13.5)
POTASSIUM SERPL-SCNC: 3.7 MMOL/L (ref 3.7–5.3)
RBC # BLD AUTO: 3.81 M/UL (ref 4.21–5.77)
SODIUM SERPL-SCNC: 143 MMOL/L (ref 136–145)
WBC OTHER # BLD: 6.4 K/UL (ref 3.5–11.3)

## 2024-11-06 PROCEDURE — 6370000000 HC RX 637 (ALT 250 FOR IP): Performed by: FAMILY MEDICINE

## 2024-11-06 PROCEDURE — 2060000000 HC ICU INTERMEDIATE R&B

## 2024-11-06 PROCEDURE — 94761 N-INVAS EAR/PLS OXIMETRY MLT: CPT

## 2024-11-06 PROCEDURE — 6370000000 HC RX 637 (ALT 250 FOR IP): Performed by: INTERNAL MEDICINE

## 2024-11-06 PROCEDURE — 2700000000 HC OXYGEN THERAPY PER DAY

## 2024-11-06 PROCEDURE — 6370000000 HC RX 637 (ALT 250 FOR IP): Performed by: NURSE PRACTITIONER

## 2024-11-06 PROCEDURE — 6360000002 HC RX W HCPCS: Performed by: INTERNAL MEDICINE

## 2024-11-06 PROCEDURE — 2580000003 HC RX 258: Performed by: FAMILY MEDICINE

## 2024-11-06 PROCEDURE — 6370000000 HC RX 637 (ALT 250 FOR IP): Performed by: HOSPITALIST

## 2024-11-06 PROCEDURE — 80048 BASIC METABOLIC PNL TOTAL CA: CPT

## 2024-11-06 PROCEDURE — 97530 THERAPEUTIC ACTIVITIES: CPT

## 2024-11-06 PROCEDURE — 85025 COMPLETE CBC W/AUTO DIFF WBC: CPT

## 2024-11-06 PROCEDURE — 97110 THERAPEUTIC EXERCISES: CPT

## 2024-11-06 PROCEDURE — 36415 COLL VENOUS BLD VENIPUNCTURE: CPT

## 2024-11-06 PROCEDURE — 97112 NEUROMUSCULAR REEDUCATION: CPT

## 2024-11-06 PROCEDURE — 97535 SELF CARE MNGMENT TRAINING: CPT

## 2024-11-06 PROCEDURE — 6360000002 HC RX W HCPCS

## 2024-11-06 PROCEDURE — 2580000003 HC RX 258: Performed by: NURSE PRACTITIONER

## 2024-11-06 RX ADMIN — APIXABAN 10 MG: 5 TABLET, FILM COATED ORAL at 08:20

## 2024-11-06 RX ADMIN — OLANZAPINE 15 MG: 5 TABLET, FILM COATED ORAL at 20:03

## 2024-11-06 RX ADMIN — FOLIC ACID 1 MG: 1 TABLET ORAL at 08:23

## 2024-11-06 RX ADMIN — FAMOTIDINE 20 MG: 20 TABLET, FILM COATED ORAL at 08:22

## 2024-11-06 RX ADMIN — TAMSULOSIN HYDROCHLORIDE 0.4 MG: 0.4 CAPSULE ORAL at 08:22

## 2024-11-06 RX ADMIN — OLANZAPINE 2.5 MG: 2.5 TABLET, FILM COATED ORAL at 08:22

## 2024-11-06 RX ADMIN — Medication 100 MG: at 08:22

## 2024-11-06 RX ADMIN — FENTANYL CITRATE 25 MCG: 0.05 INJECTION, SOLUTION INTRAMUSCULAR; INTRAVENOUS at 12:38

## 2024-11-06 RX ADMIN — SODIUM CHLORIDE, PRESERVATIVE FREE 10 ML: 5 INJECTION INTRAVENOUS at 00:03

## 2024-11-06 RX ADMIN — ANTI-FUNGAL POWDER MICONAZOLE NITRATE TALC FREE: 1.42 POWDER TOPICAL at 08:24

## 2024-11-06 RX ADMIN — APIXABAN 10 MG: 5 TABLET, FILM COATED ORAL at 20:03

## 2024-11-06 RX ADMIN — HYDROCORTISONE: 1 CREAM TOPICAL at 20:04

## 2024-11-06 RX ADMIN — MULTIVITAMIN TABLET 1 TABLET: TABLET at 08:22

## 2024-11-06 RX ADMIN — HYDROCODONE BITARTRATE AND ACETAMINOPHEN 1 TABLET: 5; 325 TABLET ORAL at 08:26

## 2024-11-06 RX ADMIN — HYDROCORTISONE: 1 CREAM TOPICAL at 08:25

## 2024-11-06 RX ADMIN — VALPROIC ACID 250 MG: 250 CAPSULE, LIQUID FILLED ORAL at 20:03

## 2024-11-06 RX ADMIN — ATORVASTATIN CALCIUM 40 MG: 40 TABLET, FILM COATED ORAL at 08:23

## 2024-11-06 RX ADMIN — HYDROCODONE BITARTRATE AND ACETAMINOPHEN 1 TABLET: 5; 325 TABLET ORAL at 18:28

## 2024-11-06 RX ADMIN — FAMOTIDINE 20 MG: 20 TABLET, FILM COATED ORAL at 20:03

## 2024-11-06 RX ADMIN — OLANZAPINE 2.5 MG: 10 INJECTION, POWDER, FOR SOLUTION INTRAMUSCULAR at 00:03

## 2024-11-06 RX ADMIN — VALPROIC ACID 250 MG: 250 CAPSULE, LIQUID FILLED ORAL at 14:48

## 2024-11-06 RX ADMIN — SODIUM CHLORIDE, PRESERVATIVE FREE 10 ML: 5 INJECTION INTRAVENOUS at 08:23

## 2024-11-06 RX ADMIN — SODIUM CHLORIDE, PRESERVATIVE FREE 10 ML: 5 INJECTION INTRAVENOUS at 20:04

## 2024-11-06 RX ADMIN — ANTI-FUNGAL POWDER MICONAZOLE NITRATE TALC FREE: 1.42 POWDER TOPICAL at 20:05

## 2024-11-06 RX ADMIN — SODIUM CHLORIDE, PRESERVATIVE FREE 10 ML: 5 INJECTION INTRAVENOUS at 20:03

## 2024-11-06 RX ADMIN — VALPROIC ACID 250 MG: 250 CAPSULE, LIQUID FILLED ORAL at 08:22

## 2024-11-06 ASSESSMENT — PAIN DESCRIPTION - DESCRIPTORS
DESCRIPTORS: ACHING;BURNING
DESCRIPTORS: DISCOMFORT;ACHING
DESCRIPTORS: DISCOMFORT;ACHING

## 2024-11-06 ASSESSMENT — PAIN SCALES - GENERAL
PAINLEVEL_OUTOF10: 2
PAINLEVEL_OUTOF10: 6
PAINLEVEL_OUTOF10: 8
PAINLEVEL_OUTOF10: 0
PAINLEVEL_OUTOF10: 0
PAINLEVEL_OUTOF10: 8
PAINLEVEL_OUTOF10: 2
PAINLEVEL_OUTOF10: 0

## 2024-11-06 ASSESSMENT — PAIN DESCRIPTION - ORIENTATION
ORIENTATION: RIGHT

## 2024-11-06 ASSESSMENT — PAIN DESCRIPTION - LOCATION
LOCATION: FOOT
LOCATION: LEG
LOCATION: HIP

## 2024-11-06 NOTE — CARE COORDINATION
Transitional Planning  Received a call from Gisela at [facility] regarding AOR.  Forms previously sent, incomplete thus invalid.  Gisela requesting Section 1 to have the patient's phone number added then to refax to [phone].  Writer placed patients phone number and faxed back first three pages per Gisela's request.  Gisela's phone number [phone] for AOR issues only.

## 2024-11-06 NOTE — PROGRESS NOTES
Physical Therapy  Facility/Department: [facility] ICU  Daily Treatment Note  NAME: Noel Valle  : 1955  MRN: [id number]    Date of Service: 2024    Discharge Recommendations:  Patient would benefit from continued therapy after discharge    Pt is currently functional below baseline and recommend comprehensive and intensive skilled therapy by a multidisciplinary team. Would expect patient to be able to tolerate 3 hours of therapy per day and able to tolerate at least one hour up in chair.  Please refer to AM-PAC score for current mobility/adl level.      Patient Diagnosis(es): The primary encounter diagnosis was Angioedema, initial encounter. Diagnoses of Seizure-like activity (HCC) and Angioedema of tongue were also pertinent to this visit.    Assessment  Assessment: Patient slowly progressing with improved technique and following VCs for STS technique in dave pratt. Patient has difficutlies engaging hip extensor needed for upright posture. Performed STS x 4 reps and required MAX x 2 A but tolerated longer standing and able to improve eccentric control during stand to sit with extensive VCs, tactile cues and assistnace. Continued to have elevated HR during activity but quickly recovery during rest break. Patient cooperative with therapy each session and motivated to progress. Patient would benefit from cotinued skilled PT services.  Activity Tolerance: Patient limited by fatigue;Patient limited by endurance;Patient limited by pain    Plan  Physical Therapy Plan  General Plan: 5-7 times per week  Current Treatment Recommendations: Strengthening;ROM;Patient/Caregiver education & training;Functional mobility training;Therapeutic activities;Safety education & training;Positioning;Balance training;Transfer training    Restrictions  Restrictions/Precautions  Restrictions/Precautions: General Precautions, Fall Risk, Seizure, Bed Alarm  Required Braces or Orthoses?: No  Position Activity Restriction  Other

## 2024-11-06 NOTE — PLAN OF CARE
Optimize nutritional status  11/6/2024 1254 by Vasyl Urias RN  Outcome: Progressing  11/5/2024 2258 by Belkis Dawson RN  Outcome: Progressing     Problem: ABCDS Injury Assessment  Goal: Absence of physical injury  11/6/2024 1254 by Vasyl Urias RN  Outcome: Progressing  11/5/2024 2258 by Belkis Dawson RN  Outcome: Progressing     Problem: Confusion  Goal: Confusion, delirium, dementia, or psychosis is improved or at baseline  Description: INTERVENTIONS:  1. Assess for possible contributors to thought disturbance, including medications, impaired vision or hearing, underlying metabolic abnormalities, dehydration, psychiatric diagnoses, and notify attending LIP  2. [place] high risk fall precautions, as indicated  3. Provide frequent short contacts to provide reality reorientation, refocusing and direction  4. Decrease environmental stimuli, including noise as appropriate  5. Monitor and intervene to maintain adequate nutrition, hydration, elimination, sleep and activity  6. If unable to ensure safety without constant attention obtain sitter and review sitter guidelines with assigned personnel  7. Initiate Psychosocial CNS and Spiritual Care consult, as indicated  11/6/2024 1254 by Vasyl Urias RN  Outcome: Progressing  11/5/2024 2258 by Belkis Dawson RN  Outcome: Progressing  Flowsheets (Taken 11/5/2024 2000 by Sarah Rascon, RN)  Effect of thought disturbance (confusion, delirium, dementia, or psychosis) are managed with adequate functional status: Assess for contributors to thought disturbance, including medications, impaired vision or hearing, underlying metabolic abnormalities, dehydration, psychiatric diagnoses, notify LIP

## 2024-11-06 NOTE — PLAN OF CARE
Problem: Discharge Planning  Goal: Discharge to home or other facility with appropriate resources  11/5/2024 2258 by Belkis Dawson RN  Outcome: Progressing  11/5/2024 1322 by Camila Morse RN  Outcome: Progressing  Flowsheets (Taken 11/5/2024 0735)  Discharge to home or other facility with appropriate resources: Identify barriers to discharge with patient and caregiver     Problem: Respiratory - Adult  Goal: Achieves optimal ventilation and oxygenation  11/5/2024 2258 by Belkis Dawson RN  Outcome: Progressing  11/5/2024 1322 by Camila Morse RN  Outcome: Progressing  Flowsheets (Taken 11/5/2024 0735)  Achieves optimal ventilation and oxygenation:   Assess for changes in respiratory status   Assess for changes in mentation and behavior   Position to facilitate oxygenation and minimize respiratory effort   Oxygen supplementation based on oxygen saturation or arterial blood gases   Initiate smoking cessation protocol as indicated   Encourage broncho-pulmonary hygiene including cough, deep breathe, incentive spirometry   Assess the need for suctioning and aspirate as needed   Assess and instruct to report shortness of breath or any respiratory difficulty   Respiratory therapy support as indicated     Problem: Neurosensory - Adult  Goal: Achieves stable or improved neurological status  11/5/2024 2258 by Belkis Dawson RN  Outcome: Progressing  11/5/2024 1322 by Camila Morse RN  Outcome: Progressing  Flowsheets (Taken 11/5/2024 0735)  Achieves stable or improved neurological status: Assess for and report changes in neurological status     Problem: Cardiovascular - Adult  Goal: Maintains optimal cardiac output and hemodynamic stability  11/5/2024 2258 by Belkis Dawson RN  Outcome: Progressing  11/5/2024 1322 by Camila Morse RN  Outcome: Progressing  Flowsheets (Taken 11/5/2024 0735)  Maintains optimal cardiac output and hemodynamic stability:   Monitor blood pressure and heart  Absence of new skin breakdown  Description: 1.  Monitor for areas of redness and/or skin breakdown  2.  Assess vascular access sites hourly  3.  Every 4-6 hours minimum:  Change oxygen saturation probe site  4.  Every 4-6 hours:  If on nasal continuous positive airway pressure, respiratory therapy assess nares and determine need for appliance change or resting period.  11/5/2024 2258 by Belkis Dawson RN  Outcome: Progressing  11/5/2024 1322 by Camila Morse RN  Outcome: Progressing     Problem: Safety - Adult  Goal: Free from fall injury  11/5/2024 2258 by Belkis Dawson RN  Outcome: Progressing  11/5/2024 1322 by Camila Morse RN  Outcome: Progressing     Problem: Coping  Goal: Pt/Family able to verbalize concerns and demonstrate effective coping strategies  Description: INTERVENTIONS:  1. Assist patient/family to identify coping skills, available support systems and cultural and spiritual values  2. Provide emotional support, including active listening and acknowledgement of concerns of patient and caregivers  3. Reduce environmental stimuli, as able  4. Instruct patient/family in relaxation techniques, as appropriate  5. Assess for spiritual pain/suffering and initiate Spiritual Care, Psychosocial Clinical Specialist consults as needed  11/5/2024 2258 by Belkis Dawson RN  Outcome: Progressing  11/5/2024 1322 by Camila Morse RN  Outcome: Progressing  Flowsheets (Taken 11/5/2024 3190)  Patient/family able to verbalize anxieties, fears, and concerns, and demonstrate effective coping:   Assist patient/family to identify coping skills, available support systems and cultural and spiritual values   Provide emotional support, including active listening and acknowledgement of concerns of patient and caregivers     Problem: ABCDS Injury Assessment  Goal: Absence of physical injury  11/5/2024 2258 by Belkis Dawson RN  Outcome: Progressing  11/5/2024 1322 by Camila Morse

## 2024-11-06 NOTE — PROGRESS NOTES
End Of Shift Note  [facility] ICU  Summary of shift: Pt up to the chair with therapy for the first time this admission and then was maxisky lifted back to bed after a few hours. Pt did have a medium brown formed BM on bedpan today. Pt was pleasant today occasionally confused thinking the call light is a phone and confused on how to use his cell phone. Pt given a rolled sheet wrapped around his right foot to help move his right leg in bed himself. Pt complains of right foot pain and given fentanyl X2 today. Pt wearing cindy hose knee high prior to working with therapy and has Jobst stockings to wear starting tomorrow due to new DVTs in right leg.    Vitals:    Vitals:    11/05/24 1100 11/05/24 1117 11/05/24 1200 11/05/24 1600   BP:   117/72 111/74   Pulse: 86  94 (!) 107   Resp: 18  18 22   Temp: 97.7 °F (36.5 °C)      TempSrc: Oral      SpO2: 96%  92% 92%   Weight:       Height:  1.66 m (5' 5.35\")          I&O:   Intake/Output Summary (Last 24 hours) at 11/5/2024 1909  Last data filed at 11/5/2024 1813  Gross per 24 hour   Intake 450 ml   Output 725 ml   Net -275 ml       Resp Status: 2L NC    Critical Care IV infusions:   sodium chloride      sodium chloride          LDA:   Peripheral IV 11/03/24 Right;Anterior Forearm (Active)   Number of days: 1       Urinary Catheter 11/02/24 2 Way (Active)   Number of days: 3

## 2024-11-06 NOTE — PROGRESS NOTES
Occupational Therapy  Facility/Department: [facility] ICU  Rehabilitation Occupational Therapy Daily Treatment Note    Date: 24  Patient Name: Noel Valle       Room: 1114/1114-01  MRN: [id number]  Account: [id number]   : 1955  (69 y.o.) Gender: male   Past Medical History:  has a past medical history of COPD (chronic obstructive pulmonary disease) ([facility]).  Past Surgical History:   has no past surgical history on file.    Restrictions  Restrictions/Precautions: General Precautions, Fall Risk, Seizure, Bed Alarm  Other position/activity restrictions: Up w/ assist, continuous pulse ox, O2 via NC, RUE IV, external catheter, telemetry, Jobst stockings.  Required Braces or Orthoses?: No    Subjective  Subjective: Pt. supine and alert in bed. Pt. willing to participate in therapy.  Restrictions/Precautions: General Precautions;Fall Risk;Seizure;Bed Alarm  Objective     Cognition  Overall Cognitive Status: Exceptions  Arousal/Alertness: Appropriate responses to stimuli  Following Commands: Follows one step commands with increased time  Attention Span: Appears intact  Memory: Impaired  Safety Judgement: Decreased awareness of need for safety;Decreased awareness of need for assistance  Problem Solving: Assistance required to identify errors made;Assistance required to correct errors made;Assistance required to implement solutions  Insights: Decreased awareness of deficits  Initiation: Requires cues for all  Sequencing: Requires cues for all  Cognition Comment: Pt. displayed increased clarity with asking questions about rehab and making plans for when he gets home.  Orientation  Overall Orientation Status: Impaired  Orientation Level: Oriented to person         ADL  Putting On/Taking Off Footwear  Assistance Level: Dependent  Skilled Clinical Factors: New Jobst stockings donned while supine in bed.  Toileting  Assistance Level: Dependent  Skilled Clinical Factors: external cath in place and no other needs  Functional

## 2024-11-06 NOTE — PROGRESS NOTES
PROGRESS NOTE  [facility] Hospitalist        11/6/2024   11:57 AM    Name:  Noel Valle  MRN:    [id number]     Acct:     [id number]   Room:  [address] [place] [address]  IP Day: 28     Admit Date: 10/9/2024 10:05 AM  PCP: Ramakrishna Silver MD    C/C:   Chief Complaint   Patient presents with    Angioedema       Assessment/ Plan:          Acute Respiratory Insufficiency due to upper airway obstruction secondary to Angioedema  - S/P extubation 10-13-24  - IV Solu-Medrol switch to prednisone, completed the course  - Pulmonology following     Acute Extensive RLE DVT  - on anticoagulation.  - developed during this stay.   - vascular consulted - appreciate recs. Agree with current plan.     Hypertension  - Monitor BP   - On norvasc and clonidine. Off coreg due to bradycardia   - On prn Hydralazine.   - Hypotension initially - resolved     HELENE  - resolved     Tobacco abuse disorder/history of possible COPD  - counseling provided       Alcohol withdrawal  - S/p meds. Stable now.      Agitation secondary to alcohol withdrawal, metabolic disturbances, polypharmacy  - on geodon for severe agitation  - zyprexa 15mg po qhs  - depakote 250mg TID  - psychiatry consulted - appreciate recs.     Subclinical hyperthyroidism     Constipation   Fleet enema  Colace prn     Hypernatremia  - resolved.     UTI  - s/p Antibiotics.        DC planning- SNF  [facility] refused - does not meet criteria   Peer to Peer request for Inpt rehab denied - citing decreased endurance and inability to perform 3-4hrs of therapy.   Next step is appeal process. Ok to dc when arrangements made.     DVT and GI prophylaxis  Continue to monitor/telemetry/CBC with differential daily/BMP daily  Continue medications as below    Scheduled Meds:   OLANZapine  15 mg Oral Nightly    apixaban  10 mg Oral BID    Followed by    [START ON 11/11/2024] apixaban  5 mg Oral BID    OLANZapine  2.5 mg Oral Daily    nicotine  1 patch TransDERmal Daily    hydrocortisone   Topical  Oral, Q6H PRN, Reza, Mohsin Mohammad, MD      I/O (24Hr):    Intake/Output Summary (Last 24 hours) at 11/6/2024 1157  Last data filed at 11/6/2024 0400  Gross per 24 hour   Intake 200 ml   Output 800 ml   Net -600 ml       Data:           Labs:    Hematology:  Recent Labs     11/04/24 0411 11/05/24  0709 11/06/24  0743   WBC 5.4 4.9 6.4   RBC 4.00* 3.78* 3.81*   HGB 13.1 12.4* 12.5*   HCT 42.1 37.9* 38.5*   .3* 100.3 101.0   MCH 32.8 32.8 32.8   MCHC 31.1 32.7 32.5   RDW 11.3* 11.3* 11.4*    231 252   MPV 9.7 9.8 9.6   INR 1.0  --   --      Chemistry:  Recent Labs     11/04/24 0411 11/05/24  0709 11/06/24  0743    142 143   K 3.5* 4.1 3.7    102 102   CO2 29 32* 30   GLUCOSE 84 122* 132*   BUN 5* 5* 6*   CREATININE 0.5* 0.7 0.7   ANIONGAP 10 8* 11   LABGLOM >90 >90 >90   CALCIUM 8.6 8.5* 8.7*       No results for input(s): \"LABALBU\", \"LABA1C\", \"N5RAQRC\", \"FT4\", \"TSH\", \"AST\", \"ALT\", \"LDH\", \"GGT\", \"ALKPHOS\", \"BILITOT\", \"BILIDIR\", \"AMMONIA\", \"AMYLASE\", \"LIPASE\", \"LACTATE\", \"CHOL\", \"HDL\", \"CHOLHDLRATIO\", \"TRIG\", \"VLDL\", \"GLY34FN\", \"PHENYTOIN\", \"PHENYF\", \"URICACID\", \"POCGLU\" in the last 72 hours.    Invalid input(s): \"PROT\", \"J5HNKNL\", \"LABGGT\", \"LDLCHOLESTEROL\"    Lab Results   Component Value Date/Time    SPECIAL Site: Urine  Hill catheter   10/23/2024 10:08 PM     Lab Results   Component Value Date/Time    CULTURE ENTEROCOCCUS FAECALIS >100,000 CFU/ML (A) 10/23/2024 10:08 PM       Lab Results   Component Value Date/Time    POCPH 7.410 10/14/2024 12:31 PM    POCPCO2 42.0 10/14/2024 12:31 PM    POCPO2 79.8 10/14/2024 12:31 PM    POCHCO3 26.6 10/14/2024 12:31 PM    NBEA 2.1 10/13/2024 10:49 AM    PBEA 1.7 10/14/2024 12:31 PM    LTEO8AJE 95.8 10/14/2024 12:31 PM    FIO2 30.0 10/14/2024 06:00 AM       Radiology:    No results found.      All radiological studies reviewed  Code Status:  Full Code        Electronically signed by Mohsin Mohammad Reza, MD on 11/6/2024 at 11:57 AM    This note was

## 2024-11-06 NOTE — PROGRESS NOTES
VASCULAR SURGERY  PROGRESS NOTE      11/6/2024 2:24 PM  Subjective:   Admit Date: 10/9/2024  PCP: Ramakrishna Silver MD    Chief Complaint   Patient presents with    Angioedema     Interval History: No complaints.  Feeling better.  Stockings fitted.    Diet: ADULT ORAL NUTRITION SUPPLEMENT; Breakfast, Dinner; Standard High Calorie/High Protein Oral Supplement  ADULT DIET; Dysphagia - Soft and Bite Sized; Safety Tray; Safety Tray (Disposables No Utensils)  ADULT ORAL NUTRITION SUPPLEMENT; Dinner; Frozen Oral Supplement    Medications:   Scheduled Meds:   OLANZapine  15 mg Oral Nightly    apixaban  10 mg Oral BID    Followed by    [START ON 11/11/2024] apixaban  5 mg Oral BID    OLANZapine  2.5 mg Oral Daily    nicotine  1 patch TransDERmal Daily    hydrocortisone   Topical BID    tamsulosin  0.4 mg Oral Daily    sodium phosphate  1 enema Rectal Once    multivitamin  1 tablet Oral Daily    miconazole   Topical BID    valproic acid  250 mg Oral TID    folic acid  1 mg Oral Daily    thiamine  100 mg Oral Daily    famotidine  20 mg Oral BID    labetalol  10 mg IntraVENous Once    amLODIPine  10 mg Oral Daily    And    atorvastatin  40 mg Oral Daily    sodium chloride flush  5-40 mL IntraVENous 2 times per day    sodium chloride flush  5-40 mL IntraVENous 2 times per day     Continuous Infusions:   sodium chloride      sodium chloride           Labs:   CBC:   Recent Labs     11/04/24  0411 11/05/24  0709 11/06/24  0743   WBC 5.4 4.9 6.4   HGB 13.1 12.4* 12.5*    231 252     BMP:    Recent Labs     11/04/24  0411 11/05/24  0709 11/06/24  0743    142 143   K 3.5* 4.1 3.7    102 102   CO2 29 32* 30   BUN 5* 5* 6*   CREATININE 0.5* 0.7 0.7   GLUCOSE 84 122* 132*     Hepatic: No results for input(s): \"AST\", \"ALT\", \"BILITOT\", \"ALKPHOS\" in the last 72 hours.    Invalid input(s): \"ALB\"  Troponin: Invalid input(s): \"TROPONIN\"  BNP: No results for input(s): \"BNP\" in the last 72 hours.  Lipids: No results for

## 2024-11-06 NOTE — PROGRESS NOTES
Occupational Therapy  Visit Type: treatment  SUBJECTIVE  Patient agreed to participate in therapy this date.  Pt responding to yes/no questions    Pain   Patient denies pain.    At onset of session (out of 10): 0    OBJECTIVE          Transfers  Assistive devices: 2-wheeled walker, gait belt  - Sit to stand: contact guard/touching/steadying assist, with verbal cues  - Stand to sit: contact guard/touching/steadying assist, with verbal cues  - Stand pivot: contact guard/touching/steadying assist, with verbal cues  Improved carry over of hand placement      Functional Ambulation  - Assistance: contact guard/touching/steadying assist  - Assistive device: gait belt and 2-wheeled walker  - Surface: even and incline/decline (carpet)  Patient ambulated to/from and around therapy gym in prep for therapeutic activities.   Activities of Daily Living (ADLs)  Grooming/Oral Hygiene:   - Grooming assist: contact guard/touching/steadying assist and with verbal cues  - Position: standing at sink  - Assist needed for: wash/dry hands and steadying  Instrumental Activities of Daily Living:        Interventions  Engaged patient in BUE ergometer while standing, level 3 x 10 minutes with seated rest break after 5 minutes to enhance BUE endurance for functional transfers, contact guard assist -stand by assist to maintain standing balance.    Patient completed portion of puzzle with max cueing throughout task, ~25 minutes for 13 pieces.    Performed ambulation in therapy gym and instructed to retrieve cones by number of cones/color, min cues required for recall.          ASSESSMENT  Impairments: bed mobility, balance, activity tolerance, safety awareness, cognitive and strength  Functional Limitations: functional transfers, participating in meaningful/purposeful activities, IADLs, toileting, community reintegration, dressing, showering, functional mobility and bed mobility      Discharge Recommendations:  Recommendations for Discharge: OT  Pulmonary Critical Care Progress Note    Patient seen for the follow up of Angioedema of tongue     Subjective:    He is on oxygen nasal cannula.  .  He is able to tolerate oral intake.  He still gets some hallucination.  No shortness of breath at rest.  Examination:    Vitals: /71   Pulse 95   Temp 97.8 °F (36.6 °C) (Oral)   Resp 15   Ht 1.66 m (5' 5.35\")   Wt 87.5 kg (192 lb 14.4 oz)   SpO2 92%   BMI 31.76 kg/m²   SpO2  Av.5 %  Min: 88 %  Max: 97 %  General appearance: Awake alert no distress significant seborrhea over face  Neck: No JVD  Lungs: Decreased breath sound no crackles or wheeze  Heart: regular rate and rhythm, S1, S2 normal, no gallop  Abdomen: Soft, non tender, + BS  Extremities: no cyanosis or clubbing. No significant edema    LABs:    CBC:   Recent Labs     24  0709 24  0743   WBC 4.9 6.4   HGB 12.4* 12.5*   HCT 37.9* 38.5*    252       BMP:   Recent Labs     24  0709 24  0743    143   K 4.1 3.7   CO2 32* 30   BUN 5* 6*   CREATININE 0.7 0.7   LABGLOM >90 >90   GLUCOSE 122* 132*        Latest Reference Range & Units 10/14/24 06:00 10/14/24 12:31   POC TCO2 22 - 30 mmol/L  27   POC HCO3 21.0 - 28.0 mmol/L 28.1 (H) 26.6   POC O2 SAT 94.0 - 98.0 % 95.6 95.8   POC pCO2 35.0 - 48.0 mm Hg 43.2 42.0   POC pH 7.350 - 7.450  7.421 7.410   POC PO2 83.0 - 108.0 mm Hg 78.4 (L) 79.8 (L)   (H): Data is abnormally high  (L): Data is abnormally low   Latest Reference Range & Units 24 12:13   D-Dimer, Quant 0.00 - 0.59 ug/mL FEU 2.09 (H)   (H): Data is abnormally high    Radiology:  Chest x-ray 10/20  Poor inspiratory effort.       Chest x-ray 10/17 reviewed  1. Probable mild atelectatic changes at the base of the left lung.  2. Small left pleural effusion versus pleural thickening.  3. No other acute cardiopulmonary process.      Chest x-ray 10/16  Reviewed  Small left effusion with atelectasis.     CTA chest   1. Suboptimal contrast timing limits  IL: Patient requires 24 HOUR assistance to perform mobility and/or ADLs safely  PT/OT Mobility Equipment for Discharge: TBD  PT/OT ADL Equipment for Discharge: TBD  OT Identified Barriers to Discharge: Impaired cognition, decreased safety awareness      Progress: progressing toward goals    Therapy Participation: This patient participated in all scheduled occupational therapy time this session.    Patient at End of Session:   Location: in wheelchair  Safety measures: alarm system in place/re-engaged and call light within reach  Handoff to: nurse    PLAN  Suggestions for next session as indicated: Functional transfer training, standing tolerance/endurance, sequencing, safety awareness    OT Frequency: 6 days/week, 7 days/week   Frequency Comments: 60-90 min/day   Duration: TDD 6/7/2024    Interventions: activity tolerance training, balance, bed mobility training, compensatory techniques, energy conservation, patient education, therapeutic exercise, therapeutic activity and upper extremity strengthening/ROM  Agreement to plan and goals: patient agrees with goals and treatment plan      GOALS  Short Term Goals (STGs): to be met 7 days from date established, unless otherwise stated.  - Patient will complete grooming at stand by assist level in standing with adaptive equipment as deemed appropriate. -MET 5/31/2024  - Patient will complete bathing at contact guard assist level with adaptive equipment as deemed appropriate bathing in shower using DME.  -MET 5/31/2024  - Patient will complete toileting at stand by assist level in standing with adaptive equipment as deemed appropriate. -MET 5/31/2024  Long Term Goals (LTGs): to be met by discharge from rehab program.  ADL goals established 5/27/2024:  - Patient will complete self-feeding at set-up level assist with adaptive equipment as deemed appropriate.  - Patient will complete grooming at set-up level assist with adaptive equipment as deemed appropriate.  - Patient will  complete bathing at stand by assist level with adaptive equipment as deemed appropriate.  - Patient will complete upper body dressing at set-up level assist with adaptive equipment as deemed appropriate.  - Patient will complete lower body dressing at supervision level with adaptive equipment as deemed appropriate.  - Patient will complete toileting at supervision level with adaptive equipment as deemed appropriate.  Bathroom transfer goals established 5/27/2024:  - Patient will complete toilet transfer at supervision level with adaptive equipment as deemed appropriate.  - Patient will complete tub transfer at supervision level with adaptive equipment as deemed appropriate.  IADL goals established at evaluation 5/25/2024:  - Patient will complete light meal prep at stand by assist level and set-up level assist.  - Patient will complete medication management at supervision level.  - Patient will complete simulated money management task related to paying bills or shopping at supervision level.        Therapy procedure time and total treatment time can be found documented on the Time Entry flowsheet

## 2024-11-07 LAB
T3FREE SERPL-MCNC: 2.5 PG/ML (ref 2–4.4)
T4 FREE SERPL-MCNC: 0.7 NG/DL (ref 0.93–1.7)
TSH SERPL DL<=0.05 MIU/L-ACNC: 0.01 UIU/ML (ref 0.27–4.2)

## 2024-11-07 PROCEDURE — 6370000000 HC RX 637 (ALT 250 FOR IP): Performed by: INTERNAL MEDICINE

## 2024-11-07 PROCEDURE — 94761 N-INVAS EAR/PLS OXIMETRY MLT: CPT

## 2024-11-07 PROCEDURE — 6370000000 HC RX 637 (ALT 250 FOR IP): Performed by: HOSPITALIST

## 2024-11-07 PROCEDURE — 6370000000 HC RX 637 (ALT 250 FOR IP): Performed by: FAMILY MEDICINE

## 2024-11-07 PROCEDURE — 97530 THERAPEUTIC ACTIVITIES: CPT

## 2024-11-07 PROCEDURE — 84443 ASSAY THYROID STIM HORMONE: CPT

## 2024-11-07 PROCEDURE — 97110 THERAPEUTIC EXERCISES: CPT

## 2024-11-07 PROCEDURE — 84481 FREE ASSAY (FT-3): CPT

## 2024-11-07 PROCEDURE — 2060000000 HC ICU INTERMEDIATE R&B

## 2024-11-07 PROCEDURE — 84439 ASSAY OF FREE THYROXINE: CPT

## 2024-11-07 PROCEDURE — 97535 SELF CARE MNGMENT TRAINING: CPT

## 2024-11-07 PROCEDURE — 94640 AIRWAY INHALATION TREATMENT: CPT

## 2024-11-07 PROCEDURE — 2580000003 HC RX 258: Performed by: NURSE PRACTITIONER

## 2024-11-07 PROCEDURE — 6360000002 HC RX W HCPCS: Performed by: INTERNAL MEDICINE

## 2024-11-07 PROCEDURE — 36415 COLL VENOUS BLD VENIPUNCTURE: CPT

## 2024-11-07 PROCEDURE — 2700000000 HC OXYGEN THERAPY PER DAY

## 2024-11-07 RX ORDER — BUDESONIDE AND FORMOTEROL FUMARATE DIHYDRATE 160; 4.5 UG/1; UG/1
2 AEROSOL RESPIRATORY (INHALATION)
Status: DISCONTINUED | OUTPATIENT
Start: 2024-11-07 | End: 2024-11-08 | Stop reason: HOSPADM

## 2024-11-07 RX ORDER — POLYETHYLENE GLYCOL 3350 17 G
2 POWDER IN PACKET (EA) ORAL
Status: DISCONTINUED | OUTPATIENT
Start: 2024-11-07 | End: 2024-11-08 | Stop reason: HOSPADM

## 2024-11-07 RX ADMIN — APIXABAN 10 MG: 5 TABLET, FILM COATED ORAL at 10:09

## 2024-11-07 RX ADMIN — OLANZAPINE 2.5 MG: 2.5 TABLET, FILM COATED ORAL at 10:07

## 2024-11-07 RX ADMIN — VALPROIC ACID 250 MG: 250 CAPSULE, LIQUID FILLED ORAL at 20:58

## 2024-11-07 RX ADMIN — ATORVASTATIN CALCIUM 40 MG: 40 TABLET, FILM COATED ORAL at 10:09

## 2024-11-07 RX ADMIN — HYDROCODONE BITARTRATE AND ACETAMINOPHEN 1 TABLET: 5; 325 TABLET ORAL at 14:34

## 2024-11-07 RX ADMIN — Medication 100 MG: at 10:08

## 2024-11-07 RX ADMIN — TAMSULOSIN HYDROCHLORIDE 0.4 MG: 0.4 CAPSULE ORAL at 10:08

## 2024-11-07 RX ADMIN — ANTI-FUNGAL POWDER MICONAZOLE NITRATE TALC FREE: 1.42 POWDER TOPICAL at 10:11

## 2024-11-07 RX ADMIN — VALPROIC ACID 250 MG: 250 CAPSULE, LIQUID FILLED ORAL at 10:07

## 2024-11-07 RX ADMIN — SODIUM CHLORIDE, PRESERVATIVE FREE 10 ML: 5 INJECTION INTRAVENOUS at 10:09

## 2024-11-07 RX ADMIN — VALPROIC ACID 250 MG: 250 CAPSULE, LIQUID FILLED ORAL at 14:34

## 2024-11-07 RX ADMIN — HYDROCORTISONE: 1 CREAM TOPICAL at 21:31

## 2024-11-07 RX ADMIN — FENTANYL CITRATE 25 MCG: 0.05 INJECTION, SOLUTION INTRAMUSCULAR; INTRAVENOUS at 20:55

## 2024-11-07 RX ADMIN — FAMOTIDINE 20 MG: 20 TABLET, FILM COATED ORAL at 10:08

## 2024-11-07 RX ADMIN — HYDROCORTISONE: 1 CREAM TOPICAL at 10:10

## 2024-11-07 RX ADMIN — OLANZAPINE 15 MG: 5 TABLET, FILM COATED ORAL at 20:58

## 2024-11-07 RX ADMIN — MULTIVITAMIN TABLET 1 TABLET: TABLET at 10:08

## 2024-11-07 RX ADMIN — FAMOTIDINE 20 MG: 20 TABLET, FILM COATED ORAL at 20:58

## 2024-11-07 RX ADMIN — APIXABAN 10 MG: 5 TABLET, FILM COATED ORAL at 20:58

## 2024-11-07 RX ADMIN — BUDESONIDE AND FORMOTEROL FUMARATE DIHYDRATE 2 PUFF: 160; 4.5 AEROSOL RESPIRATORY (INHALATION) at 18:06

## 2024-11-07 RX ADMIN — AMLODIPINE BESYLATE 10 MG: 10 TABLET ORAL at 10:08

## 2024-11-07 RX ADMIN — HYDROCODONE BITARTRATE AND ACETAMINOPHEN 1 TABLET: 5; 325 TABLET ORAL at 07:09

## 2024-11-07 RX ADMIN — FOLIC ACID 1 MG: 1 TABLET ORAL at 10:08

## 2024-11-07 RX ADMIN — NICOTINE POLACRILEX 2 MG: 2 LOZENGE ORAL at 14:28

## 2024-11-07 RX ADMIN — ANTI-FUNGAL POWDER MICONAZOLE NITRATE TALC FREE: 1.42 POWDER TOPICAL at 21:31

## 2024-11-07 ASSESSMENT — PAIN - FUNCTIONAL ASSESSMENT
PAIN_FUNCTIONAL_ASSESSMENT: PREVENTS OR INTERFERES SOME ACTIVE ACTIVITIES AND ADLS

## 2024-11-07 ASSESSMENT — PAIN DESCRIPTION - ONSET
ONSET: ON-GOING

## 2024-11-07 ASSESSMENT — PAIN SCALES - WONG BAKER
WONGBAKER_NUMERICALRESPONSE: HURTS WHOLE LOT
WONGBAKER_NUMERICALRESPONSE: NO HURT
WONGBAKER_NUMERICALRESPONSE: NO HURT

## 2024-11-07 ASSESSMENT — PAIN DESCRIPTION - DESCRIPTORS
DESCRIPTORS: ACHING
DESCRIPTORS: THROBBING
DESCRIPTORS: SHARP;STABBING;THROBBING
DESCRIPTORS: ACHING

## 2024-11-07 ASSESSMENT — PAIN SCALES - GENERAL
PAINLEVEL_OUTOF10: 4
PAINLEVEL_OUTOF10: 6
PAINLEVEL_OUTOF10: 8
PAINLEVEL_OUTOF10: 8
PAINLEVEL_OUTOF10: 4
PAINLEVEL_OUTOF10: 8

## 2024-11-07 ASSESSMENT — PAIN DESCRIPTION - FREQUENCY
FREQUENCY: INTERMITTENT

## 2024-11-07 ASSESSMENT — PAIN DESCRIPTION - LOCATION
LOCATION: FOOT;KNEE
LOCATION: FOOT
LOCATION: FOOT
LOCATION: LEG;FOOT

## 2024-11-07 ASSESSMENT — PAIN DESCRIPTION - ORIENTATION
ORIENTATION: RIGHT

## 2024-11-07 ASSESSMENT — PAIN DESCRIPTION - PAIN TYPE
TYPE: ACUTE PAIN
TYPE: ACUTE PAIN
TYPE: ACUTE PAIN;NEUROPATHIC PAIN

## 2024-11-07 NOTE — PROGRESS NOTES
RN sent message to Dr. Jackson, attending, Dr. Gamboa, critical care, Dr. Peres, Vascular and Dr. Man Psychology. Appeal to [facility] was approved. Social work is working on getting the paperwork and then seeing if they have bed available and transport available are you ok with patient discharging tonight or tomorrow if we can get all that set up? Hill was removed waiting at 2pm waiting for him to urinate.Currently on 1liter nasal canula, worked with pt/ot sat at edge of bed, stood to get on dave steady then sat up in chair for 4 hrs then i got him back to bed with the dave steady. has compression stocking on and is recieving the eliquis for the DVT.     Dr. Jackson said tomorrow after 10am.    Dr. Gamboa said he as ok with discharge.

## 2024-11-07 NOTE — PROGRESS NOTES
Occupational Therapy  Facility/Department: [facility] ICU  Rehabilitation Occupational Therapy Daily Treatment Note    Date: 24  Patient Name: Noel Valle       Room: 1114/1114-01  MRN: [id number]  Account: [id number]   : 1955  (69 y.o.) Gender: male       Past Medical History:  has a past medical history of COPD (chronic obstructive pulmonary disease) (Formerly [facility]).  Past Surgical History:   has no past surgical history on file.    Restrictions  Restrictions/Precautions: General Precautions, Fall Risk, Seizure, Bed Alarm  Other position/activity restrictions: Up w/ assist, continuous pulse ox, O2 via NC, RUE IV, external catheter, telemetry, Jobst stockings.  Required Braces or Orthoses?: No    Subjective  Subjective: Pt. supine in bed and agreeable for treatment.  Restrictions/Precautions: General Precautions;Fall Risk;Seizure;Bed Alarm  Objective     Cognition  Overall Cognitive Status: Exceptions  Arousal/Alertness: Appropriate responses to stimuli  Following Commands: Follows one step commands with increased time  Attention Span: Appears intact  Memory: Impaired  Safety Judgement: Decreased awareness of need for safety;Decreased awareness of need for assistance  Problem Solving: Assistance required to identify errors made;Assistance required to correct errors made;Assistance required to implement solutions  Insights: Decreased awareness of deficits  Initiation: Requires cues for all  Sequencing: Requires cues for all  Cognition Comment: Pt. remains motivated and eager for treatment everyday.  Orientation  Overall Orientation Status: Impaired  Orientation Level:  (Pt's voices stories and then realizes that it can't be true. I.E.  \"My wife doesn't like my underwear so she grinded them up in the washing machine......no that sounds really wierd that can't be true\")         ADL  Grooming/Oral Hygiene  Assistance Level: Stand by assist  Skilled Clinical Factors: SBA with simple grooming while sitting upright in

## 2024-11-07 NOTE — PROGRESS NOTES
Pulmonary Critical Care Progress Note    Patient seen for the follow up of Angioedema of tongue     Subjective:    He is on oxygen nasal cannula.  .  He is able to tolerate oral intake.  He has no significant hallucination.  No shortness of breath at rest.  Examination:    Vitals: /70   Pulse 97   Temp 98.2 °F (36.8 °C) (Oral)   Resp 19   Ht 1.66 m (5' 5.35\")   Wt 87.2 kg (192 lb 3.2 oz)   SpO2 93%   BMI 31.64 kg/m²   SpO2  Av.2 %  Min: 88 %  Max: 96 %  General appearance: Awake alert no distress significant seborrhea over face  Neck: No JVD  Lungs: Decreased breath sound no crackles or wheeze  Heart: regular rate and rhythm, S1, S2 normal, no gallop  Abdomen: Soft, non tender, + BS  Extremities: no cyanosis or clubbing. No significant edema    LABs:    CBC:   Recent Labs     24  0709 24  0743   WBC 4.9 6.4   HGB 12.4* 12.5*   HCT 37.9* 38.5*    252       BMP:   Recent Labs     24  0709 24  0743    143   K 4.1 3.7   CO2 32* 30   BUN 5* 6*   CREATININE 0.7 0.7   LABGLOM >90 >90   GLUCOSE 122* 132*        Latest Reference Range & Units 10/14/24 06:00 10/14/24 12:31   POC TCO2 22 - 30 mmol/L  27   POC HCO3 21.0 - 28.0 mmol/L 28.1 (H) 26.6   POC O2 SAT 94.0 - 98.0 % 95.6 95.8   POC pCO2 35.0 - 48.0 mm Hg 43.2 42.0   POC pH 7.350 - 7.450  7.421 7.410   POC PO2 83.0 - 108.0 mm Hg 78.4 (L) 79.8 (L)   (H): Data is abnormally high  (L): Data is abnormally low   Latest Reference Range & Units 24 12:13   D-Dimer, Quant 0.00 - 0.59 ug/mL FEU 2.09 (H)   (H): Data is abnormally high    Radiology:  Chest x-ray 10/20  Poor inspiratory effort.       Chest x-ray 10/17 reviewed  1. Probable mild atelectatic changes at the base of the left lung.  2. Small left pleural effusion versus pleural thickening.  3. No other acute cardiopulmonary process.      Chest x-ray 10/16  Reviewed  Small left effusion with atelectasis.     CTA chest   1. Suboptimal contrast timing limits

## 2024-11-07 NOTE — PLAN OF CARE
Problem: Discharge Planning  Goal: Discharge to home or other facility with appropriate resources  11/6/2024 2025 by Dorina Weber RN  Outcome: Progressing     Problem: Respiratory - Adult  Goal: Achieves optimal ventilation and oxygenation  11/6/2024 2025 by Dorina Weber RN  Outcome: Progressing     Problem: Cardiovascular - Adult  Goal: Maintains optimal cardiac output and hemodynamic stability  11/6/2024 2025 by Dorina Weber RN  Outcome: Progressing     Problem: Skin/Tissue Integrity - Adult  Goal: Skin integrity remains intact  11/6/2024 2025 by Dorina Weber RN  Outcome: Progressing     Problem: Pain  Goal: Verbalizes/displays adequate comfort level or baseline comfort level  11/6/2024 2025 by Dorina Weber RN  Outcome: Progressing     Problem: Safety - Adult  Goal: Free from fall injury  11/6/2024 2025 by Dorina Weber RN  Outcome: Progressing     Problem: ABCDS Injury Assessment  Goal: Absence of physical injury  11/6/2024 2025 by Dorina Weber RN  Outcome: Progressing     Problem: Confusion  Goal: Confusion, delirium, dementia, or psychosis is improved or at baseline  Description: INTERVENTIONS:  1. Assess for possible contributors to thought disturbance, including medications, impaired vision or hearing, underlying metabolic abnormalities, dehydration, psychiatric diagnoses, and notify attending LIP  2. [place] high risk fall precautions, as indicated  3. Provide frequent short contacts to provide reality reorientation, refocusing and direction  4. Decrease environmental stimuli, including noise as appropriate  5. Monitor and intervene to maintain adequate nutrition, hydration, elimination, sleep and activity  6. If unable to ensure safety without constant attention obtain sitter and review sitter guidelines with assigned personnel  7. Initiate Psychosocial CNS and Spiritual Care consult, as indicated  11/6/2024 2025 by Dorina Weber RN  Outcome: Progressing

## 2024-11-07 NOTE — PROGRESS NOTES
VASCULAR SURGERY  PROGRESS NOTE      11/7/2024 3:57 PM  Subjective:   Admit Date: 10/9/2024  PCP: Ramakrishna Silver MD    Chief Complaint   Patient presents with    Angioedema     Interval History: No complaints.  Feeling better.  Plans for discharge noted.    Diet: ADULT ORAL NUTRITION SUPPLEMENT; Breakfast, Dinner; Standard High Calorie/High Protein Oral Supplement  ADULT DIET; Dysphagia - Soft and Bite Sized; Safety Tray; Safety Tray (Disposables No Utensils)  ADULT ORAL NUTRITION SUPPLEMENT; Dinner; Frozen Oral Supplement    Medications:   Scheduled Meds:   budesonide-formoterol  2 puff Inhalation BID RT    OLANZapine  15 mg Oral Nightly    apixaban  10 mg Oral BID    Followed by    [START ON 11/11/2024] apixaban  5 mg Oral BID    OLANZapine  2.5 mg Oral Daily    hydrocortisone   Topical BID    tamsulosin  0.4 mg Oral Daily    sodium phosphate  1 enema Rectal Once    multivitamin  1 tablet Oral Daily    miconazole   Topical BID    valproic acid  250 mg Oral TID    folic acid  1 mg Oral Daily    thiamine  100 mg Oral Daily    famotidine  20 mg Oral BID    labetalol  10 mg IntraVENous Once    amLODIPine  10 mg Oral Daily    And    atorvastatin  40 mg Oral Daily    sodium chloride flush  5-40 mL IntraVENous 2 times per day    sodium chloride flush  5-40 mL IntraVENous 2 times per day     Continuous Infusions:   sodium chloride      sodium chloride           Labs:   CBC:   Recent Labs     11/05/24  0709 11/06/24  0743   WBC 4.9 6.4   HGB 12.4* 12.5*    252     BMP:    Recent Labs     11/05/24  0709 11/06/24  0743    143   K 4.1 3.7    102   CO2 32* 30   BUN 5* 6*   CREATININE 0.7 0.7   GLUCOSE 122* 132*     Hepatic: No results for input(s): \"AST\", \"ALT\", \"BILITOT\", \"ALKPHOS\" in the last 72 hours.    Invalid input(s): \"ALB\"  Troponin: Invalid input(s): \"TROPONIN\"  BNP: No results for input(s): \"BNP\" in the last 72 hours.  Lipids: No results for input(s): \"CHOL\", \"HDL\" in the last 72

## 2024-11-07 NOTE — PROGRESS NOTES
Physical Therapy  Facility/Department: [facility]  Rehabilitation Physical Therapy Treatment Note    NAME: Noel Valle  : 1955 (69 y.o.)  MRN: [id number]  CODE STATUS: Full Code    Date of Service: 24     Pt currently functioning below baseline.  Recommend daily inpatient skilled therapy at time of discharge to maximize long term outcomes and prevent re-admission. Please refer to AM-PAC score for current level of function.     Restrictions:  Restrictions/Precautions: General Precautions, Fall Risk, Seizure, Bed Alarm  Position Activity Restriction  Other position/activity restrictions: Up w/ assist, continuous pulse ox, O2 via NC, RUE IV, external catheter, telemetry, Jobst stockings.     SUBJECTIVE  Subjective  Subjective: Patient up supine in bed upon therapists arrival. Patient agreeable to PT treatment. RN states patient is appropriate for PT treatment. Patient c/o pain in R foot. RN notified.    OBJECTIVE  Cognition  Overall Cognitive Status: Exceptions  Arousal/Alertness: Appropriate responses to stimuli  Following Commands: Follows one step commands with increased time  Attention Span: Appears intact  Memory: Impaired  Safety Judgement: Decreased awareness of need for safety;Decreased awareness of need for assistance  Problem Solving: Assistance required to identify errors made;Assistance required to correct errors made;Assistance required to implement solutions  Insights: Decreased awareness of deficits  Initiation: Requires cues for all  Sequencing: Requires cues for all  Orientation  Overall Orientation Status: Impaired  Orientation Level: Oriented to person    Functional Mobility  Bed Mobility  Overall Assistance Level: Maximum Assistance;Requires x 2 Assistance  Additional Factors: Set-up;Verbal cues;Increased time to complete;Head of bed flat;Head of bed raised  Roll Left  Assistance Level: Maximum assistance;Requires x 2 assistance  Skilled Clinical Factors: vc's for hand placement and  following handout  Additional Goals?: Yes  Short Term Goal 6: Patient will demonstrate STS using dave stedy safely with mod A  Short Term Goal 7: Patient will tolerate supported stand for >2minutes with mod A    PLAN OF CARE/SAFETY  Physical Therapy Plan  General Plan: 5-7 times per week  Specific Instructions for Next Treatment: Continue to work on core with bed mobility, unsupported seated activity/exercises and standing in dave stedy.  Current Treatment Recommendations: Strengthening;ROM;Patient/Caregiver education & training;Functional mobility training;Therapeutic activities;Safety education & training;Positioning;Balance training;Transfer training  Safety Devices  Type of Devices: All fall risk precautions in place;Patient at risk for falls;Call light within reach;Nurse notified;Heels elevated for pressure relief;Gait belt;Chair alarm in place;Left in chair  Restraints  Restraints Initially in Place: No    EDUCATION  Education  Education Given To: Patient;Family  Education Provided: Role of Therapy;Plan of Care;IADL Function;Precautions;Energy Conservation;Safety;Mobility Training;Home Exercise Program;ADL Function;Transfer Training  Education Provided Comments: Educated patient on the importance of movement to prevent secondary complication.  Education Method: Demonstration;Verbal;Teach Back  Barriers to Learning: Cognition  Education Outcome: Verbalized understanding;Continued education needed        Therapy Time   Individual Concurrent Group Co-treatment   Time In 1108         Time Out 1204         Minutes 56              Co-treatment with OT warranted secondary to decreased safety and independence requiring 2 skilled therapy professionals to address individual discipline's goals. PT addressing weight shifting prior to transfers, transfer training, and postural control in sitting.     Dina Duffy, PTA, 11/07/24 at 12:26 PM

## 2024-11-07 NOTE — PROGRESS NOTES
RN spoke with Dr. Gamboa. Patient more alert still having some episodes of confusion but is more alert and oriented than has been in past. Patient had hatfield placed due to retention when he was on the precedex drip and was started on flomax. Precedex is been off for many days now. Dr. Gamboa ok with pulling hatfield and bladder scanning every 6 hrs. Ok to use external if unable to use urinal.

## 2024-11-07 NOTE — PROGRESS NOTES
RN sent message to Dr. Jackson, attending about concern about medication. We are applying nicotine patches daily, today when i took off yesterday patch his skin underneath was red, itchy and welted. Is there an alternative that can be used instead of the patches, because he says it has been helping but he seems to be having an allergic reaction to the adhesive?    Spoke with pharmacy we do have oral lozenges that we can give instead if you wish to order that.     Awaiting response.

## 2024-11-07 NOTE — PLAN OF CARE
11/7/2024 0800)  Return Mobility to Safest Level of Function:   Assess patient stability and activity tolerance for standing, transferring and ambulating with or without assistive devices   Assist with transfers and ambulation using safe patient handling equipment as needed   Ensure adequate protection for wounds/incisions during mobilization   Obtain physical therapy/occupational therapy consults as needed   Instruct patient/family in ordered activity level     Problem: Gastrointestinal - Adult  Goal: Maintains adequate nutritional intake  Outcome: Progressing  Flowsheets (Taken 11/7/2024 0800)  Maintains adequate nutritional intake:   Identify factors contributing to decreased intake, treat as appropriate   Monitor percentage of each meal consumed   Assist with meals as needed   Monitor intake and output, weight and lab values   Obtain nutritional consult as needed     Problem: Genitourinary - Adult  Goal: Absence of urinary retention  Outcome: Progressing  Flowsheets (Taken 11/7/2024 0800)  Absence of urinary retention:   Assess patient’s ability to void and empty bladder   Monitor intake/output and perform bladder scan as needed   Place urinary catheter per Licensed Independent Practitioner order if needed     Problem: Coping  Goal: Pt/Family able to verbalize concerns and demonstrate effective coping strategies  Description: INTERVENTIONS:  1. Assist patient/family to identify coping skills, available support systems and cultural and spiritual values  2. Provide emotional support, including active listening and acknowledgement of concerns of patient and caregivers  3. Reduce environmental stimuli, as able  4. Instruct patient/family in relaxation techniques, as appropriate  5. Assess for spiritual pain/suffering and initiate Spiritual Care, Psychosocial Clinical Specialist consults as needed  Outcome: Progressing  Flowsheets (Taken 11/7/2024 0800)  Patient/family able to verbalize anxieties, fears, and  abnormalities, dehydration, psychiatric diagnoses, notify [facility]   [place] high risk fall precautions, as indicated   Provide frequent short contacts to provide reality reorientation, refocusing and direction   Decrease environmental stimuli, including noise as appropriate   Monitor and intervene to maintain adequate nutrition, hydration, elimination, sleep and activity   If unable to ensure safety without constant attention obtain sitter and review sitter guidelines with assigned personnel   Initiate Psychosocial Clinical Nurse Specialist and Spiritual Care consult, as indicated   [facility] Working on getting bed and transport for patient to be transferred for in patient physical rehab.   Liz removed.

## 2024-11-07 NOTE — PROGRESS NOTES
hydroxide-simethicone, 30 mL, Q6H PRN            Subjective:     Patient seen and examined at bedside and reviewed  last 24 hrs events with nursing staff  Nicotine patches causing skin irritation.   Ok to switch to lozenges        ROS:  A 10 point system reviewed and negative otherwise mentioned above.        Physical Examination:      Vitals:  /70   Pulse 80   Temp 98.2 °F (36.8 °C) (Oral)   Resp 19   Ht 1.66 m (5' 5.35\")   Wt 87.2 kg (192 lb 3.2 oz)   SpO2 93%   BMI 31.64 kg/m²   Temp (24hrs), Av °F (36.7 °C), Min:97.8 °F (36.6 °C), Max:98.3 °F (36.8 °C)    Weight:   Wt Readings from Last 3 Encounters:   24 87.2 kg (192 lb 3.2 oz)   09/10/19 99.8 kg (220 lb)     I/O last 3 completed shifts:  I/O last 3 completed shifts:  In: -   Out: 1734 [Urine:1734]     No results for input(s): \"POCGLU\" in the last 72 hours.      General appearance - alert, well appearing, and in no acute distress  Mental status - aaox 3  Head - normocephalic and atraumatic  Eyes - pupils equal and reactive, extraocular eye movements intact, conjunctiva clear  Ears - hearing appears to be intact  Nose - no drainage noted  Mouth - mucous membranes moist  Neck - supple, no carotid bruits, thyroid not palpable  Chest - clear to auscultation, normal effort  Heart - normal rate, regular rhythm, no murmur  Abdomen - soft, nontender, nondistended, bowel sounds present all four quadrants, no masses, hepatomegaly or splenomegaly  Neurological -nonfocal  Extremities - peripheral pulses palpable, no pedal edema or calf pain with palpation  Skin - no gross lesions, rashes, or induration noted        Medications:     Allergies:   Allergies   Allergen Reactions    Benazepril Hcl Angioedema     Required intubation on 10/9/24.       Current Meds:   Current Facility-Administered Medications:     OLANZapine (ZYPREXA) tablet 15 mg, 15 mg, Oral, Nightly, Carlos Gamboa MD, 15 mg at 24    apixaban (ELIQUIS) tablet 10 mg, 10 mg,  Status:  Full Code        Electronically signed by Mohsin Mohammad Reza, MD on 11/7/2024 at 10:19 AM    This note was created with the assistance of a speech-recognition program.  Although the intention is to generate a document that actually reflects the content of the visit, no guarantees can be provided that every mistake has been identified and corrected by editing.     Note was updated later by me after  physical examination and  completion of the assessment.

## 2024-11-07 NOTE — CARE COORDINATION
Social work: Spoke to Gisela/[facility] appeal is sent to MD for review.   SW also reached out to [facility] to see if they would reconsider patient if precert is denied as mentation has improved.   Referral re-faxed.     Update:  spoke to spouse, she is not interested in [facility].  [facility] or [facility].      Update 15:36- Call from [place]/[facility] appeal approved for admission to [facility].  RN spoke to attending and plan is for discharge Friday.  JUAN JOSÉ requested 11:00AM transport- await confirmation.

## 2024-11-08 VITALS
SYSTOLIC BLOOD PRESSURE: 124 MMHG | BODY MASS INDEX: 33.13 KG/M2 | OXYGEN SATURATION: 91 % | HEIGHT: 65 IN | WEIGHT: 198.85 LBS | RESPIRATION RATE: 27 BRPM | TEMPERATURE: 98 F | DIASTOLIC BLOOD PRESSURE: 81 MMHG | HEART RATE: 106 BPM

## 2024-11-08 PROCEDURE — 2700000000 HC OXYGEN THERAPY PER DAY

## 2024-11-08 PROCEDURE — 94761 N-INVAS EAR/PLS OXIMETRY MLT: CPT

## 2024-11-08 PROCEDURE — 6370000000 HC RX 637 (ALT 250 FOR IP): Performed by: INTERNAL MEDICINE

## 2024-11-08 PROCEDURE — 51798 US URINE CAPACITY MEASURE: CPT

## 2024-11-08 PROCEDURE — 94640 AIRWAY INHALATION TREATMENT: CPT

## 2024-11-08 PROCEDURE — 2580000003 HC RX 258: Performed by: NURSE PRACTITIONER

## 2024-11-08 PROCEDURE — 6360000002 HC RX W HCPCS: Performed by: INTERNAL MEDICINE

## 2024-11-08 PROCEDURE — 6370000000 HC RX 637 (ALT 250 FOR IP): Performed by: HOSPITALIST

## 2024-11-08 PROCEDURE — 6370000000 HC RX 637 (ALT 250 FOR IP): Performed by: FAMILY MEDICINE

## 2024-11-08 PROCEDURE — 51702 INSERT TEMP BLADDER CATH: CPT

## 2024-11-08 RX ORDER — OLANZAPINE 15 MG/1
15 TABLET ORAL NIGHTLY
Qty: 30 TABLET | Refills: 0
Start: 2024-11-08

## 2024-11-08 RX ORDER — POLYETHYLENE GLYCOL 3350 17 G
2 POWDER IN PACKET (EA) ORAL
Qty: 100 EACH | Refills: 0
Start: 2024-11-08

## 2024-11-08 RX ORDER — TAMSULOSIN HYDROCHLORIDE 0.4 MG/1
0.4 CAPSULE ORAL DAILY
Qty: 30 CAPSULE | Refills: 0
Start: 2024-11-09

## 2024-11-08 RX ORDER — BUDESONIDE AND FORMOTEROL FUMARATE DIHYDRATE 160; 4.5 UG/1; UG/1
2 AEROSOL RESPIRATORY (INHALATION)
Qty: 10.2 G | Refills: 0
Start: 2024-11-08

## 2024-11-08 RX ORDER — METOPROLOL TARTRATE 25 MG/1
12.5 TABLET, FILM COATED ORAL 2 TIMES DAILY PRN
Qty: 30 TABLET | Refills: 0
Start: 2024-11-08

## 2024-11-08 RX ORDER — OLANZAPINE 2.5 MG/1
2.5 TABLET, FILM COATED ORAL DAILY
Qty: 30 TABLET | Refills: 0
Start: 2024-11-09

## 2024-11-08 RX ORDER — MIDODRINE HYDROCHLORIDE 5 MG/1
5 TABLET ORAL 3 TIMES DAILY PRN
Qty: 90 TABLET | Refills: 0
Start: 2024-11-08

## 2024-11-08 RX ADMIN — APIXABAN 10 MG: 5 TABLET, FILM COATED ORAL at 08:28

## 2024-11-08 RX ADMIN — AMLODIPINE BESYLATE 10 MG: 10 TABLET ORAL at 08:29

## 2024-11-08 RX ADMIN — BUDESONIDE AND FORMOTEROL FUMARATE DIHYDRATE 2 PUFF: 160; 4.5 AEROSOL RESPIRATORY (INHALATION) at 08:27

## 2024-11-08 RX ADMIN — OLANZAPINE 2.5 MG: 2.5 TABLET, FILM COATED ORAL at 08:29

## 2024-11-08 RX ADMIN — FENTANYL CITRATE 25 MCG: 0.05 INJECTION, SOLUTION INTRAMUSCULAR; INTRAVENOUS at 04:41

## 2024-11-08 RX ADMIN — HYDROCODONE BITARTRATE AND ACETAMINOPHEN 1 TABLET: 5; 325 TABLET ORAL at 11:26

## 2024-11-08 RX ADMIN — VALPROIC ACID 250 MG: 250 CAPSULE, LIQUID FILLED ORAL at 08:28

## 2024-11-08 RX ADMIN — ATORVASTATIN CALCIUM 40 MG: 40 TABLET, FILM COATED ORAL at 08:28

## 2024-11-08 RX ADMIN — HYDROCORTISONE: 1 CREAM TOPICAL at 08:29

## 2024-11-08 RX ADMIN — NICOTINE POLACRILEX 2 MG: 2 LOZENGE ORAL at 11:31

## 2024-11-08 RX ADMIN — MULTIVITAMIN TABLET 1 TABLET: TABLET at 08:29

## 2024-11-08 RX ADMIN — Medication 100 MG: at 08:29

## 2024-11-08 RX ADMIN — ANTI-FUNGAL POWDER MICONAZOLE NITRATE TALC FREE: 1.42 POWDER TOPICAL at 08:30

## 2024-11-08 RX ADMIN — FAMOTIDINE 20 MG: 20 TABLET, FILM COATED ORAL at 08:29

## 2024-11-08 RX ADMIN — FOLIC ACID 1 MG: 1 TABLET ORAL at 08:29

## 2024-11-08 RX ADMIN — SODIUM CHLORIDE, PRESERVATIVE FREE 10 ML: 5 INJECTION INTRAVENOUS at 08:37

## 2024-11-08 RX ADMIN — TAMSULOSIN HYDROCHLORIDE 0.4 MG: 0.4 CAPSULE ORAL at 08:29

## 2024-11-08 ASSESSMENT — PAIN - FUNCTIONAL ASSESSMENT
PAIN_FUNCTIONAL_ASSESSMENT: PREVENTS OR INTERFERES SOME ACTIVE ACTIVITIES AND ADLS

## 2024-11-08 ASSESSMENT — PAIN SCALES - WONG BAKER
WONGBAKER_NUMERICALRESPONSE: HURTS A LITTLE BIT
WONGBAKER_NUMERICALRESPONSE: HURTS LITTLE MORE
WONGBAKER_NUMERICALRESPONSE: HURTS A LITTLE BIT
WONGBAKER_NUMERICALRESPONSE: HURTS EVEN MORE
WONGBAKER_NUMERICALRESPONSE: HURTS A LITTLE BIT
WONGBAKER_NUMERICALRESPONSE: HURTS LITTLE MORE
WONGBAKER_NUMERICALRESPONSE: HURTS LITTLE MORE
WONGBAKER_NUMERICALRESPONSE: HURTS A LITTLE BIT

## 2024-11-08 ASSESSMENT — PAIN DESCRIPTION - DESCRIPTORS
DESCRIPTORS: THROBBING
DESCRIPTORS: ACHING;THROBBING

## 2024-11-08 ASSESSMENT — PAIN SCALES - GENERAL
PAINLEVEL_OUTOF10: 4
PAINLEVEL_OUTOF10: 4
PAINLEVEL_OUTOF10: 3
PAINLEVEL_OUTOF10: 6
PAINLEVEL_OUTOF10: 10
PAINLEVEL_OUTOF10: 3

## 2024-11-08 ASSESSMENT — PAIN DESCRIPTION - ORIENTATION
ORIENTATION: RIGHT

## 2024-11-08 ASSESSMENT — PAIN DESCRIPTION - LOCATION
LOCATION: LEG;FOOT
LOCATION: FOOT;LEG
LOCATION: FOOT;LEG
LOCATION: FOOT

## 2024-11-08 ASSESSMENT — PAIN DESCRIPTION - ONSET
ONSET: ON-GOING

## 2024-11-08 ASSESSMENT — PAIN DESCRIPTION - FREQUENCY
FREQUENCY: INTERMITTENT

## 2024-11-08 ASSESSMENT — PAIN DESCRIPTION - PAIN TYPE
TYPE: ACUTE PAIN

## 2024-11-08 NOTE — PROGRESS NOTES
End Of Shift Note  [facility]  Summary of shift: Patient was alert, pleasant and compliant today. Complained of pain in his heals. Right heel has stage 1 pressure sore, left heel is red and swollen. Triad cream applied and new mepilex applied. Heel boots applied when in bed and legs elevated on pillows. Lungs clear. Patient has large semi-formed bowel movement. Eating 50-75% of meals. Worked therapy sat at edge of bed, able to stand and pull self up on the dave steady. Transferred to chair and stayed up for about 4 hrs then transferred back to bed using the dave steady. Mentation improving, patient was alert and oriented to name, , place, and year still confused on reason for his admission. Denied any hallucinations. Appeal for [facility] rehab was approved, doctors informed and it was decided to discharge tomorrow since hatfield was just removed this afternoon at 2pm and need to make sure patient does not have retention issues. Bladder scan ordered every 6 hrs.     Vitals:    Vitals:    22 0351 22 0400 22 0500 22 0600   BP:  127/79 117/75 127/74   Pulse: 82 82 82 78   Resp: 17 17 20 28   Temp:  97.3 °F (36.3 °C)     TempSrc:  Temporal     SpO2: 95% 94% 91% 95%   Weight:       Height:            I&O:     Intake/Output Summary (Last 24 hours) at 2022 0713  Last data filed at 2022 0600  Gross per 24 hour   Intake 2107.03 ml   Output 3402.3 ml   Net -1295.27 ml       Resp Status: Started day at 2 liters, but oxygenation was mid 90's so he was turned down to 1 liter in evening since he does not wear home oxygen.     Ventilator Settings:     / / /FiO2 : 30 %    Critical Care IV infusions:   PN-Adult 2-in-1 Central Line (Standard) 75 mL/hr at 22    sodium chloride      sodium chloride      norepinephrine Stopped (06/10/22 1417)    sodium chloride      dextrose      sodium chloride 15 mL/hr at 22        LDA:   PICC Double Lumen 22 Left Basilic (Active)   Number

## 2024-11-08 NOTE — CARE COORDINATION
Patient to dc to [facility] via [place] [facility]  at 12:00P.  # for RN report: [phone]. Completed FAVIOLA faxed to [phone].  Informed RN, pt's family, and facility of dc time, agreeable to plan.       Update 12:11- transport now 1:00PM to [facility].

## 2024-11-08 NOTE — PROGRESS NOTES
embolism.  2. Shallow inflation with subsegmental atelectasis in the right lower lobe.  3. Calcified coronary atheromatous plaque.  4. Diverticulosis.     Lower extremity venous Doppler 11/4    Acute deep vein thrombosis in the right common femoral vein.    Acute deep vein thrombosis in the right femoral vein.    Acute deep vein thrombosis in the right popliteal vein.    No evidence of deep vein or superficial vein thrombosis in the left lower extremity. Vessels demonstrate normal compressibility, color filling, and phasic and spontaneous flow.    Impression/recommendations:    Acute respiratory insufficiency due to upper airway instruction/angioedema with significant swelling upper airway/atelectasis with small effusion  Oxygen by nasal cannula  Off BiPAP  Keep off ACE inhibitor for life  Status post icatibant  Off prednisone       COPD/smoker   DuoNeb by nebulizer    Symbicort twice daily.  Smoking cessation /off nicotine patch for tachycardia  PFT outpatient     alcohol withdrawal/metabolic encephalopathy  Diet as tolerated  Stop  Precedex  Off CIWA protocol  Geodon 10 mg as needed for severe agitation    Zyprexa 15 mg p.o. at bedtime  Zyprexa 2.5 morning  Depakote 250 3 times a day  Off Klonopin and Librium  Thiamine folate multivitamin  Alcohol cessation    Acute extensive right leg DVT / tachycardia with hypoxia?  Missed small PE  Eliquis  Ambulate per vascular/CARROLL hose      status post bradycardia/hypertension/status post hypotension due to sedation  Monitor blood pressure   Norvasc 10 daily  Off Coreg  Hydralazine 10 IV every 4 hours for blood pressure above 170 systolic         Enterococcus UTIs/hypernatremia   Monitor sodium  Off amoxicillin    suspected sleep apnea/obesity  Outpatient sleep evaluation     Seborrheic dermatitis  Hydrocortisone twice daily 1% over face      discussed with RN    Physical therapy/ambulate  Ok to discharge to rehab   peptic ulcer disease prophylaxis  DVT prophylaxis      Carlos

## 2024-11-08 NOTE — PROCEDURES
PROCEDURE NOTE  Date: 11/8/2024   Name: Noel Valle  YOB: 1955    Procedures difficult catheterization insert Hill

## 2024-11-08 NOTE — PROGRESS NOTES
RN spoke with Deanna, Nurse at [facility], Gave report over phone answered any question or concerns. Informed of when last Norco and Nicotine lozenge was.     Patient was transported via EMS and stretcher. All belongings taken via EMS or by sister to facility. IV removed prior to transport but due to urinary retention patient did leave with indwelling urinary catheter with a follow up in 1 week outpatient with Dr. Hoyos, Urology at his [facility] location.

## 2024-11-08 NOTE — PROGRESS NOTES
End Of Shift Note  [facility]  Summary of shift: Patient has not been able to void for shift. Bladder scan ordered for Q6 hours (8,2). Bladder scans for shift; 248ml, 392ml. Messaged NP about patient being unable to void and bladder scan during shift. No new orders. Fentanyl given for pain at 2055 and 0441. VSS. No BM. Plan is for patient to go to [facility] today at 1100AM.     Vitals:    Vitals:    11/07/24 2300 11/08/24 0000 11/08/24 0254 11/08/24 0400   BP:  109/60     Pulse: (!) 101 80 (!) 105    Resp: 20 22 (!) 33    Temp:    97.3 °F (36.3 °C)   TempSrc:    Temporal   SpO2: (!) 89% 93% 94%    Weight:    90.2 kg (198 lb 13.7 oz)   Height:            I&O:   Intake/Output Summary (Last 24 hours) at 11/8/2024 0544  Last data filed at 11/7/2024 1400  Gross per 24 hour   Intake 10 ml   Output 564 ml   Net -554 ml       Resp Status: 1L NC    Ventilator Settings:  Vent Mode: CPAP/PS Resp Rate (Set): 18 bpm/Vt (Set, mL): 500 mL/ /FiO2 : 30 %    Critical Care IV infusions:   sodium chloride      sodium chloride          LDA:   Peripheral IV 11/03/24 Right;Anterior Forearm (Active)   Number of days: 4       External Urinary Catheter (Active)   Number of days: 0

## 2024-11-08 NOTE — CONSULTS
Shawn Hoyos MD  Urology Consultation    Patient:  Noel Valle  MRN: [id number]  YOB: 1955    CHIEF COMPLAINT: Urinary retention difficult catheterization    HISTORY OF PRESENT ILLNESS:   The patient is a 69 y.o. male who presents with angioedema, acute respiratory distress, requiring intubation due to upper airway obstruction the patient was extubated at the present time with plans for discharge the patient had urinary retention unable to empty his bladder well nursing staff encountered difficulty inserting the catheter    Patient's old records, notes and chart reviewed and summarized above.    Past Medical History:    Past Medical History:   Diagnosis Date    COPD (chronic obstructive pulmonary disease) ([facility])        Past Surgical History:    History reviewed. No pertinent surgical history.  Previous  surgery: none     Medications:    Scheduled Meds:  Continuous Infusions:  PRN Meds:.    Allergies:  Benazepril hcl    Social History:    Social History     Socioeconomic History    Marital status:      Spouse name: Not on file    Number of children: Not on file    Years of education: Not on file    Highest education level: Not on file   Occupational History    Not on file   Tobacco Use    Smoking status: Every Day    Smokeless tobacco: Never   Substance and Sexual Activity    Alcohol use: Yes     Alcohol/week: 84.0 standard drinks of alcohol     Types: 84 Cans of beer per week     Comment: 12pack/day    Drug use: Not on file    Sexual activity: Not on file   Other Topics Concern    Not on file   Social History Narrative    Not on file     Social Determinants of Health     Financial Resource Strain: Not on file   Food Insecurity: No Food Insecurity (10/23/2024)    Hunger Vital Sign     Worried About Running Out of Food in the Last Year: Never true     Ran Out of Food in the Last Year: Never true   Transportation Needs: No Transportation Needs (10/23/2024)    PRAPARE - Transportation      Lack of Transportation (Medical): No     Lack of Transportation (Non-Medical): No   Physical Activity: Not on file   Stress: Not on file   Social Connections: Not on file   Intimate Partner Violence: Not on file   Housing Stability: Low Risk  (10/23/2024)    Housing Stability Vital Sign     Unable to Pay for Housing in the Last Year: No     Number of Times Moved in the Last Year: 1     Homeless in the Last Year: No       Family History:  No family history on file.  Previous Urologic Family history: none    REVIEW OF SYSTEMS:  Constitutional: negative, the patient at the present time is alert and is not in acute distress discharge planning as outlined  Eyes: negative  Respiratory: negative  Cardiovascular: negative  Gastrointestinal: negative  Genitourinary: see HPI  Musculoskeletal: negative  Skin: negative   Neurological: negative  Hematological/Lymphatic: negative  Psychological: negative    Physical Exam:    This a 69 y.o. male   Patient Vitals for the past 24 hrs:   BP Temp Temp src Pulse Resp SpO2 Weight   11/08/24 1200 124/81 -- -- (!) 106 27 91 % --   11/08/24 1138 121/72 98 °F (36.7 °C) Oral (!) 105 19 91 % --   11/08/24 1126 -- -- -- -- 19 93 % --   11/08/24 1100 -- -- -- (!) 109 23 91 % --   11/08/24 1000 -- -- -- (!) 103 20 92 % --   11/08/24 0900 -- -- -- (!) 132 22 (!) 89 % --   11/08/24 0800 -- 98.2 °F (36.8 °C) Oral 87 19 92 % --   11/08/24 0700 -- -- -- 98 21 91 % --   11/08/24 0600 -- -- -- (!) 102 19 91 % --   11/08/24 0500 -- -- -- 100 26 90 % --   11/08/24 0400 100/65 97.3 °F (36.3 °C) Temporal 81 19 90 % 90.2 kg (198 lb 13.7 oz)   11/08/24 0254 -- -- -- (!) 105 (!) 33 94 % --   11/08/24 0200 -- -- -- 85 23 92 % --   11/08/24 0100 -- -- -- 79 20 92 % --   11/08/24 0000 109/60 -- -- 80 22 93 % --   11/07/24 2300 -- -- -- (!) 101 20 (!) 89 % --   11/07/24 2200 -- -- -- 78 20 93 % --   11/07/24 2100 -- -- -- 96 16 93 % --   11/07/24 2000 112/78 97.9 °F (36.6 °C) Oral (!) 114 22 91 % --   11/07/24

## 2024-11-08 NOTE — PROGRESS NOTES
VASCULAR SURGERY  PROGRESS NOTE      11/8/2024 1:15 PM  Subjective:   Admit Date: 10/9/2024  PCP: Ramakrishna Silver MD    Chief Complaint   Patient presents with    Angioedema     Interval History: No complaints.  Feeling better.  Plans for discharge noted.    Diet: ADULT ORAL NUTRITION SUPPLEMENT; Breakfast, Dinner; Standard High Calorie/High Protein Oral Supplement  ADULT DIET; Dysphagia - Soft and Bite Sized; Safety Tray; Safety Tray (Disposables No Utensils)  ADULT ORAL NUTRITION SUPPLEMENT; Dinner; Frozen Oral Supplement    Medications:   Scheduled Meds:   budesonide-formoterol  2 puff Inhalation BID RT    OLANZapine  15 mg Oral Nightly    apixaban  10 mg Oral BID    Followed by    [START ON 11/11/2024] apixaban  5 mg Oral BID    OLANZapine  2.5 mg Oral Daily    hydrocortisone   Topical BID    tamsulosin  0.4 mg Oral Daily    sodium phosphate  1 enema Rectal Once    multivitamin  1 tablet Oral Daily    miconazole   Topical BID    valproic acid  250 mg Oral TID    folic acid  1 mg Oral Daily    thiamine  100 mg Oral Daily    famotidine  20 mg Oral BID    labetalol  10 mg IntraVENous Once    amLODIPine  10 mg Oral Daily    And    atorvastatin  40 mg Oral Daily    sodium chloride flush  5-40 mL IntraVENous 2 times per day    sodium chloride flush  5-40 mL IntraVENous 2 times per day     Continuous Infusions:   sodium chloride      sodium chloride           Labs:   CBC:   Recent Labs     11/06/24  0743   WBC 6.4   HGB 12.5*        BMP:    Recent Labs     11/06/24  0743      K 3.7      CO2 30   BUN 6*   CREATININE 0.7   GLUCOSE 132*     Hepatic: No results for input(s): \"AST\", \"ALT\", \"BILITOT\", \"ALKPHOS\" in the last 72 hours.    Invalid input(s): \"ALB\"  Troponin: Invalid input(s): \"TROPONIN\"  BNP: No results for input(s): \"BNP\" in the last 72 hours.  Lipids: No results for input(s): \"CHOL\", \"HDL\" in the last 72 hours.    Invalid input(s): \"LDLCALCU\"  INR:   No results for input(s): \"INR\" in

## 2024-11-08 NOTE — PLAN OF CARE
Assessment  Goal: Absence of physical injury  11/7/2024 2356 by Rolando Fernandez RN  Outcome: Progressing  11/7/2024 1501 by Hillary William RN  Outcome: Progressing  Flowsheets (Taken 11/7/2024 0800)  Absence of Physical Injury: Implement safety measures based on patient assessment     Problem: Confusion  Goal: Confusion, delirium, dementia, or psychosis is improved or at baseline  Description: INTERVENTIONS:  1. Assess for possible contributors to thought disturbance, including medications, impaired vision or hearing, underlying metabolic abnormalities, dehydration, psychiatric diagnoses, and notify attending LIP  2. [place] high risk fall precautions, as indicated  3. Provide frequent short contacts to provide reality reorientation, refocusing and direction  4. Decrease environmental stimuli, including noise as appropriate  5. Monitor and intervene to maintain adequate nutrition, hydration, elimination, sleep and activity  6. If unable to ensure safety without constant attention obtain sitter and review sitter guidelines with assigned personnel  7. Initiate Psychosocial CNS and Spiritual Care consult, as indicated  11/7/2024 2356 by Rolando Fernandez RN  Outcome: Progressing  11/7/2024 1501 by Hillary William RN  Outcome: Progressing  Flowsheets (Taken 11/7/2024 0800)  Effect of thought disturbance (confusion, delirium, dementia, or psychosis) are managed with adequate functional status:   Assess for contributors to thought disturbance, including medications, impaired vision or hearing, underlying metabolic abnormalities, dehydration, psychiatric diagnoses, notify LIP   [place] high risk fall precautions, as indicated   Provide frequent short contacts to provide reality reorientation, refocusing and direction   Decrease environmental stimuli, including noise as appropriate   Monitor and intervene to maintain adequate nutrition, hydration, elimination, sleep and activity   If unable to ensure safety  without constant attention obtain sitter and review sitter guidelines with assigned personnel   Initiate Psychosocial Clinical Nurse Specialist and Spiritual Care consult, as indicated

## 2024-11-08 NOTE — PROGRESS NOTES
RN sent message to Dr. Jackson, attending about Hatfield was removed yesterday at 2pm. He had not urinated all night, Bladder scans were 248, [id number] and then now at 8am was 565ml. [facility] and put his hand in warm water he urinated some, post void residual was 426ml multiple times. have him up in chair now eating, he still feels the sensation like he needs to pee. Going to give him a urinal and have him try again after breakfast but would you like me to do anything else? [facility] will take him if we have to put the hatfield back in but we do that then we should get a urology consult.     Awaiting response.

## 2024-11-08 NOTE — DISCHARGE SUMMARY
ill.   - pt has had multiple psych medications trials due to agitation in the hospital and they can also affect these lab values. He is on depakote and zyprexa currently     Hypertension  - Monitor BP   - On norvasc and clonidine. Off coreg due to bradycardia   - On prn Hydralazine.   - Hypotension initially - resolved     HELENE  - resolved     Tobacco abuse disorder/history of possible COPD  - counseling provided        Alcohol withdrawal  - S/p meds. Stable now.      Agitation secondary to alcohol withdrawal, metabolic disturbances, polypharmacy  - on geodon for severe agitation  - zyprexa 15mg po qhs  - depakote 250mg TID  - psychiatry consulted - appreciate recs.     Constipation   Fleet enema  Colace prn     Hypernatremia  - resolved.      UTI  - s/p Antibiotics.      Initial H and P and Hospital course:-    69-year-old male coming into the hospital for difficulty swallowing.  In the ER, patient found to have tongue swelling, trouble swallowing with acute distress, tachycardia, ill-appearing status.  Symptoms began earlier today at 6 AM.  He noticed severe swelling of his throat.  Patient denies any allergies, medications.  Patient is not on any ACE inhibitor.  Patient was able to talk but his words were muffled.  In the ER, case was discussed with the anesthesiologist to take the patient to the OR.  Patient was intubated under the supervision of surgery due to concerns for potential tracheostomy.  Patient was given rocuronium, propofol.  He continued to wake up and thrash around for which reason Versed 60 mg IV push was ordered followed by fentanyl 50 mg IV push.  He was then put on fentanyl drip.  His blood pressure did drop after this.  ICU ordered fluid boluses.  He does have a history of COPD, tobacco abuse.  He was transferred to the ICU thereafter.     Physical Exam:-  Vitals:   Patient Vitals for the past 24 hrs:   BP Temp Temp src Pulse Resp SpO2 Weight   11/08/24 0600 -- -- -- (!) 102 19 91 % --  significant change in the appearance of the chest.     XR CHEST PORTABLE    Result Date: 10/9/2024  EXAMINATION: ONE XRAY VIEW OF THE CHEST 10/9/2024 11:26 am COMPARISON: None. HISTORY: ORDERING SYSTEM PROVIDED HISTORY: placement ET TECHNOLOGIST PROVIDED HISTORY: placement ET Reason for Exam: allergic reaction, ET placement FINDINGS: There are bibasilar infiltrates.  There is no pneumothorax.  The mediastinal structures are unremarkable.  The upper abdomen unremarkable.  The extrathoracic soft tissues are unremarkable.  There is an endotracheal tube with the tip in the midtrachea.     Bibasilar infiltrates. Endotracheal tube with the tip in the midtrachea.        Follow-up scheduled after discharge :-    in the next few days with Ramakrishna Silver MD  in the next few days Cardiology, Pulmonary, Psychiatry, Neurology    Consultations during this hospital stay:-  [] NONE [x] Cardiology  [] Nephrology  [] Hemo onco   [] GI   [x] ID  [] Endocrine  [x] Pulm    [x] Neuro    [x] Psych   [x] Urology  [] ENT   [] G SURGERY   []Ortho    [x]CV surg    [] Palliative  [] Hospice [] Pain management   []    []TCU   [] PT/OT  OTHERS:-    Disposition: SNF  Condition at Discharge: Stable    Time Spent:- 50 minutes    Electronically signed by Mohsin Mohammad Reza, MD on 11/8/2024 at 10:17 AM  Discharging Hospitalist

## 2024-11-08 NOTE — PROGRESS NOTES
Dr. Hoyos called wanted patient bladder scanned before discharge. RN scanned bladder patient had 686ml. Took patient to restroom and he was only able to dribble small amount of urine. RN then sent message via [employer] to Dr. Hoyos about patient results. Dr. Hoyos called back and gave verbal order over phone to place hatfield and would follow up out patient at [facility].     RN inserted hatfield, 500 ml immediate return of clear onelia urine. Some resistance felt at prostate gland. Dr. Hoyos arrived in room and would like a follow up in 1 week at  his [facility].

## 2024-11-09 ENCOUNTER — HOSPITAL ENCOUNTER (OUTPATIENT)
Age: 69
Setting detail: SPECIMEN
Discharge: HOME | End: 2024-11-09

## 2024-11-09 LAB
ALBUMIN SERPL-MCNC: 2.9 G/DL (ref 3.5–5.2)
ALBUMIN/GLOB SERPL: 1 {RATIO} (ref 1–2.5)
ALP SERPL-CCNC: 85 U/L (ref 40–129)
ALT SERPL-CCNC: 14 U/L (ref 10–50)
ANION GAP SERPL CALCULATED.3IONS-SCNC: 9 MMOL/L (ref 9–16)
AST SERPL-CCNC: 18 U/L (ref 10–50)
BASOPHILS # BLD: 0.05 K/UL (ref 0–0.2)
BASOPHILS NFR BLD: 1 % (ref 0–2)
BILIRUB SERPL-MCNC: <0.2 MG/DL (ref 0–1.2)
BUN SERPL-MCNC: 7 MG/DL (ref 8–23)
CALCIUM SERPL-MCNC: 8.7 MG/DL (ref 8.8–10.2)
CHLORIDE SERPL-SCNC: 101 MMOL/L (ref 98–107)
CO2 SERPL-SCNC: 29 MMOL/L (ref 20–31)
CREAT SERPL-MCNC: 0.6 MG/DL (ref 0.7–1.2)
EOSINOPHIL # BLD: 0.43 K/UL (ref 0–0.44)
EOSINOPHILS RELATIVE PERCENT: 8 % (ref 1–4)
ERYTHROCYTE [DISTWIDTH] IN BLOOD BY AUTOMATED COUNT: 11.5 % (ref 11.8–14.4)
GFR, ESTIMATED: >90 ML/MIN/1.73M2
GLUCOSE SERPL-MCNC: 136 MG/DL (ref 82–115)
HCT VFR BLD AUTO: 37.3 % (ref 40.7–50.3)
HGB BLD-MCNC: 12.1 G/DL (ref 13–17)
IMM GRANULOCYTES # BLD AUTO: 0.06 K/UL (ref 0–0.3)
IMM GRANULOCYTES NFR BLD: 1 %
LYMPHOCYTES NFR BLD: 1.61 K/UL (ref 1.1–3.7)
LYMPHOCYTES RELATIVE PERCENT: 31 % (ref 24–43)
MCH RBC QN AUTO: 32.5 PG (ref 25.2–33.5)
MCHC RBC AUTO-ENTMCNC: 32.4 G/DL (ref 28.4–34.8)
MCV RBC AUTO: 100.3 FL (ref 82.6–102.9)
MONOCYTES NFR BLD: 0.47 K/UL (ref 0.1–1.2)
MONOCYTES NFR BLD: 9 % (ref 3–12)
NEUTROPHILS NFR BLD: 50 % (ref 36–65)
NEUTS SEG NFR BLD: 2.64 K/UL (ref 1.5–8.1)
NRBC BLD-RTO: 0 PER 100 WBC
PLATELET # BLD AUTO: 274 K/UL (ref 138–453)
PMV BLD AUTO: 9.8 FL (ref 8.1–13.5)
POTASSIUM SERPL-SCNC: 3.8 MMOL/L (ref 3.7–5.3)
PROT SERPL-MCNC: 5.8 G/DL (ref 6.6–8.7)
RBC # BLD AUTO: 3.72 M/UL (ref 4.21–5.77)
SODIUM SERPL-SCNC: 139 MMOL/L (ref 136–145)
WBC OTHER # BLD: 5.3 K/UL (ref 3.5–11.3)

## 2024-11-09 PROCEDURE — 85025 COMPLETE CBC W/AUTO DIFF WBC: CPT

## 2024-11-09 PROCEDURE — 36415 COLL VENOUS BLD VENIPUNCTURE: CPT

## 2024-11-09 PROCEDURE — 80053 COMPREHEN METABOLIC PANEL: CPT

## 2024-11-09 PROCEDURE — P9603 ONE-WAY ALLOW PRORATED MILES: HCPCS

## 2024-11-12 ENCOUNTER — HOSPITAL ENCOUNTER (OUTPATIENT)
Age: 69
Setting detail: SPECIMEN
Discharge: HOME | End: 2024-11-12

## 2024-11-12 LAB
ALBUMIN SERPL-MCNC: 3.2 G/DL (ref 3.5–5.2)
ALBUMIN/GLOB SERPL: 1 {RATIO} (ref 1–2.5)
ALP SERPL-CCNC: 95 U/L (ref 40–129)
ALT SERPL-CCNC: 15 U/L (ref 10–50)
ANION GAP SERPL CALCULATED.3IONS-SCNC: 11 MMOL/L (ref 9–16)
AST SERPL-CCNC: 19 U/L (ref 10–50)
BASOPHILS # BLD: 0.04 K/UL (ref 0–0.2)
BASOPHILS NFR BLD: 1 % (ref 0–2)
BILIRUB SERPL-MCNC: <0.2 MG/DL (ref 0–1.2)
BUN SERPL-MCNC: 8 MG/DL (ref 8–23)
CALCIUM SERPL-MCNC: 8.8 MG/DL (ref 8.8–10.2)
CHLORIDE SERPL-SCNC: 101 MMOL/L (ref 98–107)
CO2 SERPL-SCNC: 31 MMOL/L (ref 20–31)
CREAT SERPL-MCNC: 0.6 MG/DL (ref 0.7–1.2)
EOSINOPHIL # BLD: 0.27 K/UL (ref 0–0.44)
EOSINOPHILS RELATIVE PERCENT: 4 % (ref 1–4)
ERYTHROCYTE [DISTWIDTH] IN BLOOD BY AUTOMATED COUNT: 11.7 % (ref 11.8–14.4)
GFR, ESTIMATED: >90 ML/MIN/1.73M2
GLUCOSE SERPL-MCNC: 90 MG/DL (ref 82–115)
HCT VFR BLD AUTO: 37.9 % (ref 40.7–50.3)
HGB BLD-MCNC: 12.1 G/DL (ref 13–17)
IMM GRANULOCYTES # BLD AUTO: 0.09 K/UL (ref 0–0.3)
IMM GRANULOCYTES NFR BLD: 2 %
LYMPHOCYTES NFR BLD: 1.74 K/UL (ref 1.1–3.7)
LYMPHOCYTES RELATIVE PERCENT: 28 % (ref 24–43)
MCH RBC QN AUTO: 32.3 PG (ref 25.2–33.5)
MCHC RBC AUTO-ENTMCNC: 31.9 G/DL (ref 28.4–34.8)
MCV RBC AUTO: 101.1 FL (ref 82.6–102.9)
MONOCYTES NFR BLD: 0.51 K/UL (ref 0.1–1.2)
MONOCYTES NFR BLD: 8 % (ref 3–12)
NEUTROPHILS NFR BLD: 57 % (ref 36–65)
NEUTS SEG NFR BLD: 3.52 K/UL (ref 1.5–8.1)
NRBC BLD-RTO: 0 PER 100 WBC
PLATELET # BLD AUTO: 304 K/UL (ref 138–453)
PMV BLD AUTO: 9.8 FL (ref 8.1–13.5)
POTASSIUM SERPL-SCNC: 3.3 MMOL/L (ref 3.7–5.3)
PROT SERPL-MCNC: 6.3 G/DL (ref 6.6–8.7)
RBC # BLD AUTO: 3.75 M/UL (ref 4.21–5.77)
SODIUM SERPL-SCNC: 142 MMOL/L (ref 136–145)
WBC OTHER # BLD: 6.2 K/UL (ref 3.5–11.3)

## 2024-11-12 PROCEDURE — 85025 COMPLETE CBC W/AUTO DIFF WBC: CPT

## 2024-11-12 PROCEDURE — 80053 COMPREHEN METABOLIC PANEL: CPT

## 2024-11-12 PROCEDURE — P9603 ONE-WAY ALLOW PRORATED MILES: HCPCS

## 2024-11-12 PROCEDURE — 36415 COLL VENOUS BLD VENIPUNCTURE: CPT

## 2024-11-13 ENCOUNTER — HOSPITAL ENCOUNTER (OUTPATIENT)
Age: 69
Setting detail: SPECIMEN
Discharge: HOME | End: 2024-11-13

## 2024-11-13 LAB — POTASSIUM SERPL-SCNC: 4 MMOL/L (ref 3.7–5.3)

## 2024-11-13 PROCEDURE — 84132 ASSAY OF SERUM POTASSIUM: CPT

## 2024-11-13 PROCEDURE — P9603 ONE-WAY ALLOW PRORATED MILES: HCPCS

## 2024-11-15 ENCOUNTER — HOSPITAL ENCOUNTER (OUTPATIENT)
Age: 69
Setting detail: SPECIMEN
Discharge: HOME | End: 2024-11-15

## 2024-11-15 LAB
ALBUMIN SERPL-MCNC: 3.5 G/DL (ref 3.5–5.2)
ALBUMIN/GLOB SERPL: 1.1 {RATIO} (ref 1–2.5)
ALP SERPL-CCNC: 104 U/L (ref 40–129)
ALT SERPL-CCNC: 14 U/L (ref 10–50)
ANION GAP SERPL CALCULATED.3IONS-SCNC: 14 MMOL/L (ref 9–16)
AST SERPL-CCNC: 19 U/L (ref 10–50)
BASOPHILS # BLD: 0.08 K/UL (ref 0–0.2)
BASOPHILS NFR BLD: 1 % (ref 0–2)
BILIRUB SERPL-MCNC: <0.2 MG/DL (ref 0–1.2)
BUN SERPL-MCNC: 13 MG/DL (ref 8–23)
CALCIUM SERPL-MCNC: 9 MG/DL (ref 8.8–10.2)
CHLORIDE SERPL-SCNC: 102 MMOL/L (ref 98–107)
CO2 SERPL-SCNC: 28 MMOL/L (ref 20–31)
CREAT SERPL-MCNC: 0.8 MG/DL (ref 0.7–1.2)
EOSINOPHIL # BLD: 0.31 K/UL (ref 0–0.44)
EOSINOPHILS RELATIVE PERCENT: 4 % (ref 1–4)
ERYTHROCYTE [DISTWIDTH] IN BLOOD BY AUTOMATED COUNT: 12.1 % (ref 11.8–14.4)
GFR, ESTIMATED: >90 ML/MIN/1.73M2
GLUCOSE SERPL-MCNC: 94 MG/DL (ref 82–115)
HCT VFR BLD AUTO: 38.1 % (ref 40.7–50.3)
HGB BLD-MCNC: 12.2 G/DL (ref 13–17)
IMM GRANULOCYTES # BLD AUTO: 0.14 K/UL (ref 0–0.3)
IMM GRANULOCYTES NFR BLD: 2 %
LYMPHOCYTES NFR BLD: 2.2 K/UL (ref 1.1–3.7)
LYMPHOCYTES RELATIVE PERCENT: 28 % (ref 24–43)
MCH RBC QN AUTO: 32.4 PG (ref 25.2–33.5)
MCHC RBC AUTO-ENTMCNC: 32 G/DL (ref 28.4–34.8)
MCV RBC AUTO: 101.1 FL (ref 82.6–102.9)
MONOCYTES NFR BLD: 0.61 K/UL (ref 0.1–1.2)
MONOCYTES NFR BLD: 8 % (ref 3–12)
NEUTROPHILS NFR BLD: 57 % (ref 36–65)
NEUTS SEG NFR BLD: 4.58 K/UL (ref 1.5–8.1)
NRBC BLD-RTO: 0 PER 100 WBC
PLATELET # BLD AUTO: 338 K/UL (ref 138–453)
PMV BLD AUTO: 9.7 FL (ref 8.1–13.5)
POTASSIUM SERPL-SCNC: 3.8 MMOL/L (ref 3.7–5.3)
PROT SERPL-MCNC: 6.8 G/DL (ref 6.6–8.7)
RBC # BLD AUTO: 3.77 M/UL (ref 4.21–5.77)
SODIUM SERPL-SCNC: 143 MMOL/L (ref 136–145)
WBC OTHER # BLD: 7.9 K/UL (ref 3.5–11.3)

## 2024-11-15 PROCEDURE — 85025 COMPLETE CBC W/AUTO DIFF WBC: CPT

## 2024-11-15 PROCEDURE — 80053 COMPREHEN METABOLIC PANEL: CPT

## 2024-11-15 PROCEDURE — 36415 COLL VENOUS BLD VENIPUNCTURE: CPT

## 2024-11-15 PROCEDURE — P9603 ONE-WAY ALLOW PRORATED MILES: HCPCS

## 2024-11-19 ENCOUNTER — HOSPITAL ENCOUNTER (OUTPATIENT)
Age: 69
Setting detail: SPECIMEN
Discharge: HOME | End: 2024-11-19

## 2024-11-19 LAB
ALBUMIN SERPL-MCNC: 3.3 G/DL (ref 3.5–5.2)
ALBUMIN/GLOB SERPL: 1 {RATIO} (ref 1–2.5)
ALP SERPL-CCNC: 96 U/L (ref 40–129)
ALT SERPL-CCNC: 8 U/L (ref 10–50)
ANION GAP SERPL CALCULATED.3IONS-SCNC: 10 MMOL/L (ref 9–16)
AST SERPL-CCNC: 18 U/L (ref 10–50)
BASOPHILS # BLD: 0.08 K/UL (ref 0–0.2)
BASOPHILS NFR BLD: 1 % (ref 0–2)
BILIRUB SERPL-MCNC: 0.2 MG/DL (ref 0–1.2)
BNP SERPL-MCNC: 191 PG/ML (ref 0–125)
BUN SERPL-MCNC: 14 MG/DL (ref 8–23)
CALCIUM SERPL-MCNC: 9.1 MG/DL (ref 8.8–10.2)
CHLORIDE SERPL-SCNC: 103 MMOL/L (ref 98–107)
CO2 SERPL-SCNC: 29 MMOL/L (ref 20–31)
CREAT SERPL-MCNC: 0.6 MG/DL (ref 0.7–1.2)
EOSINOPHIL # BLD: 0.29 K/UL (ref 0–0.44)
EOSINOPHILS RELATIVE PERCENT: 3 % (ref 1–4)
ERYTHROCYTE [DISTWIDTH] IN BLOOD BY AUTOMATED COUNT: 12.7 % (ref 11.8–14.4)
GFR, ESTIMATED: >90 ML/MIN/1.73M2
GLUCOSE SERPL-MCNC: 96 MG/DL (ref 82–115)
HCT VFR BLD AUTO: 36.5 % (ref 40.7–50.3)
HGB BLD-MCNC: 11.8 G/DL (ref 13–17)
IMM GRANULOCYTES # BLD AUTO: 0.1 K/UL (ref 0–0.3)
IMM GRANULOCYTES NFR BLD: 1 %
LYMPHOCYTES NFR BLD: 1.49 K/UL (ref 1.1–3.7)
LYMPHOCYTES RELATIVE PERCENT: 15 % (ref 24–43)
MCH RBC QN AUTO: 32.4 PG (ref 25.2–33.5)
MCHC RBC AUTO-ENTMCNC: 32.3 G/DL (ref 28.4–34.8)
MCV RBC AUTO: 100.3 FL (ref 82.6–102.9)
MONOCYTES NFR BLD: 0.53 K/UL (ref 0.1–1.2)
MONOCYTES NFR BLD: 5 % (ref 3–12)
NEUTROPHILS NFR BLD: 75 % (ref 36–65)
NEUTS SEG NFR BLD: 7.34 K/UL (ref 1.5–8.1)
NRBC BLD-RTO: 0 PER 100 WBC
PLATELET # BLD AUTO: 366 K/UL (ref 138–453)
PMV BLD AUTO: 9.8 FL (ref 8.1–13.5)
POTASSIUM SERPL-SCNC: 3.8 MMOL/L (ref 3.7–5.3)
PROT SERPL-MCNC: 6.6 G/DL (ref 6.6–8.7)
RBC # BLD AUTO: 3.64 M/UL (ref 4.21–5.77)
SODIUM SERPL-SCNC: 141 MMOL/L (ref 136–145)
WBC OTHER # BLD: 9.8 K/UL (ref 3.5–11.3)

## 2024-11-19 PROCEDURE — 36415 COLL VENOUS BLD VENIPUNCTURE: CPT

## 2024-11-19 PROCEDURE — 80053 COMPREHEN METABOLIC PANEL: CPT

## 2024-11-19 PROCEDURE — P9603 ONE-WAY ALLOW PRORATED MILES: HCPCS

## 2024-11-19 PROCEDURE — 85025 COMPLETE CBC W/AUTO DIFF WBC: CPT

## 2024-11-19 PROCEDURE — 83880 ASSAY OF NATRIURETIC PEPTIDE: CPT

## 2025-05-20 NOTE — PLAN OF CARE
Problem: Discharge Planning  Goal: Discharge to home or other facility with appropriate resources  Outcome: Progressing  Flowsheets (Taken 10/30/2024 0800)  Discharge to home or other facility with appropriate resources: Identify barriers to discharge with patient and caregiver     Problem: Respiratory - Adult  Goal: Achieves optimal ventilation and oxygenation  Outcome: Progressing  Flowsheets (Taken 10/30/2024 0800)  Achieves optimal ventilation and oxygenation: Assess for changes in respiratory status     Problem: Neurosensory - Adult  Goal: Achieves stable or improved neurological status  Outcome: Progressing     Problem: Cardiovascular - Adult  Goal: Maintains optimal cardiac output and hemodynamic stability  Outcome: Progressing  Flowsheets (Taken 10/30/2024 0800)  Maintains optimal cardiac output and hemodynamic stability: Monitor blood pressure and heart rate     Problem: Skin/Tissue Integrity - Adult  Goal: Skin integrity remains intact  Outcome: Progressing  Flowsheets (Taken 10/30/2024 0800)  Skin Integrity Remains Intact: Monitor for areas of redness and/or skin breakdown     Problem: Musculoskeletal - Adult  Goal: Return mobility to safest level of function  Outcome: Progressing  Flowsheets (Taken 10/30/2024 0800)  Return Mobility to Safest Level of Function: Assess patient stability and activity tolerance for standing, transferring and ambulating with or without assistive devices     Problem: Gastrointestinal - Adult  Goal: Maintains adequate nutritional intake  Outcome: Progressing  Flowsheets (Taken 10/30/2024 0800)  Maintains adequate nutritional intake: Monitor percentage of each meal consumed     Problem: Genitourinary - Adult  Goal: Absence of urinary retention  Outcome: Progressing  Flowsheets (Taken 10/30/2024 0800)  Absence of urinary retention: Assess patient’s ability to void and empty bladder     Problem: Pain  Goal: Verbalizes/displays adequate comfort level or baseline comfort  nutrition, hydration, elimination, sleep and activity  6. If unable to ensure safety without constant attention obtain sitter and review sitter guidelines with assigned personnel  7. Initiate Psychosocial CNS and Spiritual Care consult, as indicated  Outcome: Progressing      No